# Patient Record
Sex: FEMALE | Race: WHITE | NOT HISPANIC OR LATINO | Employment: OTHER | URBAN - NONMETROPOLITAN AREA
[De-identification: names, ages, dates, MRNs, and addresses within clinical notes are randomized per-mention and may not be internally consistent; named-entity substitution may affect disease eponyms.]

---

## 2017-03-04 ENCOUNTER — APPOINTMENT (OUTPATIENT)
Dept: CT IMAGING | Facility: HOSPITAL | Age: 72
End: 2017-03-04

## 2017-03-04 ENCOUNTER — HOSPITAL ENCOUNTER (EMERGENCY)
Facility: HOSPITAL | Age: 72
Discharge: HOME | End: 2017-03-04
Attending: EMERGENCY MEDICINE | Admitting: EMERGENCY MEDICINE

## 2017-03-04 ENCOUNTER — APPOINTMENT (OUTPATIENT)
Dept: GENERAL RADIOLOGY | Facility: HOSPITAL | Age: 72
End: 2017-03-04

## 2017-03-04 VITALS
SYSTOLIC BLOOD PRESSURE: 135 MMHG | BODY MASS INDEX: 33.8 KG/M2 | DIASTOLIC BLOOD PRESSURE: 56 MMHG | OXYGEN SATURATION: 98 % | WEIGHT: 198 LBS | RESPIRATION RATE: 18 BRPM | TEMPERATURE: 97.9 F | HEIGHT: 64 IN | HEART RATE: 65 BPM
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
## 2017-03-04 DIAGNOSIS — S30.0XXA PELVIC CONTUSION, INITIAL ENCOUNTER: ICD-10-CM

## 2017-03-04 DIAGNOSIS — R73.9 HYPERGLYCEMIA: ICD-10-CM

## 2017-03-04 DIAGNOSIS — N28.9 RENAL INSUFFICIENCY: ICD-10-CM

## 2017-03-04 LAB
ALBUMIN SERPL-MCNC: 3.9 G/DL (ref 3.5–5)
ALBUMIN/GLOB SERPL: 1.3 G/DL (ref 1.1–2.5)
ALP SERPL-CCNC: 84 U/L (ref 24–120)
ALT SERPL W P-5'-P-CCNC: 45 U/L (ref 0–54)
ANION GAP SERPL CALCULATED.3IONS-SCNC: 11 MMOL/L (ref 4–13)
AST SERPL-CCNC: 55 U/L (ref 7–45)
BASOPHILS # BLD AUTO: 0.03 10*3/MM3 (ref 0–0.2)
BASOPHILS NFR BLD AUTO: 0.5 % (ref 0–2)
BILIRUB SERPL-MCNC: 0.2 MG/DL (ref 0.1–1)
BUN BLD-MCNC: 42 MG/DL (ref 5–21)
BUN/CREAT SERPL: 28.6 (ref 7–25)
CALCIUM SPEC-SCNC: 9.7 MG/DL (ref 8.4–10.4)
CHLORIDE SERPL-SCNC: 101 MMOL/L (ref 98–110)
CO2 SERPL-SCNC: 28 MMOL/L (ref 24–31)
CREAT BLD-MCNC: 1.47 MG/DL (ref 0.5–1.4)
DEPRECATED RDW RBC AUTO: 42.6 FL (ref 40–54)
EOSINOPHIL # BLD AUTO: 0.32 10*3/MM3 (ref 0–0.7)
EOSINOPHIL NFR BLD AUTO: 5.3 % (ref 0–4)
ERYTHROCYTE [DISTWIDTH] IN BLOOD BY AUTOMATED COUNT: 13.5 % (ref 12–15)
GFR SERPL CREATININE-BSD FRML MDRD: 35 ML/MIN/1.73
GLOBULIN UR ELPH-MCNC: 3 GM/DL
GLUCOSE BLD-MCNC: 232 MG/DL (ref 70–100)
HCT VFR BLD AUTO: 33.8 % (ref 37–47)
HGB BLD-MCNC: 11.5 G/DL (ref 12–16)
IMM GRANULOCYTES # BLD: 0.03 10*3/MM3 (ref 0–0.03)
IMM GRANULOCYTES NFR BLD: 0.5 % (ref 0–5)
LYMPHOCYTES # BLD AUTO: 1.74 10*3/MM3 (ref 0.72–4.86)
LYMPHOCYTES NFR BLD AUTO: 28.7 % (ref 15–45)
MCH RBC QN AUTO: 29.9 PG (ref 28–32)
MCHC RBC AUTO-ENTMCNC: 34 G/DL (ref 33–36)
MCV RBC AUTO: 87.8 FL (ref 82–98)
MONOCYTES # BLD AUTO: 0.62 10*3/MM3 (ref 0.19–1.3)
MONOCYTES NFR BLD AUTO: 10.2 % (ref 4–12)
NEUTROPHILS # BLD AUTO: 3.33 10*3/MM3 (ref 1.87–8.4)
NEUTROPHILS NFR BLD AUTO: 54.8 % (ref 39–78)
PLATELET # BLD AUTO: 200 10*3/MM3 (ref 130–400)
PMV BLD AUTO: 10.1 FL (ref 6–12)
POTASSIUM BLD-SCNC: 3.9 MMOL/L (ref 3.5–5.3)
PROT SERPL-MCNC: 6.9 G/DL (ref 6.3–8.7)
RBC # BLD AUTO: 3.85 10*6/MM3 (ref 4.2–5.4)
SODIUM BLD-SCNC: 140 MMOL/L (ref 135–145)
WBC NRBC COR # BLD: 6.07 10*3/MM3 (ref 4.8–10.8)

## 2017-03-04 PROCEDURE — 99285 EMERGENCY DEPT VISIT HI MDM: CPT

## 2017-03-04 PROCEDURE — 72170 X-RAY EXAM OF PELVIS: CPT

## 2017-03-04 PROCEDURE — 85025 COMPLETE CBC W/AUTO DIFF WBC: CPT | Performed by: EMERGENCY MEDICINE

## 2017-03-04 PROCEDURE — 72131 CT LUMBAR SPINE W/O DYE: CPT

## 2017-03-04 PROCEDURE — 72192 CT PELVIS W/O DYE: CPT

## 2017-03-04 PROCEDURE — 80053 COMPREHEN METABOLIC PANEL: CPT | Performed by: EMERGENCY MEDICINE

## 2017-03-04 RX ORDER — TRAMADOL HYDROCHLORIDE 50 MG/1
50 TABLET ORAL EVERY 6 HOURS PRN
Status: ON HOLD | COMMUNITY
End: 2018-03-05

## 2017-03-04 NOTE — ED PROVIDER NOTES
Subjective   Patient is a 71 y.o. female presenting with fall.   Fall   Mechanism of injury: fall    Injury location:  Pelvis  Pelvic injury location:  Pelvis  Incident location:  Nursing home  Arrived directly from scene: yes    Fall:     Fall occurred: from a wheelchair.    Impact surface:  Hard floor    Entrapped after fall: no    Prior to arrival data:     Bystander interventions:  None    Airway condition since incident:  Stable    Breathing condition since incident:  Stable    Circulation condition since incident:  Stable    Mental status condition since incident:  Stable    Disability condition since incident:  Stable  Associated symptoms: no abdominal pain, no back pain, no blindness, no headaches, no hearing loss, no loss of consciousness, no neck pain, no seizures and no vomiting    Risk factors: no AICD, no anticoagulation therapy, no asthma, no diabetes, no dialysis, no hemophilia, no kidney disease, no past MI, not pregnant and no steroid use        Review of Systems   Constitutional: Negative.    HENT: Negative.  Negative for hearing loss.    Eyes: Negative.  Negative for blindness.   Respiratory: Negative.    Cardiovascular: Negative.    Gastrointestinal: Negative.  Negative for abdominal pain and vomiting.   Musculoskeletal: Negative.  Negative for back pain and neck pain.   Skin: Negative.    Neurological: Negative.  Negative for seizures, loss of consciousness and headaches.   All other systems reviewed and are negative.      Past Medical History   Diagnosis Date   • COPD (chronic obstructive pulmonary disease)    • Coronary artery disease    • Diabetes mellitus    • Hypertension    • Peptic ulcer disease    • Renal insufficiency    • Venous insufficiency        Allergies   Allergen Reactions   • Aspirin Rash   • Tetracyclines & Related Rash       Past Surgical History   Procedure Laterality Date   • Cholecystectomy     • Colon surgery         History reviewed. No pertinent family  history.    Social History     Social History   • Marital status:      Spouse name: N/A   • Number of children: N/A   • Years of education: N/A     Social History Main Topics   • Smoking status: Former Smoker   • Smokeless tobacco: None   • Alcohol use No   • Drug use: No   • Sexual activity: Not Asked     Other Topics Concern   • None     Social History Narrative           Objective   Physical Exam   Constitutional: She is oriented to person, place, and time. She appears well-developed and well-nourished.   HENT:   Head: Normocephalic and atraumatic.   Eyes: Conjunctivae and EOM are normal. Pupils are equal, round, and reactive to light.   Neck: Normal range of motion. Neck supple.   Cardiovascular: Normal rate, regular rhythm, normal heart sounds and intact distal pulses.    Pulmonary/Chest: Effort normal and breath sounds normal.   Abdominal: Soft. Bowel sounds are normal.   Musculoskeletal: Normal range of motion.        Cervical back: Normal.        Thoracic back: Normal.        Lumbar back: She exhibits bony tenderness and pain. She exhibits normal range of motion, no tenderness, no swelling, no edema, no deformity and no spasm.   Neurological: She is alert and oriented to person, place, and time. She has normal reflexes.   Skin: Skin is warm and dry.   Psychiatric: She has a normal mood and affect.   Nursing note and vitals reviewed.      Procedures         ED Course  ED Course   Comment By Time   CTs are negative creatinine is slightly up the patient states that she has got some renal insufficiency all discharge her home with follow-up Andres Oropeza MD 03/04 3470                  [facility]    Final diagnoses:   Fall, initial encounter   Pelvic contusion, initial encounter   Renal insufficiency   Hyperglycemia            Andres Oropeza MD  03/04/17 [id number]

## 2017-03-04 NOTE — ED NOTES
Consulted with charge nurse regarding discharge and transport for patient.      Carissa Bacon RN  03/04/17 [id number]

## 2017-08-29 ENCOUNTER — TRANSCRIBE ORDERS (OUTPATIENT)
Dept: ADMINISTRATIVE | Facility: HOSPITAL | Age: 72
End: 2017-08-29

## 2017-08-29 DIAGNOSIS — N64.4 BREAST PAIN, RIGHT: Primary | ICD-10-CM

## 2017-09-07 ENCOUNTER — HOSPITAL ENCOUNTER (OUTPATIENT)
Dept: MAMMOGRAPHY | Facility: HOSPITAL | Age: 72
Discharge: HOME | End: 2017-09-07
Admitting: GENERAL PRACTICE

## 2017-09-07 DIAGNOSIS — N64.4 BREAST PAIN, RIGHT: ICD-10-CM

## 2017-09-07 PROCEDURE — G0279 TOMOSYNTHESIS, MAMMO: HCPCS

## 2017-09-07 PROCEDURE — G0204 DX MAMMO INCL CAD BI: HCPCS

## 2017-11-17 ENCOUNTER — HOSPITAL ENCOUNTER (INPATIENT)
Facility: HOSPITAL | Age: 72
LOS: 5 days | Discharge: NURSING, REHAB OR LONG-TERM CARE | End: 2017-11-22
Attending: EMERGENCY MEDICINE | Admitting: FAMILY MEDICINE

## 2017-11-17 ENCOUNTER — APPOINTMENT (OUTPATIENT)
Dept: GENERAL RADIOLOGY | Facility: HOSPITAL | Age: 72
End: 2017-11-17

## 2017-11-17 ENCOUNTER — APPOINTMENT (OUTPATIENT)
Dept: CT IMAGING | Facility: HOSPITAL | Age: 72
End: 2017-11-17

## 2017-11-17 DIAGNOSIS — R50.9 FEVER, UNSPECIFIED FEVER CAUSE: ICD-10-CM

## 2017-11-17 DIAGNOSIS — Z74.09 IMPAIRED MOBILITY: ICD-10-CM

## 2017-11-17 DIAGNOSIS — N18.30 ACUTE RENAL FAILURE WITH ACUTE TUBULAR NECROSIS SUPERIMPOSED ON STAGE 3 CHRONIC KIDNEY DISEASE (HCC): ICD-10-CM

## 2017-11-17 DIAGNOSIS — N39.0 URINARY TRACT INFECTION WITHOUT HEMATURIA, SITE UNSPECIFIED: Primary | ICD-10-CM

## 2017-11-17 DIAGNOSIS — N17.0 ACUTE RENAL FAILURE WITH ACUTE TUBULAR NECROSIS SUPERIMPOSED ON STAGE 3 CHRONIC KIDNEY DISEASE (HCC): ICD-10-CM

## 2017-11-17 LAB
ALBUMIN SERPL-MCNC: 3.5 G/DL (ref 3.5–5)
ALBUMIN/GLOB SERPL: 1.1 G/DL (ref 1.1–2.5)
ALP SERPL-CCNC: 80 U/L (ref 24–120)
ALT SERPL W P-5'-P-CCNC: 53 U/L (ref 0–54)
ANION GAP SERPL CALCULATED.3IONS-SCNC: 12 MMOL/L (ref 4–13)
AST SERPL-CCNC: 104 U/L (ref 7–45)
BACTERIA BLD CULT: ABNORMAL
BACTERIA UR QL AUTO: ABNORMAL /HPF
BASOPHILS # BLD AUTO: 0.01 10*3/MM3 (ref 0–0.2)
BASOPHILS NFR BLD AUTO: 0.1 % (ref 0–2)
BILIRUB SERPL-MCNC: 0.6 MG/DL (ref 0.1–1)
BILIRUB UR QL STRIP: NEGATIVE
BUN BLD-MCNC: 76 MG/DL (ref 5–21)
BUN/CREAT SERPL: 24.8 (ref 7–25)
CALCIUM SPEC-SCNC: 8.2 MG/DL (ref 8.4–10.4)
CHLORIDE SERPL-SCNC: 100 MMOL/L (ref 98–110)
CLARITY UR: ABNORMAL
CO2 SERPL-SCNC: 28 MMOL/L (ref 24–31)
COLOR UR: YELLOW
CREAT BLD-MCNC: 3.06 MG/DL (ref 0.5–1.4)
D-LACTATE SERPL-SCNC: 1.2 MMOL/L (ref 0.5–2)
DEPRECATED RDW RBC AUTO: 45.9 FL (ref 40–54)
EOSINOPHIL # BLD AUTO: 0 10*3/MM3 (ref 0–0.7)
EOSINOPHIL NFR BLD AUTO: 0 % (ref 0–4)
ERYTHROCYTE [DISTWIDTH] IN BLOOD BY AUTOMATED COUNT: 14 % (ref 12–15)
GFR SERPL CREATININE-BSD FRML MDRD: 15 ML/MIN/1.73
GLOBULIN UR ELPH-MCNC: 3.1 GM/DL
GLUCOSE BLD-MCNC: 299 MG/DL (ref 70–100)
GLUCOSE BLDC GLUCOMTR-MCNC: 263 MG/DL (ref 70–130)
GLUCOSE UR STRIP-MCNC: NEGATIVE MG/DL
HCT VFR BLD AUTO: 31.4 % (ref 37–47)
HGB BLD-MCNC: 10.5 G/DL (ref 12–16)
HGB UR QL STRIP.AUTO: ABNORMAL
HOLD SPECIMEN: NORMAL
HOLD SPECIMEN: NORMAL
IMM GRANULOCYTES # BLD: 0.05 10*3/MM3 (ref 0–0.03)
IMM GRANULOCYTES NFR BLD: 0.6 % (ref 0–5)
KETONES UR QL STRIP: ABNORMAL
LEUKOCYTE ESTERASE UR QL STRIP.AUTO: ABNORMAL
LYMPHOCYTES # BLD AUTO: 0.72 10*3/MM3 (ref 0.72–4.86)
LYMPHOCYTES NFR BLD AUTO: 8.3 % (ref 15–45)
MCH RBC QN AUTO: 30.5 PG (ref 28–32)
MCHC RBC AUTO-ENTMCNC: 33.4 G/DL (ref 33–36)
MCV RBC AUTO: 91.3 FL (ref 82–98)
MONOCYTES # BLD AUTO: 1.08 10*3/MM3 (ref 0.19–1.3)
MONOCYTES NFR BLD AUTO: 12.5 % (ref 4–12)
NEUTROPHILS # BLD AUTO: 6.78 10*3/MM3 (ref 1.87–8.4)
NEUTROPHILS NFR BLD AUTO: 78.5 % (ref 39–78)
NITRITE UR QL STRIP: NEGATIVE
PH UR STRIP.AUTO: <=5 [PH] (ref 5–8)
PLATELET # BLD AUTO: 130 10*3/MM3 (ref 130–400)
PMV BLD AUTO: 10 FL (ref 6–12)
POTASSIUM BLD-SCNC: 4.2 MMOL/L (ref 3.5–5.3)
PROT SERPL-MCNC: 6.6 G/DL (ref 6.3–8.7)
PROT UR QL STRIP: ABNORMAL
RBC # BLD AUTO: 3.44 10*6/MM3 (ref 4.2–5.4)
RBC # UR: ABNORMAL /HPF
REF LAB TEST METHOD: ABNORMAL
SODIUM BLD-SCNC: 140 MMOL/L (ref 135–145)
SP GR UR STRIP: 1.02 (ref 1–1.03)
SQUAMOUS #/AREA URNS HPF: ABNORMAL /HPF
UROBILINOGEN UR QL STRIP: ABNORMAL
WBC NRBC COR # BLD: 8.64 10*3/MM3 (ref 4.8–10.8)
WBC UR QL AUTO: ABNORMAL /HPF
WHOLE BLOOD HOLD SPECIMEN: NORMAL
WHOLE BLOOD HOLD SPECIMEN: NORMAL

## 2017-11-17 PROCEDURE — 63710000001 INSULIN ISOPHANE & REGULAR PER 5 UNITS: Performed by: INTERNAL MEDICINE

## 2017-11-17 PROCEDURE — 25010000002 CEFTRIAXONE PER 250 MG: Performed by: EMERGENCY MEDICINE

## 2017-11-17 PROCEDURE — 87088 URINE BACTERIA CULTURE: CPT | Performed by: EMERGENCY MEDICINE

## 2017-11-17 PROCEDURE — 93010 ELECTROCARDIOGRAM REPORT: CPT | Performed by: INTERNAL MEDICINE

## 2017-11-17 PROCEDURE — 87040 BLOOD CULTURE FOR BACTERIA: CPT | Performed by: EMERGENCY MEDICINE

## 2017-11-17 PROCEDURE — 87186 SC STD MICRODIL/AGAR DIL: CPT | Performed by: EMERGENCY MEDICINE

## 2017-11-17 PROCEDURE — 82962 GLUCOSE BLOOD TEST: CPT

## 2017-11-17 PROCEDURE — 71010 HC CHEST PA OR AP: CPT

## 2017-11-17 PROCEDURE — 93005 ELECTROCARDIOGRAM TRACING: CPT | Performed by: EMERGENCY MEDICINE

## 2017-11-17 PROCEDURE — 99285 EMERGENCY DEPT VISIT HI MDM: CPT

## 2017-11-17 PROCEDURE — 74176 CT ABD & PELVIS W/O CONTRAST: CPT

## 2017-11-17 PROCEDURE — 80053 COMPREHEN METABOLIC PANEL: CPT | Performed by: EMERGENCY MEDICINE

## 2017-11-17 PROCEDURE — 83605 ASSAY OF LACTIC ACID: CPT | Performed by: EMERGENCY MEDICINE

## 2017-11-17 PROCEDURE — 87150 DNA/RNA AMPLIFIED PROBE: CPT | Performed by: EMERGENCY MEDICINE

## 2017-11-17 PROCEDURE — 81001 URINALYSIS AUTO W/SCOPE: CPT | Performed by: EMERGENCY MEDICINE

## 2017-11-17 PROCEDURE — 85025 COMPLETE CBC W/AUTO DIFF WBC: CPT | Performed by: EMERGENCY MEDICINE

## 2017-11-17 PROCEDURE — 25010000002 ENOXAPARIN PER 10 MG: Performed by: INTERNAL MEDICINE

## 2017-11-17 PROCEDURE — 87086 URINE CULTURE/COLONY COUNT: CPT | Performed by: EMERGENCY MEDICINE

## 2017-11-17 PROCEDURE — P9612 CATHETERIZE FOR URINE SPEC: HCPCS

## 2017-11-17 RX ORDER — TRAMADOL HYDROCHLORIDE 50 MG/1
50 TABLET ORAL EVERY 6 HOURS PRN
Status: DISCONTINUED | OUTPATIENT
Start: 2017-11-17 | End: 2017-11-22 | Stop reason: HOSPADM

## 2017-11-17 RX ORDER — SODIUM CHLORIDE 9 MG/ML
125 INJECTION, SOLUTION INTRAVENOUS CONTINUOUS
Status: DISCONTINUED | OUTPATIENT
Start: 2017-11-17 | End: 2017-11-18

## 2017-11-17 RX ORDER — PANTOPRAZOLE SODIUM 40 MG/1
40 TABLET, DELAYED RELEASE ORAL EVERY 12 HOURS
Status: ON HOLD | COMMUNITY
End: 2018-03-07

## 2017-11-17 RX ORDER — ACETAMINOPHEN 325 MG/1
650 TABLET ORAL EVERY 4 HOURS PRN
Status: DISCONTINUED | OUTPATIENT
Start: 2017-11-17 | End: 2017-11-22 | Stop reason: HOSPADM

## 2017-11-17 RX ORDER — MULTIVIT,CALC,MINS/IRON/FOLIC 9MG-400MCG
1 TABLET ORAL DAILY
Status: DISCONTINUED | OUTPATIENT
Start: 2017-11-17 | End: 2017-11-22 | Stop reason: HOSPADM

## 2017-11-17 RX ORDER — CLOPIDOGREL BISULFATE 75 MG/1
75 TABLET ORAL DAILY
Status: DISCONTINUED | OUTPATIENT
Start: 2017-11-17 | End: 2017-11-22 | Stop reason: HOSPADM

## 2017-11-17 RX ORDER — CHOLESTYRAMINE LIGHT 4 G/5.7G
1 POWDER, FOR SUSPENSION ORAL DAILY
Status: DISCONTINUED | OUTPATIENT
Start: 2017-11-17 | End: 2017-11-22 | Stop reason: HOSPADM

## 2017-11-17 RX ORDER — SODIUM CHLORIDE 0.9 % (FLUSH) 0.9 %
1-10 SYRINGE (ML) INJECTION AS NEEDED
Status: DISCONTINUED | OUTPATIENT
Start: 2017-11-17 | End: 2017-11-22 | Stop reason: HOSPADM

## 2017-11-17 RX ORDER — ACETAMINOPHEN 500 MG
1000 TABLET ORAL ONCE
Status: COMPLETED | OUTPATIENT
Start: 2017-11-17 | End: 2017-11-17

## 2017-11-17 RX ORDER — CYCLOSPORINE 0.5 MG/ML
1 EMULSION OPHTHALMIC 2 TIMES DAILY
Status: DISCONTINUED | OUTPATIENT
Start: 2017-11-17 | End: 2017-11-22 | Stop reason: HOSPADM

## 2017-11-17 RX ORDER — ATORVASTATIN CALCIUM 10 MG/1
10 TABLET, FILM COATED ORAL NIGHTLY
Status: DISCONTINUED | OUTPATIENT
Start: 2017-11-17 | End: 2017-11-22 | Stop reason: HOSPADM

## 2017-11-17 RX ORDER — MUPIROCIN CALCIUM 20 MG/G
CREAM TOPICAL EVERY 12 HOURS SCHEDULED
Status: DISCONTINUED | OUTPATIENT
Start: 2017-11-17 | End: 2017-11-22 | Stop reason: HOSPADM

## 2017-11-17 RX ORDER — ATENOLOL 25 MG/1
25 TABLET ORAL DAILY
Status: DISCONTINUED | OUTPATIENT
Start: 2017-11-17 | End: 2017-11-19

## 2017-11-17 RX ORDER — INSULIN ASPART 100 [IU]/ML
70 INJECTION, SUSPENSION SUBCUTANEOUS 2 TIMES DAILY WITH MEALS
Status: DISCONTINUED | OUTPATIENT
Start: 2017-11-17 | End: 2017-11-17 | Stop reason: SDUPTHER

## 2017-11-17 RX ORDER — SODIUM CHLORIDE 0.9 % (FLUSH) 0.9 %
10 SYRINGE (ML) INJECTION AS NEEDED
Status: DISCONTINUED | OUTPATIENT
Start: 2017-11-17 | End: 2017-11-22 | Stop reason: HOSPADM

## 2017-11-17 RX ORDER — TOBRAMYCIN AND DEXAMETHASONE 3; 1 MG/ML; MG/ML
2 SUSPENSION/ DROPS OPHTHALMIC
Status: DISCONTINUED | OUTPATIENT
Start: 2017-11-17 | End: 2017-11-22 | Stop reason: HOSPADM

## 2017-11-17 RX ORDER — ACETAMINOPHEN 325 MG/1
650 TABLET ORAL EVERY 6 HOURS PRN
COMMUNITY
End: 2018-03-07 | Stop reason: HOSPADM

## 2017-11-17 RX ORDER — TRAMADOL HYDROCHLORIDE 100 MG/1
100 TABLET, EXTENDED RELEASE ORAL EVERY 6 HOURS PRN
COMMUNITY
End: 2017-11-22 | Stop reason: HOSPADM

## 2017-11-17 RX ORDER — SODIUM CHLORIDE 9 MG/ML
100 INJECTION, SOLUTION INTRAVENOUS CONTINUOUS
Status: DISCONTINUED | OUTPATIENT
Start: 2017-11-17 | End: 2017-11-19

## 2017-11-17 RX ORDER — GABAPENTIN 100 MG/1
100 CAPSULE ORAL 3 TIMES DAILY
Status: DISCONTINUED | OUTPATIENT
Start: 2017-11-17 | End: 2017-11-22 | Stop reason: HOSPADM

## 2017-11-17 RX ORDER — CETIRIZINE HYDROCHLORIDE 10 MG/1
10 TABLET ORAL DAILY
Status: DISCONTINUED | OUTPATIENT
Start: 2017-11-17 | End: 2017-11-22 | Stop reason: HOSPADM

## 2017-11-17 RX ORDER — ASPIRIN 81 MG/1
81 TABLET ORAL DAILY
Status: DISCONTINUED | OUTPATIENT
Start: 2017-11-17 | End: 2017-11-17

## 2017-11-17 RX ORDER — ONDANSETRON 2 MG/ML
4 INJECTION INTRAMUSCULAR; INTRAVENOUS EVERY 6 HOURS PRN
Status: DISCONTINUED | OUTPATIENT
Start: 2017-11-17 | End: 2017-11-22 | Stop reason: HOSPADM

## 2017-11-17 RX ADMIN — CLOPIDOGREL BISULFATE 75 MG: 75 TABLET, FILM COATED ORAL at 17:25

## 2017-11-17 RX ADMIN — GABAPENTIN 100 MG: 100 CAPSULE ORAL at 22:57

## 2017-11-17 RX ADMIN — INSULIN HUMAN 70 UNITS: 100 INJECTION, SUSPENSION SUBCUTANEOUS at 18:49

## 2017-11-17 RX ADMIN — MUPIROCIN: 2 CREAM TOPICAL at 22:58

## 2017-11-17 RX ADMIN — ACETAMINOPHEN 1000 MG: 500 TABLET, FILM COATED ORAL at 10:08

## 2017-11-17 RX ADMIN — TOBRAMYCIN AND DEXAMETHASONE 2 DROP: 3; 1 SUSPENSION/ DROPS OPHTHALMIC at 17:27

## 2017-11-17 RX ADMIN — SODIUM CHLORIDE 1000 ML: 9 INJECTION, SOLUTION INTRAVENOUS at 09:48

## 2017-11-17 RX ADMIN — GABAPENTIN 100 MG: 100 CAPSULE ORAL at 17:25

## 2017-11-17 RX ADMIN — ENOXAPARIN SODIUM 30 MG: 30 INJECTION SUBCUTANEOUS at 17:25

## 2017-11-17 RX ADMIN — CYCLOSPORINE 1 DROP: 0.5 EMULSION OPHTHALMIC at 17:25

## 2017-11-17 RX ADMIN — ATORVASTATIN CALCIUM 10 MG: 10 TABLET, FILM COATED ORAL at 22:57

## 2017-11-17 RX ADMIN — SODIUM CHLORIDE 100 ML/HR: 9 INJECTION, SOLUTION INTRAVENOUS at 18:39

## 2017-11-17 RX ADMIN — MUPIROCIN: 2 CREAM TOPICAL at 17:26

## 2017-11-17 RX ADMIN — CEFTRIAXONE SODIUM 1 G: 1 INJECTION, POWDER, FOR SOLUTION INTRAMUSCULAR; INTRAVENOUS at 10:08

## 2017-11-17 RX ADMIN — TOBRAMYCIN AND DEXAMETHASONE 2 DROP: 3; 1 SUSPENSION/ DROPS OPHTHALMIC at 22:57

## 2017-11-18 LAB
ANION GAP SERPL CALCULATED.3IONS-SCNC: 11 MMOL/L (ref 4–13)
BASOPHILS # BLD AUTO: 0.01 10*3/MM3 (ref 0–0.2)
BASOPHILS NFR BLD AUTO: 0.1 % (ref 0–2)
BUN BLD-MCNC: 68 MG/DL (ref 5–21)
BUN/CREAT SERPL: 28.8 (ref 7–25)
CALCIUM SPEC-SCNC: 7.7 MG/DL (ref 8.4–10.4)
CHLORIDE SERPL-SCNC: 107 MMOL/L (ref 98–110)
CO2 SERPL-SCNC: 27 MMOL/L (ref 24–31)
CREAT BLD-MCNC: 2.36 MG/DL (ref 0.5–1.4)
DEPRECATED RDW RBC AUTO: 48.3 FL (ref 40–54)
EOSINOPHIL # BLD AUTO: 0.04 10*3/MM3 (ref 0–0.7)
EOSINOPHIL NFR BLD AUTO: 0.5 % (ref 0–4)
ERYTHROCYTE [DISTWIDTH] IN BLOOD BY AUTOMATED COUNT: 14.4 % (ref 12–15)
GFR SERPL CREATININE-BSD FRML MDRD: 20 ML/MIN/1.73
GLUCOSE BLD-MCNC: 208 MG/DL (ref 70–100)
GLUCOSE BLDC GLUCOMTR-MCNC: 160 MG/DL (ref 70–130)
GLUCOSE BLDC GLUCOMTR-MCNC: 197 MG/DL (ref 70–130)
GLUCOSE BLDC GLUCOMTR-MCNC: 206 MG/DL (ref 70–130)
GLUCOSE BLDC GLUCOMTR-MCNC: 221 MG/DL (ref 70–130)
HCT VFR BLD AUTO: 31.6 % (ref 37–47)
HGB BLD-MCNC: 10.3 G/DL (ref 12–16)
IMM GRANULOCYTES # BLD: 0.07 10*3/MM3 (ref 0–0.03)
IMM GRANULOCYTES NFR BLD: 0.9 % (ref 0–5)
LYMPHOCYTES # BLD AUTO: 0.52 10*3/MM3 (ref 0.72–4.86)
LYMPHOCYTES NFR BLD AUTO: 6.9 % (ref 15–45)
MCH RBC QN AUTO: 29.9 PG (ref 28–32)
MCHC RBC AUTO-ENTMCNC: 32.6 G/DL (ref 33–36)
MCV RBC AUTO: 91.9 FL (ref 82–98)
MONOCYTES # BLD AUTO: 0.84 10*3/MM3 (ref 0.19–1.3)
MONOCYTES NFR BLD AUTO: 11.1 % (ref 4–12)
NEUTROPHILS # BLD AUTO: 6.11 10*3/MM3 (ref 1.87–8.4)
NEUTROPHILS NFR BLD AUTO: 80.5 % (ref 39–78)
PLATELET # BLD AUTO: 132 10*3/MM3 (ref 130–400)
PMV BLD AUTO: 10.6 FL (ref 6–12)
POTASSIUM BLD-SCNC: 4.2 MMOL/L (ref 3.5–5.3)
RBC # BLD AUTO: 3.44 10*6/MM3 (ref 4.2–5.4)
SODIUM BLD-SCNC: 145 MMOL/L (ref 135–145)
WBC NRBC COR # BLD: 7.59 10*3/MM3 (ref 4.8–10.8)

## 2017-11-18 PROCEDURE — 80048 BASIC METABOLIC PNL TOTAL CA: CPT | Performed by: INTERNAL MEDICINE

## 2017-11-18 PROCEDURE — 25010000002 ENOXAPARIN PER 10 MG: Performed by: INTERNAL MEDICINE

## 2017-11-18 PROCEDURE — 82962 GLUCOSE BLOOD TEST: CPT

## 2017-11-18 PROCEDURE — 25010000002 CEFTRIAXONE PER 250 MG: Performed by: INTERNAL MEDICINE

## 2017-11-18 PROCEDURE — 85025 COMPLETE CBC W/AUTO DIFF WBC: CPT | Performed by: INTERNAL MEDICINE

## 2017-11-18 PROCEDURE — 63710000001 INSULIN LISPRO (HUMAN) PER 5 UNITS: Performed by: FAMILY MEDICINE

## 2017-11-18 RX ORDER — NICOTINE POLACRILEX 4 MG
15 LOZENGE BUCCAL
Status: DISCONTINUED | OUTPATIENT
Start: 2017-11-18 | End: 2017-11-22 | Stop reason: HOSPADM

## 2017-11-18 RX ORDER — DEXTROSE MONOHYDRATE 25 G/50ML
25 INJECTION, SOLUTION INTRAVENOUS
Status: DISCONTINUED | OUTPATIENT
Start: 2017-11-18 | End: 2017-11-22 | Stop reason: HOSPADM

## 2017-11-18 RX ADMIN — Medication 1 TABLET: at 08:37

## 2017-11-18 RX ADMIN — GABAPENTIN 100 MG: 100 CAPSULE ORAL at 21:34

## 2017-11-18 RX ADMIN — INSULIN HUMAN 70 UNITS: 100 INJECTION, SUSPENSION SUBCUTANEOUS at 08:50

## 2017-11-18 RX ADMIN — GABAPENTIN 100 MG: 100 CAPSULE ORAL at 08:37

## 2017-11-18 RX ADMIN — CYCLOSPORINE 1 DROP: 0.5 EMULSION OPHTHALMIC at 17:12

## 2017-11-18 RX ADMIN — SODIUM CHLORIDE 100 ML/HR: 9 INJECTION, SOLUTION INTRAVENOUS at 14:42

## 2017-11-18 RX ADMIN — TOBRAMYCIN AND DEXAMETHASONE 2 DROP: 3; 1 SUSPENSION/ DROPS OPHTHALMIC at 14:17

## 2017-11-18 RX ADMIN — CYCLOSPORINE 1 DROP: 0.5 EMULSION OPHTHALMIC at 08:37

## 2017-11-18 RX ADMIN — CLOPIDOGREL BISULFATE 75 MG: 75 TABLET, FILM COATED ORAL at 08:36

## 2017-11-18 RX ADMIN — INSULIN LISPRO 3 UNITS: 100 INJECTION, SOLUTION INTRAVENOUS; SUBCUTANEOUS at 17:16

## 2017-11-18 RX ADMIN — TOBRAMYCIN AND DEXAMETHASONE 2 DROP: 3; 1 SUSPENSION/ DROPS OPHTHALMIC at 05:19

## 2017-11-18 RX ADMIN — INSULIN HUMAN 70 UNITS: 100 INJECTION, SUSPENSION SUBCUTANEOUS at 17:16

## 2017-11-18 RX ADMIN — TOBRAMYCIN AND DEXAMETHASONE 2 DROP: 3; 1 SUSPENSION/ DROPS OPHTHALMIC at 21:35

## 2017-11-18 RX ADMIN — CHOLESTYRAMINE 4 G: 4 POWDER, FOR SUSPENSION ORAL at 08:37

## 2017-11-18 RX ADMIN — INSULIN LISPRO 2 UNITS: 100 INJECTION, SOLUTION INTRAVENOUS; SUBCUTANEOUS at 12:14

## 2017-11-18 RX ADMIN — SODIUM CHLORIDE 100 ML/HR: 9 INJECTION, SOLUTION INTRAVENOUS at 04:33

## 2017-11-18 RX ADMIN — CEFTRIAXONE SODIUM 1 G: 1 INJECTION, POWDER, FOR SOLUTION INTRAMUSCULAR; INTRAVENOUS at 09:10

## 2017-11-18 RX ADMIN — TOBRAMYCIN AND DEXAMETHASONE 2 DROP: 3; 1 SUSPENSION/ DROPS OPHTHALMIC at 09:10

## 2017-11-18 RX ADMIN — MUPIROCIN: 2 CREAM TOPICAL at 08:37

## 2017-11-18 RX ADMIN — INSULIN LISPRO 3 UNITS: 100 INJECTION, SOLUTION INTRAVENOUS; SUBCUTANEOUS at 21:35

## 2017-11-18 RX ADMIN — ENOXAPARIN SODIUM 30 MG: 30 INJECTION SUBCUTANEOUS at 14:42

## 2017-11-18 RX ADMIN — MUPIROCIN: 2 CREAM TOPICAL at 21:35

## 2017-11-18 RX ADMIN — TOBRAMYCIN AND DEXAMETHASONE 2 DROP: 3; 1 SUSPENSION/ DROPS OPHTHALMIC at 17:12

## 2017-11-18 RX ADMIN — ATORVASTATIN CALCIUM 10 MG: 10 TABLET, FILM COATED ORAL at 21:34

## 2017-11-18 RX ADMIN — CETIRIZINE HYDROCHLORIDE 10 MG: 10 TABLET, FILM COATED ORAL at 08:37

## 2017-11-18 RX ADMIN — INSULIN LISPRO 2 UNITS: 100 INJECTION, SOLUTION INTRAVENOUS; SUBCUTANEOUS at 08:50

## 2017-11-19 ENCOUNTER — APPOINTMENT (OUTPATIENT)
Dept: GENERAL RADIOLOGY | Facility: HOSPITAL | Age: 72
End: 2017-11-19

## 2017-11-19 LAB
ANION GAP SERPL CALCULATED.3IONS-SCNC: 10 MMOL/L (ref 4–13)
ARTERIAL PATENCY WRIST A: POSITIVE
ATMOSPHERIC PRESS: 756 MMHG
BACTERIA SPEC AEROBE CULT: ABNORMAL
BASE EXCESS BLDA CALC-SCNC: -2.5 MMOL/L (ref 0–2)
BASOPHILS # BLD AUTO: 0.05 10*3/MM3 (ref 0–0.2)
BASOPHILS NFR BLD AUTO: 0.6 % (ref 0–2)
BDY SITE: ABNORMAL
BODY TEMPERATURE: 37 C
BUN BLD-MCNC: 56 MG/DL (ref 5–21)
BUN/CREAT SERPL: 30.9 (ref 7–25)
CALCIUM SPEC-SCNC: 8.1 MG/DL (ref 8.4–10.4)
CHLORIDE SERPL-SCNC: 116 MMOL/L (ref 98–110)
CO2 SERPL-SCNC: 22 MMOL/L (ref 24–31)
CREAT BLD-MCNC: 1.81 MG/DL (ref 0.5–1.4)
DEPRECATED RDW RBC AUTO: 48.5 FL (ref 40–54)
EOSINOPHIL # BLD AUTO: 0.25 10*3/MM3 (ref 0–0.7)
EOSINOPHIL NFR BLD AUTO: 2.8 % (ref 0–4)
ERYTHROCYTE [DISTWIDTH] IN BLOOD BY AUTOMATED COUNT: 14.6 % (ref 12–15)
GAS FLOW AIRWAY: 2 LPM
GFR SERPL CREATININE-BSD FRML MDRD: 27 ML/MIN/1.73
GLUCOSE BLD-MCNC: 53 MG/DL (ref 70–100)
GLUCOSE BLDC GLUCOMTR-MCNC: 106 MG/DL (ref 70–130)
GLUCOSE BLDC GLUCOMTR-MCNC: 138 MG/DL (ref 70–130)
GLUCOSE BLDC GLUCOMTR-MCNC: 247 MG/DL (ref 70–130)
GLUCOSE BLDC GLUCOMTR-MCNC: 263 MG/DL (ref 70–130)
GLUCOSE BLDC GLUCOMTR-MCNC: 59 MG/DL (ref 70–130)
GRAM STN SPEC: ABNORMAL
GRAM STN SPEC: ABNORMAL
HCO3 BLDA-SCNC: 21.4 MMOL/L (ref 20–26)
HCT VFR BLD AUTO: 39.3 % (ref 37–47)
HGB BLD-MCNC: 12.7 G/DL (ref 12–16)
IMM GRANULOCYTES # BLD: 0.1 10*3/MM3 (ref 0–0.03)
IMM GRANULOCYTES NFR BLD: 1.1 % (ref 0–5)
ISOLATED FROM: ABNORMAL
ISOLATED FROM: ABNORMAL
LYMPHOCYTES # BLD AUTO: 0.99 10*3/MM3 (ref 0.72–4.86)
LYMPHOCYTES NFR BLD AUTO: 11.1 % (ref 15–45)
Lab: ABNORMAL
MCH RBC QN AUTO: 29.3 PG (ref 28–32)
MCHC RBC AUTO-ENTMCNC: 32.3 G/DL (ref 33–36)
MCV RBC AUTO: 90.8 FL (ref 82–98)
MODALITY: ABNORMAL
MONOCYTES # BLD AUTO: 0.92 10*3/MM3 (ref 0.19–1.3)
MONOCYTES NFR BLD AUTO: 10.3 % (ref 4–12)
NEUTROPHILS # BLD AUTO: 6.58 10*3/MM3 (ref 1.87–8.4)
NEUTROPHILS NFR BLD AUTO: 74.1 % (ref 39–78)
NRBC BLD MANUAL-RTO: 0.4 /100 WBC (ref 0–0)
PCO2 BLDA: 33.3 MM HG (ref 35–45)
PH BLDA: 7.42 PH UNITS (ref 7.35–7.45)
PLATELET # BLD AUTO: 124 10*3/MM3 (ref 130–400)
PMV BLD AUTO: 11.1 FL (ref 6–12)
PO2 BLDA: 93.7 MM HG (ref 83–108)
POTASSIUM BLD-SCNC: 4 MMOL/L (ref 3.5–5.3)
RBC # BLD AUTO: 4.33 10*6/MM3 (ref 4.2–5.4)
SAO2 % BLDCOA: 98.3 % (ref 94–99)
SODIUM BLD-SCNC: 148 MMOL/L (ref 135–145)
VENTILATOR MODE: ABNORMAL
WBC NRBC COR # BLD: 8.89 10*3/MM3 (ref 4.8–10.8)

## 2017-11-19 PROCEDURE — 36600 WITHDRAWAL OF ARTERIAL BLOOD: CPT

## 2017-11-19 PROCEDURE — 71020 HC CHEST PA AND LATERAL: CPT

## 2017-11-19 PROCEDURE — 25010000002 FUROSEMIDE PER 20 MG: Performed by: FAMILY MEDICINE

## 2017-11-19 PROCEDURE — 82803 BLOOD GASES ANY COMBINATION: CPT

## 2017-11-19 PROCEDURE — 80048 BASIC METABOLIC PNL TOTAL CA: CPT | Performed by: INTERNAL MEDICINE

## 2017-11-19 PROCEDURE — 82962 GLUCOSE BLOOD TEST: CPT

## 2017-11-19 PROCEDURE — 63710000001 INSULIN ISOPHANE & REGULAR PER 5 UNITS: Performed by: FAMILY MEDICINE

## 2017-11-19 PROCEDURE — 63710000001 INSULIN LISPRO (HUMAN) PER 5 UNITS: Performed by: FAMILY MEDICINE

## 2017-11-19 PROCEDURE — 85025 COMPLETE CBC W/AUTO DIFF WBC: CPT | Performed by: INTERNAL MEDICINE

## 2017-11-19 PROCEDURE — 25010000002 CEFTRIAXONE PER 250 MG: Performed by: INTERNAL MEDICINE

## 2017-11-19 PROCEDURE — 25010000002 ENOXAPARIN PER 10 MG: Performed by: INTERNAL MEDICINE

## 2017-11-19 RX ORDER — ATENOLOL 50 MG/1
50 TABLET ORAL DAILY
Status: DISCONTINUED | OUTPATIENT
Start: 2017-11-20 | End: 2017-11-22 | Stop reason: HOSPADM

## 2017-11-19 RX ORDER — IPRATROPIUM BROMIDE AND ALBUTEROL SULFATE 2.5; .5 MG/3ML; MG/3ML
3 SOLUTION RESPIRATORY (INHALATION) EVERY 4 HOURS PRN
Status: DISCONTINUED | OUTPATIENT
Start: 2017-11-19 | End: 2017-11-22 | Stop reason: HOSPADM

## 2017-11-19 RX ORDER — FUROSEMIDE 10 MG/ML
10 INJECTION INTRAMUSCULAR; INTRAVENOUS ONCE
Status: COMPLETED | OUTPATIENT
Start: 2017-11-19 | End: 2017-11-19

## 2017-11-19 RX ADMIN — GABAPENTIN 100 MG: 100 CAPSULE ORAL at 08:57

## 2017-11-19 RX ADMIN — ATORVASTATIN CALCIUM 10 MG: 10 TABLET, FILM COATED ORAL at 21:12

## 2017-11-19 RX ADMIN — CYCLOSPORINE 1 DROP: 0.5 EMULSION OPHTHALMIC at 08:57

## 2017-11-19 RX ADMIN — FUROSEMIDE 10 MG: 10 INJECTION, SOLUTION INTRAMUSCULAR; INTRAVENOUS at 16:04

## 2017-11-19 RX ADMIN — TOBRAMYCIN AND DEXAMETHASONE 2 DROP: 3; 1 SUSPENSION/ DROPS OPHTHALMIC at 17:28

## 2017-11-19 RX ADMIN — ENOXAPARIN SODIUM 30 MG: 30 INJECTION SUBCUTANEOUS at 15:00

## 2017-11-19 RX ADMIN — MUPIROCIN: 2 CREAM TOPICAL at 21:13

## 2017-11-19 RX ADMIN — CHOLESTYRAMINE 4 G: 4 POWDER, FOR SUSPENSION ORAL at 08:58

## 2017-11-19 RX ADMIN — GABAPENTIN 100 MG: 100 CAPSULE ORAL at 21:13

## 2017-11-19 RX ADMIN — INSULIN HUMAN 30 UNITS: 100 INJECTION, SUSPENSION SUBCUTANEOUS at 08:56

## 2017-11-19 RX ADMIN — CLOPIDOGREL BISULFATE 75 MG: 75 TABLET, FILM COATED ORAL at 08:58

## 2017-11-19 RX ADMIN — INSULIN LISPRO 3 UNITS: 100 INJECTION, SOLUTION INTRAVENOUS; SUBCUTANEOUS at 17:28

## 2017-11-19 RX ADMIN — ATENOLOL 25 MG: 25 TABLET ORAL at 08:57

## 2017-11-19 RX ADMIN — TOBRAMYCIN AND DEXAMETHASONE 2 DROP: 3; 1 SUSPENSION/ DROPS OPHTHALMIC at 21:12

## 2017-11-19 RX ADMIN — CETIRIZINE HYDROCHLORIDE 10 MG: 10 TABLET, FILM COATED ORAL at 08:57

## 2017-11-19 RX ADMIN — TOBRAMYCIN AND DEXAMETHASONE 2 DROP: 3; 1 SUSPENSION/ DROPS OPHTHALMIC at 09:13

## 2017-11-19 RX ADMIN — CEFTRIAXONE SODIUM 1 G: 1 INJECTION, POWDER, FOR SOLUTION INTRAMUSCULAR; INTRAVENOUS at 09:11

## 2017-11-19 RX ADMIN — INSULIN LISPRO 3 UNITS: 100 INJECTION, SOLUTION INTRAVENOUS; SUBCUTANEOUS at 21:12

## 2017-11-19 RX ADMIN — MUPIROCIN: 2 CREAM TOPICAL at 09:12

## 2017-11-19 RX ADMIN — CYCLOSPORINE 1 DROP: 0.5 EMULSION OPHTHALMIC at 17:28

## 2017-11-19 RX ADMIN — GABAPENTIN 100 MG: 100 CAPSULE ORAL at 16:08

## 2017-11-19 RX ADMIN — Medication 1 TABLET: at 08:58

## 2017-11-19 RX ADMIN — TOBRAMYCIN AND DEXAMETHASONE 2 DROP: 3; 1 SUSPENSION/ DROPS OPHTHALMIC at 15:00

## 2017-11-19 RX ADMIN — SODIUM CHLORIDE 100 ML/HR: 9 INJECTION, SOLUTION INTRAVENOUS at 01:55

## 2017-11-19 RX ADMIN — INSULIN HUMAN 30 UNITS: 100 INJECTION, SUSPENSION SUBCUTANEOUS at 17:28

## 2017-11-20 LAB
ANION GAP SERPL CALCULATED.3IONS-SCNC: 10 MMOL/L (ref 4–13)
BASOPHILS # BLD AUTO: 0.07 10*3/MM3 (ref 0–0.2)
BASOPHILS NFR BLD AUTO: 0.7 % (ref 0–2)
BUN BLD-MCNC: 45 MG/DL (ref 5–21)
BUN/CREAT SERPL: 28.7 (ref 7–25)
CALCIUM SPEC-SCNC: 8.1 MG/DL (ref 8.4–10.4)
CHLORIDE SERPL-SCNC: 113 MMOL/L (ref 98–110)
CO2 SERPL-SCNC: 25 MMOL/L (ref 24–31)
CREAT BLD-MCNC: 1.57 MG/DL (ref 0.5–1.4)
DEPRECATED RDW RBC AUTO: 48.3 FL (ref 40–54)
EOSINOPHIL # BLD AUTO: 0.37 10*3/MM3 (ref 0–0.7)
EOSINOPHIL NFR BLD AUTO: 3.5 % (ref 0–4)
ERYTHROCYTE [DISTWIDTH] IN BLOOD BY AUTOMATED COUNT: 14.8 % (ref 12–15)
GFR SERPL CREATININE-BSD FRML MDRD: 32 ML/MIN/1.73
GLUCOSE BLD-MCNC: 98 MG/DL (ref 70–100)
GLUCOSE BLDC GLUCOMTR-MCNC: 157 MG/DL (ref 70–130)
GLUCOSE BLDC GLUCOMTR-MCNC: 178 MG/DL (ref 70–130)
GLUCOSE BLDC GLUCOMTR-MCNC: 212 MG/DL (ref 70–130)
GLUCOSE BLDC GLUCOMTR-MCNC: 75 MG/DL (ref 70–130)
HCT VFR BLD AUTO: 31.7 % (ref 37–47)
HGB BLD-MCNC: 10.4 G/DL (ref 12–16)
IMM GRANULOCYTES # BLD: 0.18 10*3/MM3 (ref 0–0.03)
IMM GRANULOCYTES NFR BLD: 1.7 % (ref 0–5)
LYMPHOCYTES # BLD AUTO: 1.65 10*3/MM3 (ref 0.72–4.86)
LYMPHOCYTES NFR BLD AUTO: 15.7 % (ref 15–45)
MCH RBC QN AUTO: 29.5 PG (ref 28–32)
MCHC RBC AUTO-ENTMCNC: 32.8 G/DL (ref 33–36)
MCV RBC AUTO: 89.8 FL (ref 82–98)
MONOCYTES # BLD AUTO: 0.89 10*3/MM3 (ref 0.19–1.3)
MONOCYTES NFR BLD AUTO: 8.5 % (ref 4–12)
NEUTROPHILS # BLD AUTO: 7.32 10*3/MM3 (ref 1.87–8.4)
NEUTROPHILS NFR BLD AUTO: 69.9 % (ref 39–78)
NRBC BLD MANUAL-RTO: 0.3 /100 WBC (ref 0–0)
PLATELET # BLD AUTO: 173 10*3/MM3 (ref 130–400)
PMV BLD AUTO: 10.5 FL (ref 6–12)
POTASSIUM BLD-SCNC: 3.6 MMOL/L (ref 3.5–5.3)
RBC # BLD AUTO: 3.53 10*6/MM3 (ref 4.2–5.4)
SODIUM BLD-SCNC: 148 MMOL/L (ref 135–145)
WBC NRBC COR # BLD: 10.48 10*3/MM3 (ref 4.8–10.8)

## 2017-11-20 PROCEDURE — 85025 COMPLETE CBC W/AUTO DIFF WBC: CPT | Performed by: INTERNAL MEDICINE

## 2017-11-20 PROCEDURE — 97530 THERAPEUTIC ACTIVITIES: CPT

## 2017-11-20 PROCEDURE — 25010000002 CEFTRIAXONE PER 250 MG: Performed by: INTERNAL MEDICINE

## 2017-11-20 PROCEDURE — 97110 THERAPEUTIC EXERCISES: CPT

## 2017-11-20 PROCEDURE — G8978 MOBILITY CURRENT STATUS: HCPCS

## 2017-11-20 PROCEDURE — G8979 MOBILITY GOAL STATUS: HCPCS

## 2017-11-20 PROCEDURE — 25010000002 ENOXAPARIN PER 10 MG: Performed by: INTERNAL MEDICINE

## 2017-11-20 PROCEDURE — 97163 PT EVAL HIGH COMPLEX 45 MIN: CPT

## 2017-11-20 PROCEDURE — 82962 GLUCOSE BLOOD TEST: CPT

## 2017-11-20 PROCEDURE — 63710000001 INSULIN LISPRO (HUMAN) PER 5 UNITS: Performed by: FAMILY MEDICINE

## 2017-11-20 PROCEDURE — 87040 BLOOD CULTURE FOR BACTERIA: CPT | Performed by: INTERNAL MEDICINE

## 2017-11-20 PROCEDURE — 80048 BASIC METABOLIC PNL TOTAL CA: CPT | Performed by: INTERNAL MEDICINE

## 2017-11-20 RX ORDER — BUMETANIDE 0.25 MG/ML
0.5 INJECTION INTRAMUSCULAR; INTRAVENOUS ONCE
Status: COMPLETED | OUTPATIENT
Start: 2017-11-20 | End: 2017-11-20

## 2017-11-20 RX ORDER — NIFEDIPINE 30 MG/1
30 TABLET, EXTENDED RELEASE ORAL
Status: DISCONTINUED | OUTPATIENT
Start: 2017-11-20 | End: 2017-11-22 | Stop reason: HOSPADM

## 2017-11-20 RX ADMIN — TOBRAMYCIN AND DEXAMETHASONE 2 DROP: 3; 1 SUSPENSION/ DROPS OPHTHALMIC at 17:36

## 2017-11-20 RX ADMIN — GABAPENTIN 100 MG: 100 CAPSULE ORAL at 21:22

## 2017-11-20 RX ADMIN — CYCLOSPORINE 1 DROP: 0.5 EMULSION OPHTHALMIC at 17:36

## 2017-11-20 RX ADMIN — NIFEDIPINE 30 MG: 30 TABLET, FILM COATED, EXTENDED RELEASE ORAL at 14:13

## 2017-11-20 RX ADMIN — TOBRAMYCIN AND DEXAMETHASONE 2 DROP: 3; 1 SUSPENSION/ DROPS OPHTHALMIC at 14:13

## 2017-11-20 RX ADMIN — TOBRAMYCIN AND DEXAMETHASONE 2 DROP: 3; 1 SUSPENSION/ DROPS OPHTHALMIC at 10:53

## 2017-11-20 RX ADMIN — CLOPIDOGREL BISULFATE 75 MG: 75 TABLET, FILM COATED ORAL at 09:38

## 2017-11-20 RX ADMIN — GABAPENTIN 100 MG: 100 CAPSULE ORAL at 16:12

## 2017-11-20 RX ADMIN — CHOLESTYRAMINE 4 G: 4 POWDER, FOR SUSPENSION ORAL at 09:38

## 2017-11-20 RX ADMIN — ENOXAPARIN SODIUM 30 MG: 30 INJECTION SUBCUTANEOUS at 14:18

## 2017-11-20 RX ADMIN — INSULIN LISPRO 3 UNITS: 100 INJECTION, SOLUTION INTRAVENOUS; SUBCUTANEOUS at 21:22

## 2017-11-20 RX ADMIN — TOBRAMYCIN AND DEXAMETHASONE 2 DROP: 3; 1 SUSPENSION/ DROPS OPHTHALMIC at 21:23

## 2017-11-20 RX ADMIN — ATENOLOL 50 MG: 50 TABLET ORAL at 09:38

## 2017-11-20 RX ADMIN — ATORVASTATIN CALCIUM 10 MG: 10 TABLET, FILM COATED ORAL at 21:22

## 2017-11-20 RX ADMIN — GABAPENTIN 100 MG: 100 CAPSULE ORAL at 09:38

## 2017-11-20 RX ADMIN — INSULIN LISPRO 2 UNITS: 100 INJECTION, SOLUTION INTRAVENOUS; SUBCUTANEOUS at 17:36

## 2017-11-20 RX ADMIN — INSULIN HUMAN 30 UNITS: 100 INJECTION, SUSPENSION SUBCUTANEOUS at 09:39

## 2017-11-20 RX ADMIN — CYCLOSPORINE 1 DROP: 0.5 EMULSION OPHTHALMIC at 09:38

## 2017-11-20 RX ADMIN — Medication 1 TABLET: at 09:38

## 2017-11-20 RX ADMIN — MUPIROCIN: 2 CREAM TOPICAL at 09:46

## 2017-11-20 RX ADMIN — INSULIN HUMAN 30 UNITS: 100 INJECTION, SUSPENSION SUBCUTANEOUS at 17:36

## 2017-11-20 RX ADMIN — BUMETANIDE 0.5 MG: 0.25 INJECTION INTRAMUSCULAR; INTRAVENOUS at 14:13

## 2017-11-20 RX ADMIN — CETIRIZINE HYDROCHLORIDE 10 MG: 10 TABLET, FILM COATED ORAL at 09:38

## 2017-11-20 RX ADMIN — MUPIROCIN: 2 CREAM TOPICAL at 21:24

## 2017-11-20 RX ADMIN — INSULIN LISPRO 2 UNITS: 100 INJECTION, SOLUTION INTRAVENOUS; SUBCUTANEOUS at 11:53

## 2017-11-20 RX ADMIN — TOBRAMYCIN AND DEXAMETHASONE 2 DROP: 3; 1 SUSPENSION/ DROPS OPHTHALMIC at 05:44

## 2017-11-20 RX ADMIN — CEFTRIAXONE SODIUM 1 G: 1 INJECTION, POWDER, FOR SOLUTION INTRAMUSCULAR; INTRAVENOUS at 09:40

## 2017-11-21 ENCOUNTER — APPOINTMENT (OUTPATIENT)
Dept: CT IMAGING | Facility: HOSPITAL | Age: 72
End: 2017-11-21

## 2017-11-21 LAB
ANION GAP SERPL CALCULATED.3IONS-SCNC: 12 MMOL/L (ref 4–13)
BASOPHILS # BLD MANUAL: 0.12 10*3/MM3 (ref 0–0.2)
BASOPHILS NFR BLD AUTO: 1 % (ref 0–2)
BUN BLD-MCNC: 40 MG/DL (ref 5–21)
BUN/CREAT SERPL: 29.4 (ref 7–25)
CALCIUM SPEC-SCNC: 8.3 MG/DL (ref 8.4–10.4)
CHLORIDE SERPL-SCNC: 113 MMOL/L (ref 98–110)
CO2 SERPL-SCNC: 21 MMOL/L (ref 24–31)
CREAT BLD-MCNC: 1.36 MG/DL (ref 0.5–1.4)
DEPRECATED RDW RBC AUTO: 49.4 FL (ref 40–54)
EOSINOPHIL # BLD MANUAL: 0.36 10*3/MM3 (ref 0–0.7)
EOSINOPHIL NFR BLD MANUAL: 3 % (ref 0–4)
ERYTHROCYTE [DISTWIDTH] IN BLOOD BY AUTOMATED COUNT: 15.1 % (ref 12–15)
GFR SERPL CREATININE-BSD FRML MDRD: 38 ML/MIN/1.73
GLUCOSE BLD-MCNC: 242 MG/DL (ref 70–100)
GLUCOSE BLDC GLUCOMTR-MCNC: 244 MG/DL (ref 70–130)
GLUCOSE BLDC GLUCOMTR-MCNC: 263 MG/DL (ref 70–130)
GLUCOSE BLDC GLUCOMTR-MCNC: 263 MG/DL (ref 70–130)
GLUCOSE BLDC GLUCOMTR-MCNC: 275 MG/DL (ref 70–130)
HCT VFR BLD AUTO: 31.7 % (ref 37–47)
HGB BLD-MCNC: 10.4 G/DL (ref 12–16)
LYMPHOCYTES # BLD MANUAL: 0.85 10*3/MM3 (ref 0.72–4.86)
LYMPHOCYTES NFR BLD MANUAL: 6 % (ref 4–12)
LYMPHOCYTES NFR BLD MANUAL: 7 % (ref 15–45)
MCH RBC QN AUTO: 29.6 PG (ref 28–32)
MCHC RBC AUTO-ENTMCNC: 32.8 G/DL (ref 33–36)
MCV RBC AUTO: 90.3 FL (ref 82–98)
MONOCYTES # BLD AUTO: 0.73 10*3/MM3 (ref 0.19–1.3)
MYELOCYTES NFR BLD MANUAL: 1 % (ref 0–0)
NEUTROPHILS # BLD AUTO: 9.48 10*3/MM3 (ref 1.87–8.4)
NEUTROPHILS NFR BLD MANUAL: 70 % (ref 39–78)
NEUTS BAND NFR BLD MANUAL: 8 % (ref 0–10)
PLATELET # BLD AUTO: 227 10*3/MM3 (ref 130–400)
PMV BLD AUTO: 10.8 FL (ref 6–12)
POTASSIUM BLD-SCNC: 4 MMOL/L (ref 3.5–5.3)
RBC # BLD AUTO: 3.51 10*6/MM3 (ref 4.2–5.4)
RBC MORPH BLD: NORMAL
SCAN SLIDE: NORMAL
SMALL PLATELETS BLD QL SMEAR: ADEQUATE
SODIUM BLD-SCNC: 146 MMOL/L (ref 135–145)
VARIANT LYMPHS NFR BLD MANUAL: 4 % (ref 0–5)
WBC MORPH BLD: NORMAL
WBC NRBC COR # BLD: 12.16 10*3/MM3 (ref 4.8–10.8)

## 2017-11-21 PROCEDURE — 97110 THERAPEUTIC EXERCISES: CPT

## 2017-11-21 PROCEDURE — 97116 GAIT TRAINING THERAPY: CPT

## 2017-11-21 PROCEDURE — 85025 COMPLETE CBC W/AUTO DIFF WBC: CPT | Performed by: INTERNAL MEDICINE

## 2017-11-21 PROCEDURE — 25010000002 CEFTRIAXONE PER 250 MG: Performed by: INTERNAL MEDICINE

## 2017-11-21 PROCEDURE — 63710000001 INSULIN LISPRO (HUMAN) PER 5 UNITS: Performed by: FAMILY MEDICINE

## 2017-11-21 PROCEDURE — 80048 BASIC METABOLIC PNL TOTAL CA: CPT | Performed by: INTERNAL MEDICINE

## 2017-11-21 PROCEDURE — 25010000002 ENOXAPARIN PER 10 MG: Performed by: INTERNAL MEDICINE

## 2017-11-21 PROCEDURE — 63710000001 INSULIN ISOPHANE & REGULAR PER 5 UNITS: Performed by: FAMILY MEDICINE

## 2017-11-21 PROCEDURE — 97530 THERAPEUTIC ACTIVITIES: CPT

## 2017-11-21 PROCEDURE — 85007 BL SMEAR W/DIFF WBC COUNT: CPT | Performed by: INTERNAL MEDICINE

## 2017-11-21 PROCEDURE — 74176 CT ABD & PELVIS W/O CONTRAST: CPT

## 2017-11-21 PROCEDURE — 25010000002 HYDRALAZINE PER 20 MG: Performed by: FAMILY MEDICINE

## 2017-11-21 PROCEDURE — 82962 GLUCOSE BLOOD TEST: CPT

## 2017-11-21 RX ORDER — HYDRALAZINE HYDROCHLORIDE 20 MG/ML
10 INJECTION INTRAMUSCULAR; INTRAVENOUS ONCE
Status: COMPLETED | OUTPATIENT
Start: 2017-11-21 | End: 2017-11-21

## 2017-11-21 RX ADMIN — ATENOLOL 50 MG: 50 TABLET ORAL at 09:25

## 2017-11-21 RX ADMIN — CHOLESTYRAMINE 4 G: 4 POWDER, FOR SUSPENSION ORAL at 09:25

## 2017-11-21 RX ADMIN — TOBRAMYCIN AND DEXAMETHASONE 2 DROP: 3; 1 SUSPENSION/ DROPS OPHTHALMIC at 14:51

## 2017-11-21 RX ADMIN — GABAPENTIN 100 MG: 100 CAPSULE ORAL at 16:20

## 2017-11-21 RX ADMIN — GABAPENTIN 100 MG: 100 CAPSULE ORAL at 20:37

## 2017-11-21 RX ADMIN — MUPIROCIN: 2 CREAM TOPICAL at 20:37

## 2017-11-21 RX ADMIN — CYCLOSPORINE 1 DROP: 0.5 EMULSION OPHTHALMIC at 17:10

## 2017-11-21 RX ADMIN — GABAPENTIN 100 MG: 100 CAPSULE ORAL at 09:25

## 2017-11-21 RX ADMIN — TOBRAMYCIN AND DEXAMETHASONE 2 DROP: 3; 1 SUSPENSION/ DROPS OPHTHALMIC at 21:03

## 2017-11-21 RX ADMIN — CEFTRIAXONE SODIUM 1 G: 1 INJECTION, POWDER, FOR SOLUTION INTRAMUSCULAR; INTRAVENOUS at 09:25

## 2017-11-21 RX ADMIN — CETIRIZINE HYDROCHLORIDE 10 MG: 10 TABLET, FILM COATED ORAL at 09:25

## 2017-11-21 RX ADMIN — INSULIN HUMAN 30 UNITS: 100 INJECTION, SUSPENSION SUBCUTANEOUS at 17:09

## 2017-11-21 RX ADMIN — MUPIROCIN: 2 CREAM TOPICAL at 09:26

## 2017-11-21 RX ADMIN — NIFEDIPINE 30 MG: 30 TABLET, FILM COATED, EXTENDED RELEASE ORAL at 09:25

## 2017-11-21 RX ADMIN — CLOPIDOGREL BISULFATE 75 MG: 75 TABLET, FILM COATED ORAL at 08:45

## 2017-11-21 RX ADMIN — INSULIN LISPRO 4 UNITS: 100 INJECTION, SOLUTION INTRAVENOUS; SUBCUTANEOUS at 12:14

## 2017-11-21 RX ADMIN — ACETAMINOPHEN 650 MG: 325 TABLET ORAL at 21:01

## 2017-11-21 RX ADMIN — ATORVASTATIN CALCIUM 10 MG: 10 TABLET, FILM COATED ORAL at 20:37

## 2017-11-21 RX ADMIN — CYCLOSPORINE 1 DROP: 0.5 EMULSION OPHTHALMIC at 08:45

## 2017-11-21 RX ADMIN — ENOXAPARIN SODIUM 30 MG: 30 INJECTION SUBCUTANEOUS at 14:51

## 2017-11-21 RX ADMIN — INSULIN LISPRO 4 UNITS: 100 INJECTION, SOLUTION INTRAVENOUS; SUBCUTANEOUS at 21:01

## 2017-11-21 RX ADMIN — HYDRALAZINE HYDROCHLORIDE 10 MG: 20 INJECTION, SOLUTION INTRAMUSCULAR; INTRAVENOUS at 03:11

## 2017-11-21 RX ADMIN — TOBRAMYCIN AND DEXAMETHASONE 2 DROP: 3; 1 SUSPENSION/ DROPS OPHTHALMIC at 09:25

## 2017-11-21 RX ADMIN — Medication 1 TABLET: at 08:45

## 2017-11-21 RX ADMIN — TOBRAMYCIN AND DEXAMETHASONE 2 DROP: 3; 1 SUSPENSION/ DROPS OPHTHALMIC at 17:10

## 2017-11-21 RX ADMIN — HYDRALAZINE HYDROCHLORIDE 10 MG: 20 INJECTION, SOLUTION INTRAMUSCULAR; INTRAVENOUS at 00:58

## 2017-11-21 RX ADMIN — INSULIN HUMAN 30 UNITS: 100 INJECTION, SUSPENSION SUBCUTANEOUS at 08:47

## 2017-11-21 RX ADMIN — TOBRAMYCIN AND DEXAMETHASONE 2 DROP: 3; 1 SUSPENSION/ DROPS OPHTHALMIC at 05:58

## 2017-11-22 VITALS
SYSTOLIC BLOOD PRESSURE: 151 MMHG | DIASTOLIC BLOOD PRESSURE: 49 MMHG | HEART RATE: 66 BPM | RESPIRATION RATE: 16 BRPM | WEIGHT: 202.8 LBS | HEIGHT: 67 IN | OXYGEN SATURATION: 98 % | BODY MASS INDEX: 31.83 KG/M2 | TEMPERATURE: 98 F

## 2017-11-22 LAB
ANION GAP SERPL CALCULATED.3IONS-SCNC: 11 MMOL/L (ref 4–13)
BUN BLD-MCNC: 42 MG/DL (ref 5–21)
BUN/CREAT SERPL: 29.8 (ref 7–25)
CALCIUM SPEC-SCNC: 8.6 MG/DL (ref 8.4–10.4)
CHLORIDE SERPL-SCNC: 114 MMOL/L (ref 98–110)
CO2 SERPL-SCNC: 24 MMOL/L (ref 24–31)
CREAT BLD-MCNC: 1.41 MG/DL (ref 0.5–1.4)
DEPRECATED RDW RBC AUTO: 50.6 FL (ref 40–54)
ERYTHROCYTE [DISTWIDTH] IN BLOOD BY AUTOMATED COUNT: 15.1 % (ref 12–15)
GFR SERPL CREATININE-BSD FRML MDRD: 36 ML/MIN/1.73
GLUCOSE BLD-MCNC: 127 MG/DL (ref 70–100)
GLUCOSE BLDC GLUCOMTR-MCNC: 148 MG/DL (ref 70–130)
GLUCOSE BLDC GLUCOMTR-MCNC: 205 MG/DL (ref 70–130)
HCT VFR BLD AUTO: 31 % (ref 37–47)
HGB BLD-MCNC: 10.1 G/DL (ref 12–16)
HYPOCHROMIA BLD QL: ABNORMAL
LYMPHOCYTES # BLD MANUAL: 2.14 10*3/MM3 (ref 0.72–4.86)
LYMPHOCYTES NFR BLD MANUAL: 1 % (ref 4–12)
LYMPHOCYTES NFR BLD MANUAL: 18 % (ref 15–45)
MCH RBC QN AUTO: 29.7 PG (ref 28–32)
MCHC RBC AUTO-ENTMCNC: 32.6 G/DL (ref 33–36)
MCV RBC AUTO: 91.2 FL (ref 82–98)
METAMYELOCYTES NFR BLD MANUAL: 1 % (ref 0–0)
MONOCYTES # BLD AUTO: 0.12 10*3/MM3 (ref 0.19–1.3)
NEUTROPHILS # BLD AUTO: 9.27 10*3/MM3 (ref 1.87–8.4)
NEUTROPHILS NFR BLD MANUAL: 78 % (ref 39–78)
PLAT MORPH BLD: NORMAL
PLATELET # BLD AUTO: 270 10*3/MM3 (ref 130–400)
PMV BLD AUTO: 10.2 FL (ref 6–12)
POTASSIUM BLD-SCNC: 3.6 MMOL/L (ref 3.5–5.3)
RBC # BLD AUTO: 3.4 10*6/MM3 (ref 4.2–5.4)
SCAN SLIDE: NORMAL
SODIUM BLD-SCNC: 149 MMOL/L (ref 135–145)
VARIANT LYMPHS NFR BLD MANUAL: 2 % (ref 0–5)
WBC MORPH BLD: NORMAL
WBC NRBC COR # BLD: 11.89 10*3/MM3 (ref 4.8–10.8)

## 2017-11-22 PROCEDURE — 85007 BL SMEAR W/DIFF WBC COUNT: CPT | Performed by: INTERNAL MEDICINE

## 2017-11-22 PROCEDURE — 80048 BASIC METABOLIC PNL TOTAL CA: CPT | Performed by: INTERNAL MEDICINE

## 2017-11-22 PROCEDURE — 97110 THERAPEUTIC EXERCISES: CPT

## 2017-11-22 PROCEDURE — 85025 COMPLETE CBC W/AUTO DIFF WBC: CPT | Performed by: INTERNAL MEDICINE

## 2017-11-22 PROCEDURE — 25010000002 CEFTRIAXONE PER 250 MG: Performed by: INTERNAL MEDICINE

## 2017-11-22 PROCEDURE — 63710000001 INSULIN LISPRO (HUMAN) PER 5 UNITS: Performed by: FAMILY MEDICINE

## 2017-11-22 PROCEDURE — 82962 GLUCOSE BLOOD TEST: CPT

## 2017-11-22 RX ORDER — NIFEDIPINE 30 MG/1
30 TABLET, FILM COATED, EXTENDED RELEASE ORAL
Start: 2017-11-23 | End: 2019-12-22 | Stop reason: HOSPADM

## 2017-11-22 RX ORDER — AMPICILLIN 500 MG/1
500 CAPSULE ORAL 4 TIMES DAILY
Qty: 36 CAPSULE | Refills: 0
Start: 2017-11-22 | End: 2017-12-01

## 2017-11-22 RX ORDER — FUROSEMIDE 40 MG/1
40 TABLET ORAL DAILY PRN
Status: ON HOLD
Start: 2017-11-22 | End: 2018-03-05

## 2017-11-22 RX ORDER — ATENOLOL 50 MG/1
50 TABLET ORAL DAILY
Start: 2017-11-23 | End: 2022-11-11 | Stop reason: HOSPADM

## 2017-11-22 RX ORDER — GABAPENTIN 100 MG/1
100 CAPSULE ORAL 3 TIMES DAILY
Status: ON HOLD
Start: 2017-11-22 | End: 2018-03-05

## 2017-11-22 RX ORDER — IPRATROPIUM BROMIDE AND ALBUTEROL SULFATE 2.5; .5 MG/3ML; MG/3ML
3 SOLUTION RESPIRATORY (INHALATION) EVERY 4 HOURS PRN
Qty: 360 ML | Status: ON HOLD
Start: 2017-11-22 | End: 2019-12-16

## 2017-11-22 RX ADMIN — INSULIN HUMAN 30 UNITS: 100 INJECTION, SUSPENSION SUBCUTANEOUS at 09:09

## 2017-11-22 RX ADMIN — INSULIN LISPRO 3 UNITS: 100 INJECTION, SOLUTION INTRAVENOUS; SUBCUTANEOUS at 12:12

## 2017-11-22 RX ADMIN — NIFEDIPINE 30 MG: 30 TABLET, FILM COATED, EXTENDED RELEASE ORAL at 09:06

## 2017-11-22 RX ADMIN — MUPIROCIN: 2 CREAM TOPICAL at 09:16

## 2017-11-22 RX ADMIN — GABAPENTIN 100 MG: 100 CAPSULE ORAL at 09:16

## 2017-11-22 RX ADMIN — CLOPIDOGREL BISULFATE 75 MG: 75 TABLET, FILM COATED ORAL at 09:06

## 2017-11-22 RX ADMIN — CYCLOSPORINE 1 DROP: 0.5 EMULSION OPHTHALMIC at 09:06

## 2017-11-22 RX ADMIN — TOBRAMYCIN AND DEXAMETHASONE 2 DROP: 3; 1 SUSPENSION/ DROPS OPHTHALMIC at 05:15

## 2017-11-22 RX ADMIN — CETIRIZINE HYDROCHLORIDE 10 MG: 10 TABLET, FILM COATED ORAL at 09:06

## 2017-11-22 RX ADMIN — ATENOLOL 50 MG: 50 TABLET ORAL at 09:06

## 2017-11-22 RX ADMIN — Medication 1 TABLET: at 09:06

## 2017-11-22 RX ADMIN — TOBRAMYCIN AND DEXAMETHASONE 2 DROP: 3; 1 SUSPENSION/ DROPS OPHTHALMIC at 09:07

## 2017-11-22 RX ADMIN — CHOLESTYRAMINE 4 G: 4 POWDER, FOR SUSPENSION ORAL at 09:06

## 2017-11-22 RX ADMIN — ACETAMINOPHEN 650 MG: 325 TABLET ORAL at 05:25

## 2017-11-22 RX ADMIN — CEFTRIAXONE SODIUM 1 G: 1 INJECTION, POWDER, FOR SOLUTION INTRAMUSCULAR; INTRAVENOUS at 09:16

## 2017-11-25 LAB
BACTERIA SPEC AEROBE CULT: NORMAL
BACTERIA SPEC AEROBE CULT: NORMAL

## 2018-03-04 ENCOUNTER — HOSPITAL ENCOUNTER (INPATIENT)
Facility: HOSPITAL | Age: 73
LOS: 3 days | Discharge: NURSING, REHAB OR LONG-TERM CARE | End: 2018-03-07
Attending: EMERGENCY MEDICINE | Admitting: FAMILY MEDICINE

## 2018-03-04 ENCOUNTER — APPOINTMENT (OUTPATIENT)
Dept: GENERAL RADIOLOGY | Facility: HOSPITAL | Age: 73
End: 2018-03-04

## 2018-03-04 ENCOUNTER — APPOINTMENT (OUTPATIENT)
Dept: CT IMAGING | Facility: HOSPITAL | Age: 73
End: 2018-03-04

## 2018-03-04 DIAGNOSIS — A41.9 SEPSIS, DUE TO UNSPECIFIED ORGANISM: Primary | ICD-10-CM

## 2018-03-04 DIAGNOSIS — Z74.09 IMPAIRED MOBILITY AND ADLS: ICD-10-CM

## 2018-03-04 DIAGNOSIS — R41.82 ALTERED MENTAL STATUS, UNSPECIFIED ALTERED MENTAL STATUS TYPE: ICD-10-CM

## 2018-03-04 DIAGNOSIS — Z78.9 IMPAIRED MOBILITY AND ADLS: ICD-10-CM

## 2018-03-04 DIAGNOSIS — E16.2 HYPOGLYCEMIA: ICD-10-CM

## 2018-03-04 DIAGNOSIS — T68.XXXA HYPOTHERMIA, INITIAL ENCOUNTER: ICD-10-CM

## 2018-03-04 DIAGNOSIS — R13.12 OROPHARYNGEAL DYSPHAGIA: ICD-10-CM

## 2018-03-04 DIAGNOSIS — Z74.09 IMPAIRED FUNCTIONAL MOBILITY, BALANCE, GAIT, AND ENDURANCE: ICD-10-CM

## 2018-03-04 LAB
ALBUMIN SERPL-MCNC: 4 G/DL (ref 3.5–5)
ALBUMIN/GLOB SERPL: 1.1 G/DL (ref 1.1–2.5)
ALP SERPL-CCNC: 57 U/L (ref 24–120)
ALT SERPL W P-5'-P-CCNC: 30 U/L (ref 0–54)
ANION GAP SERPL CALCULATED.3IONS-SCNC: 12 MMOL/L (ref 4–13)
APTT PPP: 36.5 SECONDS (ref 24.1–34.8)
ARTERIAL PATENCY WRIST A: POSITIVE
AST SERPL-CCNC: 40 U/L (ref 7–45)
ATMOSPHERIC PRESS: 755 MMHG
BACTERIA UR QL AUTO: ABNORMAL /HPF
BASE EXCESS BLDA CALC-SCNC: 1.2 MMOL/L (ref 0–2)
BASOPHILS # BLD AUTO: 0.04 10*3/MM3 (ref 0–0.2)
BASOPHILS NFR BLD AUTO: 0.4 % (ref 0–2)
BDY SITE: ABNORMAL
BILIRUB SERPL-MCNC: 0.2 MG/DL (ref 0.1–1)
BILIRUB UR QL STRIP: NEGATIVE
BODY TEMPERATURE: 37 C
BUN BLD-MCNC: 44 MG/DL (ref 5–21)
BUN/CREAT SERPL: 26.8 (ref 7–25)
CALCIUM SPEC-SCNC: 9.4 MG/DL (ref 8.4–10.4)
CHLORIDE SERPL-SCNC: 103 MMOL/L (ref 98–110)
CK SERPL-CCNC: 140 U/L (ref 0–203)
CLARITY UR: CLEAR
CO2 SERPL-SCNC: 30 MMOL/L (ref 24–31)
COLOR UR: YELLOW
CREAT BLD-MCNC: 1.64 MG/DL (ref 0.5–1.4)
D-LACTATE SERPL-SCNC: 1.2 MMOL/L (ref 0.5–2)
DEPRECATED RDW RBC AUTO: 43.6 FL (ref 40–54)
EOSINOPHIL # BLD AUTO: 0.1 10*3/MM3 (ref 0–0.7)
EOSINOPHIL NFR BLD AUTO: 1 % (ref 0–4)
ERYTHROCYTE [DISTWIDTH] IN BLOOD BY AUTOMATED COUNT: 14 % (ref 12–15)
FLUAV AG NPH QL: NEGATIVE
FLUBV AG NPH QL IA: NEGATIVE
GFR SERPL CREATININE-BSD FRML MDRD: 31 ML/MIN/1.73
GLOBULIN UR ELPH-MCNC: 3.5 GM/DL
GLUCOSE BLD-MCNC: 145 MG/DL (ref 70–100)
GLUCOSE BLDC GLUCOMTR-MCNC: 119 MG/DL (ref 70–130)
GLUCOSE BLDC GLUCOMTR-MCNC: 163 MG/DL (ref 70–130)
GLUCOSE UR STRIP-MCNC: NEGATIVE MG/DL
HCO3 BLDA-SCNC: 26.8 MMOL/L (ref 20–26)
HCT VFR BLD AUTO: 31.6 % (ref 37–47)
HGB BLD-MCNC: 10.4 G/DL (ref 12–16)
HGB UR QL STRIP.AUTO: ABNORMAL
HOLD SPECIMEN: NORMAL
HOLD SPECIMEN: NORMAL
HYALINE CASTS UR QL AUTO: ABNORMAL /LPF
IMM GRANULOCYTES # BLD: 0.06 10*3/MM3 (ref 0–0.03)
IMM GRANULOCYTES NFR BLD: 0.6 % (ref 0–5)
INR PPP: 1.04 (ref 0.91–1.09)
KETONES UR QL STRIP: NEGATIVE
LEUKOCYTE ESTERASE UR QL STRIP.AUTO: NEGATIVE
LIPASE SERPL-CCNC: 34 U/L (ref 23–203)
LYMPHOCYTES # BLD AUTO: 0.74 10*3/MM3 (ref 0.72–4.86)
LYMPHOCYTES NFR BLD AUTO: 7.6 % (ref 15–45)
Lab: ABNORMAL
MCH RBC QN AUTO: 28.6 PG (ref 28–32)
MCHC RBC AUTO-ENTMCNC: 32.9 G/DL (ref 33–36)
MCV RBC AUTO: 86.8 FL (ref 82–98)
MODALITY: ABNORMAL
MONOCYTES # BLD AUTO: 0.85 10*3/MM3 (ref 0.19–1.3)
MONOCYTES NFR BLD AUTO: 8.7 % (ref 4–12)
NEUTROPHILS # BLD AUTO: 8.01 10*3/MM3 (ref 1.87–8.4)
NEUTROPHILS NFR BLD AUTO: 81.7 % (ref 39–78)
NITRITE UR QL STRIP: NEGATIVE
NRBC BLD MANUAL-RTO: 0 /100 WBC (ref 0–0)
NT-PROBNP SERPL-MCNC: 1910 PG/ML (ref 0–900)
PCO2 BLDA: 45.5 MM HG (ref 35–45)
PH BLDA: 7.38 PH UNITS (ref 7.35–7.45)
PH UR STRIP.AUTO: <=5 [PH] (ref 5–8)
PLATELET # BLD AUTO: 237 10*3/MM3 (ref 130–400)
PMV BLD AUTO: 9.9 FL (ref 6–12)
PO2 BLDA: 79.1 MM HG (ref 83–108)
POTASSIUM BLD-SCNC: 4.2 MMOL/L (ref 3.5–5.3)
PROCALCITONIN SERPL-MCNC: <0.25 NG/ML
PROT SERPL-MCNC: 7.5 G/DL (ref 6.3–8.7)
PROT UR QL STRIP: ABNORMAL
PROTHROMBIN TIME: 13.9 SECONDS (ref 11.9–14.6)
RBC # BLD AUTO: 3.64 10*6/MM3 (ref 4.2–5.4)
RBC # UR: ABNORMAL /HPF
REF LAB TEST METHOD: ABNORMAL
SAO2 % BLDCOA: 95.9 % (ref 94–99)
SODIUM BLD-SCNC: 145 MMOL/L (ref 135–145)
SP GR UR STRIP: 1.01 (ref 1–1.03)
SQUAMOUS #/AREA URNS HPF: ABNORMAL /HPF
TROPONIN I SERPL-MCNC: <0.012 NG/ML (ref 0–0.03)
UROBILINOGEN UR QL STRIP: ABNORMAL
VENTILATOR MODE: ABNORMAL
WBC NRBC COR # BLD: 9.8 10*3/MM3 (ref 4.8–10.8)
WBC UR QL AUTO: ABNORMAL /HPF
WHOLE BLOOD HOLD SPECIMEN: NORMAL
WHOLE BLOOD HOLD SPECIMEN: NORMAL

## 2018-03-04 PROCEDURE — 84484 ASSAY OF TROPONIN QUANT: CPT | Performed by: EMERGENCY MEDICINE

## 2018-03-04 PROCEDURE — 87077 CULTURE AEROBIC IDENTIFY: CPT | Performed by: EMERGENCY MEDICINE

## 2018-03-04 PROCEDURE — 82803 BLOOD GASES ANY COMBINATION: CPT

## 2018-03-04 PROCEDURE — 82550 ASSAY OF CK (CPK): CPT | Performed by: EMERGENCY MEDICINE

## 2018-03-04 PROCEDURE — 80053 COMPREHEN METABOLIC PANEL: CPT | Performed by: EMERGENCY MEDICINE

## 2018-03-04 PROCEDURE — 85730 THROMBOPLASTIN TIME PARTIAL: CPT | Performed by: EMERGENCY MEDICINE

## 2018-03-04 PROCEDURE — 87804 INFLUENZA ASSAY W/OPTIC: CPT | Performed by: EMERGENCY MEDICINE

## 2018-03-04 PROCEDURE — 82962 GLUCOSE BLOOD TEST: CPT

## 2018-03-04 PROCEDURE — 85025 COMPLETE CBC W/AUTO DIFF WBC: CPT | Performed by: EMERGENCY MEDICINE

## 2018-03-04 PROCEDURE — 25010000002 PIPERACILLIN SOD-TAZOBACTAM PER 1 G: Performed by: EMERGENCY MEDICINE

## 2018-03-04 PROCEDURE — 81001 URINALYSIS AUTO W/SCOPE: CPT | Performed by: EMERGENCY MEDICINE

## 2018-03-04 PROCEDURE — 99285 EMERGENCY DEPT VISIT HI MDM: CPT

## 2018-03-04 PROCEDURE — 25010000002 VANCOMYCIN PER 500 MG: Performed by: EMERGENCY MEDICINE

## 2018-03-04 PROCEDURE — 71045 X-RAY EXAM CHEST 1 VIEW: CPT

## 2018-03-04 PROCEDURE — 85610 PROTHROMBIN TIME: CPT | Performed by: EMERGENCY MEDICINE

## 2018-03-04 PROCEDURE — 84145 PROCALCITONIN (PCT): CPT | Performed by: EMERGENCY MEDICINE

## 2018-03-04 PROCEDURE — 94640 AIRWAY INHALATION TREATMENT: CPT

## 2018-03-04 PROCEDURE — 83605 ASSAY OF LACTIC ACID: CPT | Performed by: EMERGENCY MEDICINE

## 2018-03-04 PROCEDURE — 71250 CT THORAX DX C-: CPT

## 2018-03-04 PROCEDURE — 36600 WITHDRAWAL OF ARTERIAL BLOOD: CPT

## 2018-03-04 PROCEDURE — 87086 URINE CULTURE/COLONY COUNT: CPT | Performed by: EMERGENCY MEDICINE

## 2018-03-04 PROCEDURE — 83690 ASSAY OF LIPASE: CPT | Performed by: EMERGENCY MEDICINE

## 2018-03-04 PROCEDURE — 70450 CT HEAD/BRAIN W/O DYE: CPT

## 2018-03-04 PROCEDURE — 87186 SC STD MICRODIL/AGAR DIL: CPT | Performed by: EMERGENCY MEDICINE

## 2018-03-04 PROCEDURE — 74176 CT ABD & PELVIS W/O CONTRAST: CPT

## 2018-03-04 PROCEDURE — 83880 ASSAY OF NATRIURETIC PEPTIDE: CPT | Performed by: EMERGENCY MEDICINE

## 2018-03-04 PROCEDURE — 93005 ELECTROCARDIOGRAM TRACING: CPT | Performed by: EMERGENCY MEDICINE

## 2018-03-04 PROCEDURE — 87040 BLOOD CULTURE FOR BACTERIA: CPT | Performed by: EMERGENCY MEDICINE

## 2018-03-04 PROCEDURE — P9612 CATHETERIZE FOR URINE SPEC: HCPCS

## 2018-03-04 PROCEDURE — 93010 ELECTROCARDIOGRAM REPORT: CPT | Performed by: INTERNAL MEDICINE

## 2018-03-04 RX ORDER — IPRATROPIUM BROMIDE AND ALBUTEROL SULFATE 2.5; .5 MG/3ML; MG/3ML
3 SOLUTION RESPIRATORY (INHALATION) EVERY 4 HOURS PRN
Status: DISCONTINUED | OUTPATIENT
Start: 2018-03-04 | End: 2018-03-05

## 2018-03-04 RX ORDER — ONDANSETRON 2 MG/ML
4 INJECTION INTRAMUSCULAR; INTRAVENOUS EVERY 6 HOURS PRN
Status: DISCONTINUED | OUTPATIENT
Start: 2018-03-04 | End: 2018-03-07 | Stop reason: HOSPADM

## 2018-03-04 RX ORDER — HEPARIN SODIUM 5000 [USP'U]/ML
5000 INJECTION, SOLUTION INTRAVENOUS; SUBCUTANEOUS EVERY 12 HOURS SCHEDULED
Status: DISCONTINUED | OUTPATIENT
Start: 2018-03-05 | End: 2018-03-05

## 2018-03-04 RX ORDER — ATORVASTATIN CALCIUM 10 MG/1
10 TABLET, FILM COATED ORAL DAILY
Status: DISCONTINUED | OUTPATIENT
Start: 2018-03-05 | End: 2018-03-07 | Stop reason: HOSPADM

## 2018-03-04 RX ORDER — FUROSEMIDE 40 MG/1
40 TABLET ORAL DAILY PRN
Status: DISCONTINUED | OUTPATIENT
Start: 2018-03-04 | End: 2018-03-05

## 2018-03-04 RX ORDER — CLOPIDOGREL BISULFATE 75 MG/1
75 TABLET ORAL DAILY
Status: DISCONTINUED | OUTPATIENT
Start: 2018-03-05 | End: 2018-03-07 | Stop reason: HOSPADM

## 2018-03-04 RX ORDER — SODIUM CHLORIDE 0.9 % (FLUSH) 0.9 %
10 SYRINGE (ML) INJECTION AS NEEDED
Status: DISCONTINUED | OUTPATIENT
Start: 2018-03-04 | End: 2018-03-07 | Stop reason: HOSPADM

## 2018-03-04 RX ORDER — NIFEDIPINE 30 MG/1
30 TABLET, EXTENDED RELEASE ORAL
Status: DISCONTINUED | OUTPATIENT
Start: 2018-03-05 | End: 2018-03-07 | Stop reason: HOSPADM

## 2018-03-04 RX ORDER — PANTOPRAZOLE SODIUM 40 MG/1
40 TABLET, DELAYED RELEASE ORAL
Status: DISCONTINUED | OUTPATIENT
Start: 2018-03-05 | End: 2018-03-05

## 2018-03-04 RX ORDER — IPRATROPIUM BROMIDE AND ALBUTEROL SULFATE 2.5; .5 MG/3ML; MG/3ML
3 SOLUTION RESPIRATORY (INHALATION) ONCE
Status: COMPLETED | OUTPATIENT
Start: 2018-03-04 | End: 2018-03-04

## 2018-03-04 RX ORDER — ATENOLOL 50 MG/1
50 TABLET ORAL DAILY
Status: DISCONTINUED | OUTPATIENT
Start: 2018-03-05 | End: 2018-03-07 | Stop reason: HOSPADM

## 2018-03-04 RX ORDER — FAMOTIDINE 20 MG/1
40 TABLET, FILM COATED ORAL DAILY
Status: DISCONTINUED | OUTPATIENT
Start: 2018-03-05 | End: 2018-03-05 | Stop reason: DRUGHIGH

## 2018-03-04 RX ORDER — GABAPENTIN 100 MG/1
100 CAPSULE ORAL EVERY 8 HOURS SCHEDULED
Status: DISCONTINUED | OUTPATIENT
Start: 2018-03-05 | End: 2018-03-07 | Stop reason: HOSPADM

## 2018-03-04 RX ORDER — INSULIN ASPART 100 [IU]/ML
30 INJECTION, SUSPENSION SUBCUTANEOUS 2 TIMES DAILY WITH MEALS
Status: DISCONTINUED | OUTPATIENT
Start: 2018-03-05 | End: 2018-03-05 | Stop reason: CLARIF

## 2018-03-04 RX ORDER — SODIUM CHLORIDE 9 MG/ML
100 INJECTION, SOLUTION INTRAVENOUS CONTINUOUS
Status: DISCONTINUED | OUTPATIENT
Start: 2018-03-05 | End: 2018-03-05

## 2018-03-04 RX ORDER — SODIUM CHLORIDE 0.9 % (FLUSH) 0.9 %
1-10 SYRINGE (ML) INJECTION AS NEEDED
Status: DISCONTINUED | OUTPATIENT
Start: 2018-03-04 | End: 2018-03-07 | Stop reason: HOSPADM

## 2018-03-04 RX ADMIN — SODIUM CHLORIDE 1000 ML: 9 INJECTION, SOLUTION INTRAVENOUS at 20:39

## 2018-03-04 RX ADMIN — TAZOBACTAM SODIUM AND PIPERACILLIN SODIUM 4.5 G: 500; 4 INJECTION, SOLUTION INTRAVENOUS at 20:43

## 2018-03-04 RX ADMIN — IPRATROPIUM BROMIDE AND ALBUTEROL SULFATE 3 ML: 2.5; .5 SOLUTION RESPIRATORY (INHALATION) at 23:22

## 2018-03-04 RX ADMIN — VANCOMYCIN HYDROCHLORIDE 1750 MG: 1 INJECTION, POWDER, LYOPHILIZED, FOR SOLUTION INTRAVENOUS at 21:51

## 2018-03-05 ENCOUNTER — APPOINTMENT (OUTPATIENT)
Dept: GENERAL RADIOLOGY | Facility: HOSPITAL | Age: 73
End: 2018-03-05

## 2018-03-05 ENCOUNTER — APPOINTMENT (OUTPATIENT)
Dept: CARDIOLOGY | Facility: HOSPITAL | Age: 73
End: 2018-03-05
Attending: FAMILY MEDICINE

## 2018-03-05 LAB
ALBUMIN SERPL-MCNC: 3.3 G/DL (ref 3.5–5)
ALBUMIN/GLOB SERPL: 1.1 G/DL (ref 1.1–2.5)
ALP SERPL-CCNC: 62 U/L (ref 24–120)
ALT SERPL W P-5'-P-CCNC: 36 U/L (ref 0–54)
ANION GAP SERPL CALCULATED.3IONS-SCNC: 11 MMOL/L (ref 4–13)
AST SERPL-CCNC: 40 U/L (ref 7–45)
BASOPHILS # BLD AUTO: 0.02 10*3/MM3 (ref 0–0.2)
BASOPHILS NFR BLD AUTO: 0.2 % (ref 0–2)
BH CV ECHO MEAS - AO MAX PG (FULL): 10.7 MMHG
BH CV ECHO MEAS - AO MAX PG: 14 MMHG
BH CV ECHO MEAS - AO MEAN PG (FULL): 6 MMHG
BH CV ECHO MEAS - AO MEAN PG: 8 MMHG
BH CV ECHO MEAS - AO ROOT AREA (BSA CORRECTED): 1.2
BH CV ECHO MEAS - AO ROOT AREA: 4.5 CM^2
BH CV ECHO MEAS - AO ROOT DIAM: 2.4 CM
BH CV ECHO MEAS - AO V2 MAX: 187 CM/SEC
BH CV ECHO MEAS - AO V2 MEAN: 138 CM/SEC
BH CV ECHO MEAS - AO V2 VTI: 47.1 CM
BH CV ECHO MEAS - AVA(I,A): 1.4 CM^2
BH CV ECHO MEAS - AVA(I,D): 1.4 CM^2
BH CV ECHO MEAS - AVA(V,A): 1.5 CM^2
BH CV ECHO MEAS - AVA(V,D): 1.5 CM^2
BH CV ECHO MEAS - BSA(HAYCOCK): 2.1 M^2
BH CV ECHO MEAS - BSA: 2 M^2
BH CV ECHO MEAS - BZI_BMI: 31.6 KILOGRAMS/M^2
BH CV ECHO MEAS - BZI_METRIC_HEIGHT: 170.2 CM
BH CV ECHO MEAS - BZI_METRIC_WEIGHT: 91.6 KG
BH CV ECHO MEAS - EDV(CUBED): 110.6 ML
BH CV ECHO MEAS - EDV(TEICH): 107.5 ML
BH CV ECHO MEAS - EF(CUBED): 84.1 %
BH CV ECHO MEAS - EF(TEICH): 77.1 %
BH CV ECHO MEAS - ESV(CUBED): 17.6 ML
BH CV ECHO MEAS - ESV(TEICH): 24.6 ML
BH CV ECHO MEAS - FS: 45.8 %
BH CV ECHO MEAS - IVS/LVPW: 1.3
BH CV ECHO MEAS - IVSD: 1.2 CM
BH CV ECHO MEAS - LA DIMENSION: 3.5 CM
BH CV ECHO MEAS - LA/AO: 1.5
BH CV ECHO MEAS - LAT PEAK E' VEL: 10.1 CM/SEC
BH CV ECHO MEAS - LV MASS(C)D: 181.9 GRAMS
BH CV ECHO MEAS - LV MASS(C)DI: 89.6 GRAMS/M^2
BH CV ECHO MEAS - LV MAX PG: 3.3 MMHG
BH CV ECHO MEAS - LV MEAN PG: 2 MMHG
BH CV ECHO MEAS - LV V1 MAX: 90.7 CM/SEC
BH CV ECHO MEAS - LV V1 MEAN: 63.3 CM/SEC
BH CV ECHO MEAS - LV V1 VTI: 21.2 CM
BH CV ECHO MEAS - LVIDD: 4.8 CM
BH CV ECHO MEAS - LVIDS: 2.6 CM
BH CV ECHO MEAS - LVOT AREA (M): 3.1 CM^2
BH CV ECHO MEAS - LVOT AREA: 3.1 CM^2
BH CV ECHO MEAS - LVOT DIAM: 2 CM
BH CV ECHO MEAS - LVPWD: 0.9 CM
BH CV ECHO MEAS - MED PEAK E' VEL: 6.09 CM/SEC
BH CV ECHO MEAS - MV A MAX VEL: 75.8 CM/SEC
BH CV ECHO MEAS - MV DEC TIME: 0.18 SEC
BH CV ECHO MEAS - MV E MAX VEL: 135 CM/SEC
BH CV ECHO MEAS - MV E/A: 1.8
BH CV ECHO MEAS - RAP SYSTOLE: 5 MMHG
BH CV ECHO MEAS - RVSP: 34.4 MMHG
BH CV ECHO MEAS - SI(AO): 104.9 ML/M^2
BH CV ECHO MEAS - SI(CUBED): 45.8 ML/M^2
BH CV ECHO MEAS - SI(LVOT): 32.8 ML/M^2
BH CV ECHO MEAS - SI(TEICH): 40.8 ML/M^2
BH CV ECHO MEAS - SV(AO): 213.1 ML
BH CV ECHO MEAS - SV(CUBED): 93 ML
BH CV ECHO MEAS - SV(LVOT): 66.6 ML
BH CV ECHO MEAS - SV(TEICH): 82.9 ML
BH CV ECHO MEAS - TR MAX VEL: 271 CM/SEC
BILIRUB SERPL-MCNC: 0.1 MG/DL (ref 0.1–1)
BUN BLD-MCNC: 39 MG/DL (ref 5–21)
BUN/CREAT SERPL: 25.7 (ref 7–25)
CALCIUM SPEC-SCNC: 8.7 MG/DL (ref 8.4–10.4)
CHLORIDE SERPL-SCNC: 108 MMOL/L (ref 98–110)
CO2 SERPL-SCNC: 28 MMOL/L (ref 24–31)
CREAT BLD-MCNC: 1.52 MG/DL (ref 0.5–1.4)
D-LACTATE SERPL-SCNC: 0.8 MMOL/L (ref 0.5–2)
DEPRECATED RDW RBC AUTO: 43 FL (ref 40–54)
E/E' RATIO: 22.2
EOSINOPHIL # BLD AUTO: 0.13 10*3/MM3 (ref 0–0.7)
EOSINOPHIL NFR BLD AUTO: 1.5 % (ref 0–4)
ERYTHROCYTE [DISTWIDTH] IN BLOOD BY AUTOMATED COUNT: 14 % (ref 12–15)
GFR SERPL CREATININE-BSD FRML MDRD: 33 ML/MIN/1.73
GLOBULIN UR ELPH-MCNC: 3 GM/DL
GLUCOSE BLD-MCNC: 164 MG/DL (ref 70–100)
GLUCOSE BLDC GLUCOMTR-MCNC: 166 MG/DL (ref 70–130)
GLUCOSE BLDC GLUCOMTR-MCNC: 174 MG/DL (ref 70–130)
GLUCOSE BLDC GLUCOMTR-MCNC: 361 MG/DL (ref 70–130)
GLUCOSE BLDC GLUCOMTR-MCNC: 395 MG/DL (ref 70–130)
GLUCOSE BLDC GLUCOMTR-MCNC: 404 MG/DL (ref 70–130)
HCT VFR BLD AUTO: 28.2 % (ref 37–47)
HGB BLD-MCNC: 9.4 G/DL (ref 12–16)
IMM GRANULOCYTES # BLD: 0.03 10*3/MM3 (ref 0–0.03)
IMM GRANULOCYTES NFR BLD: 0.4 % (ref 0–5)
LEFT ATRIUM VOLUME INDEX: 29 ML/M2
LEFT ATRIUM VOLUME: 58.9 CM3
LV EF 2D ECHO EST: 55 %
LYMPHOCYTES # BLD AUTO: 1.23 10*3/MM3 (ref 0.72–4.86)
LYMPHOCYTES NFR BLD AUTO: 14.4 % (ref 15–45)
MAGNESIUM SERPL-MCNC: 1.7 MG/DL (ref 1.4–2.2)
MAXIMAL PREDICTED HEART RATE: 146 BPM
MCH RBC QN AUTO: 28.8 PG (ref 28–32)
MCHC RBC AUTO-ENTMCNC: 33.3 G/DL (ref 33–36)
MCV RBC AUTO: 86.5 FL (ref 82–98)
MONOCYTES # BLD AUTO: 0.86 10*3/MM3 (ref 0.19–1.3)
MONOCYTES NFR BLD AUTO: 10 % (ref 4–12)
NEUTROPHILS # BLD AUTO: 6.3 10*3/MM3 (ref 1.87–8.4)
NEUTROPHILS NFR BLD AUTO: 73.5 % (ref 39–78)
NRBC BLD MANUAL-RTO: 0 /100 WBC (ref 0–0)
PHOSPHATE SERPL-MCNC: 4.4 MG/DL (ref 2.5–4.5)
PLATELET # BLD AUTO: 210 10*3/MM3 (ref 130–400)
PMV BLD AUTO: 10 FL (ref 6–12)
POTASSIUM BLD-SCNC: 4 MMOL/L (ref 3.5–5.3)
PROT SERPL-MCNC: 6.3 G/DL (ref 6.3–8.7)
RBC # BLD AUTO: 3.26 10*6/MM3 (ref 4.2–5.4)
SODIUM BLD-SCNC: 147 MMOL/L (ref 135–145)
STRESS TARGET HR: 124 BPM
TROPONIN I SERPL-MCNC: <0.012 NG/ML (ref 0–0.03)
WBC NRBC COR # BLD: 8.57 10*3/MM3 (ref 4.8–10.8)

## 2018-03-05 PROCEDURE — 97166 OT EVAL MOD COMPLEX 45 MIN: CPT | Performed by: OCCUPATIONAL THERAPIST

## 2018-03-05 PROCEDURE — G8997 SWALLOW GOAL STATUS: HCPCS

## 2018-03-05 PROCEDURE — 25010000002 HEPARIN (PORCINE) PER 1000 UNITS: Performed by: FAMILY MEDICINE

## 2018-03-05 PROCEDURE — 83605 ASSAY OF LACTIC ACID: CPT | Performed by: FAMILY MEDICINE

## 2018-03-05 PROCEDURE — G8979 MOBILITY GOAL STATUS: HCPCS

## 2018-03-05 PROCEDURE — 25010000002 ENOXAPARIN PER 10 MG: Performed by: FAMILY MEDICINE

## 2018-03-05 PROCEDURE — 82962 GLUCOSE BLOOD TEST: CPT

## 2018-03-05 PROCEDURE — 25010000002 CEFTRIAXONE PER 250 MG: Performed by: FAMILY MEDICINE

## 2018-03-05 PROCEDURE — 83735 ASSAY OF MAGNESIUM: CPT | Performed by: FAMILY MEDICINE

## 2018-03-05 PROCEDURE — 93306 TTE W/DOPPLER COMPLETE: CPT | Performed by: INTERNAL MEDICINE

## 2018-03-05 PROCEDURE — G8988 SELF CARE GOAL STATUS: HCPCS | Performed by: OCCUPATIONAL THERAPIST

## 2018-03-05 PROCEDURE — 97162 PT EVAL MOD COMPLEX 30 MIN: CPT

## 2018-03-05 PROCEDURE — 93010 ELECTROCARDIOGRAM REPORT: CPT | Performed by: INTERNAL MEDICINE

## 2018-03-05 PROCEDURE — 93306 TTE W/DOPPLER COMPLETE: CPT

## 2018-03-05 PROCEDURE — 63710000001 INSULIN ISOPHANE & REGULAR PER 5 UNITS: Performed by: FAMILY MEDICINE

## 2018-03-05 PROCEDURE — 92610 EVALUATE SWALLOWING FUNCTION: CPT

## 2018-03-05 PROCEDURE — 93005 ELECTROCARDIOGRAM TRACING: CPT | Performed by: FAMILY MEDICINE

## 2018-03-05 PROCEDURE — 80053 COMPREHEN METABOLIC PANEL: CPT | Performed by: FAMILY MEDICINE

## 2018-03-05 PROCEDURE — 84100 ASSAY OF PHOSPHORUS: CPT | Performed by: FAMILY MEDICINE

## 2018-03-05 PROCEDURE — 71045 X-RAY EXAM CHEST 1 VIEW: CPT

## 2018-03-05 PROCEDURE — 63710000001 INSULIN ASPART PER 5 UNITS: Performed by: INTERNAL MEDICINE

## 2018-03-05 PROCEDURE — 84484 ASSAY OF TROPONIN QUANT: CPT | Performed by: FAMILY MEDICINE

## 2018-03-05 PROCEDURE — 94799 UNLISTED PULMONARY SVC/PX: CPT

## 2018-03-05 PROCEDURE — G8978 MOBILITY CURRENT STATUS: HCPCS

## 2018-03-05 PROCEDURE — G8987 SELF CARE CURRENT STATUS: HCPCS | Performed by: OCCUPATIONAL THERAPIST

## 2018-03-05 PROCEDURE — G8996 SWALLOW CURRENT STATUS: HCPCS

## 2018-03-05 PROCEDURE — 25010000002 PIPERACILLIN SOD-TAZOBACTAM PER 1 G: Performed by: FAMILY MEDICINE

## 2018-03-05 PROCEDURE — 85025 COMPLETE CBC W/AUTO DIFF WBC: CPT | Performed by: FAMILY MEDICINE

## 2018-03-05 RX ORDER — ASPIRIN 81 MG/1
81 TABLET ORAL DAILY
Status: DISCONTINUED | OUTPATIENT
Start: 2018-03-05 | End: 2018-03-05

## 2018-03-05 RX ORDER — INSULIN ASPART 100 [IU]/ML
40 INJECTION, SUSPENSION SUBCUTANEOUS NIGHTLY
Status: DISCONTINUED | OUTPATIENT
Start: 2018-03-05 | End: 2018-03-07 | Stop reason: HOSPADM

## 2018-03-05 RX ORDER — TRAMADOL HYDROCHLORIDE 50 MG/1
100 TABLET ORAL EVERY 6 HOURS PRN
COMMUNITY
End: 2018-03-07 | Stop reason: HOSPADM

## 2018-03-05 RX ORDER — FAMOTIDINE 20 MG/1
20 TABLET, FILM COATED ORAL DAILY
Status: DISCONTINUED | OUTPATIENT
Start: 2018-03-05 | End: 2018-03-05

## 2018-03-05 RX ORDER — FAMOTIDINE 20 MG/1
20 TABLET, FILM COATED ORAL 2 TIMES DAILY
Status: DISCONTINUED | OUTPATIENT
Start: 2018-03-05 | End: 2018-03-06

## 2018-03-05 RX ORDER — INSULIN ASPART 100 [IU]/ML
50 INJECTION, SUSPENSION SUBCUTANEOUS EVERY MORNING
Status: DISCONTINUED | OUTPATIENT
Start: 2018-03-06 | End: 2018-03-06

## 2018-03-05 RX ORDER — IPRATROPIUM BROMIDE AND ALBUTEROL SULFATE 2.5; .5 MG/3ML; MG/3ML
3 SOLUTION RESPIRATORY (INHALATION)
Status: DISCONTINUED | OUTPATIENT
Start: 2018-03-05 | End: 2018-03-07 | Stop reason: HOSPADM

## 2018-03-05 RX ORDER — SODIUM CHLORIDE 450 MG/100ML
75 INJECTION, SOLUTION INTRAVENOUS CONTINUOUS
Status: DISCONTINUED | OUTPATIENT
Start: 2018-03-05 | End: 2018-03-06

## 2018-03-05 RX ORDER — ACETAMINOPHEN 325 MG/1
650 TABLET ORAL EVERY 6 HOURS PRN
Status: DISCONTINUED | OUTPATIENT
Start: 2018-03-05 | End: 2018-03-07 | Stop reason: HOSPADM

## 2018-03-05 RX ORDER — GABAPENTIN 600 MG/1
600 TABLET ORAL 3 TIMES DAILY
COMMUNITY
End: 2018-03-07 | Stop reason: HOSPADM

## 2018-03-05 RX ORDER — VANCOMYCIN HYDROCHLORIDE 1 G/200ML
1000 INJECTION, SOLUTION INTRAVENOUS EVERY 24 HOURS
Status: DISCONTINUED | OUTPATIENT
Start: 2018-03-05 | End: 2018-03-05

## 2018-03-05 RX ORDER — FUROSEMIDE 40 MG/1
40 TABLET ORAL DAILY
COMMUNITY
End: 2018-03-07 | Stop reason: HOSPADM

## 2018-03-05 RX ADMIN — ATENOLOL 50 MG: 50 TABLET ORAL at 09:55

## 2018-03-05 RX ADMIN — NIFEDIPINE 30 MG: 30 TABLET, FILM COATED, EXTENDED RELEASE ORAL at 05:24

## 2018-03-05 RX ADMIN — INSULIN ASPART 40 UNITS: 100 INJECTION, SUSPENSION SUBCUTANEOUS at 22:12

## 2018-03-05 RX ADMIN — SODIUM CHLORIDE 100 ML/HR: 9 INJECTION, SOLUTION INTRAVENOUS at 00:48

## 2018-03-05 RX ADMIN — ENOXAPARIN SODIUM 30 MG: 30 INJECTION SUBCUTANEOUS at 17:13

## 2018-03-05 RX ADMIN — CLOPIDOGREL 75 MG: 75 TABLET, FILM COATED ORAL at 09:56

## 2018-03-05 RX ADMIN — INSULIN HUMAN 30 UNITS: 100 INJECTION, SUSPENSION SUBCUTANEOUS at 17:13

## 2018-03-05 RX ADMIN — SODIUM CHLORIDE 75 ML/HR: 4.5 INJECTION, SOLUTION INTRAVENOUS at 10:08

## 2018-03-05 RX ADMIN — INSULIN HUMAN 30 UNITS: 100 INJECTION, SUSPENSION SUBCUTANEOUS at 10:08

## 2018-03-05 RX ADMIN — FAMOTIDINE 20 MG: 20 TABLET, FILM COATED ORAL at 22:11

## 2018-03-05 RX ADMIN — GABAPENTIN 100 MG: 100 CAPSULE ORAL at 14:43

## 2018-03-05 RX ADMIN — GABAPENTIN 100 MG: 100 CAPSULE ORAL at 00:47

## 2018-03-05 RX ADMIN — PANTOPRAZOLE SODIUM 40 MG: 40 TABLET, DELAYED RELEASE ORAL at 05:23

## 2018-03-05 RX ADMIN — FAMOTIDINE 20 MG: 20 TABLET, FILM COATED ORAL at 09:56

## 2018-03-05 RX ADMIN — ATORVASTATIN CALCIUM 10 MG: 10 TABLET, FILM COATED ORAL at 09:56

## 2018-03-05 RX ADMIN — HEPARIN SODIUM 5000 UNITS: 5000 INJECTION INTRAVENOUS; SUBCUTANEOUS at 00:47

## 2018-03-05 RX ADMIN — GABAPENTIN 100 MG: 100 CAPSULE ORAL at 22:11

## 2018-03-05 RX ADMIN — IPRATROPIUM BROMIDE AND ALBUTEROL SULFATE 3 ML: 2.5; .5 SOLUTION RESPIRATORY (INHALATION) at 21:24

## 2018-03-05 RX ADMIN — HEPARIN SODIUM 5000 UNITS: 5000 INJECTION INTRAVENOUS; SUBCUTANEOUS at 09:56

## 2018-03-05 RX ADMIN — GABAPENTIN 100 MG: 100 CAPSULE ORAL at 05:23

## 2018-03-05 RX ADMIN — IPRATROPIUM BROMIDE AND ALBUTEROL SULFATE 3 ML: 2.5; .5 SOLUTION RESPIRATORY (INHALATION) at 11:22

## 2018-03-05 RX ADMIN — IPRATROPIUM BROMIDE AND ALBUTEROL SULFATE 3 ML: 2.5; .5 SOLUTION RESPIRATORY (INHALATION) at 16:03

## 2018-03-05 RX ADMIN — CEFTRIAXONE SODIUM 1 G: 1 INJECTION, POWDER, FOR SOLUTION INTRAMUSCULAR; INTRAVENOUS at 10:47

## 2018-03-05 RX ADMIN — TAZOBACTAM SODIUM AND PIPERACILLIN SODIUM 4.5 G: 500; 4 INJECTION, SOLUTION INTRAVENOUS at 04:07

## 2018-03-05 NOTE — CONSULTS
"Pharmacy Dosing Service  Pharmacokinetics  Vancomycin Initial Evaluation    Assessment/Action/Plan:  Initiated Vancomycin 1,000 mg IVPB every 24 hours, following a 1,750 mg (20 mg/kg) loading dose given in ER. Vancomycin trough ordered when pharmacokinetically appropriate. Pharmacy will monitor renal function and adjust dose accordingly.     Subjective:  Gregoria Bennett is a 72 y.o. female with a Vancomycin \"Pharmacy to Dose\" consult for the treatment of Pneumonia (x 7 days) .    Complicating factors:  Diabetes mellitus  COPD  Chronic renal insufficiency  Obesity  Loop diuretic (PRN therapy)  Concomitant Zosyn therapy    Objective:  Ht: 170.2 cm (67\"); Wt: 92 kg (202 lb 14.4 oz)  Estimated Creatinine Clearance: 35.1 mL/min (by C-G formula based on Cr of 1.64).   Lab Results   Component Value Date    CREATININE 1.64 (H) 03/04/2018      Lab Results   Component Value Date    WBC 9.80 03/04/2018      Baseline culture results:    Microbiology Results        Procedure Component Value Units Date/Time       Influenza Antigen, Rapid - Swab, Nasopharynx [[id number]] (Normal) Collected: 03/04/18 2012       Specimen: Swab from Nasopharynx Updated: 03/04/18 2039        Influenza A Ag, EIA Negative        Influenza B Ag, EIA Negative       Narrative:           Recommend confirmation of negative results by viral culture or molecular assay.       Urine Culture - Urine, Urine, Clean Catch [[id number]] Collected: 03/04/18 2007       Specimen: Urine from Urine, Catheter Updated: 03/04/18 2020       Blood Culture - Blood, Blood, Venous Line [[id number]] Collected: 03/04/18 1945       Specimen: Blood from Blood, Venous Line Updated: 03/04/18 2019       Blood Culture - Blood, Blood, Venous Line [[id number]] Collected: 03/04/18 1945       Specimen: Blood from Blood, Venous Line Updated: 03/04/18 2019        Raza Martinez [facility]  03/05/18 12:45 AM    "

## 2018-03-05 NOTE — H&P
[facility] Medicine Services  HISTORY AND PHYSICAL    Date of Admission: 3/4/2018  Primary Care Physician: Chu Isaac MD    Subjective     Chief Complaint: Hypoglycemia    History of Present Illness    72-year-old female with past medical history of COPD, coronary artery disease, diabetes mellitus type II, hypertension, GERD, venous insufficiency and renal insufficiency was brought into the ER from nursing home for hypoglycemia.  Apparently patient was found to be somewhat more altered than her baseline mentally.  Patient's blood sugar was noted to be in the 40s.  In the ER during initial evaluation patient was found to be hypothermic with low blood sugar.  EMS administered oral glucose so repeat blood glucose was noted to be in the 180s in the ER.  Patient was started on warm blanket for hypothermia.  During initial evaluation patient was found to be sepsis positive.  Patient will be admitted for further evaluation and treatment.  Imaging study was noted for possible underlying pneumonia.        Review of Systems     Otherwise complete ROS reviewed and negative except as mentioned in the HPI.    Past Medical History:   Past Medical History:   Diagnosis Date   • COPD (chronic obstructive pulmonary disease)    • Coronary artery disease    • Diabetes mellitus    • Hypertension    • Peptic ulcer disease    • Renal insufficiency    • Venous insufficiency      Past Surgical History:  Past Surgical History:   Procedure Laterality Date   • CHOLECYSTECTOMY     • COLON SURGERY       Social History:  reports that she has quit smoking. She does not have any smokeless tobacco history on file. She reports that she does not drink alcohol or use illicit drugs.    Family History: family history is negative for Breast cancer.     Allergies:  Allergies   Allergen Reactions   • Aspirin Rash   • Tetracyclines & Related Rash     Medications:  Prior to Admission medications    Medication Sig  Start Date End Date Taking? Authorizing Provider   acetaminophen (TYLENOL) 325 MG tablet Take 650 mg by mouth Every 6 (Six) Hours As Needed for Mild Pain  or Fever.    Historical Provider, MD   aspirin 81 MG EC tablet Take 81 mg by mouth daily.    Historical Provider, MD   atenolol (TENORMIN) 50 MG tablet Take 1 tablet by mouth Daily. 11/23/17   Susanna Gordillo MD   atorvastatin (LIPITOR) 10 MG tablet Take 10 mg by mouth daily.    Historical Provider, MD   clopidogrel (PLAVIX) 75 MG tablet Take 75 mg by mouth daily.    Historical Provider, MD   colesevelam (WELCHOL) 625 MG tablet Take 625 mg by mouth daily.    Historical Provider, MD   cycloSPORINE (RESTASIS) 0.05 % ophthalmic emulsion Administer 1 drop to both eyes 2 (Two) Times a Day.    Historical Provider, MD   furosemide (LASIX) 40 MG tablet Take 1 tablet by mouth Daily As Needed (for shortness of breath, weight gain or lower extremity edema). 11/22/17   Susanna Gordillo MD   gabapentin (NEURONTIN) 100 MG capsule Take 1 capsule by mouth 3 (Three) Times a Day. 11/22/17   Susanna Gordillo MD   insulin NPH-insulin regular (humuLIN 70/30,novoLIN 70/30) (70-30) 100 UNIT/ML injection Inject 30 Units under the skin 2 (Two) Times a Day With Meals. 11/22/17   Susanna Gordillo MD   ipratropium-albuterol (DUO-NEB) 0.5-2.5 mg/mL nebulizer Take 3 mL by nebulization Every 4 (Four) Hours As Needed for Shortness of Air. 11/22/17   Susanna Gordillo MD   loratadine (CLARITIN) 10 MG tablet Take 10 mg by mouth daily.    Historical Provider, MD   Multiple Vitamins-Minerals (CENTRUM SILVER PO) Take 1 tablet by mouth daily.    Historical Provider, MD   NIFEdipine XL (ADALAT CC) 30 MG 24 hr tablet Take 1 tablet by mouth Daily. 11/23/17   Susanna Gordillo MD   pantoprazole (PROTONIX) 40 MG EC tablet Take 40 mg by mouth 2 (Two) Times a Day.    Historical Provider, MD   POLYETHYLENE GLYCOL 3350 PO Take 1 dose by mouth Take As Directed. 1 tablespoon with full glass of water  "every other day    Historical Provider, MD   traMADol (ULTRAM) 50 MG tablet Take 50 mg by mouth Every 6 (Six) Hours As Needed for moderate pain (4-6).    Historical Provider, MD     Objective     Vital Signs: /42  Pulse 77  Temp 95.2 °F (35.1 °C) (Rectal)   Resp 15  Ht 170.2 cm (67\")  Wt 91.6 kg (202 lb)  SpO2 96%  BMI 31.64 kg/m2  Physical Exam   Constitutional:   Altered and lethargic   HENT:   Head: Normocephalic and atraumatic.   Eyes: EOM are normal. Pupils are equal, round, and reactive to light.   Neck: Normal range of motion. Neck supple.   Cardiovascular: Normal rate, regular rhythm and normal heart sounds.    Pulmonary/Chest: Effort normal and breath sounds normal.   Abdominal: Soft. Bowel sounds are normal.   Musculoskeletal: Normal range of motion.   Neurological:   Arousable but altered mental status   Skin:   Bilateral lower extremity erythematous changes below knee warm to touch and tenderness on palpation   Psychiatric:   Altered and lethargic             Results Reviewed:  Lab Results (last 24 hours)     Procedure Component Value Units Date/Time    POC Glucose Once [[id number]]  (Normal) Collected:  03/04/18 1822    Specimen:  Blood Updated:  03/04/18 1847     Glucose 119 mg/dL       : [id number] Lui AlexandraMeter ID: [id number]       CBC & Differential [[id number]] Collected:  03/04/18 1855    Specimen:  Blood Updated:  03/04/18 1903    Narrative:       The following orders were created for panel order CBC & Differential.  Procedure                               Abnormality         Status                     ---------                               -----------         ------                     CBC Auto Differential[[id number]]        Abnormal            Final result                 Please view results for these tests on the individual orders.    CBC Auto Differential [[id number]]  (Abnormal) Collected:  03/04/18 1855    Specimen:  Blood from Arm, Left Updated:  03/04/18 1903     WBC " 9.80 10*3/mm3      RBC 3.64 (L) 10*6/mm3      Hemoglobin 10.4 (L) g/dL      Hematocrit 31.6 (L) %      MCV 86.8 fL      MCH 28.6 pg      MCHC 32.9 (L) g/dL      RDW 14.0 %      RDW-SD 43.6 fl      MPV 9.9 fL      Platelets 237 10*3/mm3      Neutrophil % 81.7 (H) %      Lymphocyte % 7.6 (L) %      Monocyte % 8.7 %      Eosinophil % 1.0 %      Basophil % 0.4 %      Immature Grans % 0.6 %      Neutrophils, Absolute 8.01 10*3/mm3      Lymphocytes, Absolute 0.74 10*3/mm3      Monocytes, Absolute 0.85 10*3/mm3      Eosinophils, Absolute 0.10 10*3/mm3      Basophils, Absolute 0.04 10*3/mm3      Immature Grans, Absolute 0.06 (H) 10*3/mm3      nRBC 0.0 /100 WBC     POC Glucose Once [[id number]]  (Abnormal) Collected:  03/04/18 1853    Specimen:  Blood Updated:  03/04/18 1907     Glucose 163 (H) mg/dL       : [id number] Lui Lambert ID: [id number]       Lipase [[id number]]  (Normal) Collected:  03/04/18 1855    Specimen:  Blood from Arm, Left Updated:  03/04/18 1920     Lipase 34 U/L     CK [[id number]]  (Normal) Collected:  03/04/18 1855    Specimen:  Blood from Arm, Left Updated:  03/04/18 1921     Creatine Kinase 140 U/L     Comprehensive Metabolic Panel [[id number]]  (Abnormal) Collected:  03/04/18 1855    Specimen:  Blood from Arm, Left Updated:  03/04/18 1921     Glucose 145 (H) mg/dL      BUN 44 (H) mg/dL       Specimen hemolyzed. Results may be affected.        Creatinine 1.64 (H) mg/dL      Sodium 145 mmol/L      Potassium 4.2 mmol/L       Specimen hemolyzed.  Results may be affected.        Chloride 103 mmol/L      CO2 30.0 mmol/L      Calcium 9.4 mg/dL      Total Protein 7.5 g/dL      Albumin 4.00 g/dL      ALT (SGPT) 30 U/L       Specimen hemolyzed.  Results may be affected.        AST (SGOT) 40 U/L       Specimen hemolyzed.  Results may be affected.        Alkaline Phosphatase 57 U/L       Specimen hemolyzed. Results may be affected.        Total Bilirubin 0.2 mg/dL      eGFR Non  Amer 31 (L)  mL/min/1.73      Globulin 3.5 gm/dL      A/G Ratio 1.1 g/dL      BUN/Creatinine Ratio 26.8 (H)     Anion Gap 12.0 mmol/L     Narrative:       The MDRD GFR formula is only valid for adults with stable renal function between ages 18 and 70.    Protime-INR [[id number]]  (Normal) Collected:  03/04/18 1855    Specimen:  Blood from Arm, Left Updated:  03/04/18 1931     Protime 13.9 Seconds      INR 1.04    aPTT [[id number]]  (Abnormal) Collected:  03/04/18 1855    Specimen:  Blood from Arm, Left Updated:  03/04/18 1931     PTT 36.5 (H) seconds     BNP [[id number]]  (Abnormal) Collected:  03/04/18 1855    Specimen:  Blood from Arm, Left Updated:  03/04/18 1931     proBNP 1910.0 (H) pg/mL     Troponin [[id number]]  (Normal) Collected:  03/04/18 1855    Specimen:  Blood from Arm, Left Updated:  03/04/18 1932     Troponin I <0.012 ng/mL     Lactic Acid, Plasma [[id number]]  (Normal) Collected:  03/04/18 1855    Specimen:  Blood Updated:  03/04/18 1942     Lactate 1.2 mmol/L     Blood Gas, Arterial [[id number]]  (Abnormal) Collected:  03/04/18 1940    Specimen:  Arterial Blood Updated:  03/04/18 1946     Site Left Radial     Andrea's Test Positive     pH, Arterial 7.378 pH units      pCO2, Arterial 45.5 (H) mm Hg      pO2, Arterial 79.1 (L) mm Hg      HCO3, Arterial 26.8 (H) mmol/L      Base Excess, Arterial 1.2 mmol/L      O2 Saturation, Arterial 95.9 %      Temperature 37.0 C      Barometric Pressure for Blood Gas 755 mmHg      Modality Room Air     Ventilator Mode NA     Collected by [id number]    Procalcitonin [[id number]]  (Normal) Collected:  03/04/18 1855    Specimen:  Blood from Arm, Left Updated:  03/04/18 1947     Procalcitonin <0.25 ng/mL     Narrative:       SIRS, sepsis, severe sepsis, and septic shock are categorized according to the criteria of the consensus conference of the American College of Chest Physicians/Society of Critical Care Medicine.    PCT < 0.5 ng/mL     Systemic infection (sepsis) is not likely.    PCT >0.5  and < 2.0 ng/mL Systemic infection (sepsis) is possible, but other conditions are known to elevate PCT as well.    PCT > 2.0 ng/mL     Systemic infection (sepsis) is likely, unless other causes are known.      PCT > 10.0 ng/mL    Important systemic inflammatory response, almost exclusively due to severe bacterial sepsis or septic shock.    PCT values of < 0.5 ng/mL do not exclude an infection, because localized infections (without systemic signs) may be associated with such low concentrations, or a systemic infection in its initial stages (<6 hours).  Increased PCT can occur without infection.  PCT concentrations between 0.5 and 2.0 ng/mL should be interpreted taking into account the patients history.  It is recommended to retest PCT within 6-24 hours if any concentrations < 2.0 ng/mL are obtained.    [place] Draw [[id number]] Collected:  03/04/18 1855    Specimen:  Blood Updated:  03/04/18 2001    Narrative:       The following orders were created for panel order [place] Draw.  Procedure                               Abnormality         Status                     ---------                               -----------         ------                     Light Blue Top[[id number]]                                   Final result               Green Top (Gel)[[id number]]                                  Final result               Lavender Top[[id number]]                                     Final result               Red Top[[id number]]                                          Final result                 Please view results for these tests on the individual orders.    Light Blue Top [[id number]] Collected:  03/04/18 1855    Specimen:  Blood from Arm, Left Updated:  03/04/18 2001     Extra Tube hold for add-on      Auto resulted       Green Top (Gel) [[id number]] Collected:  03/04/18 1855    Specimen:  Blood from Arm, Left Updated:  03/04/18 2001     Extra Tube Hold for add-ons.      Auto resulted.       Lavender Top [[id number]]  Collected:  03/04/18 1855    Specimen:  Blood from Arm, Left Updated:  03/04/18 2001     Extra Tube hold for add-on      Auto resulted       Red Top [[id number]] Collected:  03/04/18 1855    Specimen:  Blood from Arm, Left Updated:  03/04/18 2001     Extra Tube Hold for add-ons.      Auto resulted.       Blood Culture - Blood, Blood, Venous Line [[id number]] Collected:  03/04/18 1945    Specimen:  Blood from Blood, Venous Line Updated:  03/04/18 2019    Blood Culture - Blood, Blood, Venous Line [[id number]] Collected:  03/04/18 1945    Specimen:  Blood from Blood, Venous Line Updated:  03/04/18 2019    Urine Culture - Urine, Urine, Clean Catch [[id number]] Collected:  03/04/18 2007    Specimen:  Urine from Urine, Catheter Updated:  03/04/18 2020    Urinalysis With / Culture If Indicated - Urine, Catheter [[id number]]  (Abnormal) Collected:  03/04/18 2007    Specimen:  Urine from Urine, Catheter Updated:  03/04/18 2026     Color, UA Yellow     Appearance, UA Clear     pH, UA <=5.0     Specific Gravity, UA 1.010     Glucose, UA Negative     Ketones, UA Negative     Bilirubin, UA Negative     Blood, UA Trace (A)     Protein, UA 30 mg/dL (1+) (A)     Leuk Esterase, UA Negative     Nitrite, UA Negative     Urobilinogen, UA 0.2 E.U./dL    Urinalysis, Microscopic Only - Urine, Clean Catch [[id number]]  (Abnormal) Collected:  03/04/18 2007    Specimen:  Urine from Urine, Catheter Updated:  03/04/18 2026     RBC, UA 3-5 (A) /HPF      WBC, UA 0-2 (A) /HPF      Bacteria, UA 4+ (A) /HPF      Squamous Epithelial Cells, UA 0-2 /HPF      Hyaline Casts, UA 0-2 /LPF      Methodology Automated Microscopy    Influenza Antigen, Rapid - Swab, Nasopharynx [[id number]]  (Normal) Collected:  03/04/18 2012    Specimen:  Swab from Nasopharynx Updated:  03/04/18 2039     Influenza A Ag, EIA Negative     Influenza B Ag, EIA Negative    Narrative:         Recommend confirmation of negative results by viral culture or molecular assay.        Imaging  Results (last 24 hours)     Procedure Component Value Units Date/Time    XR Chest 1 View [[id number]] Collected:  03/04/18 1925     Updated:  03/04/18 1929    Narrative:       EXAMINATION: XR CHEST 1 VW-     3/4/2018 7:21 PM CST     HISTORY: syncope.     1 view chest x-ray compared with 11/19/2017.     Magnified heart size.     Patchy atelectasis.     No focal infiltrate or pneumothorax.     Summary:  1. Patchy atelectasis.  This report was finalized on 03/04/2018 19:26 by Dr. Alphonso Plunkett MD.    CT Chest Without Contrast [[id number]] Collected:  03/04/18 1949     Updated:  03/04/18 1954    Narrative:       EXAMINATION: CT CHEST WO CONTRAST-      3/4/2018 7:20 PM CST     HISTORY: syncope     In order to have a CT radiation dose as low as reasonably achievable  Automated Exposure Control was utilized for adjustment of the mA and/or  KV according to patient size.     DLP in mGycm= 588.     Axial, sagittal, and coronal noncontrast chest CT imaging.     Heart size is at the upper limits of normal.  There is prominent diffuse coronary artery calcification.  No mediastinal mass.     Chronic interstitial lung disease with patchy groundglass opacity which  can be seen with mild edema or patchy atelectasis.     No pleural effusion or pneumothorax.     Mild scoliosis with prominent kyphosis. Moderate thoracic spurring.     Summary:  1. Patchy groundglass opacity within both lungs. Patchy edema favored  over atelectasis.  2. No focal consolidation or pneumothorax.                                         This report was finalized on 03/04/2018 19:51 by Dr. Alphonso Plunkett MD.    CT Head Without Contrast [[id number]] Collected:  03/04/18 2115     Updated:  03/04/18 2119    Narrative:       EXAMINATION: CT HEAD WO CONTRAST-      3/4/2018 8:52 PM CST     HISTORY: hypothermia, ams, , looking for infectious source     In order to have a CT radiation dose as low as reasonably achievable  Automated Exposure Control was utilized for  adjustment of the mA and/or  KV according to patient size.     DLP in mGycm= 766.     Noncontrast head CT compared with 10/06/2015.     Axial, sagittal, and coronal noncontrast CT imaging of the head.     The visualized paranasal sinuses are clear.     The brain and ventricles have an age appropriate appearance.   There is no hemorrhage or mass-effect.   No acute infarction is seen.     No calvarial abnormality.       Impression:       1. No acute intracranial abnormality is seen.                                         This report was finalized on 03/04/2018 21:16 by Dr. Alphonso Plunkett MD.    CT Abdomen Pelvis Without Contrast [[id number]] Collected:  03/04/18 2116     Updated:  03/04/18 2121    Narrative:       EXAMINATION: CT ABDOMEN PELVIS WO CONTRAST-      3/4/2018 8:52 PM CST     HISTORY: hypothermia, ams, looking for infectious source     In order to have a CT radiation dose as low as reasonably achievable  Automated Exposure Control was utilized for adjustment of the mA and/or  KV according to patient size.     DLP in mGycm= 741.     Axial, sagittal, and coronal noncontrast CT imaging of the  abdomen/pelvis.     Normal heart size.  Chronic interstitial disease at the lung bases.  No sign of pneumonia.     Cholecystectomy clips are present.  Multiple surgical clips are seen in the region of the pancreas tail.     Normal noncontrast appearance of the liver and spleen.  Atrophic pancreas.  Normal appearance of the adrenal glands and kidneys.  No perinephric fluid or edema.     No bowel dilation.  No appendicitis or diverticulitis.  No sign of colitis.     No free air or free fluid.     Summary:  1. No acute abnormality is seen within the abdomen or pelvis.                                   This report was finalized on 03/04/2018 21:18 by Dr. Alphonso Plunkett MD.        I have personally reviewed and interpreted the radiology studies and ECG obtained at time of admission.     Assessment / Plan     Assessment:    Hospital Problem List     Sepsis        1.  Sepsis  2.  Possible pneumonia  3.  Possible underlying lower extremity cellulitis  4.  Altered mental status  5.  Coronary artery disease  6.  Hypertension  7.  Hyperlipidemia  8.  COPD    Plan:      -Admit for further evaluation  -Continue cardiac monitoring  -Continue pulse ox  -Initial EKG noted   -Follow-up a.m. EKG  -Status post IV fluid bolus and IV antibiotic in the ER  -Continue IV fluid  -Continue IV antibiotics  -Lactate noted to be negative  -Monitor WBC  -Follow-up a.m. chest x-ray  -Consider aggressive fluid resuscitation if indicated  -CT head noted to be negative  -If indicated consider further head imaging  -Fall precaution  -Continue neuro checks  -Continue home cardiac medications  -Keep nothing by mouth for now  -Follow-up speech and swallow  -GI prophylaxis  -DVT prophylaxis        Code Status: Full code     I discussed the patients findings and my recommendations with the patient's RN    Estimated length of stay 2-3 days    Quinton Pinedo MD   03/04/18   10:37 PM

## 2018-03-05 NOTE — THERAPY EVALUATION
Acute Care - Speech Language Pathology   Swallow Initial Evaluation [facility]     Patient Name: Gregoria Bennett  : 1945  MRN: [id number]  Today's Date: 3/5/2018        Referring Physician: Dr. Lozoya      Admit Date: 3/4/2018     SPEECH-LANGUAGE PATHOLOGY EVALUATION - SWALLOW  Subjective: The patient was seen on this date for a Clinical Swallow evaluation.  Patient was alert and cooperative.  Significant history: States she has been on mechanical soft solids at [facility].  Objective: Textures given included thin liquid, nectar thick liquid, honey thick liquid, puree consistency and regular consistency.  Assessment: Difficulties were noted with thin liquid, honey thick liquid, puree consistency and regular consistency.  Observations:  reduced oral control, inefficient mastication and wet vocal quality, audible swallows.  SLP Findings:  Patient presents with moderate oropharyngeal dysphagia, without esophageal component.   Recommendations: Diet Textures: nectar thick liquid, mechanical soft consistency food.  Medications should be taken crushed with puree. May have water between meals after oral care, under staff or family supervision and with the recommended strategies for safe swallowing.   Recommended Strategies: Upright for PO, small bites and sips and alternate liquids and solids. Oral care before breakfast, after all meals and PRN.  Dysphagia therapy is recommended.     ST saw pt for CBSE on 3/5/18; pt was given a full range of consistencies. Before completing CBSE, ST asked pt if she had ever had any diet modifications at [facility] and she stated that she had previously been on mechanical soft solids. During oral [facility] examination, pt presented with teeth that were present and adequate as well as labial/lingual function that was WNL. Pt presented with a mild-moderate delay in initiation of swallow response during volitional swallow; volitional cough was functional. Pt presented with very audible swallow x3 during  each trial of puree as well as slight vocal quality change. Pt presented with the same symptoms during honey thick liquid. Pt tolerated nectar thick liquids with no s/s aspiration noted. Pt presented with delayed initiation of swallow response and slight vocal quality change with thin liquids. Pt presented with increased mastication of cohesive solid consistencies. At this time, ST recommends mechanical soft and nectar thick consistencies. Medications to be crushed and given in applesauce; may have water BETWEEN meals. ST to follow up with diet toleration and treatment.  Priyanka Mendez MS, CFY-SLP 3/5/2018 9:20 AM  Visit Dx:     ICD-10-CM ICD-9-CM   1. Sepsis, due to unspecified organism A41.9 038.9     995.91   2. Hypothermia, initial encounter T68.XXXA 991.6   3. Altered mental status, unspecified altered mental status type R41.82 780.97   4. Hypoglycemia E16.2 251.2   5. Oropharyngeal dysphagia R13.12 787.22     Patient Active Problem List   Diagnosis   • Sprain   • Urinary tract infection without hematuria   • Sepsis     Past Medical History:   Diagnosis Date   • COPD (chronic obstructive pulmonary disease)    • Coronary artery disease    • Diabetes mellitus    • Hypertension    • Peptic ulcer disease    • Renal insufficiency    • Venous insufficiency      Past Surgical History:   Procedure Laterality Date   • CHOLECYSTECTOMY     • COLON SURGERY            SWALLOW EVALUATION (last 72 hours)      Swallow Evaluation       03/05/18 0820                Rehab Evaluation    Document Type evaluation  -EA        Subjective Information agree to therapy  -EA        Patient Effort, Rehab Treatment good  -EA        General Information    Patient Profile Review yes  -EA        Onset of Illness/Injury 03/04/18  -EA        Referring Physician Dr. Lozoya  -EA        Subjective Patient Observations Pt alert and cooperative; in bed when ST came in to evaluate.  -EA        Pertinent History Of Current Problem Sepsis; possible  underlyinh pneumonia stated in H&P.  -EA        Current Diet Limitations NPO  -EA        Precautions/Limitations, Vision WNL  -EA        Precautions/Limitations, Hearing WNL  -EA        Prior Level of Function- Communication functional in all spheres  -EA        Prior Level of Function- Swallowing diet modifications- foods   states on mech soft at SNF  -EA        Plans/Goals Discussed With patient  -EA        Clinical Impression    Patient's Goals For Discharge patient did not state  -EA        SLP Swallowing Diagnosis moderate dysphagia;oral dysfunction;pharyngeal dysfunction  -EA        Rehab Potential/Prognosis, Swallowing good, to achieve stated therapy goals  -EA        Criteria for Skilled Therapeutic Interventions Met demonstrates skilled criteria for intervention  -EA        FCM, Swallowing 5-->Level 5  -EA        Therapy Frequency 3-5 times/wk  -EA        Predicted Duration Therapy Interv (days) until discharge  -EA        Expected Duration Therapy Session (min) 15-30 minutes  -EA        SLP Diet Recommendation IV - mechanical soft, no mixed consistencies;chopped;nectar/syrup-thick liquids;other (see comments)   water between meals  -EA        Recommended Diagnostics other (see comments)   possible VFSS pending diet toleration  -EA        Recommended Feeding/Eating Techniques alternate between small bites and sips of food/liquid;maintain upright posture during/after eating for 30 mins;small sips/bites  -EA        SLP Rec. for Method of Medication Administration meds crushed in pudding/applesauce  -EA        Monitor For Signs Of Aspiration cough;gurgly voice;throat clearing;right lower lobe infiltrates  -EA        Anticipated Discharge Disposition skilled nursing facility  -EA        Cognitive Assessment/Intervention    Current Cognitive/Communication Assessment functional  -EA        Orientation Status oriented x 4  -EA        Follows Commands/Answers Questions able to follow single-step instructions;100%  of the time  -EA        Personal Safety WNL/WFL  -EA        Oral Motor Structure and Function    Oral Motor Anatomy and Physiology patient demonstrates anatomy and physiology that is WNL  -EA        Dentition Assessment present and adequate;other (see comments)  -EA        Secretion Management WNL/WFL  -EA        Mucosal Quality moist, healthy  -EA        Volitional Swallow mild to moderate difficulties initiating volitional swallow  -EA        Volitional Cough no difficulties initiating volitional cough  -EA        Oral Musculature General Assessment mandibular impairment  -EA        Oral Motor Assessment Comment Pt presents with reduced ROM and strength of mandibular fx.   -EA        Mandibular Strength and Mobility reduced ROM;reduced strength bilaterally  -EA        General Feeding/Swallowing Observations    Current Feeding Method NPO  -EA        Observations of Posture During Feeding ST sat pt upright upright in bed during CBSE.  -EA        Clinical Swallow Exam    Mode of Presentation fed by clinician;spoon;straw  -EA        Oral Preparation Concerns honey:;thin:;cohesive solid:;increased prep time;incomplete bolus preparation   as well as puree  -EA        Oral Transit Concerns thin:;honey:;increased oral transit time   as well as puree  -EA        Oral Phase Results impaired oral phase, signs of dysfunction present   madibular fx; reduced strength and ROM  -EA        Pharyngeal Phase Results sign/symptoms of pharyngeal impairment;susupected impaired pharyngeal phase of swallowing   audible swallows, delayed swallow response  -EA        Summary of Clinical Exam ST saw pt for CBSE on 3/5/18; pt was given a full range of consistencies. Before completing CBSE, ST asked pt if she had ever had any diet modifications at [facility] and she stated that she had previously been on mechanical soft solids. During oral [facility] examination, pt presented with teeth that were present and adequate as well as labial/lingual function  that was WNL. Pt presented with a mild-moderate delay in initiation of swallow response during volitional swallow; volitional cough was functional. Pt presented with very audible swallow x3 during each trial of puree as well as slight vocal quality change. Pt presented with the same symptoms during honey thick liquid. Pt tolerated nectar thick liquids with no s/s aspiration noted. Pt presented with delayed initiation of swallow response and slight vocal quality change with thin liquids. Pt presented with increased mastication of cohesive solid consistencies. At this time, ST recommends mechanical soft and nectar thick consistencies. Medications to be crushed and given in applesauce; may have water BETWEEN meals. ST to follow up with diet toleration and treatment.  -EA        Swallow Recommendations    Oral Care oral care with toothbrush and dentifrice BID and PRN  -EA        Modified Eating Strategies alternate food and liquid swallows;upright positioning 90 degrees  -EA        Recommended Diet IV - mechanical soft, no mixed consistencies;chopped;nectar/syrup-thick liquids   may have water between meals.  -EA        Oral Therapeutic Exercise Program jaw exercise,  -EA        Pharyngeal Therapeutic Exercise Program Improve timing:;Mendelsohn Maneuver;Improve tongue base/pharyngeal wall:;effortful swallow  -EA        Dysphagia Treatment Objectives and Progress    Dysphagia Treatment Objectives Improve oral skills;Improve timing of pharyngeal response;Improve tongue base & pharyngeal wall squeeze  -EA        Improve oral skills    To Improve Oral Skills, patient will: 90%;without cues   improve madibular fx  -EA        Status: Improve Oral Skills New  -EA        Oral Skills Progress continue to adress  -EA        Improve timing of pharyngeal response    To improve timing of pharyngeal response, patient will: Swallow in timely way after sensory input;Swallow in timely way using Mendelsohn maneuver;90%;without cues  -EA         Status: Improve timing of pharyngeal response New  -EA        Timing of Pharyngeal Response Progress continue to adress  -EA        Improve tongue base & pharyngeal wall squeeze    To improve tongue base & pharyngeal wall squeeze, patient will: Complete effortful swallow;90%;without cues  -EA        Status: Improve tongue base & pharyngeal wall squeeze New  -EA        Tongue Base/Pharyngeal Wall Squeeze Progress continue to adress  -EA          User Key  (r) = Recorded By, (t) = Taken By, (c) = Cosigned By    Initials Name Effective Dates    EA Priyanka Mendez MS, CFY-SLP 02/21/17 -         EDUCATION  The patient has been educated in the following areas:   Dysphagia (Swallowing Impairment).    SLP Recommendation and Plan  SLP Swallowing Diagnosis: moderate dysphagia, oral dysfunction, pharyngeal dysfunction  SLP Diet Recommendation: IV - mechanical soft, no mixed consistencies, chopped, nectar/syrup-thick liquids, other (see comments) (water between meals)  Recommended Feeding/Eating Techniques: alternate between small bites and sips of food/liquid, maintain upright posture during/after eating for 30 mins, small sips/bites  SLP Rec. for Method of Medication Administration: meds crushed in pudding/applesauce  Monitor For Signs Of Aspiration: cough, gurgly voice, throat clearing, right lower lobe infiltrates  Recommended Diagnostics: other (see comments) (possible VFSS pending diet toleration)  Criteria for Skilled Therapeutic Interventions Met: demonstrates skilled criteria for intervention  Anticipated Discharge Disposition: skilled nursing facility  Rehab Potential/Prognosis, Swallowing: good, to achieve stated therapy goals  Therapy Frequency: 3-5 times/wk             Plan of Care Review  Plan Of Care Reviewed With: patient  Progress: progress towards functional goals is fair  Outcome Summary/Follow up Plan:  saw pt for CBSE on 3/5/18; pt was given a full range of consistencies. Before completing CBSE,   asked pt if she had ever had any diet modifications at [facility] and she stated that she had previously been on mechanical soft solids. During oral [facility] examination, pt presented with teeth that were present and adequate as well as labial/lingual function that was WNL. Pt presented with a mild-moderate delay in initiation of swallow response during volitional swallow; volitional cough was functional. Pt presented with very audible swallow x3 during each trial of puree as well as slight vocal quality change. Pt presented with the same symptoms during honey thick liquid. Pt tolerated nectar thick liquids with no s/s aspiration noted. Pt presented with delayed initiation of swallow response and slight vocal quality change with thin liquids. Pt presented with increased mastication of cohesive solid consistencies. At this time, ST recommends mechanical soft and nectar thick consistencies. Medications to be crushed and given in applesauce; may have water BETWEEN meals. ST to follow up with diet toleration and treatment.          IP SLP Goals       03/05/18 0916          Safely Consume Diet    Safely Consume Diet- SLP, Date Established 03/05/18  -EA      Safely Consume Diet- SLP, Time to Achieve by discharge  -EA      Safely Consume Diet- SLP, Activity Level Patient will improve oral skills for more efficient eating;Patient will improve timing of pharyngeal response for safer, more efficient swallow;Patient will improve tongue base/pharyngeal wall squeeze for safer, more efficient swallow  -EA      Safely Consume Diet- SLP, Date Goal Reviewed 03/05/18  -EA      Safely Consume Diet- SLP, Outcome goal ongoing  -EA        User Key  (r) = Recorded By, (t) = Taken By, (c) = Cosigned By    Initials Name Provider Type    EA Priyanka Mendez, MS, CFY-SLP Speech and Language Pathologist             SLP Outcome Measures (last 72 hours)      SLP Outcome Measures       03/05/18 0900          SLP Outcome Measures    Outcome Measure Used? Adult  NOMS  -      FCM Scores    Swallowing FCM Score 5  -        User Key  (r) = Recorded By, (t) = Taken By, (c) = Cosigned By    Initials Name Effective Dates    TIAN Mendez MS, ROBERT-SLP 02/21/17 -            Time Calculation:         Time Calculation- SLP       03/05/18 0920          Time Calculation- SLP    SLP Start Time 0820  -EA      SLP Stop Time 0920  -EA      SLP Time Calculation (min) 60 min  -EA      SLP Received On 03/05/18  -EA      SLP Goal Re-Cert Due Date 03/15/18  -        User Key  (r) = Recorded By, (t) = Taken By, (c) = Cosigned By    Initials Name Provider Type    TIAN Mendez MS, CFY-SLP Speech and Language Pathologist          Therapy Charges for Today     Code Description Service Date Service Provider Modifiers Qty    93416850283 HC ST SWALLOWING CURRENT STATUS 3/5/2018 Priyanka Mendez MS, CFNOLAN-SLP GN, CJ 1    81034515833 HC ST SWALLOWING PROJECTED 3/5/2018 Priyanka Mendez MS, CFY-SLP GN, CI 1    10854613572 HC ST EVAL ORAL PHARYNG SWALLOW 4 3/5/2018 Priyanka Mendez MS, CFY-SLP GN 1          SLP G-Codes  SLP NOMS Used?: Yes  Functional Limitations: Swallowing  Swallow Current Status (): At least 20 percent but less than 40 percent impaired, limited or restricted  Swallow Goal Status (): At least 1 percent but less than 20 percent impaired, limited or restricted    Priyanka Mendez MS, CFY-SLP  3/5/2018

## 2018-03-05 NOTE — PLAN OF CARE
Problem: Patient Care Overview (Adult)  Goal: Plan of Care Review  Outcome: Ongoing (interventions implemented as appropriate)   03/05/18 5942   Coping/Psychosocial Response Interventions   Plan Of Care Reviewed With patient   Patient Care Overview   Progress progress towards functional goals is fair   Outcome Evaluation   Outcome Summary/Follow up Plan Iv antibiotics and IVF in progress, patient temp is >97, congested loose cough with mild wheezing noted. No distress

## 2018-03-05 NOTE — THERAPY EVALUATION
Acute Care - Physical Therapy Initial Evaluation  [facility]     Patient Name: Gregoria Bennett  : 1945  MRN: [id number]  Today's Date: 3/5/2018   Onset of Illness/Injury or Date of Surgery Date: 18  Date of Referral to PT: 18  Referring Physician: Dr. Lozoya      Admit Date: 3/4/2018     Visit Dx:    ICD-10-CM ICD-9-CM   1. Sepsis, due to unspecified organism A41.9 038.9     995.91   2. Hypothermia, initial encounter T68.XXXA 991.6   3. Altered mental status, unspecified altered mental status type R41.82 780.97   4. Hypoglycemia E16.2 251.2   5. Oropharyngeal dysphagia R13.12 787.22   6. Impaired functional mobility, balance, gait, and endurance Z74.09 V49.89     Patient Active Problem List   Diagnosis   • Sprain   • Urinary tract infection without hematuria   • Sepsis     Past Medical History:   Diagnosis Date   • COPD (chronic obstructive pulmonary disease)    • Coronary artery disease    • Diabetes mellitus    • Hypertension    • Peptic ulcer disease    • Renal insufficiency    • Venous insufficiency      Past Surgical History:   Procedure Laterality Date   • CHOLECYSTECTOMY     • COLON SURGERY            PT ASSESSMENT (last 72 hours)      PT Evaluation       18 1113 18 0820    Rehab Evaluation    Document Type evaluation   See MAR  -PB (r) LN (t) PB (c) evaluation  -EA    Subjective Information agree to therapy;complains of;weakness  -PB (r) LN (t) PB (c) agree to therapy  -EA    Patient Effort, Rehab Treatment good  -PB (r) LN (t) PB (c) good  -EA    General Information    Patient Profile Review yes  -PB (r) LN (t) PB (c)     Onset of Illness/Injury or Date of Surgery Date 18  -PB (r) LN (t) PB (c)     Referring Physician Dr. Lozoya  -PB (r) LN (t) PB (c)     General Observations Pt supine supine, HoB elevated, swelling noted in BLE, IV in LUE  -PB (r) LN (t) PB (c)     Pertinent History Of Current Problem Pt was brought to ER from nursing home for hypoglycemia and altered  mental status. Pt was found to have sepsis related to UTI, along with general deconditioning and weakness.    -PB (r) LN (t) PB (c)     Precautions/Limitations fall precautions  -PB (r) LN (t) PB (c)     Prior Level of Function min assist:;gait;transfer;bed mobility;w/c or scooter   Pt states she used WC at nursing home after falling  -PB (r) LN (t) PB (c)     Equipment Currently Used at Home rollator;wheelchair  -PB (r) LN (t) PB (c)     Plans/Goals Discussed With patient;agreed upon  -PB (r) LN (t) PB (c)     Risks Reviewed patient:;LOB;dizziness;increased discomfort;change in vital signs  -PB (r) LN (t) PB (c)     Benefits Reviewed patient:;improve function;increase independence;increase strength;increase balance  -PB (r) LN (t) PB (c)     Barriers to Rehab previous functional deficit  -PB (r) LN (t) PB (c)     Living Environment    Lives With facility resident  -PB (r) LN (t) PB (c)     Living Arrangements extended care facility  -PB (r) LN (t) PB (c)     Home Accessibility no concerns  -PB (r) LN (t) PB (c)     Stair Railings at Home none  -PB (r) LN (t) PB (c)     Type of Financial/Environmental Concern none  -PB (r) LN (t) PB (c)     Transportation Available ambulance  -PB (r) LN (t) PB (c)     Clinical Impression    Date of Referral to PT 03/05/18  -PB (r) LN (t) PB (c)     PT Diagnosis Impaired functional mobility  -PB (r) LN (t) PB (c)     Patient/Family Goals Statement Return to extended care facility  -PB (r) LN (t) PB (c)     Criteria for Skilled Therapeutic Interventions Met yes;treatment indicated  -PB (r) LN (t) PB (c)     Pathology/Pathophysiology Noted (Describe Specifically for Each System) musculoskeletal  -PB (r) LN (t) PB (c)     Rehab Potential good, to achieve stated therapy goals  -PB (r) LN (t) PB (c)     Predicted Duration of Therapy Intervention (days/wks) Until DC  -PB (r) LN (t) PB (c)     Vital Signs    Pretreatment Heart Rate (beats/min) 69  -PB (r) LN (t) PB (c)     Pre SpO2 (%) 98   -PB (r) LN (t) PB (c)     O2 Delivery Pre Treatment room air  -PB (r) LN (t) PB (c)     O2 Delivery Intra Treatment room air  -PB (r) LN (t) PB (c)     Post SpO2 (%) 96  -PB (r) LN (t) PB (c)     O2 Delivery Post Treatment room air  -PB (r) LN (t) PB (c)     Pain Assessment    Pain Assessment 0-10  -PB (r) LN (t) PB (c)     Pain Score 0  -PB (r) LN (t) PB (c)     Post Pain Score 0  -PB (r) LN (t) PB (c)     Vision Assessment/Intervention    Visual Impairment WFL with corrective lenses  -PB (r) LN (t) PB (c)     Cognitive Assessment/Intervention    Current Cognitive/Communication Assessment functional  -PB (r) LN (t) PB (c) functional  -EA    Orientation Status oriented x 4  -PB (r) LN (t) PB (c) oriented x 4  -EA    Follows Commands/Answers Questions able to follow single-step instructions;100% of the time  -PB (r) LN (t) PB (c) able to follow single-step instructions;100% of the time  -EA    Personal Safety WNL/WFL  -PB (r) LN (t) PB (c) WNL/WFL  -EA    Personal Safety Interventions fall prevention program maintained;gait belt;nonskid shoes/slippers when out of bed;supervised activity  -PB (r) LN (t) PB (c)     ROM (Range of Motion)    General ROM Detail BLE AROM WFL  -PB (r) LN (t) PB (c)     MMT (Manual Muscle Testing)    General MMT Assessment Detail BLE functionally 4/5  -PB (r) LN (t) PB (c)     Bed Mobility, Assessment/Treatment    Bed Mobility, Roll Left, [place] contact guard assist;supervision required;verbal cues required  -PB (r) LN (t) PB (c)     Bed Mobility, Roll Right, [place] contact guard assist;supervision required;verbal cues required  -PB (r) LN (t) PB (c)     Bed Mobility, Scoot/Bridge, [place] moderate assist (50% patient effort);2 person assist required   with draw sheet  -PB (r) LN (t) PB (c)     Bed Mob, Sidelying to Sit, [place] minimum assist (75% patient effort)  -PB (r) LN (t) PB (c)     Bed Mob, Sit to Sidelying, [place] moderate assist (50% patient effort)    with BLE  -PB (r) LN (t) PB (c)     Bed Mobility, Safety Issues decreased use of legs for bridging/pushing  -PB (r) LN (t) PB (c)     Bed Mobility, Impairments strength decreased  -PB (r) LN (t) PB (c)     Transfer Assessment/Treatment    Transfers, Sit-Stand [place] contact guard assist;2 person assist required  -PB (r) LN (t) PB (c)     Transfers, Stand-Sit [place] contact guard assist;2 person assist required  -PB (r) LN (t) PB (c)     Transfers, Sit-Stand-Sit, Assist Device rolling walker  -PB (r) LN (t) PB (c)     Transfer, Safety Issues balance decreased during turns;step length decreased;weight-shifting ability decreased  -PB (r) LN (t) PB (c)     Transfer, Impairments impaired balance;strength decreased  -PB (r) LN (t) PB (c)     Gait Assessment/Treatment    Gait, [place] Level contact guard assist;verbal cues required;2 person assist required  -PB (r) LN (t) PB (c)     Gait, Assistive Device rolling walker  -PB (r) LN (t) PB (c)     Gait, Distance (Feet) 40   40 ft into hallway and back to [facility]; sit; 10 ft to bed  -PB (r) LN (t) PB (c)     Gait, Gait Deviations daniel decreased;double stance time increased;forward flexed posture;step length decreased;stride length decreased  -PB (r) LN (t) PB (c)     Gait, Safety Issues balance decreased during turns;step length decreased  -PB (r) LN (t) PB (c)     Gait, Impairments impaired balance;strength decreased  -PB (r) LN (t) PB (c)     Gait, Comment Occasional VC for obstacle avoidance with RW  -PB (r) LN (t) PB (c)     Motor Skills/Interventions    Additional Documentation Balance Skills Training (Group)  -PB (r) LN (t) PB (c)     Balance Skills Training    Sitting-Level of Assistance Contact guard;Close supervision  -PB (r) LN (t) PB (c)     Sitting-Balance Support Left upper extremity supported;Right upper extremity supported;Feet supported  -PB (r) LN (t) PB (c)     Standing-Level of Assistance Contact guard;x2  -PB (r) LN (t) PB (c)      Static Standing Balance Support assistive device  -PB (r) LN (t) PB (c)     Gait Balance-Level of Assistance Contact guard;x2  -PB (r) LN (t) PB (c)     Gait Balance Support assistive device  -PB (r) LN (t) PB (c)     Sensory Assessment/Intervention    Sensory Impairment hypersensitivity   dorsum of feet  -PB (r) LN (t) PB (c)     Light Touch LLE;RLE  -PB (r) LN (t) PB (c)     LLE Light Touch WNL  -PB (r) LN (t) PB (c)     RLE Light Touch WNL  -PB (r) LN (t) PB (c)     Positioning and Restraints    Pre-Treatment Position in bed  -PB (r) LN (t) PB (c)     Post Treatment Position bed  -PB (r) LN (t) PB (c)     In Bed fowlers;call light within reach;encouraged to call for assist;side rails up x2;notified nsg  -PB (r) LN (t) PB (c)       03/05/18 0208 03/05/18 0021    Living Environment    Lives With facility resident  -     Living Arrangements extended care facility  -     Home Accessibility no concerns  -     Stair Railings at Home none  -HW     Type of Financial/Environmental Concern none  -HW     Transportation Available ambulance  -     Living Environment Comment none  -HW     Muscle Tone Assessment    Muscle Tone Assessment  Bilateral Lower Extremities  -    Bilateral Lower Extremities Muscle Tone Assessment  moderately decreased tone  -      03/05/18 0002       General Information    Equipment Currently Used at Home wheelchair  -HW       User Key  (r) = Recorded By, (t) = Taken By, (c) = Cosigned By    Initials Name Provider Type     Yee Robles RN Registered Nurse    GRAEME Meza, PT DPT Physical Therapist    TIAN Mendez, MS, CFY-SLP Speech and Language Pathologist    LN Rafa Belcher, PT Student PT Student          Physical Therapy Education     Title: PT OT SLP Therapies (Active)     Topic: Physical Therapy (Active)     Point: Mobility training (Done)    Learning Progress Summary    Learner Readiness Method Response Comment Documented by Status   Patient Acceptance E VU Educated  on hand placement for bed mobility with rails; technique with sit <-> stand with RW; verbal cues for safety gait using RW; obstacle avoidance LN 03/05/18 1300 Done               Point: Precautions (Done)    Learning Progress Summary    Learner Readiness Method Response Comment Documented by Status   Patient Acceptance E JOSLYN Educated on hand placement for bed mobility with rails; technique with sit <-> stand with RW; verbal cues for safety gait using RW; obstacle avoidance LN 03/05/18 1300 Done                      User Key     Initials Effective Dates Name Provider Type Discipline    LN 01/08/18 -  Rafa Belcher, PT Student PT Student PT                PT Recommendation and Plan  Anticipated Discharge Disposition: skilled nursing facility, extended care facility  Planned Therapy Interventions: balance training, bed mobility training, strengthening, transfer training, gait training, home exercise program, patient/family education  PT Frequency: per priority policy, daily, 2 times/day  Plan of Care Review  Plan Of Care Reviewed With: patient  Outcome Summary/Follow up Plan: PT eval completed. Pt presents with decreased strength, balance, endurance, and functional mobility. Pt performed supine -> sit with min A and HoB elevated, Mod A with BLE for sit -> supine. Pt was able to perform sit <-> stand with CGA x2  and RW, and ambulated 40 feet with CGAx2 and RW. Pt had no LoB but needed several cues for maneuvering walker around corners and in smaller spaces. Pt will benefit from skilled therapy to improve her safety with funcitonal mobility. Recommend DC to SNF vs extended care facility.            IP PT Goals       03/05/18 1302          Bed Mobility PT LTG    Bed Mobility PT LTG, Date Established 03/05/18  -PB (r) LN (t) PB (c)      Bed Mobility PT LTG, Time to Achieve by discharge  -PB (r) LN (t) PB (c)      Bed Mobility PT LTG, Activity Type all bed mobility  -PB (r) LN (t) PB (c)      Bed Mobility PT LTG, [place]  Level supervision required  -PB (r) LN (t) PB (c)      Bed Mobility PT Goal  LTG, Assist Device bed rails  -PB (r) LN (t) PB (c)      Transfer Training PT LTG    Transfer Training PT LTG, Date Established 03/05/18  -PB (r) LN (t) PB (c)      Transfer Training PT LTG, Time to Achieve by discharge  -PB (r) LN (t) PB (c)      Transfer Training PT LTG, Activity Type bed to chair /chair to bed;sit to stand/stand to sit  -PB (r) LN (t) PB (c)      Transfer Training PT LTG, [place] Level supervision required  -PB (r) LN (t) PB (c)      Transfer Training PT LTG, Assist Device walker, rolling  -PB (r) LN (t) PB (c)      Gait Training PT LTG    Gait Training Goal PT LTG, Date Established 03/05/18  -PB (r) LN (t) PB (c)      Gait Training Goal PT LTG, Time to Achieve by discharge  -PB (r) LN (t) PB (c)      Gait Training Goal PT LTG, [place] Level supervision required  -PB (r) LN (t) PB (c)      Gait Training Goal PT LTG, Assist Device walker, rolling  -PB (r) LN (t) PB (c)      Gait Training Goal PT LTG, Distance to Achieve 100ft x2 with rest break  -PB (r) LN (t) PB (c)      Gait Training Goal PT LTG, Additional Goal With no cues needed for obstacle avoidance  -PB (r) LN (t) PB (c)      Patient Education PT LTG    Patient Education PT LTG, Date Established 03/05/18  -PB (r) LN (t) PB (c)      Patient Education PT LTG, Time to Achieve by discharge  -PB (r) LN (t) PB (c)      Patient Education PT LTG, Education Type HEP;gait;transfers;bed mobility  -PB (r) LN (t) PB (c)      Patient Education PT LTG, Education Understanding demonstrate adequately;verbalize understanding  -PB (r) LN (t) PB (c)        User Key  (r) = Recorded By, (t) = Taken By, (c) = Cosigned By    Initials Name Provider Type    GRAEME Meza, PT DPT Physical Therapist    JEN Belcher, PT Student PT Student                Outcome Measures       03/05/18 1300          How much help from another person do you currently need...    Turning from  your back to your side while in flat bed without using bedrails? 3  -PB (r) LN (t) PB (c)      Moving from lying on back to sitting on the side of a flat bed without bedrails? 3  -PB (r) LN (t) PB (c)      Moving to and from a bed to a chair (including a wheelchair)? 3  -PB (r) LN (t) PB (c)      Standing up from a chair using your arms (e.g., wheelchair, bedside chair)? 3  -PB (r) LN (t) PB (c)      Climbing 3-5 steps with a railing? 2  -PB (r) LN (t) PB (c)      To walk in hospital room? 3  -PB (r) LN (t) PB (c)      AM-PAC 6 Clicks Score 17  -PB (r) LN (t)      Functional Assessment    Outcome Measure Options AM-PAC 6 Clicks Basic Mobility (PT)  -PB (r) LN (t) PB (c)        User Key  (r) = Recorded By, (t) = Taken By, (c) = Cosigned By    Initials Name Provider Type    GRAEME Meza, PT DPT Physical Therapist    JEN Belcher, PT Student PT Student           Time Calculation:         PT Charges       03/05/18 1248          Time Calculation    Start Time 1126  -PB (r) LN (t) PB (c)      Stop Time 1210  -PB (r) LN (t) PB (c)      Time Calculation (min) 44 min  -PB (r) LN (t)      PT Received On 03/05/18  -PB (r) LN (t) PB (c)      PT Goal Re-Cert Due Date 03/15/18  -PB (r) LN (t) PB (c)        User Key  (r) = Recorded By, (t) = Taken By, (c) = Cosigned By    Initials Name Provider Type    GRAEME Meza, PT DPT Physical Therapist    JEN Belcher, PT Student PT Student          Therapy Charges for Today     Code Description Service Date Service Provider Modifiers Qty    [id number] HC PT EVAL MOD COMPLEXITY 3 3/5/2018 Rafa Belcher, PT Student GP 1          PT G-Codes  Outcome Measure Options: AM-PAC 6 Clicks Basic Mobility (PT)  Score: 17  Functional Limitation: Mobility: Walking and moving around  Mobility: Walking and Moving Around Current Status (): At least 40 percent but less than 60 percent impaired, limited or restricted  Mobility: Walking and Moving Around Goal Status (): At least 20  percent but less than 40 percent impaired, limited or restricted      Rafa Belhcer, PT Student  3/5/2018

## 2018-03-05 NOTE — PROGRESS NOTES
"Pharmacy Dosing Service  Automatic Renal Adjustment  Pepcid    Assessment/Action/Plan:  Based on prescribing information provided by the drug , Pepcid 40 mg PO every 24 hours, has been changed to Pepcid 20 mg PO every 24 hours. Pharmacy will continue to monitor daily and make further adjustment(s) accordingly.     Subjective:  Gregoria Bennett is a 72 y.o. female     Additional Factors Considered:  • Patient disposition per documentation  • Disease state or condition being treated    Objective:  Ht: 170.2 cm (67\"); Wt: 92 kg (202 lb 14.4 oz)  Estimated Creatinine Clearance: 35.1 mL/min (by C-G formula based on Cr of 1.64).   Lab Results   Component Value Date    CREATININE 1.64 (H) 03/04/2018       Raza Martinez [facility]  03/05/18 12:43 AM    "

## 2018-03-05 NOTE — PROGRESS NOTES
[facility] Medicine Services  INPATIENT PROGRESS NOTE    Length of Stay: 1  Date of Admission: 3/4/2018  Primary Care Physician: Chu Isaac MD    Subjective   Chief Complaint: Epigastric discomfort, altered mental status    HPI   Discussed patient with CT scans result.  Patient has a urinary tract infection.  Patient tolerated diet without any problem.  Complain epigastric discomfort and right upper quadrant pain.  Sugars been in 180.  Kidney functions also improving.    Review of Systems   Constitutional: Positive for activity change, appetite change and fatigue. Negative for chills and fever.   HENT: Negative for hearing loss, nosebleeds, tinnitus and trouble swallowing.    Eyes: Negative for visual disturbance.   Respiratory: Negative for cough, chest tightness, shortness of breath and wheezing.    Cardiovascular: Negative for chest pain, palpitations and leg swelling.   Gastrointestinal: Positive for abdominal pain (Right upper quadrant and epigastric) and nausea. Negative for abdominal distention, blood in stool, constipation, diarrhea and vomiting.   Endocrine: Negative for cold intolerance, heat intolerance, polydipsia, polyphagia and polyuria.   Genitourinary: Positive for dysuria and frequency. Negative for decreased urine volume, difficulty urinating, flank pain and hematuria.   Musculoskeletal: Positive for arthralgias, gait problem and myalgias. Negative for joint swelling.   Skin: Negative for rash.   Allergic/Immunologic: Negative for immunocompromised state.   Neurological: Positive for weakness. Negative for dizziness, syncope, light-headedness and headaches.   Hematological: Negative for adenopathy. Does not bruise/bleed easily.   Psychiatric/Behavioral: Negative for confusion and sleep disturbance. The patient is not nervous/anxious.           All pertinent negatives and positives are as above. All other systems have been reviewed and are negative  unless otherwise stated.     Objective    Temp:  [92.8 °F (33.8 °C)-98.4 °F (36.9 °C)] 96.9 °F (36.1 °C)  Heart Rate:  [52-80] 73  Resp:  [13-20] 16  BP: (118-188)/(42-80) 154/72    Intake/Output Summary (Last 24 hours) at 03/05/18 0937  Last data filed at 03/05/18 0048   Gross per 24 hour   Intake             1500 ml   Output                0 ml   Net             1500 ml     Physical Exam   Constitutional: She appears well-developed.   HENT:   Head: Normocephalic and atraumatic.   Eyes: Conjunctivae and EOM are normal. Pupils are equal, round, and reactive to light.   Neck: Neck supple. No JVD present. No thyromegaly present.   Cardiovascular: Normal rate, regular rhythm, normal heart sounds and intact distal pulses.  Exam reveals no gallop and no friction rub.    No murmur heard.  Pulmonary/Chest: Effort normal. No respiratory distress. She has no wheezes. She has no rales. She exhibits no tenderness.   Diminished breath sound   Abdominal: Soft. Bowel sounds are normal. She exhibits no distension. There is tenderness (right upper quadrant/epigastric). There is guarding. There is no rebound.   Obesity   Musculoskeletal: She exhibits no edema, tenderness or deformity.   Lymphadenopathy:     She has no cervical adenopathy.   Neurological: She is alert. She displays normal reflexes. No cranial nerve deficit. She exhibits abnormal muscle tone. Coordination abnormal.   Skin: Skin is warm and dry. No rash noted.   Psychiatric: She has a normal mood and affect. Her behavior is normal. Thought content normal.       Results Review:  Lab Results (last 24 hours)     Procedure Component Value Units Date/Time    POC Glucose Once [[id number]]  (Normal) Collected:  03/04/18 1822    Specimen:  Blood Updated:  03/04/18 1847     Glucose 119 mg/dL       : [id number] Lui Lambert ID: [id number]       CBC & Differential [[id number]] Collected:  03/04/18 1855    Specimen:  Blood Updated:  03/04/18 1903    Narrative:       The  following orders were created for panel order CBC & Differential.  Procedure                               Abnormality         Status                     ---------                               -----------         ------                     CBC Auto Differential[[id number]]        Abnormal            Final result                 Please view results for these tests on the individual orders.    CBC Auto Differential [[id number]]  (Abnormal) Collected:  03/04/18 1855    Specimen:  Blood from Arm, Left Updated:  03/04/18 1903     WBC 9.80 10*3/mm3      RBC 3.64 (L) 10*6/mm3      Hemoglobin 10.4 (L) g/dL      Hematocrit 31.6 (L) %      MCV 86.8 fL      MCH 28.6 pg      MCHC 32.9 (L) g/dL      RDW 14.0 %      RDW-SD 43.6 fl      MPV 9.9 fL      Platelets 237 10*3/mm3      Neutrophil % 81.7 (H) %      Lymphocyte % 7.6 (L) %      Monocyte % 8.7 %      Eosinophil % 1.0 %      Basophil % 0.4 %      Immature Grans % 0.6 %      Neutrophils, Absolute 8.01 10*3/mm3      Lymphocytes, Absolute 0.74 10*3/mm3      Monocytes, Absolute 0.85 10*3/mm3      Eosinophils, Absolute 0.10 10*3/mm3      Basophils, Absolute 0.04 10*3/mm3      Immature Grans, Absolute 0.06 (H) 10*3/mm3      nRBC 0.0 /100 WBC     POC Glucose Once [[id number]]  (Abnormal) Collected:  03/04/18 1853    Specimen:  Blood Updated:  03/04/18 1907     Glucose 163 (H) mg/dL       : [id number] Poe NasrinMeter ID: [id number]       Lipase [[id number]]  (Normal) Collected:  03/04/18 1855    Specimen:  Blood from Arm, Left Updated:  03/04/18 1920     Lipase 34 U/L     CK [[id number]]  (Normal) Collected:  03/04/18 1855    Specimen:  Blood from Arm, Left Updated:  03/04/18 1921     Creatine Kinase 140 U/L     Comprehensive Metabolic Panel [[id number]]  (Abnormal) Collected:  03/04/18 1855    Specimen:  Blood from Arm, Left Updated:  03/04/18 1921     Glucose 145 (H) mg/dL      BUN 44 (H) mg/dL       Specimen hemolyzed. Results may be affected.        Creatinine 1.64 (H) mg/dL       Sodium 145 mmol/L      Potassium 4.2 mmol/L       Specimen hemolyzed.  Results may be affected.        Chloride 103 mmol/L      CO2 30.0 mmol/L      Calcium 9.4 mg/dL      Total Protein 7.5 g/dL      Albumin 4.00 g/dL      ALT (SGPT) 30 U/L       Specimen hemolyzed.  Results may be affected.        AST (SGOT) 40 U/L       Specimen hemolyzed.  Results may be affected.        Alkaline Phosphatase 57 U/L       Specimen hemolyzed. Results may be affected.        Total Bilirubin 0.2 mg/dL      eGFR Non African Amer 31 (L) mL/min/1.73      Globulin 3.5 gm/dL      A/G Ratio 1.1 g/dL      BUN/Creatinine Ratio 26.8 (H)     Anion Gap 12.0 mmol/L     Narrative:       The MDRD GFR formula is only valid for adults with stable renal function between ages 18 and 70.    Protime-INR [[id number]]  (Normal) Collected:  03/04/18 1855    Specimen:  Blood from Arm, Left Updated:  03/04/18 1931     Protime 13.9 Seconds      INR 1.04    aPTT [[id number]]  (Abnormal) Collected:  03/04/18 1855    Specimen:  Blood from Arm, Left Updated:  03/04/18 1931     PTT 36.5 (H) seconds     BNP [[id number]]  (Abnormal) Collected:  03/04/18 1855    Specimen:  Blood from Arm, Left Updated:  03/04/18 1931     proBNP 1910.0 (H) pg/mL     Troponin [[id number]]  (Normal) Collected:  03/04/18 1855    Specimen:  Blood from Arm, Left Updated:  03/04/18 1932     Troponin I <0.012 ng/mL     Lactic Acid, Plasma [[id number]]  (Normal) Collected:  03/04/18 1855    Specimen:  Blood Updated:  03/04/18 1942     Lactate 1.2 mmol/L     Blood Gas, Arterial [[id number]]  (Abnormal) Collected:  03/04/18 1940    Specimen:  Arterial Blood Updated:  03/04/18 1946     Site Left Radial     Andrea's Test Positive     pH, Arterial 7.378 pH units      pCO2, Arterial 45.5 (H) mm Hg      pO2, Arterial 79.1 (L) mm Hg      HCO3, Arterial 26.8 (H) mmol/L      Base Excess, Arterial 1.2 mmol/L      O2 Saturation, Arterial 95.9 %      Temperature 37.0 C      Barometric Pressure for Blood Gas  755 mmHg      Modality Room Air     Ventilator Mode NA     Collected by [id number]    Procalcitonin [[id number]]  (Normal) Collected:  03/04/18 1855    Specimen:  Blood from Arm, Left Updated:  03/04/18 1947     Procalcitonin <0.25 ng/mL     Narrative:       SIRS, sepsis, severe sepsis, and septic shock are categorized according to the criteria of the consensus conference of the American College of Chest Physicians/Society of Critical Care Medicine.    PCT < 0.5 ng/mL     Systemic infection (sepsis) is not likely.    PCT >0.5 and < 2.0 ng/mL Systemic infection (sepsis) is possible, but other conditions are known to elevate PCT as well.    PCT > 2.0 ng/mL     Systemic infection (sepsis) is likely, unless other causes are known.      PCT > 10.0 ng/mL    Important systemic inflammatory response, almost exclusively due to severe bacterial sepsis or septic shock.    PCT values of < 0.5 ng/mL do not exclude an infection, because localized infections (without systemic signs) may be associated with such low concentrations, or a systemic infection in its initial stages (<6 hours).  Increased PCT can occur without infection.  PCT concentrations between 0.5 and 2.0 ng/mL should be interpreted taking into account the patients history.  It is recommended to retest PCT within 6-24 hours if any concentrations < 2.0 ng/mL are obtained.    Aubrey Draw [[id number]] Collected:  03/04/18 1855    Specimen:  Blood Updated:  03/04/18 2001    Narrative:       The following orders were created for panel order Aubrey Draw.  Procedure                               Abnormality         Status                     ---------                               -----------         ------                     Light Blue Top[[id number]]                                   Final result               Green Top (Gel)[[id number]]                                  Final result               Lavender Top[[id number]]                                     Final result                Red Top[[id number]]                                          Final result                 Please view results for these tests on the individual orders.    Light Blue Top [[id number]] Collected:  03/04/18 1855    Specimen:  Blood from Arm, Left Updated:  03/04/18 2001     Extra Tube hold for add-on      Auto resulted       Green Top (Gel) [[id number]] Collected:  03/04/18 1855    Specimen:  Blood from Arm, Left Updated:  03/04/18 2001     Extra Tube Hold for add-ons.      Auto resulted.       Lavender Top [[id number]] Collected:  03/04/18 1855    Specimen:  Blood from Arm, Left Updated:  03/04/18 2001     Extra Tube hold for add-on      Auto resulted       Red Top [[id number]] Collected:  03/04/18 1855    Specimen:  Blood from Arm, Left Updated:  03/04/18 2001     Extra Tube Hold for add-ons.      Auto resulted.       Urinalysis With / Culture If Indicated - Urine, Catheter [[id number]]  (Abnormal) Collected:  03/04/18 2007    Specimen:  Urine from Urine, Catheter Updated:  03/04/18 2026     Color, UA Yellow     Appearance, UA Clear     pH, UA <=5.0     Specific Gravity, UA 1.010     Glucose, UA Negative     Ketones, UA Negative     Bilirubin, UA Negative     Blood, UA Trace (A)     Protein, UA 30 mg/dL (1+) (A)     Leuk Esterase, UA Negative     Nitrite, UA Negative     Urobilinogen, UA 0.2 E.U./dL    Urinalysis, Microscopic Only - Urine, Clean Catch [[id number]]  (Abnormal) Collected:  03/04/18 2007    Specimen:  Urine from Urine, Catheter Updated:  03/04/18 2026     RBC, UA 3-5 (A) /HPF      WBC, UA 0-2 (A) /HPF      Bacteria, UA 4+ (A) /HPF      Squamous Epithelial Cells, UA 0-2 /HPF      Hyaline Casts, UA 0-2 /LPF      Methodology Automated Microscopy    Influenza Antigen, Rapid - Swab, Nasopharynx [[id number]]  (Normal) Collected:  03/04/18 2012    Specimen:  Swab from Nasopharynx Updated:  03/04/18 2039     Influenza A Ag, EIA Negative     Influenza B Ag, EIA Negative    Narrative:         Recommend confirmation  of negative results by viral culture or molecular assay.    POC Glucose Once [[id number]]  (Abnormal) Collected:  03/05/18 0523    Specimen:  Blood Updated:  03/05/18 0534     Glucose 166 (H) mg/dL       : [id number] Robles HeatherMeter ID: [id number]       CBC Auto Differential [[id number]]  (Abnormal) Collected:  03/05/18 0603    Specimen:  Blood Updated:  03/05/18 0622     WBC 8.57 10*3/mm3      RBC 3.26 (L) 10*6/mm3      Hemoglobin 9.4 (L) g/dL      Hematocrit 28.2 (L) %      MCV 86.5 fL      MCH 28.8 pg      MCHC 33.3 g/dL      RDW 14.0 %      RDW-SD 43.0 fl      MPV 10.0 fL      Platelets 210 10*3/mm3      Neutrophil % 73.5 %      Lymphocyte % 14.4 (L) %      Monocyte % 10.0 %      Eosinophil % 1.5 %      Basophil % 0.2 %      Immature Grans % 0.4 %      Neutrophils, Absolute 6.30 10*3/mm3      Lymphocytes, Absolute 1.23 10*3/mm3      Monocytes, Absolute 0.86 10*3/mm3      Eosinophils, Absolute 0.13 10*3/mm3      Basophils, Absolute 0.02 10*3/mm3      Immature Grans, Absolute 0.03 10*3/mm3      nRBC 0.0 /100 WBC     Lactic Acid, Plasma [[id number]]  (Normal) Collected:  03/05/18 0603    Specimen:  Blood Updated:  03/05/18 0640     Lactate 0.8 mmol/L     Comprehensive Metabolic Panel [[id number]]  (Abnormal) Collected:  03/05/18 0603    Specimen:  Blood Updated:  03/05/18 0646     Glucose 164 (H) mg/dL      BUN 39 (H) mg/dL      Creatinine 1.52 (H) mg/dL      Sodium 147 (H) mmol/L      Potassium 4.0 mmol/L      Chloride 108 mmol/L      CO2 28.0 mmol/L      Calcium 8.7 mg/dL      Total Protein 6.3 g/dL      Albumin 3.30 (L) g/dL      ALT (SGPT) 36 U/L      AST (SGOT) 40 U/L      Alkaline Phosphatase 62 U/L      Total Bilirubin 0.1 mg/dL      eGFR Non African Amer 33 (L) mL/min/1.73      Globulin 3.0 gm/dL      A/G Ratio 1.1 g/dL      BUN/Creatinine Ratio 25.7 (H)     Anion Gap 11.0 mmol/L     Narrative:       The MDRD GFR formula is only valid for adults with stable renal function between ages 18 and 70.     Magnesium [[id number]]  (Normal) Collected:  03/05/18 0603    Specimen:  Blood Updated:  03/05/18 0646     Magnesium 1.7 mg/dL     Phosphorus [[id number]]  (Normal) Collected:  03/05/18 0603    Specimen:  Blood Updated:  03/05/18 0646     Phosphorus 4.4 mg/dL     Urine Culture - Urine, Urine, Clean Catch [[id number]]  (Abnormal) Collected:  03/04/18 2007    Specimen:  Urine from Urine, Catheter Updated:  03/05/18 0647     Urine Culture --      >100,000 CFU/mL Gram Negative Bacilli (A)    Troponin [[id number]]  (Normal) Collected:  03/05/18 0603    Specimen:  Blood Updated:  03/05/18 0651     Troponin I <0.012 ng/mL     Blood Culture - Blood, Blood, Venous Line [[id number]]  (Normal) Collected:  03/04/18 1945    Specimen:  Blood from Blood, Venous Line Updated:  03/05/18 0831     Blood Culture No growth at less than 24 hours    Blood Culture - Blood, Blood, Venous Line [[id number]]  (Normal) Collected:  03/04/18 1945    Specimen:  Blood from Blood, Venous Line Updated:  03/05/18 0831     Blood Culture No growth at less than 24 hours           Cultures:  Blood Culture   Date Value Ref Range Status   03/04/2018 No growth at less than 24 hours  Preliminary   03/04/2018 No growth at less than 24 hours  Preliminary     Urine Culture   Date Value Ref Range Status   03/04/2018 >100,000 CFU/mL Gram Negative Bacilli (A)  Preliminary       Radiology Data:    Imaging Results (last 24 hours)     Procedure Component Value Units Date/Time    XR Chest 1 View [[id number]] Collected:  03/04/18 1925     Updated:  03/04/18 1929    Narrative:       EXAMINATION: XR CHEST 1 VW-     3/4/2018 7:21 PM CST     HISTORY: syncope.     1 view chest x-ray compared with 11/19/2017.     Magnified heart size.     Patchy atelectasis.     No focal infiltrate or pneumothorax.     Summary:  1. Patchy atelectasis.  This report was finalized on 03/04/2018 19:26 by Dr. Alphonso Plunkett MD.    CT Chest Without Contrast [[id number]] Collected:  03/04/18 1949      Updated:  03/04/18 1954    Narrative:       EXAMINATION: CT CHEST WO CONTRAST-      3/4/2018 7:20 PM CST     HISTORY: syncope     In order to have a CT radiation dose as low as reasonably achievable  Automated Exposure Control was utilized for adjustment of the mA and/or  KV according to patient size.     DLP in mGycm= 588.     Axial, sagittal, and coronal noncontrast chest CT imaging.     Heart size is at the upper limits of normal.  There is prominent diffuse coronary artery calcification.  No mediastinal mass.     Chronic interstitial lung disease with patchy groundglass opacity which  can be seen with mild edema or patchy atelectasis.     No pleural effusion or pneumothorax.     Mild scoliosis with prominent kyphosis. Moderate thoracic spurring.     Summary:  1. Patchy groundglass opacity within both lungs. Patchy edema favored  over atelectasis.  2. No focal consolidation or pneumothorax.                                         This report was finalized on 03/04/2018 19:51 by Dr. Alphonso Plunkett MD.    CT Head Without Contrast [[id number]] Collected:  03/04/18 2115     Updated:  03/04/18 2119    Narrative:       EXAMINATION: CT HEAD WO CONTRAST-      3/4/2018 8:52 PM CST     HISTORY: hypothermia, ams, , looking for infectious source     In order to have a CT radiation dose as low as reasonably achievable  Automated Exposure Control was utilized for adjustment of the mA and/or  KV according to patient size.     DLP in mGycm= 766.     Noncontrast head CT compared with 10/06/2015.     Axial, sagittal, and coronal noncontrast CT imaging of the head.     The visualized paranasal sinuses are clear.     The brain and ventricles have an age appropriate appearance.   There is no hemorrhage or mass-effect.   No acute infarction is seen.     No calvarial abnormality.       Impression:       1. No acute intracranial abnormality is seen.                                         This report was finalized on 03/04/2018 21:16 by  Dr. Alphonso Plunkett MD.    CT Abdomen Pelvis Without Contrast [[id number]] Collected:  03/04/18 2116     Updated:  03/04/18 2121    Narrative:       EXAMINATION: CT ABDOMEN PELVIS WO CONTRAST-      3/4/2018 8:52 PM CST     HISTORY: hypothermia, ams, looking for infectious source     In order to have a CT radiation dose as low as reasonably achievable  Automated Exposure Control was utilized for adjustment of the mA and/or  KV according to patient size.     DLP in mGycm= 741.     Axial, sagittal, and coronal noncontrast CT imaging of the  abdomen/pelvis.     Normal heart size.  Chronic interstitial disease at the lung bases.  No sign of pneumonia.     Cholecystectomy clips are present.  Multiple surgical clips are seen in the region of the pancreas tail.     Normal noncontrast appearance of the liver and spleen.  Atrophic pancreas.  Normal appearance of the adrenal glands and kidneys.  No perinephric fluid or edema.     No bowel dilation.  No appendicitis or diverticulitis.  No sign of colitis.     No free air or free fluid.     Summary:  1. No acute abnormality is seen within the abdomen or pelvis.                                   This report was finalized on 03/04/2018 21:18 by Dr. Alphonso Plunkett MD.          Allergies   Allergen Reactions   • Aspirin Rash   • Tetracyclines & Related Rash       Scheduled meds:     atenolol 50 mg Oral Daily   atorvastatin 10 mg Oral Daily   clopidogrel 75 mg Oral Daily   famotidine 20 mg Oral Daily   gabapentin 100 mg Oral Q8H   heparin (porcine) 5,000 Units Subcutaneous Q12H   insulin NPH-insulin regular 30 Units Subcutaneous BID With Meals   NIFEdipine CC 30 mg Oral Q24H   pantoprazole 40 mg Oral Q AM   piperacillin-tazobactam 4.5 g Intravenous Q8H   vancomycin 1,000 mg Intravenous Q24H       PRN meds:  furosemide  •  ipratropium-albuterol  •  ondansetron  •  sodium chloride  •  Insert peripheral IV **AND** sodium chloride    Assessment/Plan     Active Problems:     Sepsis      Plan:    Sepsis/questionable pneumonia-resolved.  Lactic acid 0.8 today.  DC vancomycin and Zosyn.  CTA shows patchy groundglass pasty both lungs, patchy edema favored over atelectasis.    Alter mental status-back to baseline.  CT scan head-no acute change.      CHF-BMP 1.9k.  Echocardiogram 8/15/15, ejection fraction 60%.  Echocardiogram.     Hypernatremia- change fluid to half-normal saline.    Epigastric pain.  Lipase and amylase.  CT scan abdomen pelvic-no acute changes.  Ultrasound the gallbladder.  Increase Pepcid.    Renal insufficiency/Dehydration - improving with IV fluids.    Diabetes- continue NPH 70/30.  Fingerstick 4 times a day    Hypertension/hyperlipidemia/CAD-continue atenolol, Lipitor, Plavix, Procardia.     UTI-start Rocephin.    Anemia- decrease. Will follow. Anemia panel.    Lovenox prophylaxis    Flu screen negative.  Blood culture no growth less than 24 hours.  Urine culture gram-negative bacilli.    Deconditioning.  PT and OT consult    Discharge Plannin-2 days.     Sen Lozoya MD   18   9:37 AM

## 2018-03-05 NOTE — ED NOTES
Pt appropriately gowned and Bear hugger blanket warmer applied to pt at this time with warm blankets placed on top     Jose Poe RN  03/04/18 1900

## 2018-03-05 NOTE — PROGRESS NOTES
Discharge Planning Assessment  [facility]     Patient Name: Gregoria Bennett  MRN: [id number]  Today's Date: 3/5/2018    Admit Date: 3/4/2018          Discharge Needs Assessment       03/05/18 1340    Living Environment    Lives With (P)  facility resident    Living Environment    Able to Return to Prior Living Arrangements (P)  yes    Discharge Needs Assessment    Concerns To Be Addressed (P)  no discharge needs identified    Anticipated Changes Related to Illness (P)  none    Discharge Planning Comments (P)  Pt admitted from [facility]. SW called Sandra [phone] pt is ICF level and has courtesy bed hold and should be able to return at [place]. Pt made mention of not liking her DR there; SW will mention it to facility. Will follow.            Discharge Plan     None        Discharge Placement     No information found                Demographic Summary     None            Functional Status     None            Psychosocial     None            Abuse/Neglect     None            Legal     None            Substance Abuse     None            Patient Forms     None          Unique Martin

## 2018-03-05 NOTE — PLAN OF CARE
Problem: Patient Care Overview (Adult)  Goal: Plan of Care Review  Outcome: Ongoing (interventions implemented as appropriate)   03/05/18 1508   Coping/Psychosocial Response Interventions   Plan Of Care Reviewed With patient   Patient Care Overview   Progress progress towards functional goals is fair   Outcome Evaluation   Outcome Summary/Follow up Plan vss, blood glucose monitoring in progress, vss, will continue to monitor       Problem: Diabetes, Type 2 (Adult)  Goal: Signs and Symptoms of Listed Potential Problems Will be Absent or Manageable (Diabetes, Type 2)  Outcome: Ongoing (interventions implemented as appropriate)      Problem: Infection, Risk/Actual (Adult)  Goal: Identify Related Risk Factors and Signs and Symptoms  Outcome: Ongoing (interventions implemented as appropriate)    Goal: Infection Prevention/Resolution  Outcome: Ongoing (interventions implemented as appropriate)      Problem: Sepsis (Adult)  Goal: Signs and Symptoms of Listed Potential Problems Will be Absent or Manageable (Sepsis)  Outcome: Ongoing (interventions implemented as appropriate)

## 2018-03-05 NOTE — PLAN OF CARE
Problem: Patient Care Overview (Adult)  Goal: Plan of Care Review  Outcome: Ongoing (interventions implemented as appropriate)   03/05/18 1630   Coping/Psychosocial Response Interventions   Plan Of Care Reviewed With patient   Patient Care Overview   Progress progress toward functional goals as expected   Outcome Evaluation   Outcome Summary/Follow up Plan OT eval completed. Pt was mod x2 with draw sheet for scooting and sit to supine. Pt was CGA for Rolling R<>L. pt was min A x2 for supine to sit. Pt was CGA x2 for sit to stand from bed, min x1 for sit to stand from commode. Pt was max to sabrina socks. Pt was mod for posterior hygiene. Skilled OT recommended to address adls, functional mobility, strength and activity tolerance. Recommended d/c SNF.       Problem: Inpatient Occupational Therapy  Goal: Strength Goal LTG- OT  Outcome: Ongoing (interventions implemented as appropriate)   03/05/18 1630   Strength OT LTG   Strength Goal OT LTG, Date Established 03/05/18   Strength Goal OT LTG, Time to Achieve by discharge   Strength Goal OT LTG, Measure to Achieve Pt will independently maintain BUE AROM HEP to increase strength to 4+/5.     Goal: Bathing Goal LTG- OT  Outcome: Ongoing (interventions implemented as appropriate)   03/05/18 1630   Bathing OT LTG   Bathing Goal OT LTG, Date Established 03/05/18   Bathing Goal OT LTG, Time to Achieve by discharge   Bathing Goal OT LTG, Activity Type upper body bathing;lower body bathing   Bathing Goal OT LTG, [place] Level conditional independence;set up required   Bathing Goal OT LTG, Assist Device grab bars;shower head, detachable;shower chair with back;sponge, long handled     Goal: Toileting Goal LTG- OT  Outcome: Ongoing (interventions implemented as appropriate)   03/05/18 1630   Toileting OT LTG   Toileting Goal OT LTG, Date Established 03/05/18   Toileting Goal OT LTG, Time to Achieve by discharge   Toileting Goal OT LTG, [place] Level conditional  independence;set up required   Toileting Goal OT LTG, Assist Device toilet paper aid     Goal: LB Dressing Goal LTG- OT  Outcome: Ongoing (interventions implemented as appropriate)   03/05/18 1630   LB Dressing OT LTG   LB Dressing Goal OT LTG, Date Established 03/05/18   LB Dressing Goal OT LTG, Time to Achieve by discharge   LB Dressing Goal OT LTG, [place] Level conditional independence;set up required   LB Dressing Goal OT LTG, Adaptive Equipment laces, elastic;reacher;shoe horn, long handled;sock-aid   LB Dressing Goal OT LTG, Additional Goal AE PRN

## 2018-03-05 NOTE — PLAN OF CARE
Problem: Patient Care Overview (Adult)  Goal: Plan of Care Review  Outcome: Ongoing (interventions implemented as appropriate)   03/05/18 0916   Coping/Psychosocial Response Interventions   Plan Of Care Reviewed With patient   Patient Care Overview   Progress progress towards functional goals is fair   Outcome Evaluation   Outcome Summary/Follow up Plan ST saw pt for CBSE on 3/5/18; pt was given a full range of consistencies. Before completing CBSE, ST asked pt if she had ever had any diet modifications at SNF and she stated that she had previously been on mechanical soft solids. During oral [facility] examination, pt presented with teeth that were present and adequate as well as labial/lingual function that was WNL. Pt presented with a mild-moderate delay in initiation of swallow response during volitional swallow; volitional cough was functional. Pt presented with very audible swallow x3 during each trial of puree as well as slight vocal quality change. Pt presented with the same symptoms during honey thick liquid. Pt tolerated nectar thick liquids with no s/s aspiration noted. Pt presented with delayed initiation of swallow response and slight vocal quality change with thin liquids. Pt presented with increased mastication of cohesive solid consistencies. At this time, ST recommends mechanical soft and nectar thick consistencies. Medications to be crushed and given in applesauce; may have water BETWEEN meals. ST to follow up with diet toleration and treatment.       Problem: Inpatient SLP  Goal: Dysphagia- Patient will safely consume diet as per recommendation with no signs/symptoms of aspiration  Outcome: Ongoing (interventions implemented as appropriate)   03/05/18 0916   Safely Consume Diet   Safely Consume Diet- SLP, Date Established 03/05/18   Safely Consume Diet- SLP, Time to Achieve by discharge   Safely Consume Diet- SLP, Activity Level Patient will improve oral skills for more efficient eating;Patient will  improve timing of pharyngeal response for safer, more efficient swallow;Patient will improve tongue base/pharyngeal wall squeeze for safer, more efficient swallow   Safely Consume Diet- SLP, Date Goal Reviewed 03/05/18   Safely Consume Diet- SLP, Outcome goal ongoing

## 2018-03-05 NOTE — ED PROVIDER NOTES
Subjective   HPI Comments: 71 y/o female presents from NH for evaluation of hypoglycemia. Patient was found to have bs of 40 at NH, given oral glucose and EMS re-check at 180. Patient arrives alert but not oriented for me. Per her prior H and P and discharge summaries she appears to be confused at baseline providing little to no history. Given this all history from NH notes and EMS notes. Patient was found to be hypothermic at 92 degrees.     Patient is a 72 y.o. female presenting with hypoglycemia.   History provided by:  EMS personnel  Hypoglycemia   Initial blood sugar:  60  Blood sugar after intervention:  180  Chronicity:  New  Diabetic status:  Controlled with insulin  Associated symptoms: altered mental status        Review of Systems   Unable to perform ROS: Mental status change       Past Medical History:   Diagnosis Date   • COPD (chronic obstructive pulmonary disease)    • Coronary artery disease    • Diabetes mellitus    • Hypertension    • Peptic ulcer disease    • Renal insufficiency    • Venous insufficiency        Allergies   Allergen Reactions   • Aspirin Rash   • Tetracyclines & Related Rash       Past Surgical History:   Procedure Laterality Date   • CHOLECYSTECTOMY     • COLON SURGERY         Family History   Problem Relation Age of Onset   • Breast cancer Neg Hx        Social History     Social History   • Marital status:      Social History Main Topics   • Smoking status: Former Smoker   • Alcohol use No   • Drug use: No           Objective   Physical Exam   Constitutional: She appears well-developed.   HENT:   Head: Normocephalic.   Nose: Nose normal.   Eyes: Conjunctivae and EOM are normal. Pupils are equal, round, and reactive to light.   Neck: Normal range of motion. Neck supple.   Cardiovascular: Normal rate, regular rhythm and normal heart sounds.    Pulmonary/Chest: Effort normal and breath sounds normal. No respiratory distress. She has no wheezes. She has no rales.   Abdominal:  "Soft. Bowel sounds are normal.   Musculoskeletal: Normal range of motion. She exhibits no edema, tenderness or deformity.   Neurological: She is alert. She exhibits normal muscle tone.   Will follow commands but jsut stats \"yeah\" to all questions.    Skin:   cold   Vitals reviewed.      Procedures         ED Course  ED Course      CT Abdomen Pelvis Without Contrast   Final Result      CT Head Without Contrast   Final Result   1. No acute intracranial abnormality is seen.                                                       This report was finalized on 03/04/2018 21:16 by Dr. Alphonso Plunkett MD.      CT Chest Without Contrast   Final Result      XR Chest 1 View   Final Result        Labs Reviewed   COMPREHENSIVE METABOLIC PANEL - Abnormal; Notable for the following:        Result Value    Glucose 145 (*)     BUN 44 (*)     Creatinine 1.64 (*)     eGFR Non African Amer 31 (*)     BUN/Creatinine Ratio 26.8 (*)     All other components within normal limits    Narrative:     The MDRD GFR formula is only valid for adults with stable renal function between ages 18 and 70.   CBC WITH AUTO DIFFERENTIAL - Abnormal; Notable for the following:     RBC 3.64 (*)     Hemoglobin 10.4 (*)     Hematocrit 31.6 (*)     MCHC 32.9 (*)     Neutrophil % 81.7 (*)     Lymphocyte % 7.6 (*)     Immature Grans, Absolute 0.06 (*)     All other components within normal limits   APTT - Abnormal; Notable for the following:     PTT 36.5 (*)     All other components within normal limits   BNP (IN-HOUSE) - Abnormal; Notable for the following:     proBNP 1910.0 (*)     All other components within normal limits   URINALYSIS W/ CULTURE IF INDICATED - Abnormal; Notable for the following:     Blood, UA Trace (*)     Protein, UA 30 mg/dL (1+) (*)     All other components within normal limits   BLOOD GAS, ARTERIAL - Abnormal; Notable for the following:     pCO2, Arterial 45.5 (*)     pO2, Arterial 79.1 (*)     HCO3, Arterial 26.8 (*)     All other components " within normal limits   URINALYSIS, MICROSCOPIC ONLY - Abnormal; Notable for the following:     RBC, UA 3-5 (*)     WBC, UA 0-2 (*)     Bacteria, UA 4+ (*)     All other components within normal limits   POCT GLUCOSE FINGERSTICK - Abnormal; Notable for the following:     Glucose 163 (*)     All other components within normal limits   INFLUENZA ANTIGEN, RAPID - Normal    Narrative:     Recommend confirmation of negative results by viral culture or molecular assay.   PROTIME-INR - Normal   LIPASE - Normal   CK - Normal   TROPONIN (IN-HOUSE) - Normal   PROCALCITONIN - Normal    Narrative:     SIRS, sepsis, severe sepsis, and septic shock are categorized according to the criteria of the consensus conference of the American College of Chest Physicians/Society of Critical Care Medicine.    PCT < 0.5 ng/mL     Systemic infection (sepsis) is not likely.    PCT >0.5 and < 2.0 ng/mL Systemic infection (sepsis) is possible, but other conditions are known to elevate PCT as well.    PCT > 2.0 ng/mL     Systemic infection (sepsis) is likely, unless other causes are known.      PCT > 10.0 ng/mL    Important systemic inflammatory response, almost exclusively due to severe bacterial sepsis or septic shock.    PCT values of < 0.5 ng/mL do not exclude an infection, because localized infections (without systemic signs) may be associated with such low concentrations, or a systemic infection in its initial stages (<6 hours).  Increased PCT can occur without infection.  PCT concentrations between 0.5 and 2.0 ng/mL should be interpreted taking into account the patients history.  It is recommended to retest PCT within 6-24 hours if any concentrations < 2.0 ng/mL are obtained.   LACTIC ACID, PLASMA - Normal   POCT GLUCOSE FINGERSTICK - Normal   BLOOD CULTURE   BLOOD CULTURE   URINE CULTURE   RAINBOW DRAW    Narrative:     The following orders were created for panel order [place] Draw.  Procedure                               Abnormality          Status                     ---------                               -----------         ------                     Light Blue Top[[id number]]                                   Final result               Green Top (Gel)[[id number]]                                  Final result               Lavender Top[[id number]]                                     Final result               Red Top[[id number]]                                          Final result                 Please view results for these tests on the individual orders.   BLOOD GAS, ARTERIAL   POCT GLUCOSE FINGERSTICK   CBC AND DIFFERENTIAL    Narrative:     The following orders were created for panel order CBC & Differential.  Procedure                               Abnormality         Status                     ---------                               -----------         ------                     CBC Auto Differential[[id number]]        Abnormal            Final result                 Please view results for these tests on the individual orders.   LIGHT BLUE TOP   GREEN TOP   LAVENDER TOP   RED TOP                   [facility]    Final diagnoses:   Sepsis, due to unspecified organism   Hypothermia, initial encounter   Altered mental status, unspecified altered mental status type   Hypoglycemia            Conor Wylie MD  03/04/18 4141

## 2018-03-05 NOTE — PLAN OF CARE
Problem: Patient Care Overview (Adult)  Goal: Plan of Care Review  Outcome: Ongoing (interventions implemented as appropriate)   03/05/18 1302   Coping/Psychosocial Response Interventions   Plan Of Care Reviewed With patient   Outcome Evaluation   Outcome Summary/Follow up Plan PT eval completed. Pt presents with decreased strength, balance, endurance, and functional mobility. Pt performed supine -> sit with min A and HoB elevated, Mod A with BLE for sit -> supine. Pt was able to perform sit <-> stand with CGA x2 and RW, and ambulated 40 feet with CGAx2 and RW. Pt had no LoB but needed several cues for maneuvering walker around corners and in smaller spaces. Pt will benefit from skilled therapy to improve her safety with funcitonal mobility. Recommend DC to SNF vs extended care facility.        Problem: Inpatient Physical Therapy  Goal: Bed Mobility Goal LTG- PT  Outcome: Ongoing (interventions implemented as appropriate)   03/05/18 1302   Bed Mobility PT LTG   Bed Mobility PT LTG, Date Established 03/05/18   Bed Mobility PT LTG, Time to Achieve by discharge   Bed Mobility PT LTG, Activity Type all bed mobility   Bed Mobility PT LTG, [place] Level supervision required   Bed Mobility PT Goal LTG, Assist Device bed rails     Goal: Transfer Training Goal 1 LTG- PT  Outcome: Ongoing (interventions implemented as appropriate)   03/05/18 1302   Transfer Training PT LTG   Transfer Training PT LTG, Date Established 03/05/18   Transfer Training PT LTG, Time to Achieve by discharge   Transfer Training PT LTG, Activity Type bed to chair /chair to bed;sit to stand/stand to sit   Transfer Training PT LTG, [place] Level supervision required   Transfer Training PT LTG, Assist Device walker, rolling     Goal: Gait Training Goal LTG- PT  Outcome: Ongoing (interventions implemented as appropriate)   03/05/18 1302   Gait Training PT LTG   Gait Training Goal PT LTG, Date Established 03/05/18   Gait Training Goal PT LTG, Time  to Achieve by discharge   Gait Training Goal PT LTG, [place] Level supervision required   Gait Training Goal PT LTG, Assist Device walker, rolling   Gait Training Goal PT LTG, Distance to Achieve 100ft x2 with rest break   Gait Training Goal PT LTG, Additional Goal With no cues needed for obstacle avoidance     Goal: Patient Education Goal LTG- PT  Outcome: Ongoing (interventions implemented as appropriate)   03/05/18 1302   Patient Education PT LTG   Patient Education PT LTG, Date Established 03/05/18   Patient Education PT LTG, Time to Achieve by discharge   Patient Education PT LTG, Education Type HEP;gait;transfers;bed mobility   Patient Education PT LTG, Education Understanding demonstrate adequately;verbalize understanding

## 2018-03-05 NOTE — ED NOTES
Fingerstick glucose completed at this time 119mg/dl     Jose Poe RN  03/04/18 1836       Jose Poe RN  03/04/18 1837       Jose Poe RN  03/04/18 1838

## 2018-03-05 NOTE — THERAPY EVALUATION
Acute Care - Occupational Therapy Initial Evaluation  [facility]     Patient Name: Gregoria Bennett  : 1945  MRN: [id number]  Today's Date: 3/5/2018  Onset of Illness/Injury or Date of Surgery Date: 18  Date of Referral to OT: 18  Referring Physician: Dr. Lozoya    Admit Date: 3/4/2018       ICD-10-CM ICD-9-CM   1. Sepsis, due to unspecified organism A41.9 038.9     995.91   2. Hypothermia, initial encounter T68.XXXA 991.6   3. Altered mental status, unspecified altered mental status type R41.82 780.97   4. Hypoglycemia E16.2 251.2   5. Oropharyngeal dysphagia R13.12 787.22   6. Impaired functional mobility, balance, gait, and endurance Z74.09 V49.89   7. Impaired mobility and ADLs Z74.09 799.89     Patient Active Problem List   Diagnosis   • Sprain   • Urinary tract infection without hematuria   • Sepsis     Past Medical History:   Diagnosis Date   • COPD (chronic obstructive pulmonary disease)    • Coronary artery disease    • Diabetes mellitus    • Hypertension    • Peptic ulcer disease    • Renal insufficiency    • Venous insufficiency      Past Surgical History:   Procedure Laterality Date   • CHOLECYSTECTOMY     • COLON SURGERY            OT ASSESSMENT FLOWSHEET (last 72 hours)      OT Evaluation       18 1340 18 1138 18 1113 18 0820 18 0208    Rehab Evaluation    Document Type  evaluation   see MAR  -MM evaluation   See MAR  -PB (r) LN (t) PB (c) evaluation  -EA     Subjective Information  agree to therapy;complains of;weakness  -MM agree to therapy;complains of;weakness  -PB (r) LN (t) PB (c) agree to therapy  -EA     Patient Effort, Rehab Treatment   good  -PB (r) LN (t) PB (c) good  -EA     General Information    Patient Profile Review  yes  -MM yes  -PB (r) LN (t) PB (c)      Onset of Illness/Injury or Date of Surgery Date  18  -MM 18  -PB (r) LN (t) PB (c)      Referring Physician  Dr. Lozoya  -MM Dr. Lozoya  -PB (r) LN (t) PB (c)      General  Observations  fowlers in bed, with PT, swelling in BLE, IV LUE  -MM Pt supine supine, HoB elevated, swelling noted in BLE, IV in LUE  -PB (r) LN (t) PB (c)      Pertinent History Of Current Problem  Pt was brought to ER from nursing home for hypoglycemia and altered mental status. Pt was found to have sepsis related to UTI, along with general deconditioning and weakness.    -MM Pt was brought to ER from nursing home for hypoglycemia and altered mental status. Pt was found to have sepsis related to UTI, along with general deconditioning and weakness.    -PB (r) LN (t) PB (c)      Precautions/Limitations  fall precautions  -MM fall precautions  -PB (r) LN (t) PB (c)      Prior Level of Function  min assist:;gait;transfer;bed mobility;w/c or scooter;ADL's  -MM min assist:;gait;transfer;bed mobility;w/c or scooter   Pt states she used WC at nursing home after falling  -PB (r) LN (t) PB (c)      Equipment Currently Used at Home  rollator;wheelchair  -MM rollator;wheelchair  -PB (r) LN (t) PB (c)      Plans/Goals Discussed With  patient;agreed upon  -MM patient;agreed upon  -PB (r) LN (t) PB (c)      Risks Reviewed  patient:;LOB;dizziness;increased discomfort;change in vital signs  -MM patient:;LOB;dizziness;increased discomfort;change in vital signs  -PB (r) LN (t) PB (c)      Benefits Reviewed  patient:;improve function;increase independence;increase strength;increase balance  -MM patient:;improve function;increase independence;increase strength;increase balance  -PB (r) LN (t) PB (c)      Barriers to Rehab  previous functional deficit  -MM previous functional deficit  -PB (r) LN (t) PB (c)      Living Environment    Lives With (P)  facility resident  -CD facility resident  -MM facility resident  -PB (r) LN (t) PB (c)  facility resident  -HW    Living Arrangements  extended care facility  -MM extended care facility  -PB (r) LN (t) PB (c)  extended care facility  -HW    Home Accessibility  no concerns  -MM no concerns   -PB (r) LN (t) PB (c)  no concerns  -HW    Stair Railings at Home   none  -PB (r) LN (t) PB (c)  none  -HW    Type of Financial/Environmental Concern   none  -PB (r) LN (t) PB (c)  none  -HW    Transportation Available   ambulance  -PB (r) LN (t) PB (c)  ambulance  -HW    Living Environment Comment     none  -HW    Clinical Impression    Date of Referral to OT  03/05/18  -MM       OT Diagnosis  impaired mobility and adls  -MM       Impairments Found (describe specific impairments)  ergonomics and body mechanics;gait, locomotion, and balance  -MM       Patient/Family Goals Statement  return home  -MM       Criteria for Skilled Therapeutic Interventions Met  yes;treatment indicated  -MM       Rehab Potential  good, to achieve stated therapy goals  -MM       Therapy Frequency  3-5 times/wk  -MM       Predicted Duration of Therapy Intervention (days/wks)  until d/c  -MM       Anticipated Discharge Disposition  skilled nursing facility  -MM       Vital Signs    Pretreatment Heart Rate (beats/min)  69  -MM 69  -PB (r) LN (t) PB (c)      Pre SpO2 (%)  98  -MM 98  -PB (r) LN (t) PB (c)      O2 Delivery Pre Treatment  room air  -MM room air  -PB (r) LN (t) PB (c)      O2 Delivery Intra Treatment  room air  -MM room air  -PB (r) LN (t) PB (c)      Post SpO2 (%)  96  -MM 96  -PB (r) LN (t) PB (c)      O2 Delivery Post Treatment  room air  -MM room air  -PB (r) LN (t) PB (c)      Pre Patient Position  Supine  -MM       Intra Patient Position  Standing  -MM       Post Patient Position  Supine  -MM       Pain Assessment    Pain Assessment  No/denies pain  -MM 0-10  -PB (r) LN (t) PB (c)      Pain Score   0  -PB (r) LN (t) PB (c)      Post Pain Score   0  -PB (r) LN (t) PB (c)      Vision Assessment/Intervention    Visual Impairment  WFL with corrective lenses  -MM WFL with corrective lenses  -PB (r) LN (t) PB (c)      Cognitive Assessment/Intervention    Current Cognitive/Communication Assessment  functional  -MM functional  -PB  (r) LN (t) PB (c) functional  -EA     Orientation Status  oriented x 4  -MM oriented x 4  -PB (r) LN (t) PB (c) oriented x 4  -EA     Follows Commands/Answers Questions  100% of the time;able to follow single-step instructions  -MM able to follow single-step instructions;100% of the time  -PB (r) LN (t) PB (c) able to follow single-step instructions;100% of the time  -EA     Personal Safety  WNL/WFL  -MM WNL/WFL  -PB (r) LN (t) PB (c) WNL/WFL  -EA     Personal Safety Interventions  fall prevention program maintained;gait belt;muscle strengthening facilitated;nonskid shoes/slippers when out of bed;supervised activity  -MM fall prevention program maintained;gait belt;nonskid shoes/slippers when out of bed;supervised activity  -PB (r) LN (t) PB (c)      ROM (Range of Motion)    General ROM  no range of motion deficits identified  -MM       General ROM Detail   BLE AROM WFL  -PB (r) LN (t) PB (c)      MMT (Manual Muscle Testing)    General MMT Assessment Detail  BUE 4-/5  -MM BLE functionally 4/5  -PB (r) LN (t) PB (c)      Bed Mobility, Assessment/Treatment    Bed Mobility, Assistive Device  draw sheet;bed rails  -MM       Bed Mobility, Roll Left, [place]  contact guard assist;supervision required;verbal cues required  -MM contact guard assist;supervision required;verbal cues required  -PB (r) LN (t) PB (c)      Bed Mobility, Roll Right, [place]  contact guard assist;supervision required;verbal cues required  -MM contact guard assist;supervision required;verbal cues required  -PB (r) LN (t) PB (c)      Bed Mobility, Scoot/Bridge, [place]  moderate assist (50% patient effort);2 person assist required  -MM moderate assist (50% patient effort);2 person assist required   with draw sheet  -PB (r) LN (t) PB (c)      Bed Mob, Sidelying to Sit, [place]  minimum assist (75% patient effort);verbal cues required  -MM minimum assist (75% patient effort)  -PB (r) LN (t) PB (c)      Bed Mob, Sit to Sidelying,  [place]  moderate assist (50% patient effort);verbal cues required   assist with BLE  -MM moderate assist (50% patient effort)   with BLE  -PB (r) LN (t) PB (c)      Bed Mobility, Safety Issues  decreased use of legs for bridging/pushing;decreased use of arms for pushing/pulling  -MM decreased use of legs for bridging/pushing  -PB (r) LN (t) PB (c)      Bed Mobility, Impairments  strength decreased  -MM strength decreased  -PB (r) LN (t) PB (c)      Transfer Assessment/Treatment    Transfers, Sit-Stand [place]  contact guard assist;2 person assist required;verbal cues required  -MM contact guard assist;2 person assist required  -PB (r) LN (t) PB (c)      Transfers, Stand-Sit [place]  contact guard assist;2 person assist required;verbal cues required  -MM contact guard assist;2 person assist required  -PB (r) LN (t) PB (c)      Transfers, Sit-Stand-Sit, Assist Device  rolling walker  -MM rolling walker  -PB (r) LN (t) PB (c)      Toilet Transfer, Delta  minimum assist (75% patient effort);verbal cues required  -MM       Toilet Transfer, Assistive Device  rolling walker  -MM       Transfer, Safety Issues  balance decreased during turns;step length decreased;weight-shifting ability decreased  -MM balance decreased during turns;step length decreased;weight-shifting ability decreased  -PB (r) LN (t) PB (c)      Transfer, Impairments  impaired balance;strength decreased  -MM impaired balance;strength decreased  -PB (r) LN (t) PB (c)      Functional Mobility    Functional Mobility- Ind. Level  contact guard assist;2 person assist required;verbal cues required  -MM       Functional Mobility- Device  rolling walker  -MM       Functional Mobility- Comment  down walker, into BR  -MM       Lower Body Dressing Assessment/Training    LB Dressing Assess/Train, Clothing Type  donning:;slipper socks  -MM       LB Dressing Assess/Train, Position  sitting;edge of bed  -MM       LB Dressing Assess/Train,  [place]  verbal cues required;maximum assist (25% patient effort)  -MM       LB Dressing Assess/Train, Impairments  strength decreased;impaired balance  -MM       Toileting Assessment/Training    Toileting Assess/Train, Assistive Device  --   standard commode  -MM       Toileting Assess/Train, Position  sitting;standing  -MM       Toileting Assess/Train, Indepen Level  moderate assist (50% patient effort);verbal cues required  -MM       Toileting Assess/Train, Impairments  impaired balance;decreased flexibility  -MM       Toileting Assess/Train, Comment  toilet hygiene  -MM       Motor Skills/Interventions    Additional Documentation   Balance Skills Training (Group)  -PB (r) LN (t) PB (c)      Balance Skills Training    Sitting-Level of Assistance   Contact guard;Close supervision  -PB (r) LN (t) PB (c)      Sitting-Balance Support   Left upper extremity supported;Right upper extremity supported;Feet supported  -PB (r) LN (t) PB (c)      Standing-Level of Assistance   Contact guard;x2  -PB (r) LN (t) PB (c)      Static Standing Balance Support   assistive device  -PB (r) LN (t) PB (c)      Gait Balance-Level of Assistance   Contact guard;x2  -PB (r) LN (t) PB (c)      Gait Balance Support   assistive device  -PB (r) LN (t) PB (c)      Sensory Assessment/Intervention    Sensory Impairment   hypersensitivity   dorsum of feet  -PB (r) LN (t) PB (c)      Light Touch  LUE;RUE  -MM LLE;RLE  -PB (r) LN (t) PB (c)      LUE Light Touch  WNL  -MM       RUE Light Touch  WNL  -MM       LLE Light Touch   WNL  -PB (r) LN (t) PB (c)      RLE Light Touch   WNL  -PB (r) LN (t) PB (c)      General Therapy Interventions    Planned Therapy Interventions  activity intolerance;adaptive equipment training;ADL retraining;balance training;bed mobility training;energy conservation;home exercise program;strengthening;transfer training  -MM       Positioning and Restraints    Pre-Treatment Position  in bed  -MM in bed  -PB (r) LN (t) PB  (c)      Post Treatment Position  bed  -MM bed  -PB (r) LN (t) PB (c)      In Bed  fowlers;call light within reach;encouraged to call for assist;side rails up x2;notified nsg  -MM fowlers;call light within reach;encouraged to call for assist;side rails up x2;notified nsg  -PB (r) LN (t) PB (c)        03/05/18 0021 03/05/18 0002             General Information    Equipment Currently Used at Home  wheelchair  -HW       Functional Level Prior    Ambulation  3-->assistive equipment and person  -HW       Transferring  3-->assistive equipment and person  -HW       Toileting  3-->assistive equipment and person  -HW       Bathing  3-->assistive equipment and person  -HW       Dressing  2-->assistive person  -HW       Eating  2-->assistive person  -HW       Communication  0-->understands/communicates without difficulty  -HW       Swallowing  0-->swallows foods/liquids without difficulty  -HW       Prior Functional Level Comment  patient is able to transfer herself into a wheelchair to use the restroom  -HW       Muscle Tone Assessment    Muscle Tone Assessment Bilateral Lower Extremities  -HW        Bilateral Lower Extremities Muscle Tone Assessment moderately decreased tone  -HW          User Key  (r) = Recorded By, (t) = Taken By, (c) = Cosigned By    Initials Name Effective Dates     Yee Robles RN 06/16/16 -     PB Rodriguez Meza, PT DPT 08/02/16 -     MM Garrett Garcia, OTR/L 10/21/16 -     EA Priyanka Mendez, MS, CFY-SLP 02/21/17 -     LN Rafa Belcher, PT Student 01/08/18 -     GODFREY Martin 01/17/18 -            Occupational Therapy Education     Title: PT OT SLP Therapies (Active)     Topic: Occupational Therapy (Active)     Point: ADL training (Active)    Description: Instruct learner(s) on proper safety adaptation and remediation techniques during self care or transfers.   Instruct in proper use of assistive devices.    Learning Progress Summary    Learner Readiness Method Response Comment Documented  by Status   Patient Acceptance E NR OT role, benefits and POC  03/05/18 1630 Active                      User Key     Initials Effective Dates Name Provider Type Discipline     10/21/16 -  Garrett Garcia OTR/L Occupational Therapist OT                  OT Recommendation and Plan  Anticipated Discharge Disposition: skilled nursing facility  Planned Therapy Interventions: activity intolerance, adaptive equipment training, ADL retraining, balance training, bed mobility training, energy conservation, home exercise program, strengthening, transfer training  Therapy Frequency: 3-5 times/wk  Plan of Care Review  Plan Of Care Reviewed With: patient  Progress: progress toward functional goals as expected  Outcome Summary/Follow up Plan: OT eval completed. Pt was mod x2 with draw sheet for scooting and sit to supine. Pt was CGA for Rolling R<>L. pt was min A x2 for supine to sit. Pt was CGA x2 for sit to stand from bed, min x1 for sit to stand from commode. Pt was max to sabrina socks. Pt was mod for posterior hygiene. Skilled OT recommended to address adls, functional mobility, strength and activity tolerance. Recommended d/c SNF.          OT Goals       03/05/18 1630          Strength OT LTG    Strength Goal OT LTG, Date Established 03/05/18  -MM      Strength Goal OT LTG, Time to Achieve by discharge  -MM      Strength Goal OT LTG, Measure to Achieve Pt will independently maintain BUE AROM HEP to increase strength to 4+/5.  -MM      Bathing OT LTG    Bathing Goal OT LTG, Date Established 03/05/18  -MM      Bathing Goal OT LTG, Time to Achieve by discharge  -MM      Bathing Goal OT LTG, Activity Type upper body bathing;lower body bathing  -MM      Bathing Goal OT LTG, [place] Level conditional independence;set up required  -MM      Bathing Goal OT LTG, Assist Device grab bars;shower head, detachable;shower chair with back;sponge, long handled  -MM      Toileting OT LTG    Toileting Goal OT LTG, Date Established  03/05/18  -MM      Toileting Goal OT LTG, Time to Achieve by discharge  -MM      Toileting Goal OT LTG, [place] Level conditional independence;set up required  -MM      Toileting Goal OT LTG, Assist Device toilet paper aid  -MM      LB Dressing OT LTG    LB Dressing Goal OT LTG, Date Established 03/05/18  -MM      LB Dressing Goal OT LTG, Time to Achieve by discharge  -MM      LB Dressing Goal OT LTG, [place] Level conditional independence;set up required  -MM      LB Dressing Goal OT LTG, Adaptive Equipment laces, elastic;reacher;shoe horn, long handled;sock-aid  -MM      LB Dressing Goal OT LTG, Additional Goal AE PRN  -MM        User Key  (r) = Recorded By, (t) = Taken By, (c) = Cosigned By    Initials Name Provider Type    CHARLY Garcia, OTR/L Occupational Therapist                Outcome Measures       03/05/18 1600 03/05/18 1300       How much help from another person do you currently need...    Turning from your back to your side while in flat bed without using bedrails?  3  -PB (r) LN (t) PB (c)     Moving from lying on back to sitting on the side of a flat bed without bedrails?  3  -PB (r) LN (t) PB (c)     Moving to and from a bed to a chair (including a wheelchair)?  3  -PB (r) LN (t) PB (c)     Standing up from a chair using your arms (e.g., wheelchair, bedside chair)?  3  -PB (r) LN (t) PB (c)     Climbing 3-5 steps with a railing?  2  -PB (r) LN (t) PB (c)     To walk in hospital room?  3  -PB (r) LN (t) PB (c)     AM-PAC 6 Clicks Score  17  -PB (r) LN (t)     How much help from another is currently needed...    Putting on and taking off regular lower body clothing? 2  -MM      Bathing (including washing, rinsing, and drying) 2  -MM      Toileting (which includes using toilet bed pan or urinal) 2  -MM      Putting on and taking off regular upper body clothing 3  -MM      Taking care of personal grooming (such as brushing teeth) 3  -MM      Eating meals 3  -MM      Score 15  -MM       Functional Assessment    Outcome Measure Options AM-PAC 6 Clicks Daily Activity (OT)  -MM AM-PAC 6 Clicks Basic Mobility (PT)  -PB (r) LN (t) PB (c)       User Key  (r) = Recorded By, (t) = Taken By, (c) = Cosigned By    Initials Name Provider Type    PB Rodriguez Meza, PT DPT Physical Therapist    MM Garrett Garcia, OTR/L Occupational Therapist    LN Rafa Belcher, PT Student PT Student          Time Calculation:   OT Start Time: 1138  OT Stop Time: 1216  OT Time Calculation (min): 38 min    Therapy Charges for Today     Code Description Service Date Service Provider Modifiers Qty    58879053727 HC OT SELFCARE CURRENT 3/5/2018 Garrett Garcia OTR/L GO, CK 1    95863582845 HC OT SELFCARE PROJECTED 3/5/2018 Garrett Garcia OTR/L GO, CI 1    77688650680 HC OT EVAL MOD COMPLEXITY 3 3/5/2018 Garrett Garcia OTR/L GO, KX 1          OT G-codes  OT Professional Judgement Used?: Yes  OT Functional Scales Options: AM-PAC 6 Clicks Daily Activity (OT)  Score: 15  Functional Limitation: Self care  Self Care Current Status (): At least 40 percent but less than 60 percent impaired, limited or restricted  Self Care Goal Status (): At least 1 percent but less than 20 percent impaired, limited or restricted    Garrett Garcia OTR/JODI  3/5/2018

## 2018-03-05 NOTE — PLAN OF CARE
Problem: Infection, Risk/Actual (Adult)  Goal: Identify Related Risk Factors and Signs and Symptoms  Outcome: Ongoing (interventions implemented as appropriate)   03/05/18 5100   Infection, Risk/Actual   Infection, Risk/Actual: Related Risk Factors exposure to microbes;chronic illness/condition   Signs and Symptoms (Infection, Risk/Actual) blood glucose changes;other (see comments)  (wheezing and congestion)

## 2018-03-06 LAB
ALBUMIN SERPL-MCNC: 3.4 G/DL (ref 3.5–5)
ALBUMIN/GLOB SERPL: 1.1 G/DL (ref 1.1–2.5)
ALP SERPL-CCNC: 53 U/L (ref 24–120)
ALT SERPL W P-5'-P-CCNC: 36 U/L (ref 0–54)
AMYLASE SERPL-CCNC: <30 U/L (ref 30–110)
ANION GAP SERPL CALCULATED.3IONS-SCNC: 9 MMOL/L (ref 4–13)
ARTICHOKE IGE QN: 66 MG/DL (ref 0–99)
AST SERPL-CCNC: 26 U/L (ref 7–45)
BASOPHILS # BLD AUTO: 0.03 10*3/MM3 (ref 0–0.2)
BASOPHILS NFR BLD AUTO: 0.4 % (ref 0–2)
BILIRUB SERPL-MCNC: <0.1 MG/DL (ref 0.1–1)
BUN BLD-MCNC: 31 MG/DL (ref 5–21)
BUN/CREAT SERPL: 19.1 (ref 7–25)
CALCIUM SPEC-SCNC: 9 MG/DL (ref 8.4–10.4)
CHLORIDE SERPL-SCNC: 110 MMOL/L (ref 98–110)
CHOLEST SERPL-MCNC: 113 MG/DL (ref 130–200)
CO2 SERPL-SCNC: 29 MMOL/L (ref 24–31)
CREAT BLD-MCNC: 1.62 MG/DL (ref 0.5–1.4)
DEPRECATED RDW RBC AUTO: 46.2 FL (ref 40–54)
EOSINOPHIL # BLD AUTO: 0.2 10*3/MM3 (ref 0–0.7)
EOSINOPHIL NFR BLD AUTO: 2.9 % (ref 0–4)
ERYTHROCYTE [DISTWIDTH] IN BLOOD BY AUTOMATED COUNT: 14.5 % (ref 12–15)
FERRITIN SERPL-MCNC: 31.5 NG/ML (ref 11.1–264)
FOLATE SERPL-MCNC: 17.8 NG/ML
GFR SERPL CREATININE-BSD FRML MDRD: 31 ML/MIN/1.73
GLOBULIN UR ELPH-MCNC: 3 GM/DL
GLUCOSE BLD-MCNC: 56 MG/DL (ref 70–100)
GLUCOSE BLDC GLUCOMTR-MCNC: 111 MG/DL (ref 70–130)
GLUCOSE BLDC GLUCOMTR-MCNC: 175 MG/DL (ref 70–130)
GLUCOSE BLDC GLUCOMTR-MCNC: 313 MG/DL (ref 70–130)
GLUCOSE BLDC GLUCOMTR-MCNC: 74 MG/DL (ref 70–130)
HBA1C MFR BLD: 6.7 %
HCT VFR BLD AUTO: 27.8 % (ref 37–47)
HDLC SERPL-MCNC: 28 MG/DL
HGB BLD-MCNC: 9 G/DL (ref 12–16)
IMM GRANULOCYTES # BLD: 0.03 10*3/MM3 (ref 0–0.03)
IMM GRANULOCYTES NFR BLD: 0.4 % (ref 0–5)
IRON 24H UR-MRATE: 53 MCG/DL (ref 42–180)
IRON SATN MFR SERPL: 15 % (ref 20–45)
LDLC/HDLC SERPL: 2.44 {RATIO}
LIPASE SERPL-CCNC: 49 U/L (ref 23–203)
LYMPHOCYTES # BLD AUTO: 1.79 10*3/MM3 (ref 0.72–4.86)
LYMPHOCYTES NFR BLD AUTO: 26 % (ref 15–45)
MCH RBC QN AUTO: 28.7 PG (ref 28–32)
MCHC RBC AUTO-ENTMCNC: 32.4 G/DL (ref 33–36)
MCV RBC AUTO: 88.5 FL (ref 82–98)
MONOCYTES # BLD AUTO: 0.87 10*3/MM3 (ref 0.19–1.3)
MONOCYTES NFR BLD AUTO: 12.6 % (ref 4–12)
NEUTROPHILS # BLD AUTO: 3.97 10*3/MM3 (ref 1.87–8.4)
NEUTROPHILS NFR BLD AUTO: 57.7 % (ref 39–78)
NRBC BLD MANUAL-RTO: 0 /100 WBC (ref 0–0)
PLATELET # BLD AUTO: 203 10*3/MM3 (ref 130–400)
PMV BLD AUTO: 10.2 FL (ref 6–12)
POTASSIUM BLD-SCNC: 3.8 MMOL/L (ref 3.5–5.3)
PROT SERPL-MCNC: 6.4 G/DL (ref 6.3–8.7)
RBC # BLD AUTO: 3.14 10*6/MM3 (ref 4.2–5.4)
RETICS #: 0.06 10*6/MM3 (ref 0.02–0.13)
RETICS/RBC NFR AUTO: 2.05 % (ref 0.6–1.8)
SODIUM BLD-SCNC: 148 MMOL/L (ref 135–145)
TIBC SERPL-MCNC: 353 MCG/DL (ref 225–420)
TRIGL SERPL-MCNC: 84 MG/DL (ref 0–149)
TSH SERPL DL<=0.05 MIU/L-ACNC: 5.23 MIU/ML (ref 0.47–4.68)
VIT B12 BLD-MCNC: 548 PG/ML (ref 239–931)
WBC NRBC COR # BLD: 6.89 10*3/MM3 (ref 4.8–10.8)

## 2018-03-06 PROCEDURE — 80053 COMPREHEN METABOLIC PANEL: CPT | Performed by: FAMILY MEDICINE

## 2018-03-06 PROCEDURE — 92526 ORAL FUNCTION THERAPY: CPT

## 2018-03-06 PROCEDURE — 94799 UNLISTED PULMONARY SVC/PX: CPT

## 2018-03-06 PROCEDURE — 82746 ASSAY OF FOLIC ACID SERUM: CPT | Performed by: FAMILY MEDICINE

## 2018-03-06 PROCEDURE — 97116 GAIT TRAINING THERAPY: CPT

## 2018-03-06 PROCEDURE — 25010000002 POTASSIUM CHLORIDE PER 2 MEQ OF POTASSIUM: Performed by: FAMILY MEDICINE

## 2018-03-06 PROCEDURE — 25010000002 CEFTRIAXONE PER 250 MG: Performed by: FAMILY MEDICINE

## 2018-03-06 PROCEDURE — 82607 VITAMIN B-12: CPT | Performed by: FAMILY MEDICINE

## 2018-03-06 PROCEDURE — 25010000002 ENOXAPARIN PER 10 MG: Performed by: FAMILY MEDICINE

## 2018-03-06 PROCEDURE — 83690 ASSAY OF LIPASE: CPT | Performed by: FAMILY MEDICINE

## 2018-03-06 PROCEDURE — 82728 ASSAY OF FERRITIN: CPT | Performed by: FAMILY MEDICINE

## 2018-03-06 PROCEDURE — 83540 ASSAY OF IRON: CPT | Performed by: FAMILY MEDICINE

## 2018-03-06 PROCEDURE — 25010000002 THIAMINE PER 100 MG: Performed by: FAMILY MEDICINE

## 2018-03-06 PROCEDURE — 97110 THERAPEUTIC EXERCISES: CPT

## 2018-03-06 PROCEDURE — 80061 LIPID PANEL: CPT | Performed by: FAMILY MEDICINE

## 2018-03-06 PROCEDURE — 94760 N-INVAS EAR/PLS OXIMETRY 1: CPT

## 2018-03-06 PROCEDURE — 84443 ASSAY THYROID STIM HORMONE: CPT | Performed by: FAMILY MEDICINE

## 2018-03-06 PROCEDURE — 85025 COMPLETE CBC W/AUTO DIFF WBC: CPT | Performed by: FAMILY MEDICINE

## 2018-03-06 PROCEDURE — 83036 HEMOGLOBIN GLYCOSYLATED A1C: CPT | Performed by: FAMILY MEDICINE

## 2018-03-06 PROCEDURE — 85045 AUTOMATED RETICULOCYTE COUNT: CPT | Performed by: FAMILY MEDICINE

## 2018-03-06 PROCEDURE — 82962 GLUCOSE BLOOD TEST: CPT

## 2018-03-06 PROCEDURE — 63710000001 INSULIN ASPART PER 5 UNITS: Performed by: INTERNAL MEDICINE

## 2018-03-06 PROCEDURE — 83550 IRON BINDING TEST: CPT | Performed by: FAMILY MEDICINE

## 2018-03-06 PROCEDURE — 82150 ASSAY OF AMYLASE: CPT | Performed by: FAMILY MEDICINE

## 2018-03-06 RX ORDER — INSULIN ASPART 100 [IU]/ML
40 INJECTION, SUSPENSION SUBCUTANEOUS EVERY MORNING
Status: DISCONTINUED | OUTPATIENT
Start: 2018-03-07 | End: 2018-03-07 | Stop reason: HOSPADM

## 2018-03-06 RX ORDER — FAMOTIDINE 20 MG/1
20 TABLET, FILM COATED ORAL DAILY
Status: DISCONTINUED | OUTPATIENT
Start: 2018-03-07 | End: 2018-03-07 | Stop reason: HOSPADM

## 2018-03-06 RX ADMIN — ACETAMINOPHEN 650 MG: 325 TABLET ORAL at 20:27

## 2018-03-06 RX ADMIN — GABAPENTIN 100 MG: 100 CAPSULE ORAL at 14:11

## 2018-03-06 RX ADMIN — POTASSIUM CHLORIDE 75 ML/HR: 2 INJECTION, SOLUTION, CONCENTRATE INTRAVENOUS at 14:54

## 2018-03-06 RX ADMIN — IPRATROPIUM BROMIDE AND ALBUTEROL SULFATE 3 ML: 2.5; .5 SOLUTION RESPIRATORY (INHALATION) at 15:10

## 2018-03-06 RX ADMIN — IPRATROPIUM BROMIDE AND ALBUTEROL SULFATE 3 ML: 2.5; .5 SOLUTION RESPIRATORY (INHALATION) at 11:20

## 2018-03-06 RX ADMIN — FAMOTIDINE 20 MG: 20 TABLET, FILM COATED ORAL at 08:41

## 2018-03-06 RX ADMIN — IPRATROPIUM BROMIDE AND ALBUTEROL SULFATE 3 ML: 2.5; .5 SOLUTION RESPIRATORY (INHALATION) at 07:36

## 2018-03-06 RX ADMIN — GABAPENTIN 100 MG: 100 CAPSULE ORAL at 20:27

## 2018-03-06 RX ADMIN — INSULIN ASPART 40 UNITS: 100 INJECTION, SUSPENSION SUBCUTANEOUS at 20:26

## 2018-03-06 RX ADMIN — ENOXAPARIN SODIUM 30 MG: 30 INJECTION SUBCUTANEOUS at 19:17

## 2018-03-06 RX ADMIN — GABAPENTIN 100 MG: 100 CAPSULE ORAL at 06:25

## 2018-03-06 RX ADMIN — ATENOLOL 50 MG: 50 TABLET ORAL at 08:41

## 2018-03-06 RX ADMIN — INSULIN ASPART 50 UNITS: 100 INJECTION, SUSPENSION SUBCUTANEOUS at 08:41

## 2018-03-06 RX ADMIN — ATORVASTATIN CALCIUM 10 MG: 10 TABLET, FILM COATED ORAL at 08:41

## 2018-03-06 RX ADMIN — CLOPIDOGREL 75 MG: 75 TABLET, FILM COATED ORAL at 08:41

## 2018-03-06 RX ADMIN — SODIUM CHLORIDE 75 ML/HR: 4.5 INJECTION, SOLUTION INTRAVENOUS at 00:24

## 2018-03-06 RX ADMIN — NIFEDIPINE 30 MG: 30 TABLET, FILM COATED, EXTENDED RELEASE ORAL at 06:25

## 2018-03-06 RX ADMIN — CEFTRIAXONE SODIUM 1 G: 1 INJECTION, POWDER, FOR SOLUTION INTRAMUSCULAR; INTRAVENOUS at 11:45

## 2018-03-06 RX ADMIN — IPRATROPIUM BROMIDE AND ALBUTEROL SULFATE 3 ML: 2.5; .5 SOLUTION RESPIRATORY (INHALATION) at 20:31

## 2018-03-06 NOTE — PLAN OF CARE
Problem: Patient Care Overview (Adult)  Goal: Plan of Care Review  Outcome: Ongoing (interventions implemented as appropriate)   03/06/18 1107   Coping/Psychosocial Response Interventions   Plan Of Care Reviewed With patient   Patient Care Overview   Progress progress towards functional goals is fair   Outcome Evaluation   Outcome Summary/Follow up Plan Pt. requires assist of 1 to transfer to EOB. Stands with contact guard assist(CGA) and ambulate 110' with rwx. Performed LE ex's. Tolerated well. Will benefit from continued strengthening and increased activity.

## 2018-03-06 NOTE — PLAN OF CARE
Problem: Patient Care Overview (Adult)  Goal: Plan of Care Review  Outcome: Ongoing (interventions implemented as appropriate)   03/06/18 1122   Coping/Psychosocial Response Interventions   Plan Of Care Reviewed With patient   Patient Care Overview   Progress progress toward functional goals as expected   Outcome Evaluation   Outcome Summary/Follow up Plan Pt. progressing as expected, no problems othwer than fatigue at this tx!

## 2018-03-06 NOTE — PROGRESS NOTES
[facility] Medicine Services  INPATIENT PROGRESS NOTE    Length of Stay: 2  Date of Admission: 3/4/2018  Primary Care Physician: Chu Isaac MD    Subjective   Chief Complaint: Complaining of neuropathy pain    HPI   Discussed patient possible due to high sodium.  Plan to start banana bag with free water.  Patient has a history of cholecystectomy.  Kidney function slightly worse.  Episode of sugar fasting this morning, 54, plan to cut back nighttime insulin.    Review of Systems   Constitutional: Positive for activity change, appetite change and fatigue. Negative for chills and fever.   HENT: Negative for hearing loss, nosebleeds, tinnitus and trouble swallowing.    Eyes: Negative for visual disturbance.   Respiratory: Negative for cough, chest tightness, shortness of breath and wheezing.    Cardiovascular: Negative for chest pain, palpitations and leg swelling.   Gastrointestinal: Negative for abdominal distention, abdominal pain, blood in stool, constipation, diarrhea, nausea and vomiting.   Endocrine: Negative for cold intolerance, heat intolerance, polydipsia, polyphagia and polyuria.   Genitourinary: Negative for decreased urine volume, difficulty urinating, dysuria, flank pain, frequency and hematuria.   Musculoskeletal: Positive for arthralgias, back pain, gait problem and myalgias. Negative for joint swelling.   Skin: Negative for rash.   Allergic/Immunologic: Negative for immunocompromised state.   Neurological: Positive for weakness. Negative for dizziness, syncope, light-headedness and headaches.   Hematological: Negative for adenopathy. Does not bruise/bleed easily.   Psychiatric/Behavioral: Negative for confusion and sleep disturbance. The patient is not nervous/anxious.           All pertinent negatives and positives are as above. All other systems have been reviewed and are negative unless otherwise stated.     Objective    Temp:  [97.9 °F (36.6 °C)-99.4 °F  (37.4 °C)] 98.3 °F (36.8 °C)  Heart Rate:  [68-78] 68  Resp:  [14-20] 18  BP: (127-152)/(45-66) 136/61    Intake/Output Summary (Last 24 hours) at 03/06/18 0952  Last data filed at 03/06/18 0023   Gross per 24 hour   Intake             1905 ml   Output                0 ml   Net             1905 ml     Physical Exam   Constitutional: She appears well-developed.   HENT:   Head: Normocephalic and atraumatic.   Eyes: Conjunctivae and EOM are normal. Pupils are equal, round, and reactive to light.   Neck: Neck supple. No JVD present. No thyromegaly present.   Cardiovascular: Normal rate, regular rhythm, normal heart sounds and intact distal pulses.  Exam reveals no gallop and no friction rub.    No murmur heard.  Pulmonary/Chest: Effort normal and breath sounds normal. No respiratory distress. She has no wheezes. She has no rales. She exhibits no tenderness.   Abdominal: Soft. Bowel sounds are normal. She exhibits no distension. There is no tenderness. There is no rebound and no guarding.   Obese   Musculoskeletal: Normal range of motion. She exhibits no edema, tenderness or deformity.   Lymphadenopathy:     She has no cervical adenopathy.   Neurological: She is alert. She displays normal reflexes. No cranial nerve deficit. She exhibits abnormal muscle tone. Coordination abnormal.   Skin: Skin is warm and dry. No rash noted.   Psychiatric: She has a normal mood and affect. Her behavior is normal.       Results Review:  Lab Results (last 24 hours)     Procedure Component Value Units Date/Time    POC Glucose Once [[id number]]  (Abnormal) Collected:  03/05/18 0954    Specimen:  Blood Updated:  03/05/18 1011     Glucose 174 (H) mg/dL       : [id number] Evelyn RebeccaMeter ID: [id number]       POC Glucose Once [[id number]]  (Abnormal) Collected:  03/05/18 1448    Specimen:  Blood Updated:  03/05/18 1501     Glucose 395 (H) mg/dL       : [id number] Evelyn RebeccaMeter ID: [id number]       POC Glucose Once [[id number]]   (Abnormal) Collected:  03/05/18 1449    Specimen:  Blood Updated:  03/05/18 1501     Glucose 404 (H) mg/dL       : [id number] Evelyn RebeccaMeter ID: [id number]       Blood Culture - Blood, Blood, Venous Line [[id number]]  (Normal) Collected:  03/04/18 1945    Specimen:  Blood from Blood, Venous Line Updated:  03/05/18 2031     Blood Culture No growth at 24 hours    Blood Culture - Blood, Blood, Venous Line [[id number]]  (Normal) Collected:  03/04/18 1945    Specimen:  Blood from Blood, Venous Line Updated:  03/05/18 2031     Blood Culture No growth at 24 hours    POC Glucose Once [[id number]]  (Abnormal) Collected:  03/05/18 2210    Specimen:  Blood Updated:  03/05/18 2229     Glucose 361 (H) mg/dL       : [id number] Sinan RachelMeter ID: [id number]       CBC & Differential [[id number]] Collected:  03/06/18 0523    Specimen:  Blood Updated:  03/06/18 0606    Narrative:       The following orders were created for panel order CBC & Differential.  Procedure                               Abnormality         Status                     ---------                               -----------         ------                     CBC Auto Differential[[id number]]        Abnormal            Final result                 Please view results for these tests on the individual orders.    Reticulocytes [[id number]]  (Abnormal) Collected:  03/06/18 0523    Specimen:  Blood Updated:  03/06/18 0606     Reticulocyte % 2.05 (H) %      Reticulocyte Absolute 0.0644 10*6/mm3     CBC Auto Differential [[id number]]  (Abnormal) Collected:  03/06/18 0523    Specimen:  Blood Updated:  03/06/18 0606     WBC 6.89 10*3/mm3      RBC 3.14 (L) 10*6/mm3      Hemoglobin 9.0 (L) g/dL      Hematocrit 27.8 (L) %      MCV 88.5 fL      MCH 28.7 pg      MCHC 32.4 (L) g/dL      RDW 14.5 %      RDW-SD 46.2 fl      MPV 10.2 fL      Platelets 203 10*3/mm3      Neutrophil % 57.7 %      Lymphocyte % 26.0 %      Monocyte % 12.6 (H) %      Eosinophil % 2.9 %       Basophil % 0.4 %      Immature Grans % 0.4 %      Neutrophils, Absolute 3.97 10*3/mm3      Lymphocytes, Absolute 1.79 10*3/mm3      Monocytes, Absolute 0.87 10*3/mm3      Eosinophils, Absolute 0.20 10*3/mm3      Basophils, Absolute 0.03 10*3/mm3      Immature Grans, Absolute 0.03 10*3/mm3      nRBC 0.0 /100 WBC     Amylase [[id number]]  (Abnormal) Collected:  03/06/18 0523    Specimen:  Blood Updated:  03/06/18 0616     Amylase <30 (L) U/L     Comprehensive Metabolic Panel [[id number]]  (Abnormal) Collected:  03/06/18 0523    Specimen:  Blood Updated:  03/06/18 0616     Glucose 56 (L) mg/dL      BUN 31 (H) mg/dL      Creatinine 1.62 (H) mg/dL      Sodium 148 (H) mmol/L      Potassium 3.8 mmol/L      Chloride 110 mmol/L      CO2 29.0 mmol/L      Calcium 9.0 mg/dL      Total Protein 6.4 g/dL      Albumin 3.40 (L) g/dL      ALT (SGPT) 36 U/L      AST (SGOT) 26 U/L      Alkaline Phosphatase 53 U/L      Total Bilirubin <0.1 (L) mg/dL      eGFR Non African Amer 31 (L) mL/min/1.73      Globulin 3.0 gm/dL      A/G Ratio 1.1 g/dL      BUN/Creatinine Ratio 19.1     Anion Gap 9.0 mmol/L     Narrative:       The MDRD GFR formula is only valid for adults with stable renal function between ages 18 and 70.    Lipase [[id number]]  (Normal) Collected:  03/06/18 0523    Specimen:  Blood Updated:  03/06/18 0616     Lipase 49 U/L     Lipid Panel [[id number]]  (Abnormal) Collected:  03/06/18 0523    Specimen:  Blood Updated:  03/06/18 0626     Total Cholesterol 113 (L) mg/dL      Triglycerides 84 mg/dL      HDL Cholesterol 28 (L) mg/dL      LDL Cholesterol  66 mg/dL      LDL/HDL Ratio 2.44    Iron Profile [[id number]]  (Abnormal) Collected:  03/06/18 0523    Specimen:  Blood Updated:  03/06/18 0631     Iron 53 mcg/dL      TIBC 353 mcg/dL      Iron Saturation 15 (L) %     Hemoglobin A1c [[id number]] Collected:  03/06/18 0523    Specimen:  Blood Updated:  03/06/18 0637     Hemoglobin A1C 6.7 %     Narrative:       Less than 6.0            Non-Diabetic Range  6.0-7.0                 ADA Therapeutic Target  Greater than 7.0        Action Suggested    TSH [[id number]]  (Abnormal) Collected:  03/06/18 0523    Specimen:  Blood Updated:  03/06/18 0647     TSH 5.230 (H) mIU/mL     Ferritin [[id number]]  (Normal) Collected:  03/06/18 0523    Specimen:  Blood Updated:  03/06/18 0651     Ferritin 31.50 ng/mL     Urine Culture - Urine, Urine, Clean Catch [[id number]]  (Abnormal) Collected:  03/04/18 2007    Specimen:  Urine from Urine, Catheter Updated:  03/06/18 0711     Urine Culture --      >100,000 CFU/mL Gram Negative Bacilli (A)    Narrative:       Identification and susceptibility to follow.    Vitamin B12 [[id number]]  (Normal) Collected:  03/06/18 0523    Specimen:  Blood Updated:  03/06/18 0721     Vitamin B-12 548 pg/mL     Folate [[id number]] Collected:  03/06/18 0523    Specimen:  Blood Updated:  03/06/18 0721     Folate 17.80 ng/mL     POC Glucose Once [[id number]]  (Normal) Collected:  03/06/18 0755    Specimen:  Blood Updated:  03/06/18 0825     Glucose 74 mg/dL       : [id number] Salinas ID: [id number]              Cultures:  Blood Culture   Date Value Ref Range Status   03/04/2018 No growth at 24 hours  Preliminary   03/04/2018 No growth at 24 hours  Preliminary     Urine Culture   Date Value Ref Range Status   03/04/2018 >100,000 CFU/mL Gram Negative Bacilli (A)  Preliminary       Radiology Data:    Imaging Results (last 24 hours)     Procedure Component Value Units Date/Time    XR Chest 1 View [[id number]] Collected:  03/05/18 1516     Updated:  03/05/18 1520    Narrative:       EXAMINATION:   XR CHEST 1 VW-  3/5/2018 15:16 CST     HISTORY: Possible pneumonia     Frontal upright radiograph of the chest 3/5/2018 14:48 CST     COMPARISON: 03/04/2018.     FINDINGS:   There is no obvious consolidation. There some increased density in the  right lung base this may be an early inflammatory process.. Cardiac  silhouettes moderately  enlarged..      The osseous structures and surrounding soft tissues demonstrate no acute  abnormality.       Impression:       1. Mild increase in density in the right lung base. An early  inflammatory process cannot be excluded.        2. Moderate cardiomegaly.        This report was finalized on 03/05/2018 15:17 by Dr. Gino Richard MD.          Allergies   Allergen Reactions   • Aspirin Rash   • Tetracyclines & Related Rash       Scheduled meds:     atenolol 50 mg Oral Daily   atorvastatin 10 mg Oral Daily   ceftriaxone 1 g Intravenous Q24H   clopidogrel 75 mg Oral Daily   enoxaparin 30 mg Subcutaneous Q24H   famotidine 20 mg Oral BID   gabapentin 100 mg Oral Q8H   insulin aspart prot-insulin aspart 40 Units Subcutaneous Nightly   [START ON 3/7/2018] insulin aspart prot-insulin aspart 40 Units Subcutaneous QAM   ipratropium-albuterol 3 mL Nebulization 4x Daily - RT   NIFEdipine CC 30 mg Oral Q24H       PRN meds:  •  acetaminophen  •  ondansetron  •  sodium chloride  •  Insert peripheral IV **AND** sodium chloride    Assessment/Plan     Active Problems:    Sepsis      Plan:  Sepsis/questionable pneumonia-resolved.  Lactic acid 0.8 today.  DC vancomycin and Zosyn.  CTA shows patchy groundglass pasty both lungs, patchy edema favored over atelectasis.     Alter mental status-back to baseline.  CT scan head-no acute change.      CHF-BMP 1.9k.  Echocardiogram ejection fraction 55%, diastolic dysfunction, grade 2, aortic valve sclerosis without significant stenosis.     Hypernatremia- banana bag free water.      Epigastric pain.  Lipase and amylase.  CT scan abdomen pelvic-no acute changes.    Increase Pepcid.     Renal insufficiency/Dehydration - D5 free water.      Diabetes- continue NPH 70/30.  Fingerstick 4 times a day     Hypertension/hyperlipidemia/CAD-continue atenolol, Lipitor, Plavix, Procardia.     High TSH-free T4 and free T3.     UTI-start Rocephin.     Anemia- stable     Lovenox  prophylaxis     Deconditioning.  PT and OT consult    Blood culture no growth in 24 hours.  Urine culture Escherichia coli.     Discharge Plannin-2 days.      Sen Lozoya MD   18   9:52 AM

## 2018-03-06 NOTE — THERAPY TREATMENT NOTE
Acute Care - Speech Language Pathology   Swallow Treatment Note [facility]     Patient Name: Gregoria Bennett  : 1945  MRN: [id number]  Today's Date: 3/6/2018  Onset of Illness/Injury or Date of Surgery Date: 18     Referring Physician: Dr. Lozoya      Admit Date: 3/4/2018  Swallow treatment completed. Pt alert and cooperative. St observed diet in epic not consistent with st recommendation. Educated RNKassidy on concerns. RN changed order to recommended mech soft and nectar thick liquids. Recommended to continue crushing meds and giving in applesause. St to follow and treat.    Negar Ponce, Speech Therapy Student 3/6/2018 11:57 AM  Visit Dx:      ICD-10-CM ICD-9-CM   1. Sepsis, due to unspecified organism A41.9 038.9     995.91   2. Hypothermia, initial encounter T68.XXXA 991.6   3. Altered mental status, unspecified altered mental status type R41.82 780.97   4. Hypoglycemia E16.2 251.2   5. Oropharyngeal dysphagia R13.12 787.22   6. Impaired functional mobility, balance, gait, and endurance Z74.09 V49.89   7. Impaired mobility and ADLs Z74.09 799.89     Patient Active Problem List   Diagnosis   • Sprain   • Urinary tract infection without hematuria   • Sepsis             Adult Rehabilitation Note       18 1105 18 1038 18 0825    Rehab Assessment/Intervention    Discipline (P)  speech language pathologist  -NII physical therapy assistant  -HAN occupational therapy assistant  -CJ    Document Type (P)  therapy note (daily note)  -NII therapy note (daily note)  -HAN therapy note (daily note)  -    Subjective Information (P)  no complaints;agree to therapy  -NII agree to therapy;complains of;fatigue  -HAN agree to therapy;complains of;weakness;fatigue  -CJ    Patient Effort, Rehab Treatment (P)  good  -RG  good  -CJ    Precautions/Limitations  fall precautions  -HAN fall precautions  -CJ    Recorded by [RG] Negar Ponce, Speech Therapy Student [HAN] Lisa Lowe, PTA [CJ]  Duncan Aaron, MIRANDA/L    Pain Assessment    Pain Assessment (P)  0-10  -RG No/denies pain  -HAN No/denies pain  -    Pain Score (P)  3  -RG      Pain Location (P)  Abdomen  -RG      Pain Orientation (P)  Upper  -RG      Recorded by [RG] Negar Ponce, Speech Therapy Student [HAN] Lisa Lowe PTA [CJ] Duncan Aaron, MIRANDA/L    Improve timing of pharyngeal response    Timing of Pharyngeal Response Progress (P)  30%;with inconsistent cues  -RG      Comments: Improve timing of pharyngeal response (P)  Pt completed cold bolus exercises x15 with a 3-4 swallow second delay.  Pt's laryngeal movement was noted to be significantly weak with swallow.   -RG      Recorded by [RG] Negar Ponce, Speech Therapy Student      Improve tongue base & pharyngeal wall squeeze    Tongue Base/Pharyngeal Wall Squeeze Progress (P)  30%;with inconsistent cues  -RG      Comments: Improve tongue base & pharyngeal wall squeeze (P)  Pt was able to complete 3 effortful swallows given 4 attempts.  Pt needed verbal and tactile cueing to complete swallows.   -RG      Recorded by [RG] Negar Ponce, Speech Therapy Student      Bed Mobility, Assessment/Treatment    Bed Mobility, Assistive Device  bed rails;draw sheet  -     Bed Mobility, Roll Right, [place]  verbal cues required;minimum assist (75% patient effort)  -     Bed Mob, Sidelying to Sit, [place]  verbal cues required;minimum assist (75% patient effort)  -     Bed Mobility, Comment   fowlers in bed!  -    Recorded by  [HAN] Lisa Lowe PTA [CJ] Duncan Aaron, MIRANDA/L    Transfer Assessment/Treatment    Transfers, Sit-Stand [place]  verbal cues required;contact guard assist  -     Transfers, Stand-Sit [place]  verbal cues required;contact guard assist  -     Recorded by  [HAN] Lisa Lowe PTA     Gait Assessment/Treatment    Gait, [place] Level  contact guard assist  -     Gait, Assistive Device  rolling walker   -HAN     Gait, Distance (Feet)  110  -HAN     Gait, Gait Deviations  daniel decreased;forward flexed posture;limb motion velocity decreased;step length decreased  -HAN     Recorded by  [HAN] Lisa Lowe PTA     Therapy Exercises    Bilateral Lower Extremities  AROM:;15 reps;supine;sitting;ankle pumps/circles;heel slides;hip abduction/adduction;hip flexion;LAQ  -HAN     Bilateral Upper Extremity   AROM:;20 reps;sitting;elbow flexion/extension;shoulder extension/flexion   3lb. bar, 2lb. dumb bells! No shd. flex exs!  -    Recorded by  [HAN] Lisa Lowe PTA [CJ] RUBEN Tavarez/L    Positioning and Restraints    Pre-Treatment Position  in bed  -HAN in bed  -    Post Treatment Position  chair  - bed  -    In Bed  sitting;call light within reach;encouraged to call for assist  -HAN call light within reach;encouraged to call for assist;fowlers;side rails up x2  -CJ    Recorded by  [HAN] Lisa Lowe PTA [CJ] LU TavarezA/L      User Key  (r) = Recorded By, (t) = Taken By, (c) = Cosigned By    Initials Name Effective Dates     RUBEN Tavarez/JODI 08/02/16 -     HAN Lowe PTA 08/02/16 -     RG Negar Ponce, Speech Therapy Student 12/11/17 -                   IP SLP Goals       03/06/18 1148 03/05/18 0916       Safely Consume Diet    Safely Consume Diet- SLP, Date Established  03/05/18  -EA     Safely Consume Diet- SLP, Time to Achieve  by discharge  -EA     Safely Consume Diet- SLP, Activity Level  Patient will improve oral skills for more efficient eating;Patient will improve timing of pharyngeal response for safer, more efficient swallow;Patient will improve tongue base/pharyngeal wall squeeze for safer, more efficient swallow  -EA     Safely Consume Diet- SLP, Date Goal Reviewed (P)  03/06/18  -RG 03/05/18  -EA     Safely Consume Diet- SLP, Outcome  goal ongoing  -EA       User Key  (r) = Recorded By, (t) = Taken By, (c) = Cosigned By    Initials Name Provider  Type    TIAN Mendez, MS, CFY-SLP Speech and Language Pathologist    NII Ponce, Speech Therapy Student Speech Therapy Student          EDUCATION  The patient has been educated in the following areas:   Dysphagia (Swallowing Impairment).    SLP Recommendation and Plan                                           Plan of Care Review  Outcome Summary/Follow up Plan: (P) Swallow treatment completed.  Pt's diet had been changed to regular diet after an NPO order had been canceled.  RNKassidy aware and changed [facility] back [facility].  Recommended to continue crushing meds and giving in applesause.  St to follow and treat.        SLP Outcome Measures (last 72 hours)      SLP Outcome Measures       03/05/18 0900          SLP Outcome Measures    Outcome Measure Used? Adult NOMS  -EA      FCM Scores    Swallowing FCM Score 5  -EA        User Key  (r) = Recorded By, (t) = Taken By, (c) = Cosigned By    Initials Name Effective Dates    TIAN Mendez MS, CFY-SLP 02/21/17 -              Time Calculation:         Time Calculation- SLP       03/06/18 1147          Time Calculation- SLP    SLP Start Time (P)  1105  -RG      SLP Stop Time (P)  1128  -RG      SLP Time Calculation (min) (P)  23 min  -RG      SLP Received On (P)  03/06/18  -RG        User Key  (r) = Recorded By, (t) = Taken By, (c) = Cosigned By    Initials Name Provider Type    NII Ponce, Speech Therapy Student Speech Therapy Student          Therapy Charges for Today     Code Description Service Date Service Provider Modifiers Qty    [id number]  ST TREATMENT SWALLOW 2 3/6/2018 Negar Ponce, Speech Therapy Student HANSEL REEVES 1          SLP G-Codes  SLP NOMS Used?: Yes  Functional Limitations: Swallowing  Swallow Current Status (): At least 20 percent but less than 40 percent impaired, limited or restricted  Swallow Goal Status (): At least 1 percent but less than 20 percent impaired,  limited or restricted      Negar Ponce, Speech Therapy Student  3/6/2018

## 2018-03-06 NOTE — PLAN OF CARE
Problem: Patient Care Overview (Adult)  Goal: Plan of Care Review  Outcome: Ongoing (interventions implemented as appropriate)   03/06/18 0522   Coping/Psychosocial Response Interventions   Plan Of Care Reviewed With patient   Patient Care Overview   Progress no change   Outcome Evaluation   Outcome Summary/Follow up Plan , pt insulin order changed, Novolog 70/30 given as ordered. VSS.        Problem: Diabetes, Type 2 (Adult)  Goal: Signs and Symptoms of Listed Potential Problems Will be Absent or Manageable (Diabetes, Type 2)  Outcome: Ongoing (interventions implemented as appropriate)      Problem: Infection, Risk/Actual (Adult)  Goal: Identify Related Risk Factors and Signs and Symptoms  Outcome: Outcome(s) achieved Date Met: 03/06/18      Problem: Sepsis (Adult)  Goal: Signs and Symptoms of Listed Potential Problems Will be Absent or Manageable (Sepsis)  Outcome: Ongoing (interventions implemented as appropriate)

## 2018-03-06 NOTE — PLAN OF CARE
Problem: Patient Care Overview (Adult)  Goal: Plan of Care Review  Outcome: Ongoing (interventions implemented as appropriate)   03/06/18 1122 03/06/18 1148   Coping/Psychosocial Response Interventions   Plan Of Care Reviewed With patient --    Patient Care Overview   Progress progress toward functional goals as expected --    Outcome Evaluation   Outcome Summary/Follow up Plan --  Swallow treatment completed.St observed diet in epic not consistent with st recommendation. Educated Kassidy LOCKHART on concerns. RN changed order to recommended mech soft and nectar thick liquids. Recommended to continue crushing meds and giving in applesause. St to follow and treat.        Problem: Inpatient SLP  Goal: Dysphagia- Patient will safely consume diet as per recommendation with no signs/symptoms of aspiration  Outcome: Ongoing (interventions implemented as appropriate)   03/05/18 0916 03/06/18 1148   Safely Consume Diet   Safely Consume Diet- SLP, Date Established 03/05/18 --    Safely Consume Diet- SLP, Time to Achieve by discharge --    Safely Consume Diet- SLP, Activity Level Patient will improve oral skills for more efficient eating;Patient will improve timing of pharyngeal response for safer, more efficient swallow;Patient will improve tongue base/pharyngeal wall squeeze for safer, more efficient swallow --    Safely Consume Diet- SLP, Date Goal Reviewed --  03/06/18   Safely Consume Diet- SLP, Outcome goal ongoing --

## 2018-03-07 VITALS
TEMPERATURE: 97.6 F | WEIGHT: 201 LBS | HEIGHT: 67 IN | DIASTOLIC BLOOD PRESSURE: 58 MMHG | HEART RATE: 73 BPM | OXYGEN SATURATION: 97 % | BODY MASS INDEX: 31.55 KG/M2 | RESPIRATION RATE: 18 BRPM | SYSTOLIC BLOOD PRESSURE: 164 MMHG

## 2018-03-07 LAB
ALBUMIN SERPL-MCNC: 3.7 G/DL (ref 3.5–5)
ALBUMIN/GLOB SERPL: 1.1 G/DL (ref 1.1–2.5)
ALP SERPL-CCNC: 58 U/L (ref 24–120)
ALT SERPL W P-5'-P-CCNC: 29 U/L (ref 0–54)
ANION GAP SERPL CALCULATED.3IONS-SCNC: 11 MMOL/L (ref 4–13)
AST SERPL-CCNC: 30 U/L (ref 7–45)
BACTERIA SPEC AEROBE CULT: ABNORMAL
BACTERIA SPEC AEROBE CULT: ABNORMAL
BASOPHILS # BLD AUTO: 0.06 10*3/MM3 (ref 0–0.2)
BASOPHILS NFR BLD AUTO: 0.7 % (ref 0–2)
BILIRUB SERPL-MCNC: 0.1 MG/DL (ref 0.1–1)
BUN BLD-MCNC: 27 MG/DL (ref 5–21)
BUN/CREAT SERPL: 18.5 (ref 7–25)
CALCIUM SPEC-SCNC: 9 MG/DL (ref 8.4–10.4)
CHLORIDE SERPL-SCNC: 109 MMOL/L (ref 98–110)
CO2 SERPL-SCNC: 29 MMOL/L (ref 24–31)
CREAT BLD-MCNC: 1.46 MG/DL (ref 0.5–1.4)
DEPRECATED RDW RBC AUTO: 45.3 FL (ref 40–54)
EOSINOPHIL # BLD AUTO: 0.45 10*3/MM3 (ref 0–0.7)
EOSINOPHIL NFR BLD AUTO: 5.4 % (ref 0–4)
ERYTHROCYTE [DISTWIDTH] IN BLOOD BY AUTOMATED COUNT: 14.5 % (ref 12–15)
GFR SERPL CREATININE-BSD FRML MDRD: 35 ML/MIN/1.73
GLOBULIN UR ELPH-MCNC: 3.3 GM/DL
GLUCOSE BLD-MCNC: 63 MG/DL (ref 70–100)
GLUCOSE BLDC GLUCOMTR-MCNC: 101 MG/DL (ref 70–130)
GLUCOSE BLDC GLUCOMTR-MCNC: 177 MG/DL (ref 70–130)
GLUCOSE BLDC GLUCOMTR-MCNC: 260 MG/DL (ref 70–130)
HCT VFR BLD AUTO: 31.6 % (ref 37–47)
HGB BLD-MCNC: 10.4 G/DL (ref 12–16)
IMM GRANULOCYTES # BLD: 0.03 10*3/MM3 (ref 0–0.03)
IMM GRANULOCYTES NFR BLD: 0.4 % (ref 0–5)
LYMPHOCYTES # BLD AUTO: 1.67 10*3/MM3 (ref 0.72–4.86)
LYMPHOCYTES NFR BLD AUTO: 20 % (ref 15–45)
MCH RBC QN AUTO: 28.8 PG (ref 28–32)
MCHC RBC AUTO-ENTMCNC: 32.9 G/DL (ref 33–36)
MCV RBC AUTO: 87.5 FL (ref 82–98)
MONOCYTES # BLD AUTO: 1.02 10*3/MM3 (ref 0.19–1.3)
MONOCYTES NFR BLD AUTO: 12.2 % (ref 4–12)
NEUTROPHILS # BLD AUTO: 5.14 10*3/MM3 (ref 1.87–8.4)
NEUTROPHILS NFR BLD AUTO: 61.3 % (ref 39–78)
NRBC BLD MANUAL-RTO: 0 /100 WBC (ref 0–0)
PLATELET # BLD AUTO: 239 10*3/MM3 (ref 130–400)
PMV BLD AUTO: 9.7 FL (ref 6–12)
POTASSIUM BLD-SCNC: 4.2 MMOL/L (ref 3.5–5.3)
PROT SERPL-MCNC: 7 G/DL (ref 6.3–8.7)
RBC # BLD AUTO: 3.61 10*6/MM3 (ref 4.2–5.4)
SODIUM BLD-SCNC: 149 MMOL/L (ref 135–145)
T3FREE SERPL-MCNC: 2.82 PG/ML (ref 2.77–5.27)
T4 FREE SERPL-MCNC: 0.83 NG/DL (ref 0.78–2.19)
WBC NRBC COR # BLD: 8.37 10*3/MM3 (ref 4.8–10.8)

## 2018-03-07 PROCEDURE — 25010000002 THIAMINE PER 100 MG: Performed by: FAMILY MEDICINE

## 2018-03-07 PROCEDURE — 97116 GAIT TRAINING THERAPY: CPT

## 2018-03-07 PROCEDURE — 94799 UNLISTED PULMONARY SVC/PX: CPT

## 2018-03-07 PROCEDURE — 97110 THERAPEUTIC EXERCISES: CPT

## 2018-03-07 PROCEDURE — 82962 GLUCOSE BLOOD TEST: CPT

## 2018-03-07 PROCEDURE — 92526 ORAL FUNCTION THERAPY: CPT

## 2018-03-07 PROCEDURE — 25010000002 POTASSIUM CHLORIDE PER 2 MEQ OF POTASSIUM: Performed by: FAMILY MEDICINE

## 2018-03-07 PROCEDURE — 25010000002 CEFTRIAXONE PER 250 MG: Performed by: FAMILY MEDICINE

## 2018-03-07 PROCEDURE — 84481 FREE ASSAY (FT-3): CPT | Performed by: FAMILY MEDICINE

## 2018-03-07 PROCEDURE — 85025 COMPLETE CBC W/AUTO DIFF WBC: CPT | Performed by: FAMILY MEDICINE

## 2018-03-07 PROCEDURE — 63710000001 INSULIN ASPART PER 5 UNITS: Performed by: FAMILY MEDICINE

## 2018-03-07 PROCEDURE — 94760 N-INVAS EAR/PLS OXIMETRY 1: CPT

## 2018-03-07 PROCEDURE — 84439 ASSAY OF FREE THYROXINE: CPT | Performed by: FAMILY MEDICINE

## 2018-03-07 PROCEDURE — 80053 COMPREHEN METABOLIC PANEL: CPT | Performed by: FAMILY MEDICINE

## 2018-03-07 RX ORDER — INSULIN ASPART 100 [IU]/ML
30 INJECTION, SUSPENSION SUBCUTANEOUS 2 TIMES DAILY WITH MEALS
Refills: 12 | Status: ON HOLD
Start: 2018-03-07 | End: 2019-12-16

## 2018-03-07 RX ORDER — PANTOPRAZOLE SODIUM 40 MG/1
40 TABLET, DELAYED RELEASE ORAL DAILY
Status: ON HOLD
Start: 2018-03-07 | End: 2019-12-16

## 2018-03-07 RX ADMIN — GABAPENTIN 100 MG: 100 CAPSULE ORAL at 16:29

## 2018-03-07 RX ADMIN — CEFTRIAXONE SODIUM 1 G: 1 INJECTION, POWDER, FOR SOLUTION INTRAMUSCULAR; INTRAVENOUS at 11:43

## 2018-03-07 RX ADMIN — NIFEDIPINE 30 MG: 30 TABLET, FILM COATED, EXTENDED RELEASE ORAL at 06:38

## 2018-03-07 RX ADMIN — IPRATROPIUM BROMIDE AND ALBUTEROL SULFATE 3 ML: 2.5; .5 SOLUTION RESPIRATORY (INHALATION) at 10:23

## 2018-03-07 RX ADMIN — POTASSIUM CHLORIDE 75 ML/HR: 2 INJECTION, SOLUTION, CONCENTRATE INTRAVENOUS at 09:22

## 2018-03-07 RX ADMIN — GABAPENTIN 100 MG: 100 CAPSULE ORAL at 06:38

## 2018-03-07 RX ADMIN — ATENOLOL 50 MG: 50 TABLET ORAL at 09:22

## 2018-03-07 RX ADMIN — INSULIN ASPART 40 UNITS: 100 INJECTION, SUSPENSION SUBCUTANEOUS at 09:20

## 2018-03-07 RX ADMIN — IPRATROPIUM BROMIDE AND ALBUTEROL SULFATE 3 ML: 2.5; .5 SOLUTION RESPIRATORY (INHALATION) at 06:50

## 2018-03-07 RX ADMIN — ATORVASTATIN CALCIUM 10 MG: 10 TABLET, FILM COATED ORAL at 09:22

## 2018-03-07 RX ADMIN — FAMOTIDINE 20 MG: 20 TABLET, FILM COATED ORAL at 09:22

## 2018-03-07 RX ADMIN — IPRATROPIUM BROMIDE AND ALBUTEROL SULFATE 3 ML: 2.5; .5 SOLUTION RESPIRATORY (INHALATION) at 14:59

## 2018-03-07 RX ADMIN — CLOPIDOGREL 75 MG: 75 TABLET, FILM COATED ORAL at 09:22

## 2018-03-07 NOTE — PLAN OF CARE
Problem: Inpatient SLP  Goal: Dysphagia- Patient will safely consume diet as per recommendation with no signs/symptoms of aspiration  Outcome: Ongoing (interventions implemented as appropriate)   03/05/18 0916 03/07/18 1350   Safely Consume Diet   Safely Consume Diet- SLP, Date Established 03/05/18 --    Safely Consume Diet- SLP, Time to Achieve by discharge --    Safely Consume Diet- SLP, Activity Level Patient will improve oral skills for more efficient eating;Patient will improve timing of pharyngeal response for safer, more efficient swallow;Patient will improve tongue base/pharyngeal wall squeeze for safer, more efficient swallow --    Safely Consume Diet- SLP, Date Goal Reviewed --  03/07/18   Safely Consume Diet- SLP, Outcome goal ongoing --

## 2018-03-07 NOTE — PLAN OF CARE
Problem: Patient Care Overview (Adult)  Goal: Plan of Care Review  Outcome: Ongoing (interventions implemented as appropriate)   03/07/18 1517   Coping/Psychosocial Response Interventions   Plan Of Care Reviewed With patient   Patient Care Overview   Progress progress toward functional goals as expected   Outcome Evaluation   Outcome Summary/Follow up Plan Pt. progressing as expected, pt. states that at the snf she does most of everything for herself!

## 2018-03-07 NOTE — PLAN OF CARE
Problem: Patient Care Overview (Adult)  Goal: Plan of Care Review  Outcome: Ongoing (interventions implemented as appropriate)   03/07/18 0652   Coping/Psychosocial Response Interventions   Plan Of Care Reviewed With patient   Patient Care Overview   Progress no change   Outcome Evaluation   Outcome Summary/Follow up Plan Increased SOA noted with exertion this shift, with audible wheezing noted. O2 sats stable on room air. Continue to monitor       Problem: Diabetes, Type 2 (Adult)  Goal: Signs and Symptoms of Listed Potential Problems Will be Absent or Manageable (Diabetes, Type 2)  Outcome: Ongoing (interventions implemented as appropriate)      Problem: Infection, Risk/Actual (Adult)  Goal: Infection Prevention/Resolution  Outcome: Ongoing (interventions implemented as appropriate)      Problem: Sepsis (Adult)  Goal: Signs and Symptoms of Listed Potential Problems Will be Absent or Manageable (Sepsis)  Outcome: Ongoing (interventions implemented as appropriate)

## 2018-03-07 NOTE — PLAN OF CARE
Problem: Patient Care Overview (Adult)  Goal: Plan of Care Review  Outcome: Ongoing (interventions implemented as appropriate)   03/07/18 1019   Coping/Psychosocial Response Interventions   Plan Of Care Reviewed With patient   Patient Care Overview   Progress progress toward functional goals as expected   Outcome Evaluation   Outcome Summary/Follow up Plan Pt. transfers and ambulates 180-200' with RWX and contact guard asist(CGA).Performs LE ex's.

## 2018-03-07 NOTE — DISCHARGE PLACEMENT REQUEST
"MILA AGEE [phone]  ColumbusGregoria (72 y.o. Female)     Date of Birth Social Security Number Address Home Phone MRN    [id number]  [id number] [facility]  [facility] [address] [phone] [id number]    Congregational Marital Status          Mosque        Admission Date Admission Type Admitting Provider Attending Provider Department, Room/Bed    3/4/18 Emergency Sen Lozoya MD Truong, Khai C, MD [facility] 4C, 494/1    Discharge Date Discharge Disposition Discharge Destination         Skilled Nursing Facility ([place] - External)             Attending Provider: Sen Lozoya MD     Allergies:  Aspirin, Tetracyclines & Related    Isolation:  None   Infection:  None   Code Status:  FULL    Ht:  170.2 cm (67\")   Wt:  91.2 kg (201 lb)    Admission Cmt:  None   Principal Problem:  None                Active Insurance as of 3/4/2018     Primary Coverage     Payor Plan Insurance Group Employer/Plan Group    MEDICARE MEDICARE A & B      Payor Plan Address Payor Plan Phone Number Effective From Effective To    [address] [phone] 7/[birth year]1973     [place] [address]       Subscriber Name Subscriber Birth Date Member ID       GREGORIA DIAS 1945 [id number]           Secondary Coverage     Payor Plan Insurance Group Employer/Plan Group    [place]      Payor Plan Address Payor Plan Phone Number Effective From Effective To    [address] [phone] 3/4/2017     [place] [address]       Subscriber Name Subscriber Birth Date Member ID       GREGORIA DIAS 1945 [id number]                 Emergency Contacts      (Rel.) Home Phone Work Phone Mobile Phone    Maria Guadalupe Patel (Other) [phone] -- --                 Discharge Summary      Sen Lozoya MD at 3/7/2018  3:07 PM              [facility] Medicine Services  DISCHARGE SUMMARY       Date of Admission: 3/4/2018  Date of Discharge:  " 3/7/2018  Primary Care Physician: Chu Isaac MD    Presenting Problem/History of Present Illness:  Sepsis, due to unspecified organism [A41.9]     Final Discharge Diagnoses:  Hospital Problem List     Sepsis          Pertinent Test Results:   IMPRESSION: Chest x-ray  1. Mild increase in density in the right lung base. An early  inflammatory process cannot be excluded.  2. Moderate cardiomegaly.    Summary: CT scan abdomen pelvic  1. No acute abnormality is seen within the abdomen or pelvis.    Summary: CT scan of chest  1. Patchy groundglass opacity within both lungs. Patchy edema favored  over atelectasis.  2. No focal consolidation or pneumothorax.    IMPRESSION: CT scan ahead  1. No acute intracranial abnormality is seen.      Chief Complaint on Day of Discharge: none    History of Present Illness on Day of Discharge:   72-year-old female with past medical history of COPD, coronary artery disease, diabetes mellitus type II, hypertension, GERD, venous insufficiency and renal insufficiency was brought into the ER from nursing home for hypoglycemia.  Apparently patient was found to be somewhat more altered than her baseline mentally.  Patient's blood sugar was noted to be in the 40s.  In the ER during initial evaluation patient was found to be hypothermic with low blood sugar.  EMS administered oral glucose so repeat blood glucose was noted to be in the 180s in the ER.  Patient was started on warm blanket for hypothermia.  During initial evaluation patient was found to be sepsis positive.  Patient will be admitted for further evaluation and treatment.  Imaging study was noted for possible underlying pneumonia.       Hospital Course:    The patient is a 72 y.o. female who presented to [facility] with sepsis/pneumonia.  Lab shows lack gases trending down.  Last lactic acid level was 0.8.  Blood culture showed no growth in 2 days.  Urine culture shows Klebsiella, patient finished 3 days of  "Rocephin.  Flu test negative.  Initial antibiotics was vancomycin and Zosyn, this was DC after culture came back negative.  CTA shows patchy groundglass both lungs, patchy edema favored over atelectasis.  With treatment, patient altered mental status returned to baseline.  CT scan ahead shows no acute changes.      Patient did complain epigastric pain.  Amylase and lipase was negative.  CT scan of the abdomen pelvic shows no acute changes.    Renal insufficiency/dehydration improve with IV fluids.  Hypernatremia, stable.  Patient to stay on low-salt diet.      Diabetes,  patient to continue NPH 70/30.  Fingerstick 4 times a day.      Hypertension/hyperlipidemia/CAD-continue atenolol, Lipitor, Plavix, Procardia.     Vital signs stable, afebrile.  Patient back to baseline.  Patient to stay on low-salt diet and encourage oral hydration.    Condition on Discharge:  stable    Physical Exam on Discharge:  /58 (BP Location: Right arm, Patient Position: Sitting)  Pulse 73  Temp 97.6 °F (36.4 °C) (Tympanic)   Resp 18  Ht 170.2 cm (67\")  Wt 91.2 kg (201 lb)  SpO2 97%  BMI 31.48 kg/m2  Physical Exam  Constitutional: She appears well-developed.   HENT:   Head: Normocephalic and atraumatic.   Eyes: Conjunctivae and EOM are normal. Pupils are equal, round, and reactive to light.   Neck: Neck supple. No JVD present. No thyromegaly present.   Cardiovascular: Normal rate, regular rhythm, normal heart sounds and intact distal pulses.  Exam reveals no gallop and no friction rub.    No murmur heard.  Pulmonary/Chest: Effort normal and breath sounds normal. No respiratory distress. She has no wheezes. She has no rales. She exhibits no tenderness.   Abdominal: Soft. Bowel sounds are normal. She exhibits no distension. There is no tenderness. There is no rebound and no guarding.   Obese   Musculoskeletal: Normal range of motion. She exhibits no edema, tenderness or deformity.   Lymphadenopathy:     She has no cervical " adenopathy.   Neurological: She is alert. She displays normal reflexes. No cranial nerve deficit. She exhibits abnormal muscle tone. Coordination abnormal.   Skin: Skin is warm and dry. No rash noted.   Psychiatric: She has a normal mood and affect. Her behavior is normal.        Discharge Disposition:  Skilled Nursing Facility ([place] - External)    Discharge Medications:   Gregoria Bennett   Home Medication Instructions RUPINDER:[id number]    Printed on:03/07/18 2443   Medication Information                      atenolol (TENORMIN) 50 MG tablet  Take 1 tablet by mouth Daily.             atorvastatin (LIPITOR) 10 MG tablet  Take 10 mg by mouth daily.             clopidogrel (PLAVIX) 75 MG tablet  Take 75 mg by mouth daily.             cycloSPORINE (RESTASIS) 0.05 % ophthalmic emulsion  Administer 1 drop to both eyes Every 12 (Twelve) Hours.             insulin aspart prot-insulin aspart (novoLOG 70/30) (70-30) 100 UNIT/ML injection  Inject 30 Units under the skin 2 (Two) Times a Day With Meals.             ipratropium-albuterol (DUO-NEB) 0.5-2.5 mg/mL nebulizer  Take 3 mL by nebulization Every 4 (Four) Hours As Needed for Shortness of Air.             NIFEdipine XL (ADALAT CC) 30 MG 24 hr tablet  Take 1 tablet by mouth Daily.             pantoprazole (PROTONIX) 40 MG EC tablet  Take 1 tablet by mouth Daily.                 Discharge Diet:   Diet Instructions     Advance Diet As Tolerated       Low-sodium diet                 Activity at Discharge:   Activity Instructions     Activity as Tolerated                     Discharge Care Plan/Instructions:     Follow-up Appointments:   Follow-up with nursing home physician as soon as possible.    Sen Lozoya MD  03/07/18  3:07 PM    Time: Greater than 30 minutes         Electronically signed by Sen Lozoya MD at 3/7/2018  3:22 PM

## 2018-03-07 NOTE — DISCHARGE SUMMARY
[facility] Medicine Services  DISCHARGE SUMMARY       Date of Admission: 3/4/2018  Date of Discharge:  3/7/2018  Primary Care Physician: Chu Isaac MD    Presenting Problem/History of Present Illness:  Sepsis, due to unspecified organism [A41.9]     Final Discharge Diagnoses:  Hospital Problem List     Sepsis          Pertinent Test Results:   IMPRESSION: Chest x-ray  1. Mild increase in density in the right lung base. An early  inflammatory process cannot be excluded.  2. Moderate cardiomegaly.    Summary: CT scan abdomen pelvic  1. No acute abnormality is seen within the abdomen or pelvis.    Summary: CT scan of chest  1. Patchy groundglass opacity within both lungs. Patchy edema favored  over atelectasis.  2. No focal consolidation or pneumothorax.    IMPRESSION: CT scan ahead  1. No acute intracranial abnormality is seen.      Chief Complaint on Day of Discharge: none    History of Present Illness on Day of Discharge:   72-year-old female with past medical history of COPD, coronary artery disease, diabetes mellitus type II, hypertension, GERD, venous insufficiency and renal insufficiency was brought into the ER from nursing home for hypoglycemia.  Apparently patient was found to be somewhat more altered than her baseline mentally.  Patient's blood sugar was noted to be in the 40s.  In the ER during initial evaluation patient was found to be hypothermic with low blood sugar.  EMS administered oral glucose so repeat blood glucose was noted to be in the 180s in the ER.  Patient was started on warm blanket for hypothermia.  During initial evaluation patient was found to be sepsis positive.  Patient will be admitted for further evaluation and treatment.  Imaging study was noted for possible underlying pneumonia.       Hospital Course:    The patient is a 72 y.o. female who presented to [facility] with sepsis/pneumonia.  Lab shows lack gases trending down.   "Last lactic acid level was 0.8.  Blood culture showed no growth in 2 days.  Urine culture shows Klebsiella, patient finished 3 days of Rocephin.  Flu test negative.  Initial antibiotics was vancomycin and Zosyn, this was DC after culture came back negative.  CTA shows patchy groundglass both lungs, patchy edema favored over atelectasis.  With treatment, patient altered mental status returned to baseline.  CT scan ahead shows no acute changes.      Patient did complain epigastric pain.  Amylase and lipase was negative.  CT scan of the abdomen pelvic shows no acute changes.    Renal insufficiency/dehydration improve with IV fluids.  Hypernatremia, stable.  Patient to stay on low-salt diet.      Diabetes,  patient to continue NPH 70/30.  Fingerstick 4 times a day.      Hypertension/hyperlipidemia/CAD-continue atenolol, Lipitor, Plavix, Procardia.     Vital signs stable, afebrile.  Patient back to baseline.  Patient to stay on low-salt diet and encourage oral hydration.    Condition on Discharge:  stable    Physical Exam on Discharge:  /58 (BP Location: Right arm, Patient Position: Sitting)  Pulse 73  Temp 97.6 °F (36.4 °C) (Tympanic)   Resp 18  Ht 170.2 cm (67\")  Wt 91.2 kg (201 lb)  SpO2 97%  BMI 31.48 kg/m2  Physical Exam  Constitutional: She appears well-developed.   HENT:   Head: Normocephalic and atraumatic.   Eyes: Conjunctivae and EOM are normal. Pupils are equal, round, and reactive to light.   Neck: Neck supple. No JVD present. No thyromegaly present.   Cardiovascular: Normal rate, regular rhythm, normal heart sounds and intact distal pulses.  Exam reveals no gallop and no friction rub.    No murmur heard.  Pulmonary/Chest: Effort normal and breath sounds normal. No respiratory distress. She has no wheezes. She has no rales. She exhibits no tenderness.   Abdominal: Soft. Bowel sounds are normal. She exhibits no distension. There is no tenderness. There is no rebound and no guarding.   Obese "   Musculoskeletal: Normal range of motion. She exhibits no edema, tenderness or deformity.   Lymphadenopathy:     She has no cervical adenopathy.   Neurological: She is alert. She displays normal reflexes. No cranial nerve deficit. She exhibits abnormal muscle tone. Coordination abnormal.   Skin: Skin is warm and dry. No rash noted.   Psychiatric: She has a normal mood and affect. Her behavior is normal.        Discharge Disposition:  Skilled Nursing Facility ([place] - External)    Discharge Medications:   Gregoria Bennett   Home Medication Instructions RUPINDER:[id number]    Printed on:03/07/18 8081   Medication Information                      atenolol (TENORMIN) 50 MG tablet  Take 1 tablet by mouth Daily.             atorvastatin (LIPITOR) 10 MG tablet  Take 10 mg by mouth daily.             clopidogrel (PLAVIX) 75 MG tablet  Take 75 mg by mouth daily.             cycloSPORINE (RESTASIS) 0.05 % ophthalmic emulsion  Administer 1 drop to both eyes Every 12 (Twelve) Hours.             insulin aspart prot-insulin aspart (novoLOG 70/30) (70-30) 100 UNIT/ML injection  Inject 30 Units under the skin 2 (Two) Times a Day With Meals.             ipratropium-albuterol (DUO-NEB) 0.5-2.5 mg/mL nebulizer  Take 3 mL by nebulization Every 4 (Four) Hours As Needed for Shortness of Air.             NIFEdipine XL (ADALAT CC) 30 MG 24 hr tablet  Take 1 tablet by mouth Daily.             pantoprazole (PROTONIX) 40 MG EC tablet  Take 1 tablet by mouth Daily.                 Discharge Diet:   Diet Instructions     Advance Diet As Tolerated       Low-sodium diet                 Activity at Discharge:   Activity Instructions     Activity as Tolerated                     Discharge Care Plan/Instructions:     Follow-up Appointments:   Follow-up with nursing home physician as soon as possible.    Sen Lozoya MD  03/07/18  3:07 PM    Time: Greater than 30 minutes

## 2018-03-07 NOTE — PLAN OF CARE
Problem: Patient Care Overview (Adult)  Goal: Plan of Care Review  Outcome: Ongoing (interventions implemented as appropriate)   03/06/18 1759   Coping/Psychosocial Response Interventions   Plan Of Care Reviewed With patient   Patient Care Overview   Progress progress toward functional goals as expected   Outcome Evaluation   Outcome Summary/Follow up Plan No c/o pain. VSS. Will continue to monitor.       Problem: Diabetes, Type 2 (Adult)  Goal: Signs and Symptoms of Listed Potential Problems Will be Absent or Manageable (Diabetes, Type 2)  Outcome: Ongoing (interventions implemented as appropriate)   03/06/18 1759   Diabetes, Type 2   Problems Assessed (Type 2 Diabetes) all   Problems Present (Type 2 Diabetes) impaired glycemic control       Problem: Infection, Risk/Actual (Adult)  Goal: Infection Prevention/Resolution  Outcome: Ongoing (interventions implemented as appropriate)   03/06/18 1759   Infection, Risk/Actual (Adult)   Infection Prevention/Resolution making progress toward outcome       Problem: Sepsis (Adult)  Goal: Signs and Symptoms of Listed Potential Problems Will be Absent or Manageable (Sepsis)  Outcome: Ongoing (interventions implemented as appropriate)   03/06/18 1759   Sepsis   Problems Assessed (Sepsis) all   Problems Present (Sepsis) glycemic control, impaired

## 2018-03-07 NOTE — PROGRESS NOTES
Continued Stay Note  [facility]     Patient Name: Gregoria Bennett  MRN: [id number]  Today's Date: 3/7/2018    Admit Date: 3/4/2018          Discharge Plan       03/07/18 1544    Case Management/Social Work Plan    Plan [facility]    Final Note    Final Note Pt is [facility]. Spoke with Sandra nassar to inform, pt will be readmitted at [facility] level care. Called guardian (Hilario Armstrong [phone] spoke with Estefany) and informed of d/c status. Pt does not qualify for ambulance and no funds for taxi. Pt will be provided voucher to pay for ride back to NH.                Discharge Codes     None        Expected Discharge Date and Time     Expected Discharge Date Expected Discharge Time    Mar 7, 2018             KALPESH Mendiola

## 2018-03-07 NOTE — THERAPY TREATMENT NOTE
Acute Care - Speech Language Pathology   Swallow Treatment Note [facility]     Patient Name: Gregoria Bennett  : 1945  MRN: [id number]  Today's Date: 3/7/2018  Onset of Illness/Injury or Date of Surgery Date: 18     Referring Physician: Dr. Lozoya      Admit Date: 3/4/2018    Visit Dx:      ICD-10-CM ICD-9-CM   1. Sepsis, due to unspecified organism A41.9 038.9     995.91   2. Hypothermia, initial encounter T68.XXXA 991.6   3. Altered mental status, unspecified altered mental status type R41.82 780.97   4. Hypoglycemia E16.2 251.2   5. Oropharyngeal dysphagia R13.12 787.22   6. Impaired functional mobility, balance, gait, and endurance Z74.09 V49.89   7. Impaired mobility and ADLs Z74.09 799.89     Patient Active Problem List   Diagnosis   • Sprain   • Urinary tract infection without hematuria   • Sepsis             Adult Rehabilitation Note       18 1333 18 0941 18 1455    Rehab Assessment/Intervention    Discipline speech language pathologist  -MB physical therapy assistant  -HAN physical therapy assistant  -HAN    Document Type therapy note (daily note)  -MB therapy note (daily note)  -HAN therapy note (daily note)  -HAN    Subjective Information no complaints;agree to therapy  -MB no complaints;agree to therapy  -HAN no complaints;agree to therapy  -HAN    Patient Effort, Rehab Treatment good  -MB      Symptoms Noted Comment  pleasantly confused  -HAN     Precautions/Limitations  fall precautions  -HAN fall precautions  -HAN    Recorded by [MB] Brianna Hernandez CCC-SLP [HAN] Lisa Lowe PTA [HAN] Lisa Lowe PTA    Pain Assessment    Pain Assessment  No/denies pain  -HAN No/denies pain  -HAN    Recorded by  [HAN] Lisa Lowe PTA [HAN] Lisa Lowe PTA    Swallow Assessment/Intervention    Additional Documentation Dysphagia/Swallow Intervention (Group)  -MB      Recorded by [MB] Brianna Hernandez CCC-SLP      Dysphagia/Swallow Intervention    Dysphagia/Swallow Therapeutic  Feed Pt will tolerate trials of current diet and upgraded consistencies with no overt s/s of aspiration. Pt completed trials of thin liquids with no immediate s/s of aspiration. She did have delayed coughs during swallow exercises and a 1x immediate cough after lingual stimulation via ice chip.  -MB      Recorded by [MB] XOCHITL Gaspar      Improve oral skills    Oral Skills Progress 80%;without cues  -MB      Recorded by [MB] CARLOTA GasparSLP      Improve timing of pharyngeal response    Timing of Pharyngeal Response Progress 70%;without cues  -MB      Recorded by [MB] XOCHITL Gaspar      Improve tongue base & pharyngeal wall squeeze    Tongue Base/Pharyngeal Wall Squeeze Progress 60%;without cues  -MB      Recorded by [MB] XOCHITL Gaspar      Bed Mobility, Assessment/Treatment    Bed Mobility, Assistive Device   bed rails  -HAN    Bed Mobility, Roll Left, [place]   contact guard assist  -HAN    Bed Mob, Sidelying to Sit, [place]   contact guard assist  -HAN    Bed Mobility, Comment  in chair  -HAN Does better getting oob on L.  -HAN    Recorded by  [HAN] Lisa Lowe PTA [HAN] Lisa Lowe PTA    Transfer Assessment/Treatment    Transfers, Sit-Stand [place]  verbal cues required;contact guard assist   slow but able  -HAN verbal cues required;contact guard assist  -HAN    Transfers, Stand-Sit [place]  verbal cues required;stand by assist  -HAN verbal cues required;stand by assist  -HAN    Recorded by  [HAN] Lisa Lowe PTA [HAN] Lisa Lowe PTA    Gait Assessment/Treatment    Gait, [place] Level  verbal cues required;contact guard assist  -HAN contact guard assist  -HAN    Gait, Assistive Device  rolling walker  -HAN rolling walker  -HAN    Gait, Distance (Feet)  180  -  -HAN    Gait, Gait Deviations  daniel decreased;forward flexed posture  -HAN daniel decreased;forward flexed posture  -HAN    Recorded by  [HAN] Lisa Lowe PTA  [HAN] Lisa Lowe PTA    Motor Skills/Interventions    Additional Documentation  Balance Skills Training (Group)  -HAN     Recorded by  [HAN] Lisa Lowe PTA     Balance Skills Training    Sitting-Balance Support  Feet supported  -HAN     Sitting-Balance Activities  Lateral lean;Forward lean;Reaching across midline  -HAN     Recorded by  [HAN] Lisa Lowe PTA     Therapy Exercises    Bilateral Lower Extremities  AROM:;20 reps;sitting;ankle pumps/circles;hip abduction/adduction;hip flexion;LAQ   hip flex 2x10,hip abd/add w MR x15  -HAN AROM:;15 reps;sitting;ankle pumps/circles;hip abduction/adduction;hip flexion;LAQ  -HAN    Recorded by  [HAN] Lisa Lowe PTA [HAN] Lisa Lowe PTA    Positioning and Restraints    Pre-Treatment Position  sitting in chair/recliner  -HAN in bed  -HAN    Post Treatment Position  chair  -HAN chair  -HAN    In Chair  reclined;call light within reach;encouraged to call for assist  -HAN sitting;call light within reach  -HAN    Recorded by  [HAN] Lisa Lowe PTA [HAN] Lisa Lowe PTA      03/06/18 1105 03/06/18 1038 03/06/18 0825    Rehab Assessment/Intervention    Discipline speech language pathologist  -TM,RG,2 physical therapy assistant  -HAN occupational therapy assistant  -    Document Type therapy note (daily note)  -DENIZ,RG,2 therapy note (daily note)  - therapy note (daily note)  -    Subjective Information no complaints;agree to therapy  -TM,RG,TM2 agree to therapy;complains of;fatigue  -HAN agree to therapy;complains of;weakness;fatigue  -CJ    Patient Effort, Rehab Treatment good  -TM,RG,TM2  good  -CJ    Precautions/Limitations  fall precautions  -HAN fall precautions  -CJ    Recorded by [DENIZ,RG,TM2] Negin Khan CCC-SLP (r) Negar Ponce, Speech Therapy Student (t) Negin Khan CCC-SLP (c) [HAN] Lisa Lowe PTA [CJ] Duncan Aaron, MIRANDA/L    Pain Assessment    Pain Assessment 0-10  -TM,RG,TM2 No/denies pain  -HAN No/denies pain  -CJ     Pain Score 3  -TM,RG,TM2      Pain Location Abdomen  -TM,RG,TM2      Pain Orientation Upper  -TM,RG,TM2      Recorded by [TM,RG,TM2] Negin Khan CCC-SLP (r) Negar Ponce, Speech Therapy Student (t) Negin Khan CCC-SLP (c) [HAN] Lisa Lowe PTA [CJ] Duncan Aaron, MIRANDA/L    Improve timing of pharyngeal response    Timing of Pharyngeal Response Progress 30%;with inconsistent cues  -TM,RG,TM2      Comments: Improve timing of pharyngeal response Pt completed cold bolus exercises x15 with a 3-4 swallow second delay.  Pt's laryngeal movement was noted to be significantly weak with swallow.   -TM,RG,TM2      Recorded by [TM,RG,TM2] Negin Khan CCC-SLP (r) Negar Ponce, Speech Therapy Student (t) Negin Khan CCC-SLP (c)      Improve tongue base & pharyngeal wall squeeze    Tongue Base/Pharyngeal Wall Squeeze Progress 30%;with inconsistent cues  -TM,RG,TM2      Comments: Improve tongue base & pharyngeal wall squeeze Pt was able to complete 3 effortful swallows given 4 attempts.  Pt needed verbal and tactile cueing to complete swallows.   -TM,RG,TM2      Recorded by [TM,RG,TM2] Negin Khan CCC-SLP (r) Negar Ponce, Speech Therapy Student (t) Negin Khan CCC-SLP (c)      Bed Mobility, Assessment/Treatment    Bed Mobility, Assistive Device  bed rails;draw sheet  -     Bed Mobility, Roll Right, [place]  verbal cues required;minimum assist (75% patient effort)  -     Bed Mob, Sidelying to Sit, [place]  verbal cues required;minimum assist (75% patient effort)  -     Bed Mobility, Comment   fowlers in bed!  -CJ    Recorded by  [HAN] Lisa Lowe PTA [CJ] Duncan Aaron, MIRANDA/L    Transfer Assessment/Treatment    Transfers, Sit-Stand [place]  verbal cues required;contact guard assist  -     Transfers, Stand-Sit [place]  verbal cues required;contact guard assist  -     Recorded by  [HAN] Lisa Lowe PTA     Gait Assessment/Treatment     Gait, [place] Level  contact guard assist  -HAN     Gait, Assistive Device  rolling walker  -HAN     Gait, Distance (Feet)  110  -HAN     Gait, Gait Deviations  daniel decreased;forward flexed posture;limb motion velocity decreased;step length decreased  -HAN     Recorded by  [HAN] Lisa Lowe PTA     Therapy Exercises    Bilateral Lower Extremities  AROM:;15 reps;supine;sitting;ankle pumps/circles;heel slides;hip abduction/adduction;hip flexion;LAQ  -HAN     Bilateral Upper Extremity   AROM:;20 reps;sitting;elbow flexion/extension;shoulder extension/flexion   3lb. bar, 2lb. dumb bells! No shd. flex exs!  -    Recorded by  [HAN] Lisa Lowe PTA [CJ] RUBEN Tavarez/L    Positioning and Restraints    Pre-Treatment Position  in bed  -HAN in bed  -    Post Treatment Position  chair  -HAN bed  -CJ    In Bed  sitting;call light within reach;encouraged to call for assist  -HAN call light within reach;encouraged to call for assist;fowlers;side rails up x2  -CJ    Recorded by  [HAN] Lisa Lowe PTA [CJ] LU TavarezA/L      User Key  (r) = Recorded By, (t) = Taken By, (c) = Cosigned By    Initials Name Effective Dates    MOUSTAPHA Hernandez, [facility]-SLP 08/02/16 -     CJ RUBEN Tavarez/L 08/02/16 -     TM Negin Khan CCC-SLP 08/02/16 -     HAN Lowe PTA 08/02/16 -     RG Negar Ponce, Speech Therapy Student 12/11/17 -                   IP SLP Goals       03/07/18 1350 03/06/18 1148 03/05/18 0916    Safely Consume Diet    Safely Consume Diet- SLP, Date Established   03/05/18  -EA    Safely Consume Diet- SLP, Time to Achieve   by discharge  -EA    Safely Consume Diet- SLP, Activity Level   Patient will improve oral skills for more efficient eating;Patient will improve timing of pharyngeal response for safer, more efficient swallow;Patient will improve tongue base/pharyngeal wall squeeze for safer, more efficient swallow  -EA    Safely Consume Diet- SLP, Date Goal  Reviewed 03/07/18  -MB 03/06/18  -TM (r) RG (t) TM (c) 03/05/18  -EA    Safely Consume Diet- SLP, Outcome   goal ongoing  -EA      User Key  (r) = Recorded By, (t) = Taken By, (c) = Cosigned By    Initials Name Provider Type    MOUSTAPHA Hernandez CCC-SLP Speech and Language Pathologist    DENIZ Khan CCC-SLP Speech and Language Pathologist    TIAN Mendez MS, CFY-SLP Speech and Language Pathologist    RG Negar Ponce, Speech Therapy Student Speech Therapy Student          EDUCATION  The patient has been educated in the following areas:   Dysphagia (Swallowing Impairment).    SLP Recommendation and Plan                                                   SLP Outcome Measures (last 72 hours)      SLP Outcome Measures       03/05/18 0900          SLP Outcome Measures    Outcome Measure Used? Adult NOMS  -EA      FCM Scores    Swallowing FCM Score 5  -EA        User Key  (r) = Recorded By, (t) = Taken By, (c) = Cosigned By    Initials Name Effective Dates    EA Priyanka Mendez MS, CFY-SLP 02/21/17 - 03/06/18             Time Calculation:         Time Calculation- SLP       03/07/18 1351          Time Calculation- SLP    SLP Start Time 1333  -MB      SLP Stop Time 1347  -MB      SLP Time Calculation (min) 14 min  -MB      SLP Received On 03/07/18  -MB        User Key  (r) = Recorded By, (t) = Taken By, (c) = Cosigned By    Initials Name Provider Type    MOUSTAPHA Hernandez CCC-SLP Speech and Language Pathologist          Therapy Charges for Today     Code Description Service Date Service Provider Modifiers Qty    [id number]  ST TREATMENT SWALLOW 1 3/7/2018 Brianna Hernandez CCC-SLP GN, KX 1          SLP G-Codes  SLP NOMS Used?: Yes  Functional Limitations: Swallowing  Swallow Current Status (): At least 20 percent but less than 40 percent impaired, limited or restricted  Swallow Goal Status (): At least 1 percent but less than 20 percent impaired, limited or restricted      Brianna OH  KATHYA Hernandez-SLP  3/7/2018

## 2018-03-08 NOTE — PLAN OF CARE
Problem: Inpatient Physical Therapy  Goal: Bed Mobility Goal LTG- PT  Outcome: Unable to achieve outcome(s) by discharge Date Met: 03/08/18 03/05/18 1302 03/08/18 0746   Bed Mobility PT LTG   Bed Mobility PT LTG, Date Established 03/05/18 --    Bed Mobility PT LTG, Time to Achieve by discharge --    Bed Mobility PT LTG, Activity Type all bed mobility --    Bed Mobility PT LTG, [place] Level supervision required --    Bed Mobility PT Goal LTG, Assist Device bed rails --    Bed Mobility PT LTG, Date Goal Reviewed --  03/08/18   Bed Mobility PT LTG, Outcome --  goal not met   Bed Mobility PT LTG, Reason Goal Not Met --  discharged from facility     Goal: Transfer Training Goal 1 LTG- PT  Outcome: Unable to achieve outcome(s) by discharge Date Met: 03/08/18 03/05/18 1302 03/08/18 0746   Transfer Training PT LTG   Transfer Training PT LTG, Date Established 03/05/18 --    Transfer Training PT LTG, Time to Achieve by discharge --    Transfer Training PT LTG, Activity Type bed to chair /chair to bed;sit to stand/stand to sit --    Transfer Training PT LTG, [place] Level supervision required --    Transfer Training PT LTG, Assist Device walker, rolling --    Transfer Training PT LTG, Date Goal Reviewed --  03/08/18   Transfer Training PT LTG, Outcome --  goal not met   Transfer Training PT LTG, Reason Goal Not Met --  discharged from facility     Goal: Gait Training Goal LTG- PT  Outcome: Unable to achieve outcome(s) by discharge Date Met: 03/08/18 03/05/18 1302 03/08/18 0746   Gait Training PT LTG   Gait Training Goal PT LTG, Date Established 03/05/18 --    Gait Training Goal PT LTG, Time to Achieve by discharge --    Gait Training Goal PT LTG, [place] Level supervision required --    Gait Training Goal PT LTG, Assist Device walker, rolling --    Gait Training Goal PT LTG, Distance to Achieve 100ft x2 with rest break --    Gait Training Goal PT LTG, Additional Goal With no cues needed for obstacle  avoidance --    Gait Training Goal PT LTG, Date Goal Reviewed --  03/08/18   Gait Training Goal PT LTG, Outcome --  goal not met   Gait Training Goal PT LTG, Reason Goal Not Met --  discharged from facility     Goal: Patient Education Goal LTG- PT  Outcome: Unable to achieve outcome(s) by discharge Date Met: 03/08/18 03/05/18 1302 03/08/18 0746   Patient Education PT LTG   Patient Education PT LTG, Date Established 03/05/18 --    Patient Education PT LTG, Time to Achieve by discharge --    Patient Education PT LTG, Education Type HEP;gait;transfers;bed mobility --    Patient Education PT LTG, Education Understanding demonstrate adequately;verbalize understanding --    Patient Education PT LTG, Date Goal Reviewed --  03/08/18   Patient Education PT LTG Outcome --  goal not met   Patient Education PT LTG, Reason Goal Not Met --  discharged from facility

## 2018-03-08 NOTE — THERAPY DISCHARGE NOTE
Acute Care - Physical Therapy Discharge Summary  [facility]       Patient Name: Gregoria Benntet  : 1945  MRN: [id number]    Today's Date: 3/8/2018  Onset of Illness/Injury or Date of Surgery Date: 18    Date of Referral to PT: 18  Referring Physician: Dr. Lozoya      Admit Date: 3/4/2018      PT Recommendation and Plan    Visit Dx:    ICD-10-CM ICD-9-CM   1. Sepsis, due to unspecified organism A41.9 038.9     995.91   2. Hypothermia, initial encounter T68.XXXA 991.6   3. Altered mental status, unspecified altered mental status type R41.82 780.97   4. Hypoglycemia E16.2 251.2   5. Oropharyngeal dysphagia R13.12 787.22   6. Impaired functional mobility, balance, gait, and endurance Z74.09 V49.89   7. Impaired mobility and ADLs Z74.09 799.89             Outcome Measures       18 1320 18 1000 18 1100    How much help from another person do you currently need...    Turning from your back to your side while in flat bed without using bedrails?  3  -HAN 3  -HAN    Moving from lying on back to sitting on the side of a flat bed without bedrails?  3  -HAN 3  -HAN    Moving to and from a bed to a chair (including a wheelchair)?  3  -HAN 3  -HAN    Standing up from a chair using your arms (e.g., wheelchair, bedside chair)?  3  -HAN 3  -HAN    Climbing 3-5 steps with a railing?  1  -HAN 1  -HAN    To walk in hospital room?  3  -HAN 3  -HAN    AM-PAC 6 Clicks Score  16  -HAN 16  -HAN    How much help from another is currently needed...    Putting on and taking off regular lower body clothing? 2  -CJ      Bathing (including washing, rinsing, and drying) 2  -CJ      Toileting (which includes using toilet bed pan or urinal) 2  -CJ      Putting on and taking off regular upper body clothing 3  -CJ      Taking care of personal grooming (such as brushing teeth) 3  -CJ      Eating meals 3  -CJ      Score 15  -CJ        18 0825 18 1600 18 1300    How much help from another person do you currently  need...    Turning from your back to your side while in flat bed without using bedrails?   3  -PB (r) LN (t) PB (c)    Moving from lying on back to sitting on the side of a flat bed without bedrails?   3  -PB (r) LN (t) PB (c)    Moving to and from a bed to a chair (including a wheelchair)?   3  -PB (r) LN (t) PB (c)    Standing up from a chair using your arms (e.g., wheelchair, bedside chair)?   3  -PB (r) LN (t) PB (c)    Climbing 3-5 steps with a railing?   2  -PB (r) LN (t) PB (c)    To walk in hospital room?   3  -PB (r) LN (t) PB (c)    AM-PAC 6 Clicks Score   17  -PB (r) LN (t)    How much help from another is currently needed...    Putting on and taking off regular lower body clothing? 2  - 2  -MM     Bathing (including washing, rinsing, and drying) 2  - 2  -MM     Toileting (which includes using toilet bed pan or urinal) 2  - 2  -MM     Putting on and taking off regular upper body clothing 3  - 3  -MM     Taking care of personal grooming (such as brushing teeth) 3  - 3  -MM     Eating meals 3  - 3  -MM     Score 15  - 15  -MM     Functional Assessment    Outcome Measure Options AM-PAC 6 Clicks Daily Activity (OT)  -CJ AM-PAC 6 Clicks Daily Activity (OT)  -MM AM-PAC 6 Clicks Basic Mobility (PT)  -PB (r) LN (t) PB (c)      User Key  (r) = Recorded By, (t) = Taken By, (c) = Cosigned By    Initials Name Provider Type    LI Aaron, MIRANDA/L Occupational Therapy Assistant    HAN Lowe, PTA Physical Therapy Assistant    PB Rodriguez Meza, PT DPT Physical Therapist    MM Garrett Garcia, OTR/L Occupational Therapist    LN Rafa Belcher, PT Student PT Student                      IP PT Goals       03/08/18 0746 03/05/18 1302       Bed Mobility PT LTG    Bed Mobility PT LTG, Date Established  03/05/18  -PB (r) LN (t) PB (c)     Bed Mobility PT LTG, Time to Achieve  by discharge  -PB (r) LN (t) PB (c)     Bed Mobility PT LTG, Activity Type  all bed mobility  -PB (r) LN (t) PB (c)      Bed Mobility PT LTG, [place] Level  supervision required  -PB (r) LN (t) PB (c)     Bed Mobility PT Goal  LTG, Assist Device  bed rails  -PB (r) LN (t) PB (c)     Bed Mobility PT LTG, Date Goal Reviewed 03/08/18  -      Bed Mobility PT LTG, Outcome goal not met  -      Bed Mobility PT LTG, Reason Goal Not Met discharged from facility  -      Transfer Training PT LTG    Transfer Training PT LTG, Date Established  03/05/18  -PB (r) LN (t) PB (c)     Transfer Training PT LTG, Time to Achieve  by discharge  -PB (r) LN (t) PB (c)     Transfer Training PT LTG, Activity Type  bed to chair /chair to bed;sit to stand/stand to sit  -PB (r) LN (t) PB (c)     Transfer Training PT LTG, [place] Level  supervision required  -PB (r) LN (t) PB (c)     Transfer Training PT LTG, Assist Device  walker, rolling  -PB (r) LN (t) PB (c)     Transfer Training PT  LTG, Date Goal Reviewed 03/08/18  -      Transfer Training PT LTG, Outcome goal not met  -      Transfer Training PT LTG, Reason Goal Not Met discharged from facility  -      Gait Training PT LTG    Gait Training Goal PT LTG, Date Established  03/05/18  -PB (r) LN (t) PB (c)     Gait Training Goal PT LTG, Time to Achieve  by discharge  -PB (r) LN (t) PB (c)     Gait Training Goal PT LTG, [place] Level  supervision required  -PB (r) LN (t) PB (c)     Gait Training Goal PT LTG, Assist Device  walker, rolling  -PB (r) LN (t) PB (c)     Gait Training Goal PT LTG, Distance to Achieve  100ft x2 with rest break  -PB (r) LN (t) PB (c)     Gait Training Goal PT LTG, Additional Goal  With no cues needed for obstacle avoidance  -PB (r) LN (t) PB (c)     Gait Training Goal PT LTG, Date Goal Reviewed 03/08/18  -      Gait Training Goal PT LTG, Outcome goal not met  -      Gait Training Goal PT LTG, Reason Goal Not Met discharged from facility  -      Patient Education PT LTG    Patient Education PT LTG, Date Established  03/05/18  -PB (r) LN (t) PB (c)      Patient Education PT LTG, Time to Achieve  by discharge  -PB (r) LN (t) PB (c)     Patient Education PT LTG, Education Type  HEP;gait;transfers;bed mobility  -PB (r) LN (t) PB (c)     Patient Education PT LTG, Education Understanding  demonstrate adequately;verbalize understanding  -PB (r) LN (t) PB (c)     Patient Education PT LTG, Date Goal Reviewed 03/08/18  -      Patient Education PT LTG Outcome goal not met  -      Patient Education PT LTG, Reason Goal Not Met discharged from facility  -        User Key  (r) = Recorded By, (t) = Taken By, (c) = Cosigned By    Initials Name Provider Type     Chastity Merchant PTA Physical Therapy Assistant    PB Rodriguez Meza, PT DPT Physical Therapist    LN Rafa Belcher, PT Student PT Student              PT Discharge Summary  Anticipated Discharge Disposition: skilled nursing facility  Reason for Discharge: Discharge from facility  Outcomes Achieved: Unable to make functional progress toward goals at this time  Discharge Destination: SNF      Chastity Merchant PTA   3/8/2018

## 2018-03-08 NOTE — PLAN OF CARE
Problem: Inpatient Occupational Therapy  Goal: Strength Goal LTG- OT  Outcome: Outcome(s) achieved Date Met: 03/08/18 03/05/18 1630 03/08/18 0622   Strength OT LTG   Strength Goal OT LTG, Date Established 03/05/18 --    Strength Goal OT LTG, Time to Achieve by discharge --    Strength Goal OT LTG, Measure to Achieve Pt will independently maintain BUE AROM HEP to increase strength to 4+/5. --    Strength Goal OT LTG, Date Goal Reviewed --  03/08/18   Strength Goal OT LTG, Outcome --  goal met   Strength Goal OT LTG, Reason Goal Not Met --  discharged from facility     Goal: Bathing Goal LTG- OT  Outcome: Unable to achieve outcome(s) by discharge Date Met: 03/08/18 03/05/18 1630 03/08/18 0622   Bathing OT LTG   Bathing Goal OT LTG, Date Established 03/05/18 --    Bathing Goal OT LTG, Time to Achieve by discharge --    Bathing Goal OT LTG, Activity Type upper body bathing;lower body bathing --    Bathing Goal OT LTG, [place] Level conditional independence;set up required --    Bathing Goal OT LTG, Assist Device grab bars;shower head, detachable;shower chair with back;sponge, long handled --    Bathing Goal OT LTG, Date Goal Reviewed --  03/08/18   Bathing Goal OT LTG, Outcome --  goal not met   Bathing Goal OT LTG, Reason Goal Not Met --  discharged from facility     Goal: Toileting Goal LTG- OT  Outcome: Unable to achieve outcome(s) by discharge Date Met: 03/08/18 03/05/18 1630 03/08/18 0622   Toileting OT LTG   Toileting Goal OT LTG, Date Established 03/05/18 --    Toileting Goal OT LTG, Time to Achieve by discharge --    Toileting Goal OT LTG, [place] Level conditional independence;set up required --    Toileting Goal OT LTG, Assist Device toilet paper aid --    Toileting Goal OT LTG, Date Goal Reviewed --  03/01/18   Toileting Goal OT LTG, Outcome --  goal not met   Toileting Goal OT LTG, Reason Goal Not Met --  discharged from facility     Goal: LB Dressing Goal LTG- OT  Outcome: Unable to achieve  outcome(s) by discharge Date Met: 03/08/18 03/05/18 1630 03/08/18 0622   LB Dressing OT LTG   LB Dressing Goal OT LTG, Date Established 03/05/18 --    LB Dressing Goal OT LTG, Time to Achieve by discharge --    LB Dressing Goal OT LTG, [place] Level conditional independence;set up required --    LB Dressing Goal OT LTG, Adaptive Equipment laces, elastic;reacher;shoe horn, long handled;sock-aid --    LB Dressing Goal OT LTG, Additional Goal AE PRN --    LB Dressing Goal OT LTG, Date Goal Reviewed --  03/08/18   LB Dressing Goal OT LTG, Outcome --  goal not met   LB Dressing Goal OT LTG, Reason Goal Not Met --  discharged from facility

## 2018-03-08 NOTE — THERAPY DISCHARGE NOTE
Acute Care - Occupational Therapy Discharge Summary  [facility]     Patient Name: Gregoria Bennett  : 1945  MRN: [id number]    Today's Date: 3/8/2018  Onset of Illness/Injury or Date of Surgery Date: 18    Date of Referral to OT: 18  Referring Physician: Dr. Lozoya      Admit Date: 3/4/2018        OT Recommendation and Plan    Visit Dx:    ICD-10-CM ICD-9-CM   1. Sepsis, due to unspecified organism A41.9 038.9     995.91   2. Hypothermia, initial encounter T68.XXXA 991.6   3. Altered mental status, unspecified altered mental status type R41.82 780.97   4. Hypoglycemia E16.2 251.2   5. Oropharyngeal dysphagia R13.12 787.22   6. Impaired functional mobility, balance, gait, and endurance Z74.09 V49.89   7. Impaired mobility and ADLs Z74.09 799.89                     OT Goals       18 0622 18 1630       Strength OT LTG    Strength Goal OT LTG, Date Established  18  -MM     Strength Goal OT LTG, Time to Achieve  by discharge  -MM     Strength Goal OT LTG, Measure to Achieve  Pt will independently maintain BUE AROM HEP to increase strength to 4+/5.  -MM     Strength Goal OT LTG, Date Goal Reviewed (P)  18  -      Strength Goal OT LTG, Outcome (P)  goal met  -      Strength Goal OT LTG, Reason Goal Not Met (P)  discharged from [facility]  -      Bathing OT LTG    Bathing Goal OT LTG, Date Established  18  -MM     Bathing Goal OT LTG, Time to Achieve  by discharge  -MM     Bathing Goal OT LTG, Activity Type  upper body bathing;lower body bathing  -MM     Bathing Goal OT LTG, [place] Level  conditional independence;set up required  -MM     Bathing Goal OT LTG, Assist Device  grab bars;shower head, detachable;shower chair with back;sponge, long handled  -MM     Bathing Goal OT LTG, Date Goal Reviewed (P)  18  -      Bathing Goal OT LTG, Outcome (P)  goal not met  -      Bathing Goal OT LTG, Reason Goal Not Met (P)  discharged from [facility]  -      Toileting  OT LTG    Toileting Goal OT LTG, Date Established  03/05/18  -MM     Toileting Goal OT LTG, Time to Achieve  by discharge  -MM     Toileting Goal OT LTG, [place] Level  conditional independence;set up required  -MM     Toileting Goal OT LTG, Assist Device  toilet paper aid  -MM     Toileting Goal OT LTG, Date Goal Reviewed (P)  03/01/18  -      Toileting Goal OT LTG, Outcome (P)  goal not met  -      Toileting Goal OT LTG, Reason Goal Not Met (P)  discharged from facility  -      LB Dressing OT LTG    LB Dressing Goal OT LTG, Date Established  03/05/18  -MM     LB Dressing Goal OT LTG, Time to Achieve  by discharge  -MM     LB Dressing Goal OT LTG, [place] Level  conditional independence;set up required  -MM     LB Dressing Goal OT LTG, Adaptive Equipment  laces, elastic;reacher;shoe horn, long handled;sock-aid  -MM     LB Dressing Goal OT LTG, Additional Goal  AE PRN  -MM     LB Dressing Goal OT LTG, Date Goal Reviewed (P)  03/08/18  -      LB Dressing Goal OT LTG, Outcome (P)  goal not met  -      LB Dressing Goal OT LTG, Reason Goal Not Met (P)  discharged from facility  -        User Key  (r) = Recorded By, (t) = Taken By, (c) = Cosigned By    Initials Name Provider Type     RUBEN Tavarez/L Occupational Therapy Assistant    CHARLY Garcia, SHERRYR/L Occupational Therapist                Outcome Measures       03/07/18 1320 03/07/18 1000 03/06/18 1100    How much help from another person do you currently need...    Turning from your back to your side while in flat bed without using bedrails?  3  -HAN 3  -HAN    Moving from lying on back to sitting on the side of a flat bed without bedrails?  3  -HAN 3  -HAN    Moving to and from a bed to a chair (including a wheelchair)?  3  -HAN 3  -HAN    Standing up from a chair using your arms (e.g., wheelchair, bedside chair)?  3  -HAN 3  -HAN    Climbing 3-5 steps with a railing?  1  -HAN 1  -HAN    To walk in hospital room?  3  -HAN 3  -HAN    AM-PAC  6 Clicks Score  16  -HAN 16  -HAN    How much help from another is currently needed...    Putting on and taking off regular lower body clothing? 2  -CJ      Bathing (including washing, rinsing, and drying) 2  -CJ      Toileting (which includes using toilet bed pan or urinal) 2  -CJ      Putting on and taking off regular upper body clothing 3  -CJ      Taking care of personal grooming (such as brushing teeth) 3  -CJ      Eating meals 3  -      Score 15  -CJ        03/06/18 0825 03/05/18 1600 03/05/18 1300    How much help from another person do you currently need...    Turning from your back to your side while in flat bed without using bedrails?   3  -PB (r) LN (t) PB (c)    Moving from lying on back to sitting on the side of a flat bed without bedrails?   3  -PB (r) LN (t) PB (c)    Moving to and from a bed to a chair (including a wheelchair)?   3  -PB (r) LN (t) PB (c)    Standing up from a chair using your arms (e.g., wheelchair, bedside chair)?   3  -PB (r) LN (t) PB (c)    Climbing 3-5 steps with a railing?   2  -PB (r) LN (t) PB (c)    To walk in hospital room?   3  -PB (r) LN (t) PB (c)    AM-PAC 6 Clicks Score   17  -PB (r) LN (t)    How much help from another is currently needed...    Putting on and taking off regular lower body clothing? 2  -CJ 2  -MM     Bathing (including washing, rinsing, and drying) 2  -CJ 2  -MM     Toileting (which includes using toilet bed pan or urinal) 2  -CJ 2  -MM     Putting on and taking off regular upper body clothing 3  -CJ 3  -MM     Taking care of personal grooming (such as brushing teeth) 3  -CJ 3  -MM     Eating meals 3  - 3  -MM     Score 15  - 15  -MM     Functional Assessment    Outcome Measure Options AM-PAC 6 Clicks Daily Activity (OT)  -CJ AM-PAC 6 Clicks Daily Activity (OT)  -MM AM-PAC 6 Clicks Basic Mobility (PT)  -PB (r) LN (t) PB (c)      User Key  (r) = Recorded By, (t) = Taken By, (c) = Cosigned By    Initials Name Provider Type    LI Aaron,  MIRANDA/L Occupational Therapy Assistant    HAN Lowe, PTA Physical Therapy Assistant    PB Rodriguez Meza, PT DPT Physical Therapist    MM Garrett Garcia, OTR/L Occupational Therapist    LN Rafa Belcher, PT Student PT Student          Therapy Charges for Today     Code Description Service Date Service Provider Modifiers Qty    78368421720 HC OT THER PROC EA 15 MIN 3/7/2018 LU TavarezA/L GO, KX 2          OT Discharge Summary  Reason for Discharge: Discharge from facility  Outcomes Achieved: Refer to plan of care for updates on goals achieved  Discharge Destination:  (d/c'd under [facility] care at snf/rehab!)      MIKAELA Obando  3/8/2018

## 2018-03-09 LAB
BACTERIA SPEC AEROBE CULT: NORMAL
BACTERIA SPEC AEROBE CULT: NORMAL

## 2018-03-09 NOTE — THERAPY DISCHARGE NOTE
Acute Care - Speech Language Pathology Discharge Summary  [facility]       Patient Name: Gregoria Bennett  : 1945  MRN: [id number]    Today's Date: 3/9/2018  Onset of Illness/Injury or Date of Surgery Date: 18       Referring Physician: Dr. Lozoya        Admit Date: 3/4/2018      SLP Recommendation and Plan  Mechanical soft solids and nectar thick liquids    Visit Dx:    ICD-10-CM ICD-9-CM   1. Sepsis, due to unspecified organism A41.9 038.9     995.91   2. Hypothermia, initial encounter T68.XXXA 991.6   3. Altered mental status, unspecified altered mental status type R41.82 780.97   4. Hypoglycemia E16.2 251.2   5. Oropharyngeal dysphagia R13.12 787.22   6. Impaired functional mobility, balance, gait, and endurance Z74.09 V49.89   7. Impaired mobility and ADLs Z74.09 799.89                     IP SLP Goals       18 1758 18 1350 18 1148    Safely Consume Diet    Safely Consume Diet- SLP, Date Goal Reviewed  18  -MB 18  -TM (r) RG (t) TM (c)    Safely Consume Diet- SLP, Outcome goal partially met  -MB      Safely Consume Diet- SLP, Reason Goal Not Met discharged from facility  -MB        18 0916          Safely Consume Diet    Safely Consume Diet- SLP, Date Established 18  -EA      Safely Consume Diet- SLP, Time to Achieve by discharge  -EA      Safely Consume Diet- SLP, Activity Level Patient will improve oral skills for more efficient eating;Patient will improve timing of pharyngeal response for safer, more efficient swallow;Patient will improve tongue base/pharyngeal wall squeeze for safer, more efficient swallow  -EA      Safely Consume Diet- SLP, Date Goal Reviewed 18  -EA      Safely Consume Diet- SLP, Outcome goal ongoing  -EA        User Key  (r) = Recorded By, (t) = Taken By, (c) = Cosigned By    Initials Name Provider Type    MOUSTAPHA Hernandez, CCC-SLP Speech and Language Pathologist    DENIZ Khan, CCC-SLP Speech and Language Pathologist     TIAN Mendez, MS, CFY-SLP Speech and Language Pathologist    RG Negar Ponce, Speech Therapy Student Speech Therapy Student                  SLP Discharge Summary  Anticipated Discharge Disposition: skilled nursing facility  Reason for Discharge: Discharge from facility  Outcomes Achieved: Patient able to partially acheive established goals  Discharge Destination: SNF      Brianna Hernandez, CCC-SLP  3/9/2018

## 2018-03-09 NOTE — PLAN OF CARE
Problem: Inpatient SLP  Goal: Dysphagia- Patient will safely consume diet as per recommendation with no signs/symptoms of aspiration  Outcome: Unable to achieve outcome(s) by discharge Date Met: 03/09/18 03/05/18 0916 03/07/18 1350 03/09/18 1758   Safely Consume Diet   Safely Consume Diet- SLP, Date Established 03/05/18 --  --    Safely Consume Diet- SLP, Time to Achieve by discharge --  --    Safely Consume Diet- SLP, Activity Level Patient will improve oral skills for more efficient eating;Patient will improve timing of pharyngeal response for safer, more efficient swallow;Patient will improve tongue base/pharyngeal wall squeeze for safer, more efficient swallow --  --    Safely Consume Diet- SLP, Date Goal Reviewed --  03/07/18 --    Safely Consume Diet- SLP, Outcome --  --  goal partially met   Safely Consume Diet- SLP, Reason Goal Not Met --  --  discharged from facility

## 2019-02-03 ENCOUNTER — HOSPITAL ENCOUNTER (INPATIENT)
Age: 74
LOS: 2 days | Discharge: NURSING, REHAB OR LONG-TERM CARE | DRG: 682 | End: 2019-02-05
Attending: EMERGENCY MEDICINE | Admitting: FAMILY MEDICINE
Payer: MEDICARE

## 2019-02-03 ENCOUNTER — APPOINTMENT (OUTPATIENT)
Dept: GENERAL RADIOLOGY | Age: 74
DRG: 682 | End: 2019-02-03
Payer: MEDICARE

## 2019-02-03 ENCOUNTER — APPOINTMENT (OUTPATIENT)
Dept: CT IMAGING | Age: 74
DRG: 682 | End: 2019-02-03
Payer: MEDICARE

## 2019-02-03 PROBLEM — A41.9 SEPSIS (HCC): Status: ACTIVE | Noted: 2019-02-03

## 2019-02-03 PROBLEM — N30.01 ACUTE CYSTITIS WITH HEMATURIA: Status: ACTIVE | Noted: 2019-02-03

## 2019-02-03 PROBLEM — G93.41 ACUTE METABOLIC ENCEPHALOPATHY: Status: ACTIVE | Noted: 2019-02-03

## 2019-02-03 LAB
ALBUMIN SERPL-MCNC: 3.7 G/DL (ref 3.5–5.2)
ALP BLD-CCNC: 71 U/L (ref 35–104)
ALT SERPL-CCNC: 9 U/L (ref 5–33)
ANION GAP SERPL CALCULATED.3IONS-SCNC: 12 MMOL/L (ref 7–19)
AST SERPL-CCNC: 20 U/L (ref 5–32)
BACTERIA: ABNORMAL /HPF
BASOPHILS ABSOLUTE: 0 K/UL (ref 0–0.2)
BASOPHILS RELATIVE PERCENT: 0.3 % (ref 0–1)
BILIRUB SERPL-MCNC: <0.2 MG/DL (ref 0.2–1.2)
BILIRUBIN URINE: NEGATIVE
BLOOD, URINE: ABNORMAL
BUN BLDV-MCNC: 47 MG/DL (ref 8–23)
CALCIUM SERPL-MCNC: 9 MG/DL (ref 8.8–10.2)
CHLORIDE BLD-SCNC: 104 MMOL/L (ref 98–111)
CLARITY: ABNORMAL
CO2: 27 MMOL/L (ref 22–29)
COLOR: YELLOW
CREAT SERPL-MCNC: 2.1 MG/DL (ref 0.5–0.9)
EOSINOPHILS ABSOLUTE: 0.2 K/UL (ref 0–0.6)
EOSINOPHILS RELATIVE PERCENT: 1.4 % (ref 0–5)
EPITHELIAL CELLS, UA: ABNORMAL /HPF
GFR NON-AFRICAN AMERICAN: 23
GLUCOSE BLD-MCNC: 213 MG/DL (ref 70–99)
GLUCOSE BLD-MCNC: 215 MG/DL (ref 70–99)
GLUCOSE BLD-MCNC: 231 MG/DL (ref 74–109)
GLUCOSE BLD-MCNC: 312 MG/DL (ref 70–99)
GLUCOSE URINE: 100 MG/DL
HCT VFR BLD CALC: 36.1 % (ref 37–47)
HEMOGLOBIN: 11.4 G/DL (ref 12–16)
KETONES, URINE: ABNORMAL MG/DL
LACTIC ACID: 1 MMOL/L (ref 0.5–1.9)
LEUKOCYTE ESTERASE, URINE: ABNORMAL
LYMPHOCYTES ABSOLUTE: 0.2 K/UL (ref 1.1–4.5)
LYMPHOCYTES RELATIVE PERCENT: 1.8 % (ref 20–40)
MCH RBC QN AUTO: 28.8 PG (ref 27–31)
MCHC RBC AUTO-ENTMCNC: 31.6 G/DL (ref 33–37)
MCV RBC AUTO: 91.2 FL (ref 81–99)
MONOCYTES ABSOLUTE: 0.5 K/UL (ref 0–0.9)
MONOCYTES RELATIVE PERCENT: 4.3 % (ref 0–10)
NEUTROPHILS ABSOLUTE: 10.7 K/UL (ref 1.5–7.5)
NEUTROPHILS RELATIVE PERCENT: 91.1 % (ref 50–65)
NITRITE, URINE: NEGATIVE
PDW BLD-RTO: 13 % (ref 11.5–14.5)
PERFORMED ON: ABNORMAL
PH UA: 6
PLATELET # BLD: 242 K/UL (ref 130–400)
PMV BLD AUTO: 9.4 FL (ref 9.4–12.3)
POTASSIUM SERPL-SCNC: 4.4 MMOL/L (ref 3.5–5)
PROTEIN UA: 300 MG/DL
RAPID INFLUENZA  B AGN: NEGATIVE
RAPID INFLUENZA A AGN: NEGATIVE
RBC # BLD: 3.96 M/UL (ref 4.2–5.4)
RBC UA: ABNORMAL /HPF (ref 0–2)
SODIUM BLD-SCNC: 143 MMOL/L (ref 136–145)
SPECIFIC GRAVITY UA: 1.01
TOTAL PROTEIN: 7.8 G/DL (ref 6.6–8.7)
URINE REFLEX TO CULTURE: YES
UROBILINOGEN, URINE: 0.2 E.U./DL
WBC # BLD: 11.8 K/UL (ref 4.8–10.8)
WBC UA: ABNORMAL /HPF (ref 0–5)
YEAST: ABNORMAL /HPF

## 2019-02-03 PROCEDURE — 6370000000 HC RX 637 (ALT 250 FOR IP): Performed by: EMERGENCY MEDICINE

## 2019-02-03 PROCEDURE — 2580000003 HC RX 258: Performed by: EMERGENCY MEDICINE

## 2019-02-03 PROCEDURE — 74150 CT ABDOMEN W/O CONTRAST: CPT

## 2019-02-03 PROCEDURE — 87040 BLOOD CULTURE FOR BACTERIA: CPT

## 2019-02-03 PROCEDURE — 82948 REAGENT STRIP/BLOOD GLUCOSE: CPT

## 2019-02-03 PROCEDURE — 99285 EMERGENCY DEPT VISIT HI MDM: CPT

## 2019-02-03 PROCEDURE — 87086 URINE CULTURE/COLONY COUNT: CPT

## 2019-02-03 PROCEDURE — 6360000002 HC RX W HCPCS: Performed by: EMERGENCY MEDICINE

## 2019-02-03 PROCEDURE — 87804 INFLUENZA ASSAY W/OPTIC: CPT

## 2019-02-03 PROCEDURE — 85025 COMPLETE CBC W/AUTO DIFF WBC: CPT

## 2019-02-03 PROCEDURE — 81001 URINALYSIS AUTO W/SCOPE: CPT

## 2019-02-03 PROCEDURE — 83605 ASSAY OF LACTIC ACID: CPT

## 2019-02-03 PROCEDURE — 99285 EMERGENCY DEPT VISIT HI MDM: CPT | Performed by: EMERGENCY MEDICINE

## 2019-02-03 PROCEDURE — 2580000003 HC RX 258: Performed by: FAMILY MEDICINE

## 2019-02-03 PROCEDURE — 96365 THER/PROPH/DIAG IV INF INIT: CPT

## 2019-02-03 PROCEDURE — 99223 1ST HOSP IP/OBS HIGH 75: CPT | Performed by: FAMILY MEDICINE

## 2019-02-03 PROCEDURE — 6370000000 HC RX 637 (ALT 250 FOR IP): Performed by: FAMILY MEDICINE

## 2019-02-03 PROCEDURE — 80053 COMPREHEN METABOLIC PANEL: CPT

## 2019-02-03 PROCEDURE — 71045 X-RAY EXAM CHEST 1 VIEW: CPT

## 2019-02-03 PROCEDURE — 36415 COLL VENOUS BLD VENIPUNCTURE: CPT

## 2019-02-03 PROCEDURE — 6360000002 HC RX W HCPCS: Performed by: FAMILY MEDICINE

## 2019-02-03 PROCEDURE — 2700000000 HC OXYGEN THERAPY PER DAY

## 2019-02-03 PROCEDURE — 1210000000 HC MED SURG R&B

## 2019-02-03 RX ORDER — DEXTROSE MONOHYDRATE 25 G/50ML
12.5 INJECTION, SOLUTION INTRAVENOUS PRN
Status: DISCONTINUED | OUTPATIENT
Start: 2019-02-03 | End: 2019-02-05 | Stop reason: HOSPADM

## 2019-02-03 RX ORDER — ACETAMINOPHEN 325 MG/1
650 TABLET ORAL ONCE
Status: COMPLETED | OUTPATIENT
Start: 2019-02-03 | End: 2019-02-03

## 2019-02-03 RX ORDER — PANTOPRAZOLE SODIUM 40 MG/1
40 TABLET, DELAYED RELEASE ORAL 2 TIMES DAILY
COMMUNITY

## 2019-02-03 RX ORDER — CLOPIDOGREL BISULFATE 75 MG/1
75 TABLET ORAL DAILY
COMMUNITY

## 2019-02-03 RX ORDER — 0.9 % SODIUM CHLORIDE 0.9 %
500 INTRAVENOUS SOLUTION INTRAVENOUS ONCE
Status: COMPLETED | OUTPATIENT
Start: 2019-02-03 | End: 2019-02-03

## 2019-02-03 RX ORDER — SODIUM CHLORIDE 9 MG/ML
INJECTION, SOLUTION INTRAVENOUS CONTINUOUS
Status: DISCONTINUED | OUTPATIENT
Start: 2019-02-03 | End: 2019-02-05

## 2019-02-03 RX ORDER — SODIUM CHLORIDE 0.9 % (FLUSH) 0.9 %
10 SYRINGE (ML) INJECTION PRN
Status: DISCONTINUED | OUTPATIENT
Start: 2019-02-03 | End: 2019-02-05 | Stop reason: HOSPADM

## 2019-02-03 RX ORDER — TRAMADOL HYDROCHLORIDE 50 MG/1
50 TABLET ORAL EVERY 6 HOURS PRN
Status: ON HOLD | COMMUNITY
End: 2019-02-05

## 2019-02-03 RX ORDER — ONDANSETRON 2 MG/ML
4 INJECTION INTRAMUSCULAR; INTRAVENOUS EVERY 6 HOURS PRN
Status: DISCONTINUED | OUTPATIENT
Start: 2019-02-03 | End: 2019-02-05 | Stop reason: HOSPADM

## 2019-02-03 RX ORDER — NICOTINE POLACRILEX 4 MG
15 LOZENGE BUCCAL PRN
Status: DISCONTINUED | OUTPATIENT
Start: 2019-02-03 | End: 2019-02-05 | Stop reason: HOSPADM

## 2019-02-03 RX ORDER — NIFEDIPINE 60 MG/1
60 TABLET, FILM COATED, EXTENDED RELEASE ORAL DAILY
COMMUNITY

## 2019-02-03 RX ORDER — ATENOLOL 50 MG/1
50 TABLET ORAL DAILY
COMMUNITY
End: 2022-09-14

## 2019-02-03 RX ORDER — 0.9 % SODIUM CHLORIDE 0.9 %
1000 INTRAVENOUS SOLUTION INTRAVENOUS ONCE
Status: COMPLETED | OUTPATIENT
Start: 2019-02-03 | End: 2019-02-03

## 2019-02-03 RX ORDER — SODIUM CHLORIDE 0.9 % (FLUSH) 0.9 %
10 SYRINGE (ML) INJECTION EVERY 12 HOURS SCHEDULED
Status: DISCONTINUED | OUTPATIENT
Start: 2019-02-03 | End: 2019-02-05 | Stop reason: HOSPADM

## 2019-02-03 RX ORDER — DEXTROSE MONOHYDRATE 50 MG/ML
100 INJECTION, SOLUTION INTRAVENOUS PRN
Status: DISCONTINUED | OUTPATIENT
Start: 2019-02-03 | End: 2019-02-05 | Stop reason: HOSPADM

## 2019-02-03 RX ADMIN — ACETAMINOPHEN 650 MG: 325 TABLET, FILM COATED ORAL at 07:36

## 2019-02-03 RX ADMIN — SODIUM CHLORIDE 500 ML: 9 INJECTION, SOLUTION INTRAVENOUS at 10:07

## 2019-02-03 RX ADMIN — Medication 1 G: at 13:46

## 2019-02-03 RX ADMIN — SODIUM CHLORIDE 1000 ML: 9 INJECTION, SOLUTION INTRAVENOUS at 07:36

## 2019-02-03 RX ADMIN — INSULIN LISPRO 2 UNITS: 100 INJECTION, SOLUTION INTRAVENOUS; SUBCUTANEOUS at 21:34

## 2019-02-03 RX ADMIN — CEFTRIAXONE 1 G: 1 INJECTION, POWDER, FOR SOLUTION INTRAMUSCULAR; INTRAVENOUS at 07:36

## 2019-02-03 RX ADMIN — INSULIN LISPRO 2 UNITS: 100 INJECTION, SOLUTION INTRAVENOUS; SUBCUTANEOUS at 17:10

## 2019-02-03 RX ADMIN — SODIUM CHLORIDE: 9 INJECTION, SOLUTION INTRAVENOUS at 13:42

## 2019-02-03 RX ADMIN — ENOXAPARIN SODIUM 40 MG: 40 INJECTION SUBCUTANEOUS at 13:46

## 2019-02-03 NOTE — ED PROVIDER NOTES
[facility]  eMERGENCY dEPARTMENT eNCOUnter      Pt Name: Scott Goetz  MRN: [id number]  [place] 1945  Date of evaluation: 2/3/2019  Provider: Nellie Hankins MD    CHIEF COMPLAINT       Chief Complaint   Patient presents with    Fever     I saw this patient on arriving to work at 0700. HISTORY OF PRESENT ILLNESS   (Location/Symptom, Timing/Onset,Context/Setting, Quality, Duration, Modifying Factors, Severity)  Note limiting factors. Scott Goetz is a 74 y.o. female who presents to the emergency department with fever 104.7 at [facility] [place], operated by [facility]. Pt with fever. Last creat 1.4 based on labs from [facility] last year. Pt is dnr, has state appt guardian. Chronically on O2. Smells of urine in diaper, confused. Has L flank pain on exam. Hard to get more history. The history is provided by the patient, the EMS personnel and medical records. The history is limited by the condition of the patient. NursingNotes were reviewed. REVIEW OF SYSTEMS    (2-9 systems for level 4, 10 or more for level 5)     Review of Systems   Unable to perform ROS: Mental status change       A complete review of systems was performed and is negative except as noted above in the HPI.        PAST MEDICAL HISTORY     Past Medical History:   Diagnosis Date    Anxiety     Cataract     Chronic kidney disease     Colon polyp     Constipation     COPD (chronic obstructive pulmonary disease) ([facility] [address]     DDD (degenerative disc disease)     Glaucoma     Hyperlipidemia     Hypertension     Irritable bowel syndrome     Low back pain     Obesity     PUD (peptic ulcer disease)     Type II or unspecified type diabetes mellitus without mention of complication, not stated as uncontrolled     Unspecified sleep apnea     Venous insufficiency          SURGICAL HISTORY       Past Surgical History:   Procedure Laterality Date    CHOLECYSTECTOMY      with Spleen repair    COLONOSCOPY  7/21/2011    [facility]    DILATION AND CURETTAGE OF UTERUS      x3    UPPER GASTROINTESTINAL ENDOSCOPY  7/28/2011    [facility]         CURRENT MEDICATIONS       Previous Medications    ALBUTEROL (VENTOLIN IN)    Inhale  into the lungs. ASCORBIC ACID (VITAMIN C) 500 MG TABLET    Take 500 mg by mouth daily. ATORVASTATIN CALCIUM (LIPITOR PO)    Take  by mouth. BREWERS YEAST PO    Take  by mouth. CLOPIDOGREL (PLAVIX) 75 MG TABLET    Take 75 mg by mouth daily    CYCLOSPORINE (RESTASIS OP)    Apply  to eye. DICYCLOMINE (BENTYL) 10 MG CAPSULE    Take 10 mg by mouth 3 times daily. FUROSEMIDE (LASIX PO)    Take  by mouth. GABAPENTIN PO    Take  by mouth. INSULIN ASPART (NOVOLOG) 100 UNIT/ML INJECTION    Inject  into the skin 3 times daily (before meals). INULIN (FIBERCHOICE PO)    Take  by mouth. IPRATROPIUM-ALBUTEROL (DUONEB) 0.5-2.5 (3) MG/3ML SOLN NEBULIZER SOLUTION    Inhale 1 vial into the lungs every 4 hours. IRON PO    Take  by mouth. LECITHIN PO    Take  by mouth. LISINOPRIL PO    Take  by mouth. LORATADINE PO    Take  by mouth. MECLIZINE HCL PO    Take  by mouth. METFORMIN HCL (GLUCOPHAGE PO)    Take  by mouth. MILK THISTLE PO    Take  by mouth. MULTIPLE VITAMIN (MULTI-VITAMIN PO)    Take  by mouth. NIFEDIPINE (ADALAT CC) 30 MG EXTENDED RELEASE TABLET    Take 30 mg by mouth daily    NITROGLYCERIN (NITROSTAT) 0.4 MG SL TABLET    Place 0.4 mg under the tongue every 5 minutes as needed. NYSTATIN PO    Take  by mouth. OMEPRAZOLE (PRILOSEC) 20 MG CAPSULE    TAKE 2 CAPSULES BY MOUTH EVERY MORNING (BEFORE BREAKFAST). POTASSIUM (POTASSIMIN PO)    Take  by mouth. RANITIDINE (ZANTAC) 150 MG TABLET    Take 150 mg by mouth 2 times daily. SELENIUM PO    Take  by mouth. SERTRALINE (ZOLOFT) 25 MG TABLET    Take 25 mg by mouth daily    SUCRALFATE (CARAFATE) 1 GM TABLET    Take 1 g by mouth 3 times daily.       TRAMADOL (ULTRAM) 50 MG TABLET    Take 50 mg by mouth every 6 hours as needed for Pain. .    TRIAMCINOLONE ACETONIDE,NASAL, (NASACORT AQ NA)    by Nasal route. VITAMIN E 1000 UNIT CAPSULE    Take 1,000 Units by mouth daily. ALLERGIES     Asa buff (mag [buffered aspirin] and Tetracyclines & related    FAMILY HISTORY       Family History   Problem Relation Age of Onset    Cancer Father           SOCIAL HISTORY       Social History     Social History    Marital status:      Spouse name: N/A    Number of children: N/A    Years of education: N/A     Social History Main Topics    Smoking status: Never Smoker    Smokeless tobacco: Not on file    Alcohol use No    Drug use: No    Sexual activity: Not on file     Other Topics Concern    Not on file     Social History Narrative    No narrative on file       SCREENINGS    [place] Coma Scale  Eye Opening: Spontaneous  Best Verbal Response: Confused  Best Motor Response: Obeys commands  [place] Coma Scale Score: 14        PHYSICAL EXAM    (up to 7 for level 4, 8 or more for level 5)     ED Triage Vitals [02/03/19 0621]   BP Temp Temp Source Pulse Resp SpO2 Height Weight   (!) 124/51 102 °F (38.9 °C) Rectal 83 20 93 % -- --       Physical Exam   Constitutional:   Chronically ill appearing, smells of urine, laying in bed. confused   HENT:   Head: Normocephalic and atraumatic. Some crusting around eyes    Eyes: Pupils are equal, round, and reactive to light. EOM are normal.   Neck: Normal range of motion. Neck supple. Cardiovascular: Normal rate and regular rhythm. Pulmonary/Chest: Effort normal and breath sounds normal.   Abdominal: Soft. Tenderness: LUQ and L flank    Genitourinary:   Genitourinary Comments: Wearing diaper, smells of urine    Musculoskeletal:   Seems to have chronic contractures of feet but can move them, R UE with tremor    Neurological: She is alert. She displays tremor (RUE). GCS eye subscore is 4. GCS verbal subscore is 4. GCS motor subscore is 6.    Skin: Skin is warm and dry. Hot to touch    Psychiatric:   Confused not combative    Nursing note and vitals reviewed. DIAGNOSTIC RESULTS     EKG: All EKG's are interpreted by the Emergency Department Physician who either signs or Co-signs this chart in the absence of a cardiologist.      RADIOLOGY:   Non-plain film images such as CT, Ultrasound and MRI are read by the radiologist. Bhavani Ingles images are visualized and preliminarily interpreted by the emergency physician with the below findings:      Interpretation per the Radiologist below, if available at the time of this note:    CT Kidney WO Contrast   Final Result   No acute findings in the abdomen or pelvis. No urinary tract stones or   obstructive uropathy. Mild/moderate levoscoliosis of the lumbar spine   with associated degenerative changes. Signed by Dr Holly Sandhoff on 2/3/2019 7:29 AM      XR CHEST PORTABLE   Final Result   No acute findings.    Signed by Dr Holly Sandhoff on 2/3/2019 7:20 AM            ED BEDSIDE ULTRASOUND:   Performed by ED Physician - none    LABS:  Labs Reviewed   CBC WITH AUTO DIFFERENTIAL - Abnormal; Notable for the following:        Result Value    WBC 11.8 (*)     RBC 3.96 (*)     Hemoglobin 11.4 (*)     Hematocrit 36.1 (*)     MCHC 31.6 (*)     Neutrophils % 91.1 (*)     Lymphocytes % 1.8 (*)     Neutrophils # 10.7 (*)     Lymphocytes # 0.2 (*)     All other components within normal limits   COMPREHENSIVE METABOLIC PANEL - Abnormal; Notable for the following:     Glucose 231 (*)     BUN 47 (*)     CREATININE 2.1 (*)     GFR Non- 23 (*)     All other components within normal limits   URINE RT REFLEX TO CULTURE - Abnormal; Notable for the following:     Clarity, UA TURBID (*)     Glucose, Ur 100 (*)     Ketones, Urine TRACE (*)     Blood, Urine MODERATE (*)     Leukocyte Esterase, Urine LARGE (*)     All other components within normal limits   MICROSCOPIC URINALYSIS - Abnormal; Notable for the following:     WBC, UA 16-20 (*) RBC, UA 3-5 (*)     Yeast, UA Rare (*)     All other components within normal limits   RAPID INFLUENZA A/B ANTIGENS   URINE CULTURE   CULTURE BLOOD #1   CULTURE BLOOD #2   LACTIC ACID, PLASMA       All other labs were within normal range or not returned as of this dictation. EMERGENCY DEPARTMENT COURSE and DIFFERENTIALDIAGNOSIS/MDM:   Vitals:    Vitals:    02/03/19 0700 02/03/19 0800 02/03/19 0850 02/03/19 0900   BP: (!) 136/56 (!) 144/58 (!) 140/60 (!) 146/62   Pulse:   78    Resp: 20 20 17 17   Temp:   99.6 °F (37.6 °C)    TempSrc:       SpO2:   98%        MDM  Number of Diagnoses or Management Options  Acute encephalopathy: new and requires workup  Acute pyelonephritis: new and requires workup  Septicemia [facility]): new and requires workup  Diagnosis management comments: Patient with high fever elevated white blood cell count UTI likely Pylo/UTI. Patient DNR. Acute encephalopathy as well. Start IV antibiotics given Rocephin based on prior cultures. Patient had culture sent. Patient with Stanley catheter likely acute on chronic renal insufficiency as well. Based on prior it seemed like her creatinine was about 1.0-1.5 last year. IV fluids. Lactate normal. Stable for admission. Discussed with Dr. Nikita Gresham. Amount and/or Complexity of Data Reviewed  Clinical lab tests: ordered and reviewed  Tests in the radiology section of CPT®: ordered and reviewed  Decide to obtain previous medical records or to obtain history from someone other than the patient: yes  Obtain history from someone other than the patient: yes  Discuss the patient with other providers: yes    Risk of Complications, Morbidity, and/or Mortality  Presenting problems: high    Patient Progress  Patient progress: stable        CONSULTS:  None    PROCEDURES:  Unless otherwise notedbelow, none     Procedures    FINAL IMPRESSION     1. Septicemia ([facility] [address]    2. Acute encephalopathy    3. Acute pyelonephritis    4.  Acute on chronic renal insufficiency DISPOSITION/PLAN   DISPOSITION  Admit       PATIENT REFERRED TO:  Kenny Craft  [address]            DISCHARGE MEDICATIONS:  New Prescriptions    No medications on file          (Please note that portions of this note were completed with a voice recognition program.  Efforts were made to edit the dictations butoccasionally words are mis-transcribed.)    Carmen Topete MD (electronically signed)  AttendingEmergency Physician         Carmen Topete MD  02/03/19 4078

## 2019-02-03 NOTE — ED NOTES
Report from PHIL at [facility]. Patient's temp 104.7 axillary. Patient is normally alert X 2. Patient is now confused per PHIL. Patient was \"ok\" at 0400 but now is altered with temp. Patient is DNR.        Katty Dickson RN  02/03/19 3742

## 2019-02-03 NOTE — H&P
[address] - History & Physical      PCP: Julian Mcfadden    Date of Admission: 2/3/2019    Date of Service: 2/3/2019    Chief Complaint:  Mental status changes    History Of Present Illness: The patient is a 74 y.o. female who presented to ER from SNF with AMS, not able to provide much information. She was febrile and confused in ED, UA is consistent with UTI. Stanley was replaced. Past Medical History:        Diagnosis Date    Anxiety     CAD (coronary artery disease)     Cataract     Chronic kidney disease     Colon polyp     Constipation     COPD (chronic obstructive pulmonary disease) (HCC)     DDD (degenerative disc disease)     Glaucoma     Hyperlipidemia     Hypertension     Irritable bowel syndrome     Low back pain     Obesity     PUD (peptic ulcer disease)     Type II or unspecified type diabetes mellitus without mention of complication, not stated as uncontrolled     Unspecified sleep apnea     Venous insufficiency        Past Surgical History:        Procedure Laterality Date    CHOLECYSTECTOMY      with Spleen repair    [facility]21/[facility]    DILATION AND CURETTAGE OF UTERUS      x3    UPPER GASTROINTESTINAL ENDOSCOPY  7/28/2011    [facility]       Home Medications:  Prior to Admission medications    Medication Sig Start Date End Date Taking? Authorizing Provider   clopidogrel (PLAVIX) 75 MG tablet Take 75 mg by mouth daily   Yes Historical Provider, MD   sertraline (ZOLOFT) 25 MG tablet Take 25 mg by mouth daily   Yes Historical Provider, MD   traMADol (ULTRAM) 50 MG tablet Take 50 mg by mouth every 6 hours as needed for Pain. .   Yes Historical Provider, MD   NIFEdipine (ADALAT CC) 30 MG extended release tablet Take 30 mg by mouth daily   Yes Historical Provider, MD   Multiple Vitamin (MULTI-VITAMIN PO) Take  by mouth.      Yes Historical Provider, MD   ipratropium-albuterol (DUONEB) 0.5-2.5 (3) MG/3ML SOLN nebulizer solution Inhale 1 vial into the lungs every 4 hours. Yes Historical Provider, MD   IRON PO Take  by mouth. Yes Historical Provider, MD   Albuterol (VENTOLIN IN) Inhale  into the lungs. Yes Historical Provider, MD   Inulin (FIBERCHOICE PO) Take  by mouth. Yes Historical Provider, MD   GABAPENTIN PO Take  by mouth. Yes Historical Provider, MD   insulin aspart (NOVOLOG) 100 UNIT/ML injection Inject  into the skin 3 times daily (before meals). Yes Historical Provider, MD   Furosemide (LASIX PO) Take  by mouth. Yes Historical Provider, MD   nitroGLYCERIN (NITROSTAT) 0.4 MG SL tablet Place 0.4 mg under the tongue every 5 minutes as needed. Yes Historical Provider, MD   Potassium (POTASSIMIN PO) Take  by mouth. Yes Historical Provider, MD   omeprazole (PRILOSEC) 20 MG capsule TAKE 2 CAPSULES BY MOUTH EVERY MORNING (BEFORE BREAKFAST). 12/3/12   KYAW Paiz   NYSTATIN PO Take  by mouth. Historical Provider, MD   BREWERS YEAST PO Take  by mouth. Historical Provider, MD   sucralfate (CARAFATE) 1 GM tablet Take 1 g by mouth 3 times daily. Historical Provider, MD   dicyclomine (BENTYL) 10 MG capsule Take 10 mg by mouth 3 times daily. Historical Provider, MD   Ascorbic Acid (VITAMIN C) 500 MG tablet Take 500 mg by mouth daily. Historical Provider, MD   vitamin E 1000 UNIT capsule Take 1,000 Units by mouth daily. Historical Provider, MD   MetFORMIN HCl (GLUCOPHAGE PO) Take  by mouth. Historical Provider, MD   ranitidine (ZANTAC) 150 MG tablet Take 150 mg by mouth 2 times daily. Historical Provider, MD   LECITHIN PO Take  by mouth. Historical Provider, MD   Atorvastatin Calcium (LIPITOR PO) Take  by mouth. Historical Provider, MD   LISINOPRIL PO Take  by mouth. Historical Provider, MD   LORATADINE PO Take  by mouth. Historical Provider, MD   MECLIZINE HCL PO Take  by mouth. Historical Provider, MD   MILK THISTLE PO Take  by mouth. any focal sensory/motor deficits. Cranial nerves: II-XII intact, grossly non-focal.  Psychiatric: Alert and disoriented. Diagnostic Data:  CXR: I have reviewed the report and films with the following interpretation:   No acute findings    CT Kidney WO Contrast : I have reviewed the report and films with the following interpretation:   No acute findings in the abdomen or pelvis. No urinary tract stones or  obstructive uropathy. Mild/moderate levoscoliosis of the lumbar spine  with associated degenerative changes. CBC:  Recent Labs      02/03/19 0620   WBC  11.8*   HGB  11.4*   HCT  36.1*   PLT  242     BMP:  Recent Labs      02/03/19 0620   NA  143   K  4.4   CL  104   CO2  27   BUN  47*   CREATININE  2.1*   CALCIUM  9.0     Recent Labs      02/03/19 0620   AST  20   ALT  9   BILITOT  <0.2   ALKPHOS  71     Coag Panel: No results for input(s): INR, PROTIME, APTT in the last 72 hours. Cardiac Enzymes: No results for input(s): Jadene Moh in the last 72 hours. ABGs:No results found for: PHART, PO2ART, GCY6JLB  Urinalysis:  Lab Results   Component Value Date    NITRU Negative 02/03/2019    WBCUA 16-20 02/03/2019    BACTERIA 1+ 02/03/2019    RBCUA 3-5 02/03/2019    BLOODU MODERATE 02/03/2019    SPECGRAV 1.015 02/03/2019    GLUCOSEU 100 02/03/2019       Active Hospital Problems    Diagnosis Date Noted    Sepsis ([address] [A41.9] 02/03/2019    Acute cystitis with hematuria [N30.01] 02/03/2019    Acute metabolic encephalopathy [X44.72] 02/03/2019       Assessment and Plan:  Principal Problem:    Sepsis ([address]  Active Problems:    Acute cystitis with hematuria    Acute metabolic encephalopathy  Resolved Problems:    * No resolved hospital problems. *      1. Admit to medical floor inpatient. Serial vitals, diet diabetic, activity up with assistance. 2. IVF NS 75 cc/hour. Bolus as needed  3. Follow labs CBC/BMP. Urine and blood cultures  4. Consults: PT/OT  5. Start medications Rocephin IV  6.  Home medications reconciled. 7. DVT prophylaxis with Lovenox  8. Code status DNR per patient records from SNF  9. Oxygen as needed  10.  Disposition medical floor        [facility] service  2/3/2019  11:58 AM

## 2019-02-03 NOTE — LETTER
including skilled nursing facilities, home health agencies, inpatient rehabilitation facilities, and long term care hospitals. [facility] is working closely with the doctors and other health care providers that care for you during and following your hospital stay and for a period of time after you leave the hospital. By working together, the health care providers are trying to more efficiently provide well-managed, high quality, patient-centered care as you undergo treatment. Hospitals, doctors, and other health care providers that care for you following a hospital stay may receive an additional payment for providing better, more coordinated health care. Medicare will monitor your care to make sure you and others get high quality care. Your feedback is important     Medicare may also ask you to answer a survey about the services and care you received from [address] will be mailed to you. Your feedback will improve care for all people with Medicare who receive care from [facility]. Completion of this survey is optional.     Get more information     For more information about the Bundled Payments for [facility], you can:    · Visit the CMS BPCI Advanced Website at http://vergara-shi.net/ initiatives/bpci-advanced   · Call the [facility] team at (352) 410-9880. · Call 1-800-MEDICARE (7-510.516.4136). TTY users can call 5-389.572.7790     If you have concerns or complaints about your care, talk to your health care provider, or contact your Beneficiary and Family Centered Quality Improvement Organization [facility]). To get your [facility]'s phone number, visit Medicare.gov/contacts or call 1-800-MEDICARE. · To find a different hospital, visit www. hospitalcompare.[facility].gov or call 1-800[employer] MEDICARE (1-502.250.2771). TTY users should call 7-658.104.2558. · To find a different doctor, visit Medicare's Physician Compare website, HDTapes.co.nz, or call 1-800-MEDICARE (152 8952). TTY users should call 9-954.187.2477. · To find a different skilled nursing facility, visit [facility] website, https://www.[employer]/, or call 1-800-MEDICARE (1- 698.107.8948). TTY users should call 6-934.633.8566. · To find a different long term care hospital, visit [facility] Compare website, [employer].be, or call 1-800- MEDICARE (650 6726). TTY users should call 2-652.855.8034. · To find a different inpatient rehabilitation facility, visit [facility] Compare website, www.medicare.gov/ inpatientrehabilitation facilitycompare, or call 1-800-MEDICARE (3-148.881.5189). TTY users should call 2- [phone]. · To find a different home health agency, visit [address] website, www.medicare.gov/homehealthcompare, or call 1-800-MEDICARE (8-677- 765-0423). TTY users should call [phone].

## 2019-02-03 NOTE — ED NOTES
Patient arrive with 2 briefs and pad, all urine soaked on arrival.   No breakdown noted. Patient cleansed and new, dry brief, applied.        Destin Hernandez RN  02/03/19 [address], RN  02/03/19 6312

## 2019-02-04 LAB
AMMONIA: 38 UMOL/L (ref 11–51)
ANION GAP SERPL CALCULATED.3IONS-SCNC: 12 MMOL/L (ref 7–19)
BASOPHILS ABSOLUTE: 0 K/UL (ref 0–0.2)
BASOPHILS RELATIVE PERCENT: 0.3 % (ref 0–1)
BUN BLDV-MCNC: 36 MG/DL (ref 8–23)
CALCIUM SERPL-MCNC: 8.2 MG/DL (ref 8.8–10.2)
CHLORIDE BLD-SCNC: 111 MMOL/L (ref 98–111)
CO2: 23 MMOL/L (ref 22–29)
CREAT SERPL-MCNC: 1.7 MG/DL (ref 0.5–0.9)
EOSINOPHILS ABSOLUTE: 0.2 K/UL (ref 0–0.6)
EOSINOPHILS RELATIVE PERCENT: 3.4 % (ref 0–5)
GFR NON-AFRICAN AMERICAN: 29
GLUCOSE BLD-MCNC: 180 MG/DL (ref 70–99)
GLUCOSE BLD-MCNC: 196 MG/DL (ref 74–109)
GLUCOSE BLD-MCNC: 233 MG/DL (ref 70–99)
GLUCOSE BLD-MCNC: 254 MG/DL (ref 70–99)
GLUCOSE BLD-MCNC: 265 MG/DL (ref 70–99)
HCT VFR BLD CALC: 33.2 % (ref 37–47)
HEMOGLOBIN: 10 G/DL (ref 12–16)
LACTIC ACID: 1 MMOL/L (ref 0.5–1.9)
LYMPHOCYTES ABSOLUTE: 0.4 K/UL (ref 1.1–4.5)
LYMPHOCYTES RELATIVE PERCENT: 6.3 % (ref 20–40)
MCH RBC QN AUTO: 28.2 PG (ref 27–31)
MCHC RBC AUTO-ENTMCNC: 30.1 G/DL (ref 33–37)
MCV RBC AUTO: 93.8 FL (ref 81–99)
MONOCYTES ABSOLUTE: 0.4 K/UL (ref 0–0.9)
MONOCYTES RELATIVE PERCENT: 6.3 % (ref 0–10)
NEUTROPHILS ABSOLUTE: 5.4 K/UL (ref 1.5–7.5)
NEUTROPHILS RELATIVE PERCENT: 82.8 % (ref 50–65)
PDW BLD-RTO: 13.2 % (ref 11.5–14.5)
PERFORMED ON: ABNORMAL
PLATELET # BLD: 204 K/UL (ref 130–400)
PMV BLD AUTO: 9.5 FL (ref 9.4–12.3)
POTASSIUM REFLEX MAGNESIUM: 3.9 MMOL/L (ref 3.5–5)
RBC # BLD: 3.54 M/UL (ref 4.2–5.4)
SODIUM BLD-SCNC: 146 MMOL/L (ref 136–145)
TSH SERPL DL<=0.05 MIU/L-ACNC: 1.24 UIU/ML (ref 0.27–4.2)
WBC # BLD: 6.5 K/UL (ref 4.8–10.8)

## 2019-02-04 PROCEDURE — 80048 BASIC METABOLIC PNL TOTAL CA: CPT

## 2019-02-04 PROCEDURE — 84443 ASSAY THYROID STIM HORMONE: CPT

## 2019-02-04 PROCEDURE — 1210000000 HC MED SURG R&B

## 2019-02-04 PROCEDURE — 99232 SBSQ HOSP IP/OBS MODERATE 35: CPT | Performed by: HOSPITALIST

## 2019-02-04 PROCEDURE — 2580000003 HC RX 258: Performed by: FAMILY MEDICINE

## 2019-02-04 PROCEDURE — 36415 COLL VENOUS BLD VENIPUNCTURE: CPT

## 2019-02-04 PROCEDURE — 6360000002 HC RX W HCPCS: Performed by: FAMILY MEDICINE

## 2019-02-04 PROCEDURE — 6370000000 HC RX 637 (ALT 250 FOR IP): Performed by: HOSPITALIST

## 2019-02-04 PROCEDURE — 85025 COMPLETE CBC W/AUTO DIFF WBC: CPT

## 2019-02-04 PROCEDURE — 6370000000 HC RX 637 (ALT 250 FOR IP): Performed by: FAMILY MEDICINE

## 2019-02-04 PROCEDURE — 82140 ASSAY OF AMMONIA: CPT

## 2019-02-04 PROCEDURE — C9113 INJ PANTOPRAZOLE SODIUM, VIA: HCPCS | Performed by: HOSPITALIST

## 2019-02-04 PROCEDURE — 82948 REAGENT STRIP/BLOOD GLUCOSE: CPT

## 2019-02-04 PROCEDURE — 6360000002 HC RX W HCPCS: Performed by: HOSPITALIST

## 2019-02-04 PROCEDURE — 2700000000 HC OXYGEN THERAPY PER DAY

## 2019-02-04 PROCEDURE — 83605 ASSAY OF LACTIC ACID: CPT

## 2019-02-04 RX ORDER — NIFEDIPINE 30 MG/1
30 TABLET, EXTENDED RELEASE ORAL DAILY
Status: DISCONTINUED | OUTPATIENT
Start: 2019-02-04 | End: 2019-02-05 | Stop reason: HOSPADM

## 2019-02-04 RX ORDER — PHENAZOPYRIDINE HYDROCHLORIDE 100 MG/1
100 TABLET, FILM COATED ORAL
Status: DISCONTINUED | OUTPATIENT
Start: 2019-02-04 | End: 2019-02-05 | Stop reason: HOSPADM

## 2019-02-04 RX ORDER — METRONIDAZOLE 250 MG/1
500 TABLET ORAL EVERY 8 HOURS SCHEDULED
Status: DISCONTINUED | OUTPATIENT
Start: 2019-02-04 | End: 2019-02-05 | Stop reason: HOSPADM

## 2019-02-04 RX ORDER — ATENOLOL 50 MG/1
50 TABLET ORAL DAILY
Status: DISCONTINUED | OUTPATIENT
Start: 2019-02-04 | End: 2019-02-05 | Stop reason: HOSPADM

## 2019-02-04 RX ORDER — LACTOBACILLUS RHAMNOSUS GG 10B CELL
1 CAPSULE ORAL DAILY
Status: DISCONTINUED | OUTPATIENT
Start: 2019-02-04 | End: 2019-02-05 | Stop reason: HOSPADM

## 2019-02-04 RX ORDER — HYDRALAZINE HYDROCHLORIDE 20 MG/ML
5 INJECTION INTRAMUSCULAR; INTRAVENOUS EVERY 4 HOURS PRN
Status: DISCONTINUED | OUTPATIENT
Start: 2019-02-04 | End: 2019-02-05 | Stop reason: HOSPADM

## 2019-02-04 RX ORDER — CLOPIDOGREL BISULFATE 75 MG/1
75 TABLET ORAL DAILY
Status: DISCONTINUED | OUTPATIENT
Start: 2019-02-04 | End: 2019-02-05 | Stop reason: HOSPADM

## 2019-02-04 RX ORDER — PANTOPRAZOLE SODIUM 40 MG/10ML
40 INJECTION, POWDER, LYOPHILIZED, FOR SOLUTION INTRAVENOUS 2 TIMES DAILY
Status: DISCONTINUED | OUTPATIENT
Start: 2019-02-04 | End: 2019-02-05 | Stop reason: HOSPADM

## 2019-02-04 RX ORDER — CIPROFLOXACIN 2 MG/ML
400 INJECTION, SOLUTION INTRAVENOUS EVERY 12 HOURS
Status: DISCONTINUED | OUTPATIENT
Start: 2019-02-04 | End: 2019-02-05 | Stop reason: HOSPADM

## 2019-02-04 RX ADMIN — NIFEDIPINE 30 MG: 30 TABLET, FILM COATED, EXTENDED RELEASE ORAL at 18:47

## 2019-02-04 RX ADMIN — ENOXAPARIN SODIUM 40 MG: 40 INJECTION SUBCUTANEOUS at 12:30

## 2019-02-04 RX ADMIN — ATENOLOL 50 MG: 50 TABLET ORAL at 18:47

## 2019-02-04 RX ADMIN — INSULIN LISPRO 1 UNITS: 100 INJECTION, SOLUTION INTRAVENOUS; SUBCUTANEOUS at 08:46

## 2019-02-04 RX ADMIN — CLOPIDOGREL BISULFATE 75 MG: 75 TABLET ORAL at 18:47

## 2019-02-04 RX ADMIN — HYDROMORPHONE HYDROCHLORIDE 0.5 MG: 1 INJECTION, SOLUTION INTRAMUSCULAR; INTRAVENOUS; SUBCUTANEOUS at 12:30

## 2019-02-04 RX ADMIN — SODIUM CHLORIDE: 9 INJECTION, SOLUTION INTRAVENOUS at 08:47

## 2019-02-04 RX ADMIN — PHENAZOPYRIDINE HYDROCHLORIDE 100 MG: 100 TABLET ORAL at 12:30

## 2019-02-04 RX ADMIN — PANTOPRAZOLE SODIUM 40 MG: 40 INJECTION, POWDER, FOR SOLUTION INTRAVENOUS at 12:30

## 2019-02-04 RX ADMIN — Medication 1 G: at 12:29

## 2019-02-04 RX ADMIN — METRONIDAZOLE 500 MG: 250 TABLET ORAL at 18:47

## 2019-02-04 RX ADMIN — PHENAZOPYRIDINE HYDROCHLORIDE 100 MG: 100 TABLET ORAL at 18:47

## 2019-02-04 RX ADMIN — Medication 1 CAPSULE: at 18:48

## 2019-02-04 RX ADMIN — Medication 10 ML: at 08:47

## 2019-02-04 RX ADMIN — CIPROFLOXACIN 400 MG: 2 INJECTION, SOLUTION INTRAVENOUS at 18:48

## 2019-02-04 ASSESSMENT — PAIN SCALES - GENERAL: PAINLEVEL_OUTOF10: 6

## 2019-02-05 VITALS
BODY MASS INDEX: 27.7 KG/M2 | TEMPERATURE: 98.8 F | HEART RATE: 54 BPM | OXYGEN SATURATION: 98 % | DIASTOLIC BLOOD PRESSURE: 68 MMHG | RESPIRATION RATE: 18 BRPM | WEIGHT: 176.5 LBS | HEIGHT: 67 IN | SYSTOLIC BLOOD PRESSURE: 145 MMHG

## 2019-02-05 PROBLEM — Z51.5 PALLIATIVE CARE PATIENT: Status: ACTIVE | Noted: 2019-02-05

## 2019-02-05 LAB
ANION GAP SERPL CALCULATED.3IONS-SCNC: 15 MMOL/L (ref 7–19)
BASOPHILS ABSOLUTE: 0 K/UL (ref 0–0.2)
BASOPHILS RELATIVE PERCENT: 0.2 % (ref 0–1)
BUN BLDV-MCNC: 30 MG/DL (ref 8–23)
CALCIUM SERPL-MCNC: 8.5 MG/DL (ref 8.8–10.2)
CHLORIDE BLD-SCNC: 108 MMOL/L (ref 98–111)
CO2: 21 MMOL/L (ref 22–29)
CREAT SERPL-MCNC: 1.6 MG/DL (ref 0.5–0.9)
EOSINOPHILS ABSOLUTE: 0.1 K/UL (ref 0–0.6)
EOSINOPHILS RELATIVE PERCENT: 0.5 % (ref 0–5)
GFR NON-AFRICAN AMERICAN: 31
GLUCOSE BLD-MCNC: 219 MG/DL (ref 70–99)
GLUCOSE BLD-MCNC: 237 MG/DL (ref 70–99)
GLUCOSE BLD-MCNC: 257 MG/DL (ref 74–109)
HCT VFR BLD CALC: 35.6 % (ref 37–47)
HEMOGLOBIN: 11 G/DL (ref 12–16)
LYMPHOCYTES ABSOLUTE: 0.6 K/UL (ref 1.1–4.5)
LYMPHOCYTES RELATIVE PERCENT: 6.6 % (ref 20–40)
MAGNESIUM: 1.6 MG/DL (ref 1.6–2.4)
MCH RBC QN AUTO: 29 PG (ref 27–31)
MCHC RBC AUTO-ENTMCNC: 30.9 G/DL (ref 33–37)
MCV RBC AUTO: 93.9 FL (ref 81–99)
MONOCYTES ABSOLUTE: 0.5 K/UL (ref 0–0.9)
MONOCYTES RELATIVE PERCENT: 5.5 % (ref 0–10)
NEUTROPHILS ABSOLUTE: 7.9 K/UL (ref 1.5–7.5)
NEUTROPHILS RELATIVE PERCENT: 86.2 % (ref 50–65)
PDW BLD-RTO: 13.1 % (ref 11.5–14.5)
PERFORMED ON: ABNORMAL
PERFORMED ON: ABNORMAL
PLATELET # BLD: 204 K/UL (ref 130–400)
PMV BLD AUTO: 9.7 FL (ref 9.4–12.3)
POTASSIUM REFLEX MAGNESIUM: 3.4 MMOL/L (ref 3.5–5)
RBC # BLD: 3.79 M/UL (ref 4.2–5.4)
SODIUM BLD-SCNC: 144 MMOL/L (ref 136–145)
URINE CULTURE, ROUTINE: NORMAL
WBC # BLD: 9.2 K/UL (ref 4.8–10.8)

## 2019-02-05 PROCEDURE — 97165 OT EVAL LOW COMPLEX 30 MIN: CPT

## 2019-02-05 PROCEDURE — 83735 ASSAY OF MAGNESIUM: CPT

## 2019-02-05 PROCEDURE — 2580000003 HC RX 258: Performed by: FAMILY MEDICINE

## 2019-02-05 PROCEDURE — 85025 COMPLETE CBC W/AUTO DIFF WBC: CPT

## 2019-02-05 PROCEDURE — 97530 THERAPEUTIC ACTIVITIES: CPT

## 2019-02-05 PROCEDURE — 80048 BASIC METABOLIC PNL TOTAL CA: CPT

## 2019-02-05 PROCEDURE — C9113 INJ PANTOPRAZOLE SODIUM, VIA: HCPCS | Performed by: HOSPITALIST

## 2019-02-05 PROCEDURE — 2700000000 HC OXYGEN THERAPY PER DAY

## 2019-02-05 PROCEDURE — 6370000000 HC RX 637 (ALT 250 FOR IP): Performed by: HOSPITALIST

## 2019-02-05 PROCEDURE — 82948 REAGENT STRIP/BLOOD GLUCOSE: CPT

## 2019-02-05 PROCEDURE — 99239 HOSP IP/OBS DSCHRG MGMT >30: CPT | Performed by: HOSPITALIST

## 2019-02-05 PROCEDURE — 6360000002 HC RX W HCPCS: Performed by: FAMILY MEDICINE

## 2019-02-05 PROCEDURE — 97535 SELF CARE MNGMENT TRAINING: CPT

## 2019-02-05 PROCEDURE — 36415 COLL VENOUS BLD VENIPUNCTURE: CPT

## 2019-02-05 PROCEDURE — 6360000002 HC RX W HCPCS: Performed by: HOSPITALIST

## 2019-02-05 RX ORDER — CIPROFLOXACIN 250 MG/1
250 TABLET, FILM COATED ORAL 2 TIMES DAILY
Qty: 14 TABLET | Refills: 0
Start: 2019-02-05 | End: 2019-02-12

## 2019-02-05 RX ORDER — LACTOBACILLUS RHAMNOSUS GG 10B CELL
1 CAPSULE ORAL DAILY
Qty: 7 CAPSULE | Refills: 0 | Status: ON HOLD
Start: 2019-02-06 | End: 2022-10-03 | Stop reason: ALTCHOICE

## 2019-02-05 RX ORDER — CLONIDINE HYDROCHLORIDE 0.1 MG/1
0.1 TABLET ORAL 2 TIMES DAILY
Status: DISCONTINUED | OUTPATIENT
Start: 2019-02-05 | End: 2019-02-05 | Stop reason: HOSPADM

## 2019-02-05 RX ORDER — POTASSIUM CHLORIDE 20 MEQ/1
20 TABLET, EXTENDED RELEASE ORAL ONCE
Status: DISCONTINUED | OUTPATIENT
Start: 2019-02-05 | End: 2019-02-05 | Stop reason: HOSPADM

## 2019-02-05 RX ORDER — HYDRALAZINE HYDROCHLORIDE 50 MG/1
50 TABLET, FILM COATED ORAL EVERY 8 HOURS SCHEDULED
Status: DISCONTINUED | OUTPATIENT
Start: 2019-02-05 | End: 2019-02-05 | Stop reason: HOSPADM

## 2019-02-05 RX ORDER — METRONIDAZOLE 500 MG/1
500 TABLET ORAL EVERY 8 HOURS SCHEDULED
Qty: 21 TABLET | Refills: 0
Start: 2019-02-05 | End: 2019-02-12

## 2019-02-05 RX ORDER — PHENAZOPYRIDINE HYDROCHLORIDE 100 MG/1
100 TABLET, FILM COATED ORAL
Qty: 9 TABLET | Refills: 0
Start: 2019-02-05 | End: 2019-02-08

## 2019-02-05 RX ORDER — TRAMADOL HYDROCHLORIDE 50 MG/1
50 TABLET ORAL EVERY 8 HOURS PRN
Qty: 9 TABLET | Refills: 0 | Status: SHIPPED | OUTPATIENT
Start: 2019-02-05 | End: 2019-02-08

## 2019-02-05 RX ORDER — POTASSIUM CHLORIDE 20 MEQ/1
10 TABLET, EXTENDED RELEASE ORAL DAILY
Qty: 3 TABLET | Refills: 0
Start: 2019-02-05 | End: 2022-09-14

## 2019-02-05 RX ADMIN — NIFEDIPINE 30 MG: 30 TABLET, FILM COATED, EXTENDED RELEASE ORAL at 08:55

## 2019-02-05 RX ADMIN — HYDROMORPHONE HYDROCHLORIDE 0.5 MG: 1 INJECTION, SOLUTION INTRAMUSCULAR; INTRAVENOUS; SUBCUTANEOUS at 09:00

## 2019-02-05 RX ADMIN — HYDRALAZINE HYDROCHLORIDE 50 MG: 50 TABLET, FILM COATED ORAL at 09:08

## 2019-02-05 RX ADMIN — ENOXAPARIN SODIUM 40 MG: 40 INJECTION SUBCUTANEOUS at 10:50

## 2019-02-05 RX ADMIN — ATENOLOL 50 MG: 50 TABLET ORAL at 08:56

## 2019-02-05 RX ADMIN — METRONIDAZOLE 500 MG: 250 TABLET ORAL at 09:08

## 2019-02-05 RX ADMIN — CLOPIDOGREL BISULFATE 75 MG: 75 TABLET ORAL at 08:56

## 2019-02-05 RX ADMIN — Medication 1 CAPSULE: at 08:56

## 2019-02-05 RX ADMIN — Medication 10 ML: at 08:56

## 2019-02-05 RX ADMIN — CIPROFLOXACIN 400 MG: 2 INJECTION, SOLUTION INTRAVENOUS at 09:08

## 2019-02-05 RX ADMIN — PHENAZOPYRIDINE HYDROCHLORIDE 100 MG: 100 TABLET ORAL at 10:50

## 2019-02-05 RX ADMIN — PHENAZOPYRIDINE HYDROCHLORIDE 100 MG: 100 TABLET ORAL at 08:56

## 2019-02-05 RX ADMIN — PANTOPRAZOLE SODIUM 40 MG: 40 INJECTION, POWDER, FOR SOLUTION INTRAVENOUS at 08:56

## 2019-02-05 RX ADMIN — CLONIDINE HYDROCHLORIDE 0.1 MG: 0.1 TABLET ORAL at 10:50

## 2019-02-05 ASSESSMENT — PAIN DESCRIPTION - PAIN TYPE: TYPE: ACUTE PAIN

## 2019-02-05 ASSESSMENT — PAIN SCALES - GENERAL
PAINLEVEL_OUTOF10: 8
PAINLEVEL_OUTOF10: 8

## 2019-02-05 NOTE — PROGRESS NOTES
Orientation  Orientation  Overall Orientation Status: Within Functional Limits     Objective    ADL  UE Bathing: Minimal assistance  LE Bathing: Maximum assistance  UE Dressing: Minimal assistance  LE Dressing: Maximum assistance  Toileting: Maximum assistance        Balance  Sitting Balance: Contact guard assistance  Standing Balance: Maximum assistance  Standing Balance  Sit to stand: Maximum assistance  Stand to sit: Maximum assistance  Functional Mobility  Functional - Mobility Device: Rolling Walker  Assist Level: Maximum assistance  Functional Mobility Comments: stand and stepped to bedside chair   Bed mobility  Rolling to Left: Maximum assistance  Rolling to Right: Maximum assistance  Supine to Sit: Maximum assistance  Sit to Supine: Maximum assistance  Comment: pt max A to roll L/R for hygiene s/p bowel movement   Transfers  Sit to stand: Maximum assistance  Stand to sit: Maximum assistance  Transfer Comments: stand step back from chair to bed        Cognition  Overall Cognitive Status: WFL        LUE AROM (degrees)  LUE AROM : WFL  Left Hand AROM (degrees)  Left Hand AROM: WFL  RUE AROM (degrees)  RUE AROM : WFL  Right Hand AROM (degrees)  Right Hand AROM: WFL        Assessment   Performance deficits / Impairments: Decreased functional mobility ; Decreased ADL status; Decreased balance;Decreased high-level IADLs;Decreased endurance;Decreased ROM  Assessment: Pt benefits from skilled OT to address decreased pt I to complete functional mobility, balance, endurnace, and ADL Tasks   Treatment Diagnosis: Sepsis  Prognosis: Good  Decision Making: Medium Complexity  REQUIRES OT FOLLOW UP: Yes  Activity Tolerance  Activity Tolerance: Patient Tolerated treatment well  Safety Devices  Safety Devices in place: Yes  Type of devices: Call light within reach;Nurse notified; Left in chair          Plan   Plan  Times per week: 3-5x/wk   Current Treatment Recommendations: Balance Training, Safety Education & Training, Equipment Evaluation, Education, & procurement, Self-Care / ADL, Home Management Training, Functional Mobility Training, Endurance Training, Patient/Caregiver Education & Training       Goals  Short term goals  Time Frame for Short term goals: 1 week   Short term goal 1: Pt will be Min A to stand and step to toilet  Short term goal 2: Pt will be Set-up to complete UE bathing/dressing   Patient Goals   Patient goals :  To go home       Therapy Time   Individual Concurrent Group Co-treatment   Time In 1430         Time Out 1500         Minutes 30         Timed Code Treatment Minutes: 30 Minutes     Electronically signed by Tisha Alvares OT on 2/5/2019 at 3:53 PM    Tisha Alvares OT

## 2019-02-05 NOTE — PROGRESS NOTES
Iliana Haynes MD   40 mg at 02/04/19 1230    0.9 % sodium chloride infusion   Intravenous Continuous Gordon Chatman MD 75 mL/hr at 02/04/19 0847      glucose (GLUTOSE) 40 % oral gel 15 g  15 g Oral PRN Gordon Chatman MD        dextrose 50 % solution 12.5 g  12.5 g Intravenous PRN Gordon Chatman MD        glucagon (rDNA) injection 1 mg  1 mg Intramuscular PRN Gordon Chatman MD        dextrose 5 % solution  100 mL/hr Intravenous PRN Gordon Chatman MD        insulin lispro (HUMALOG) injection vial 0-6 Units  0-6 Units Subcutaneous TID WC Gordon Chatman MD   Stopped at 02/04/19 1201    insulin lispro (HUMALOG) injection vial 0-3 Units  0-3 Units Subcutaneous Nightly Gordon Chatman MD   2 Units at 02/03/19 2134     DVT Prophylaxis: Lovenox 40 mg sq daily    Continuous Infusions:   sodium chloride 75 mL/hr at 02/04/19 0847    dextrose         Intake/Output Summary (Last 24 hours) at 02/04/19 1803  Last data filed at 02/04/19 1125   Gross per 24 hour   Intake              480 ml   Output             1300 ml   Net             -820 ml     CBC:   Recent Labs      02/03/19   0620  02/04/19   0419   WBC  11.8*  6.5   HGB  11.4*  10.0*   PLT  242  204     BMP:  Recent Labs      02/03/19   0620  02/04/19   0419   NA  143  146*   K  4.4  3.9   CL  104  111   CO2  27  23   BUN  47*  36*   CREATININE  2.1*  1.7*   GLUCOSE  231*  196*           Objective:   Vitals: BP (!) 180/70   Pulse 73   Temp 99.6 °F (37.6 °C) (Temporal)   Resp 24   Ht 5' 7\" (1.702 m)   Wt 176 lb 8 oz (80.1 kg)   SpO2 94%   BMI 27.64 kg/m²   General appearance: alert, appears stated age and cooperative  Skin: Skin color, texture, turgor normal.   HEENT: Head: Normocephalic, no lesions, without obvious abnormality.   Neck: no adenopathy, no carotid bruit, no JVD and supple, symmetrical, trachea midline  Lungs: clear to auscultation bilaterally  Heart: regular rate and rhythm, S1, S2 normal, no murmur, click, rub or gallop  Abdomen: soft. Suprapubic tenderness, bs present  Extremities: extremities normal, atraumatic, no cyanosis or edema  Lymphatic: No significant lymph node enlargement papable  Neurologic: Mental status: Alert, oriented, thought content appropriate        Assessment & Plan:    Metabolic encephalopathy - better  SILVESTRE on CKD- better   UTI- on cipro  Enteritis with diarrhea - start flagyl and lactobacillus      Disposition: back to NH when better     Beth Sales

## 2019-02-05 NOTE — PROGRESS NOTES
MD notified that patient's BP is 145/68, and discharge may be completed   Electronically signed by Ra Peng RN on 2/5/2019 at 12:31 PM

## 2019-02-05 NOTE — PROGRESS NOTES
Occupational Therapy   Occupational Therapy Initial Assessment  Date: 2019   Patient Name: Janeen Bolden  MRN: [id number]     : 1945    Date of Service: 2019    Discharge Recommendations:  [address]       Patient Diagnosis(es): The primary encounter diagnosis was Septicemia [facility]). Diagnoses of Acute encephalopathy, Acute pyelonephritis, and Acute on chronic renal insufficiency were also pertinent to this visit. has a past medical history of Anxiety; CAD (coronary artery disease); Cataract; Chronic kidney disease; Colon polyp; Constipation; COPD (chronic obstructive pulmonary disease) ([address] DDD (degenerative disc disease); Glaucoma; Hyperlipidemia; Hypertension; Irritable bowel syndrome; Low back pain; Obesity; Palliative care patient; PUD (peptic ulcer disease); Type II or unspecified type diabetes mellitus without mention of complication, not stated as uncontrolled; Unspecified sleep apnea; and Venous insufficiency. has a past surgical history that includes Cholecystectomy; Dilation and curettage of uterus; Colonoscopy (2011); and Upper gastrointestinal endoscopy (2011).     Treatment Diagnosis: Sepsis      Restrictions  Restrictions/Precautions  Restrictions/Precautions: Fall Risk, Isolation  Position Activity Restriction  Other position/activity restrictions: C- Diff    Subjective   General  Additional Pertinent Hx: Pt to ED from SNF for AMS   Referring Practitioner: Dr. Mendoza Comment  Diagnosis: Sepsis  Subjective  Subjective: Pt pleasant and cooperative for sesion   Pain Assessment  Patient Currently in Pain: Yes  Pain Assessment: Faces  Pain Level: 8  Pain Type: Acute pain  Pain Intervention(s): Repositioned  Pre Treatment Pain Screening  Pain at present: 8  Scale Used: Numeric Score  Intervention List: Patient able to continue with treatment  Oxygen Therapy  SpO2: 98 %  O2 Device: Nasal cannula  O2 Flow Rate (L/min): 2 L/min     Social/Functional History  Social/Functional History  Type of Home: Facility [facility])  Bathroom Shower/Tub: Walk-in shower  Bathroom Toilet: Handicap height  Active : Yes  Mode of Transportation: Car       Objective   Vision: Within Functional Limits  Hearing: Within functional limits    Orientation  Overall Orientation Status: Within Functional Limits  Observation/Palpation  Posture: Fair  Observation: kyphotic posturing  Balance  Sitting Balance: Contact guard assistance  Standing Balance: Maximum assistance  Standing Balance  Sit to stand: Maximum assistance  Stand to sit: Maximum assistance  Functional Mobility  Functional - Mobility Device: Rolling Walker  Assist Level: Maximum assistance  Functional Mobility Comments: stand and stepped to bedside chair   ADL  UE Bathing: Minimal assistance  LE Bathing: Maximum assistance  UE Dressing: Minimal assistance  LE Dressing: Maximum assistance  Toileting: Maximum assistance        Bed mobility  Supine to Sit: Maximum assistance  Sit to Supine: Maximum assistance  Transfers  Sit to stand: Maximum assistance  Stand to sit: Maximum assistance     Cognition  Overall Cognitive Status: WFL        Sensation  Overall Sensation Status: WFL        LUE AROM (degrees)  LUE AROM : WFL  Left Hand AROM (degrees)  Left Hand AROM: WFL  RUE AROM (degrees)  RUE AROM : WFL  Right Hand AROM (degrees)  Right Hand AROM: WFL  LUE Strength  LUE Strength Comment: 3+/5 all joints/planes of movement   RUE Strength  RUE Strength Comment: 3+/5 all joints/planes of movement        Assessment   Performance deficits / Impairments: Decreased functional mobility ; Decreased ADL status; Decreased balance;Decreased high-level IADLs;Decreased endurance;Decreased ROM  Assessment: Pt benefits from skilled OT to address decreased pt I to complete functional mobility, balance, endurnace, and ADL Tasks   Treatment Diagnosis: Sepsis  Prognosis: Good  Decision Making: Medium Complexity  REQUIRES OT FOLLOW UP: Yes  Treatment Initiated:  Pt Max A bed mobility, pt Max A to stand and step to bedside chair with cues to advance LEs  Activity Tolerance  Activity Tolerance: Patient Tolerated treatment well  Safety Devices  Safety Devices in place: Yes  Type of devices: Call light within reach;Nurse notified; Left in chair         Plan   Plan  Times per week: 3-5x/wk   Current Treatment Recommendations: Balance Training, Safety Education & Training, Equipment Evaluation, Education, & procurement, Self-Care / ADL, Home Management Training, Functional Mobility Training, Endurance Training, Patient/Caregiver Education & Training       Goals  Short term goals  Time Frame for Short term goals: 1 week   Short term goal 1: Pt will be Min A to stand and step to toilet  Short term goal 2: Pt will be Set-up to complete UE bathing/dressing   Patient Goals   Patient goals :  To go home      Electronically signed by Josué Lazo OT on 2/5/2019 at 1:28 PM    Josué Lazo OT

## 2019-02-05 NOTE — DISCHARGE INSTR - COC
Continuity of Care Form    Patient Name: Bernice Mccloud   :  1945  MRN:  [id number]    Admit date:  2/3/2019  Discharge date:  19    Code Status Order: [facility]   Advance Directives:   [facility] Documentation     Date/Time Healthcare Directive Type of Healthcare Directive Copy in [address] Agent's Name Healthcare Agent's Phone Number    19 1203  Yes, patient has an advance directive for healthcare treatment  Living will  No, copy requested from medical records  --  --  --          Admitting Physician:  Cynthia Moseley MD  PCP: Bernardino Sepulveda    Discharging Nurse: Kamaljit Streeter [address] Unit/Room#: 8867/190-86  Discharging Unit Phone Number: [phone]    Emergency Contact:   Extended Emergency Contact Information  Primary Emergency Contact: Sheeba Wallace   [address] Phone: [phone]  Relation: Other    Past Surgical History:  Past Surgical History:   Procedure Laterality Date    CHOLECYSTECTOMY      with Spleen repair    [facility]    DILATION AND CURETTAGE OF UTERUS      x3    UPPER GASTROINTESTINAL ENDOSCOPY  2011    [facility]       Immunization History: There is no immunization history on file for this patient. Active Problems:  Patient Active Problem List   Diagnosis Code    Upper abdominal pain R10.10    Diarrhea R19.7    Change in bowel habits R19.4    History of colon polyps Z86.010    Sepsis [address] [address] A41.9    Acute cystitis with hematuria N30.01    Acute metabolic encephalopathy U54.07    Palliative care patient Z51.5       Isolation/Infection:   Isolation          C Diff Contact            Nurse Assessment:  Last Vital Signs: BP (!) 145/68   Pulse 54   Temp 98.8 °F (37.1 °C) (Temporal)   Resp 18   Ht 5' 7\" (1.702 m)   Wt 176 lb 8 oz (80.1 kg)   SpO2 98%   BMI 27.64 kg/m²     Last documented pain score (0-10 scale): Pain Level: 8  Last Weight:    Wt Discharging to Facility/ Agency   · Name:   · Address:  · Phone:  · Fax:    Dialysis Facility (if applicable)   · Name:  · Address:  · Dialysis Schedule:  · Phone:  · Fax:    / signature: {Esignature:[id number]}    PHYSICIAN SECTION    Prognosis: {Prognosis:6188462381}    Condition at Discharge: Araceli Wheat Patient Condition:121612036}    Rehab Potential (if transferring to Rehab): {Prognosis:3380815078}    Recommended Labs or Other Treatments After Discharge: ***    Physician Certification: I certify the above information and transfer of Felipe Vilchis  is necessary for the continuing treatment of the diagnosis listed and that she requires {Admit to Appropriate Level of Care:71280} for {GREATER/LESS:391574886} 30 days.      Update Admission H&P: {CHP DME Changes in GCZCV:421289203}    PHYSICIAN SIGNATURE:  {Esignature:590413425}

## 2019-02-05 NOTE — CARE COORDINATION
[address] Transitions Interview     2019    Patient: Singh Lynch Patient : 1945   MRN: [id number]  Reason for Admission: No discharge information exists for this patient. RARS: Readmission Risk Score: 17       Spoke with: Singh Lynch        Readmission Risk  Patient Active Problem List   Diagnosis    Upper abdominal pain    Diarrhea    Change in bowel habits    History of colon polyps    Sepsis ([address]    Acute cystitis with hematuria    Acute metabolic encephalopathy    Palliative care patient       Inpatient Assessment  Care Transitions Summary    Care Transitions Inpatient Review  Medication Review  Are you able to afford your medications?:  Yes  How often do you have difficulty taking your medications?:  I always take them as prescribed. Housing Review  Who do you live with?:  Alone  Are you an active caregiver in your home?:  No  Social Support  Do you have a ?:  No  Do you have a [address] [place] [address]?:  Yes  [address] [facility]  [facility]  Patient DME:  Straight cane, Walker, Wheelchair  Functional Review  Ability to seek help/take action for Emergent/Urgent situations i.e. fire, crime, inclement weather or health crisis. :  Dependent  Ability handle personal hygiene needs (bathing/dressing/grooming):  Dependent  Ability to manage medications:  Dependent  Ability to prepare food:  Dependent  Ability to maintain home (clean home, laundry):  Dependent  Ability to drive and/or has transportation:  Dependent  Ability to do shopping:  Dependent  Ability to manage finances:  Dependent  Is patient able to live independently?:  No  Hearing and Vision  Visual Impairment:  Reading glasses  Hearing Impairment:  None  Care Transitions Interventions         Follow Up : Met at bedside with Ms. Sadler, discussed BPCI-A process and presented the CMS Beneficiary Letter. Contact information given.   Gabi Lynn is agreeable with appropriate follow up after discharge. Patient states she lives at [facility], but is unsure how long she has lived there. She states that she has no children, nieces or nephews. She does state that her [employer] family here in [place] do check on her some. She says she is able to help with her bathing but does need some assistance. She states that [facility] does come and see her. (unsure of what home care this is). Patient was informed that [facility] would be following up on her upon return to SNF. Will follow as inpatient and follow up at discharge, as indicated. No future appointments.     Health Maintenance  Health Maintenance Due   Topic Date Due    Lipid screen  05/15/1983    DEXA (modify frequency per FRAX score)  05/15/2008       Cherylene Amend, RN

## 2019-02-05 NOTE — PROGRESS NOTES
Palliative care initiated by notes and RN. Per nurse report pt is confused and pleasant. Pt is up in chair asleep at present time. Pt has state appt. Guardian. Brought to ED d/t elevated temp, AMS. Pt is a DNR  Unable to assess spiritual.  Pt to return to [facility] today per MD note.   Electronically signed by Deana Fernandez RN on 2/5/2019 at 2:09 PM

## 2019-02-08 LAB
BLOOD CULTURE, ROUTINE: NORMAL
CULTURE, BLOOD 2: NORMAL

## 2019-02-08 NOTE — CARE COORDINATION
Call placed to [facility] regarding condition of patient when she arrived at the ED (2 briefs on as well as a pad and all were wet from urine). It was noted that the patient had no signs of skin breakdown when she arrived. SÁNCHEZ Ruffin reports that the patient prefers and requests to have 2 incontinent briefs on at all times. She did apologize that the patient had not been cleaned up prior to her leaving the facility, stating that they should have checked her. No further issues or concerns noted at this time.   Electronically signed by Dalia Ahumada RN on 2/8/2019 at 3:13 PM

## 2019-11-22 ENCOUNTER — LAB REQUISITION (OUTPATIENT)
Dept: LAB | Facility: HOSPITAL | Age: 74
End: 2019-11-22

## 2019-11-22 DIAGNOSIS — Z00.00 ROUTINE GENERAL MEDICAL EXAMINATION AT A HEALTH CARE FACILITY: ICD-10-CM

## 2019-11-22 LAB
ANION GAP SERPL CALCULATED.3IONS-SCNC: 13 MMOL/L (ref 5–15)
BASOPHILS # BLD AUTO: 0.03 10*3/MM3 (ref 0–0.2)
BASOPHILS NFR BLD AUTO: 0.3 % (ref 0–1.5)
BUN BLD-MCNC: 70 MG/DL (ref 8–23)
BUN/CREAT SERPL: 21.9 (ref 7–25)
CALCIUM SPEC-SCNC: 9.3 MG/DL (ref 8.6–10.5)
CHLORIDE SERPL-SCNC: 113 MMOL/L (ref 98–107)
CO2 SERPL-SCNC: 17 MMOL/L (ref 22–29)
CREAT BLD-MCNC: 3.2 MG/DL (ref 0.57–1)
DEPRECATED RDW RBC AUTO: 42.7 FL (ref 37–54)
EOSINOPHIL # BLD AUTO: 0.27 10*3/MM3 (ref 0–0.4)
EOSINOPHIL NFR BLD AUTO: 2.8 % (ref 0.3–6.2)
ERYTHROCYTE [DISTWIDTH] IN BLOOD BY AUTOMATED COUNT: 12.9 % (ref 12.3–15.4)
GFR SERPL CREATININE-BSD FRML MDRD: 14 ML/MIN/1.73
GFR SERPL CREATININE-BSD FRML MDRD: ABNORMAL ML/MIN/{1.73_M2}
GLUCOSE BLD-MCNC: 155 MG/DL (ref 65–99)
HCT VFR BLD AUTO: 30.1 % (ref 34–46.6)
HGB BLD-MCNC: 9.6 G/DL (ref 12–15.9)
IMM GRANULOCYTES # BLD AUTO: 0.03 10*3/MM3 (ref 0–0.05)
IMM GRANULOCYTES NFR BLD AUTO: 0.3 % (ref 0–0.5)
LYMPHOCYTES # BLD AUTO: 1.58 10*3/MM3 (ref 0.7–3.1)
LYMPHOCYTES NFR BLD AUTO: 16.4 % (ref 19.6–45.3)
MCH RBC QN AUTO: 29.1 PG (ref 26.6–33)
MCHC RBC AUTO-ENTMCNC: 31.9 G/DL (ref 31.5–35.7)
MCV RBC AUTO: 91.2 FL (ref 79–97)
MONOCYTES # BLD AUTO: 0.92 10*3/MM3 (ref 0.1–0.9)
MONOCYTES NFR BLD AUTO: 9.6 % (ref 5–12)
NEUTROPHILS # BLD AUTO: 6.79 10*3/MM3 (ref 1.7–7)
NEUTROPHILS NFR BLD AUTO: 70.6 % (ref 42.7–76)
NRBC BLD AUTO-RTO: 0 /100 WBC (ref 0–0.2)
PLATELET # BLD AUTO: 214 10*3/MM3 (ref 140–450)
PMV BLD AUTO: 10 FL (ref 6–12)
POTASSIUM BLD-SCNC: 5.4 MMOL/L (ref 3.5–5.2)
RBC # BLD AUTO: 3.3 10*6/MM3 (ref 3.77–5.28)
SODIUM BLD-SCNC: 143 MMOL/L (ref 136–145)
WBC NRBC COR # BLD: 9.62 10*3/MM3 (ref 3.4–10.8)

## 2019-11-22 PROCEDURE — 85025 COMPLETE CBC W/AUTO DIFF WBC: CPT

## 2019-11-22 PROCEDURE — 80048 BASIC METABOLIC PNL TOTAL CA: CPT

## 2019-12-14 ENCOUNTER — LAB REQUISITION (OUTPATIENT)
Dept: LAB | Facility: HOSPITAL | Age: 74
End: 2019-12-14

## 2019-12-14 DIAGNOSIS — Z00.00 ROUTINE GENERAL MEDICAL EXAMINATION AT A HEALTH CARE FACILITY: ICD-10-CM

## 2019-12-14 LAB
ALBUMIN SERPL-MCNC: 4.1 G/DL (ref 3.5–5.2)
ALBUMIN/GLOB SERPL: 1.6 G/DL
ALP SERPL-CCNC: 62 U/L (ref 39–117)
ALT SERPL W P-5'-P-CCNC: 12 U/L (ref 1–33)
ANION GAP SERPL CALCULATED.3IONS-SCNC: 14 MMOL/L (ref 5–15)
AST SERPL-CCNC: 17 U/L (ref 1–32)
BASOPHILS # BLD AUTO: 0.05 10*3/MM3 (ref 0–0.2)
BASOPHILS NFR BLD AUTO: 0.8 % (ref 0–1.5)
BILIRUB SERPL-MCNC: <0.2 MG/DL (ref 0.2–1.2)
BUN BLD-MCNC: 51 MG/DL (ref 8–23)
BUN/CREAT SERPL: 19.9 (ref 7–25)
CALCIUM SPEC-SCNC: 9.4 MG/DL (ref 8.6–10.5)
CHLORIDE SERPL-SCNC: 108 MMOL/L (ref 98–107)
CO2 SERPL-SCNC: 17 MMOL/L (ref 22–29)
CREAT BLD-MCNC: 2.56 MG/DL (ref 0.57–1)
DEPRECATED RDW RBC AUTO: 44.3 FL (ref 37–54)
EOSINOPHIL # BLD AUTO: 0.38 10*3/MM3 (ref 0–0.4)
EOSINOPHIL NFR BLD AUTO: 6.3 % (ref 0.3–6.2)
ERYTHROCYTE [DISTWIDTH] IN BLOOD BY AUTOMATED COUNT: 13.2 % (ref 12.3–15.4)
GFR SERPL CREATININE-BSD FRML MDRD: 18 ML/MIN/1.73
GLOBULIN UR ELPH-MCNC: 2.5 GM/DL
GLUCOSE BLD-MCNC: 192 MG/DL (ref 65–99)
HCT VFR BLD AUTO: 30.6 % (ref 34–46.6)
HGB BLD-MCNC: 9.8 G/DL (ref 12–15.9)
IMM GRANULOCYTES # BLD AUTO: 0.02 10*3/MM3 (ref 0–0.05)
IMM GRANULOCYTES NFR BLD AUTO: 0.3 % (ref 0–0.5)
LYMPHOCYTES # BLD AUTO: 1.85 10*3/MM3 (ref 0.7–3.1)
LYMPHOCYTES NFR BLD AUTO: 30.7 % (ref 19.6–45.3)
MCH RBC QN AUTO: 29.6 PG (ref 26.6–33)
MCHC RBC AUTO-ENTMCNC: 32 G/DL (ref 31.5–35.7)
MCV RBC AUTO: 92.4 FL (ref 79–97)
MONOCYTES # BLD AUTO: 0.63 10*3/MM3 (ref 0.1–0.9)
MONOCYTES NFR BLD AUTO: 10.5 % (ref 5–12)
NEUTROPHILS # BLD AUTO: 3.09 10*3/MM3 (ref 1.7–7)
NEUTROPHILS NFR BLD AUTO: 51.4 % (ref 42.7–76)
NRBC BLD AUTO-RTO: 0 /100 WBC (ref 0–0.2)
PLATELET # BLD AUTO: 214 10*3/MM3 (ref 140–450)
PMV BLD AUTO: 10 FL (ref 6–12)
POTASSIUM BLD-SCNC: 5.5 MMOL/L (ref 3.5–5.2)
PROT SERPL-MCNC: 6.6 G/DL (ref 6–8.5)
RBC # BLD AUTO: 3.31 10*6/MM3 (ref 3.77–5.28)
SODIUM BLD-SCNC: 139 MMOL/L (ref 136–145)
WBC NRBC COR # BLD: 6.02 10*3/MM3 (ref 3.4–10.8)

## 2019-12-14 PROCEDURE — 85025 COMPLETE CBC W/AUTO DIFF WBC: CPT

## 2019-12-14 PROCEDURE — 80053 COMPREHEN METABOLIC PANEL: CPT

## 2019-12-16 ENCOUNTER — HOSPITAL ENCOUNTER (INPATIENT)
Facility: HOSPITAL | Age: 74
LOS: 6 days | Discharge: NURSING, REHAB OR LONG-TERM CARE | End: 2019-12-22
Attending: EMERGENCY MEDICINE | Admitting: FAMILY MEDICINE

## 2019-12-16 ENCOUNTER — APPOINTMENT (OUTPATIENT)
Dept: GENERAL RADIOLOGY | Facility: HOSPITAL | Age: 74
End: 2019-12-16

## 2019-12-16 ENCOUNTER — APPOINTMENT (OUTPATIENT)
Dept: NEUROLOGY | Facility: HOSPITAL | Age: 74
End: 2019-12-16

## 2019-12-16 ENCOUNTER — APPOINTMENT (OUTPATIENT)
Dept: CT IMAGING | Facility: HOSPITAL | Age: 74
End: 2019-12-16

## 2019-12-16 DIAGNOSIS — Z78.9 IMPAIRED MOBILITY AND ADLS: ICD-10-CM

## 2019-12-16 DIAGNOSIS — N39.0 ACUTE UTI (URINARY TRACT INFECTION): ICD-10-CM

## 2019-12-16 DIAGNOSIS — R65.20 SEVERE SEPSIS (HCC): ICD-10-CM

## 2019-12-16 DIAGNOSIS — E87.20 METABOLIC ACIDOSIS: ICD-10-CM

## 2019-12-16 DIAGNOSIS — R13.10 DYSPHAGIA, UNSPECIFIED TYPE: ICD-10-CM

## 2019-12-16 DIAGNOSIS — Z74.09 IMPAIRED FUNCTIONAL MOBILITY AND ACTIVITY TOLERANCE: ICD-10-CM

## 2019-12-16 DIAGNOSIS — Z74.09 IMPAIRED MOBILITY AND ADLS: ICD-10-CM

## 2019-12-16 DIAGNOSIS — N18.9 CHRONIC KIDNEY DISEASE, UNSPECIFIED CKD STAGE: ICD-10-CM

## 2019-12-16 DIAGNOSIS — R41.82 ALTERED MENTAL STATUS, UNSPECIFIED ALTERED MENTAL STATUS TYPE: Primary | ICD-10-CM

## 2019-12-16 DIAGNOSIS — A41.9 SEVERE SEPSIS (HCC): ICD-10-CM

## 2019-12-16 LAB
ALBUMIN SERPL-MCNC: 4.2 G/DL (ref 3.5–5.2)
ALBUMIN/GLOB SERPL: 1.4 G/DL
ALP SERPL-CCNC: 67 U/L (ref 39–117)
ALT SERPL W P-5'-P-CCNC: 20 U/L (ref 1–33)
AMORPH URATE CRY URNS QL MICRO: ABNORMAL /HPF
AMPHET+METHAMPHET UR QL: NEGATIVE
AMPHETAMINES UR QL: NEGATIVE
ANION GAP SERPL CALCULATED.3IONS-SCNC: 14 MMOL/L (ref 5–15)
APTT PPP: 29.4 SECONDS (ref 24.1–35)
ARTERIAL PATENCY WRIST A: ABNORMAL
ARTERIAL PATENCY WRIST A: ABNORMAL
AST SERPL-CCNC: 32 U/L (ref 1–32)
ATMOSPHERIC PRESS: 748 MMHG
ATMOSPHERIC PRESS: 750 MMHG
BACTERIA UR QL AUTO: ABNORMAL /HPF
BARBITURATES UR QL SCN: NEGATIVE
BASE EXCESS BLDA CALC-SCNC: -10.3 MMOL/L (ref 0–2)
BASE EXCESS BLDA CALC-SCNC: -9 MMOL/L (ref 0–2)
BASOPHILS # BLD AUTO: 0.04 10*3/MM3 (ref 0–0.2)
BASOPHILS NFR BLD AUTO: 0.3 % (ref 0–1.5)
BDY SITE: ABNORMAL
BDY SITE: ABNORMAL
BENZODIAZ UR QL SCN: NEGATIVE
BILIRUB SERPL-MCNC: 0.2 MG/DL (ref 0.2–1.2)
BILIRUB UR QL STRIP: NEGATIVE
BODY TEMPERATURE: 37 C
BODY TEMPERATURE: 37 C
BUN BLD-MCNC: 51 MG/DL (ref 8–23)
BUN/CREAT SERPL: 20.1 (ref 7–25)
BUPRENORPHINE SERPL-MCNC: NEGATIVE NG/ML
CALCIUM SPEC-SCNC: 9.5 MG/DL (ref 8.6–10.5)
CANNABINOIDS SERPL QL: NEGATIVE
CHLORIDE SERPL-SCNC: 110 MMOL/L (ref 98–107)
CLARITY UR: ABNORMAL
CO2 SERPL-SCNC: 19 MMOL/L (ref 22–29)
COCAINE UR QL: NEGATIVE
COLOR UR: YELLOW
CREAT BLD-MCNC: 2.54 MG/DL (ref 0.57–1)
D-LACTATE SERPL-SCNC: 1.8 MMOL/L (ref 0.5–2)
DEPRECATED RDW RBC AUTO: 42.3 FL (ref 37–54)
EOSINOPHIL # BLD AUTO: 0.12 10*3/MM3 (ref 0–0.4)
EOSINOPHIL NFR BLD AUTO: 0.8 % (ref 0.3–6.2)
ERYTHROCYTE [DISTWIDTH] IN BLOOD BY AUTOMATED COUNT: 13.1 % (ref 12.3–15.4)
GAS FLOW AIRWAY: 15 LPM
GFR SERPL CREATININE-BSD FRML MDRD: 18 ML/MIN/1.73
GLOBULIN UR ELPH-MCNC: 3.1 GM/DL
GLUCOSE BLD-MCNC: 160 MG/DL (ref 65–99)
GLUCOSE BLDC GLUCOMTR-MCNC: 261 MG/DL (ref 70–130)
GLUCOSE UR STRIP-MCNC: NEGATIVE MG/DL
HCO3 BLDA-SCNC: 16.8 MMOL/L (ref 20–26)
HCO3 BLDA-SCNC: 17.3 MMOL/L (ref 20–26)
HCT VFR BLD AUTO: 31.6 % (ref 34–46.6)
HGB BLD-MCNC: 10.3 G/DL (ref 12–15.9)
HGB UR QL STRIP.AUTO: ABNORMAL
HOROWITZ INDEX BLD+IHG-RTO: 100 %
HYALINE CASTS UR QL AUTO: ABNORMAL /LPF
IMM GRANULOCYTES # BLD AUTO: 0.07 10*3/MM3 (ref 0–0.05)
IMM GRANULOCYTES NFR BLD AUTO: 0.5 % (ref 0–0.5)
INR PPP: 1.01 (ref 0.91–1.09)
KETONES UR QL STRIP: NEGATIVE
LEUKOCYTE ESTERASE UR QL STRIP.AUTO: ABNORMAL
LYMPHOCYTES # BLD AUTO: 0.87 10*3/MM3 (ref 0.7–3.1)
LYMPHOCYTES NFR BLD AUTO: 6.1 % (ref 19.6–45.3)
Lab: ABNORMAL
MAGNESIUM SERPL-MCNC: 1.6 MG/DL (ref 1.6–2.4)
MCH RBC QN AUTO: 28.9 PG (ref 26.6–33)
MCHC RBC AUTO-ENTMCNC: 32.6 G/DL (ref 31.5–35.7)
MCV RBC AUTO: 88.5 FL (ref 79–97)
METHADONE UR QL SCN: NEGATIVE
MODALITY: ABNORMAL
MODALITY: ABNORMAL
MONOCYTES # BLD AUTO: 0.93 10*3/MM3 (ref 0.1–0.9)
MONOCYTES NFR BLD AUTO: 6.5 % (ref 5–12)
NEUTROPHILS # BLD AUTO: 12.29 10*3/MM3 (ref 1.7–7)
NEUTROPHILS NFR BLD AUTO: 85.8 % (ref 42.7–76)
NITRITE UR QL STRIP: POSITIVE
NOTIFIED BY: ABNORMAL
NOTIFIED WHO: ABNORMAL
NRBC BLD AUTO-RTO: 0 /100 WBC (ref 0–0.2)
NT-PROBNP SERPL-MCNC: 4790 PG/ML (ref 5–1800)
OPIATES UR QL: NEGATIVE
OXYCODONE UR QL SCN: NEGATIVE
PCO2 BLDA: 38.2 MM HG (ref 35–45)
PCO2 BLDA: 41.2 MM HG (ref 35–45)
PCP UR QL SCN: NEGATIVE
PH BLDA: 7.22 PH UNITS (ref 7.35–7.45)
PH BLDA: 7.27 PH UNITS (ref 7.35–7.45)
PH UR STRIP.AUTO: 6 [PH] (ref 5–8)
PLATELET # BLD AUTO: 215 10*3/MM3 (ref 140–450)
PMV BLD AUTO: 9.9 FL (ref 6–12)
PO2 BLDA: 317 MM HG (ref 83–108)
PO2 BLDA: 65.5 MM HG (ref 83–108)
POTASSIUM BLD-SCNC: 4.8 MMOL/L (ref 3.5–5.2)
PROCALCITONIN SERPL-MCNC: 0.22 NG/ML (ref 0.1–0.25)
PROPOXYPH UR QL: NEGATIVE
PROT SERPL-MCNC: 7.3 G/DL (ref 6–8.5)
PROT UR QL STRIP: ABNORMAL
PROTHROMBIN TIME: 13.6 SECONDS (ref 11.9–14.6)
RBC # BLD AUTO: 3.57 10*6/MM3 (ref 3.77–5.28)
RBC # UR: ABNORMAL /HPF
REF LAB TEST METHOD: ABNORMAL
SAO2 % BLDCOA: 100 % (ref 94–99)
SAO2 % BLDCOA: 93.9 % (ref 94–99)
SODIUM BLD-SCNC: 143 MMOL/L (ref 136–145)
SP GR UR STRIP: 1.02 (ref 1–1.03)
SQUAMOUS #/AREA URNS HPF: ABNORMAL /HPF
T4 FREE SERPL-MCNC: 0.86 NG/DL (ref 0.93–1.7)
TRICYCLICS UR QL SCN: NEGATIVE
TROPONIN T SERPL-MCNC: 0.06 NG/ML (ref 0–0.03)
TSH SERPL DL<=0.05 MIU/L-ACNC: 3.02 UIU/ML (ref 0.27–4.2)
UROBILINOGEN UR QL STRIP: ABNORMAL
VENTILATOR MODE: ABNORMAL
VENTILATOR MODE: ABNORMAL
WBC NRBC COR # BLD: 14.32 10*3/MM3 (ref 3.4–10.8)
WBC UR QL AUTO: ABNORMAL /HPF

## 2019-12-16 PROCEDURE — 87205 SMEAR GRAM STAIN: CPT | Performed by: FAMILY MEDICINE

## 2019-12-16 PROCEDURE — 99285 EMERGENCY DEPT VISIT HI MDM: CPT

## 2019-12-16 PROCEDURE — 81001 URINALYSIS AUTO W/SCOPE: CPT | Performed by: EMERGENCY MEDICINE

## 2019-12-16 PROCEDURE — 36600 WITHDRAWAL OF ARTERIAL BLOOD: CPT

## 2019-12-16 PROCEDURE — 70450 CT HEAD/BRAIN W/O DYE: CPT

## 2019-12-16 PROCEDURE — 94799 UNLISTED PULMONARY SVC/PX: CPT

## 2019-12-16 PROCEDURE — 84439 ASSAY OF FREE THYROXINE: CPT | Performed by: EMERGENCY MEDICINE

## 2019-12-16 PROCEDURE — 83735 ASSAY OF MAGNESIUM: CPT | Performed by: EMERGENCY MEDICINE

## 2019-12-16 PROCEDURE — 87040 BLOOD CULTURE FOR BACTERIA: CPT | Performed by: EMERGENCY MEDICINE

## 2019-12-16 PROCEDURE — 94760 N-INVAS EAR/PLS OXIMETRY 1: CPT

## 2019-12-16 PROCEDURE — 95816 EEG AWAKE AND DROWSY: CPT | Performed by: PSYCHIATRY & NEUROLOGY

## 2019-12-16 PROCEDURE — 83605 ASSAY OF LACTIC ACID: CPT | Performed by: EMERGENCY MEDICINE

## 2019-12-16 PROCEDURE — 84443 ASSAY THYROID STIM HORMONE: CPT | Performed by: EMERGENCY MEDICINE

## 2019-12-16 PROCEDURE — 83880 ASSAY OF NATRIURETIC PEPTIDE: CPT | Performed by: EMERGENCY MEDICINE

## 2019-12-16 PROCEDURE — 93010 ELECTROCARDIOGRAM REPORT: CPT | Performed by: INTERNAL MEDICINE

## 2019-12-16 PROCEDURE — 99291 CRITICAL CARE FIRST HOUR: CPT | Performed by: PSYCHIATRY & NEUROLOGY

## 2019-12-16 PROCEDURE — 25010000002 VANCOMYCIN: Performed by: EMERGENCY MEDICINE

## 2019-12-16 PROCEDURE — 25010000002 LEVETIRACETAM IN NACL 0.82% 500 MG/100ML SOLUTION: Performed by: PSYCHIATRY & NEUROLOGY

## 2019-12-16 PROCEDURE — 25010000002 METHYLPREDNISOLONE PER 125 MG: Performed by: EMERGENCY MEDICINE

## 2019-12-16 PROCEDURE — 87186 SC STD MICRODIL/AGAR DIL: CPT | Performed by: FAMILY MEDICINE

## 2019-12-16 PROCEDURE — 71045 X-RAY EXAM CHEST 1 VIEW: CPT

## 2019-12-16 PROCEDURE — 74018 RADEX ABDOMEN 1 VIEW: CPT

## 2019-12-16 PROCEDURE — 84145 PROCALCITONIN (PCT): CPT | Performed by: EMERGENCY MEDICINE

## 2019-12-16 PROCEDURE — 85730 THROMBOPLASTIN TIME PARTIAL: CPT | Performed by: EMERGENCY MEDICINE

## 2019-12-16 PROCEDURE — 93005 ELECTROCARDIOGRAM TRACING: CPT | Performed by: EMERGENCY MEDICINE

## 2019-12-16 PROCEDURE — 82803 BLOOD GASES ANY COMBINATION: CPT

## 2019-12-16 PROCEDURE — 25010000002 PIPERACILLIN SOD-TAZOBACTAM PER 1 G: Performed by: EMERGENCY MEDICINE

## 2019-12-16 PROCEDURE — 82962 GLUCOSE BLOOD TEST: CPT

## 2019-12-16 PROCEDURE — 84484 ASSAY OF TROPONIN QUANT: CPT | Performed by: EMERGENCY MEDICINE

## 2019-12-16 PROCEDURE — 87086 URINE CULTURE/COLONY COUNT: CPT | Performed by: EMERGENCY MEDICINE

## 2019-12-16 PROCEDURE — 80307 DRUG TEST PRSMV CHEM ANLYZR: CPT | Performed by: EMERGENCY MEDICINE

## 2019-12-16 PROCEDURE — 87147 CULTURE TYPE IMMUNOLOGIC: CPT | Performed by: FAMILY MEDICINE

## 2019-12-16 PROCEDURE — 25010000002 MAGNESIUM SULFATE IN D5W 1G/100ML (PREMIX) 1-5 GM/100ML-% SOLUTION: Performed by: PSYCHIATRY & NEUROLOGY

## 2019-12-16 PROCEDURE — 85025 COMPLETE CBC W/AUTO DIFF WBC: CPT | Performed by: EMERGENCY MEDICINE

## 2019-12-16 PROCEDURE — 25010000002 CEFTRIAXONE PER 250 MG: Performed by: FAMILY MEDICINE

## 2019-12-16 PROCEDURE — 63710000001 INSULIN LISPRO (HUMAN) PER 5 UNITS: Performed by: INTERNAL MEDICINE

## 2019-12-16 PROCEDURE — P9612 CATHETERIZE FOR URINE SPEC: HCPCS

## 2019-12-16 PROCEDURE — 85610 PROTHROMBIN TIME: CPT | Performed by: EMERGENCY MEDICINE

## 2019-12-16 PROCEDURE — 25010000002 NALOXONE PER 1 MG: Performed by: EMERGENCY MEDICINE

## 2019-12-16 PROCEDURE — 25010000002 KETOROLAC TROMETHAMINE PER 15 MG: Performed by: EMERGENCY MEDICINE

## 2019-12-16 PROCEDURE — 87070 CULTURE OTHR SPECIMN AEROBIC: CPT | Performed by: FAMILY MEDICINE

## 2019-12-16 PROCEDURE — 95816 EEG AWAKE AND DROWSY: CPT

## 2019-12-16 PROCEDURE — 80053 COMPREHEN METABOLIC PANEL: CPT | Performed by: EMERGENCY MEDICINE

## 2019-12-16 RX ORDER — LEVETIRACETAM 5 MG/ML
500 INJECTION INTRAVASCULAR EVERY 12 HOURS SCHEDULED
Status: DISCONTINUED | OUTPATIENT
Start: 2019-12-16 | End: 2019-12-18

## 2019-12-16 RX ORDER — NOREPINEPHRINE BITARTRATE 1 MG/ML
INJECTION, SOLUTION INTRAVENOUS
Status: DISCONTINUED
Start: 2019-12-16 | End: 2019-12-16 | Stop reason: WASHOUT

## 2019-12-16 RX ORDER — ACETAMINOPHEN 325 MG/1
650 TABLET ORAL EVERY 6 HOURS PRN
COMMUNITY

## 2019-12-16 RX ORDER — DOCUSATE SODIUM 100 MG/1
100 CAPSULE, LIQUID FILLED ORAL EVERY MORNING
COMMUNITY
End: 2022-11-11 | Stop reason: HOSPADM

## 2019-12-16 RX ORDER — DEXTROSE MONOHYDRATE 25 G/50ML
25 INJECTION, SOLUTION INTRAVENOUS
Status: DISCONTINUED | OUTPATIENT
Start: 2019-12-16 | End: 2019-12-22 | Stop reason: HOSPADM

## 2019-12-16 RX ORDER — TRIAMCINOLONE ACETONIDE 55 UG/1
1 SPRAY, METERED NASAL DAILY
COMMUNITY
End: 2022-11-11 | Stop reason: HOSPADM

## 2019-12-16 RX ORDER — IPRATROPIUM BROMIDE AND ALBUTEROL SULFATE 2.5; .5 MG/3ML; MG/3ML
3 SOLUTION RESPIRATORY (INHALATION) DAILY PRN
COMMUNITY
End: 2022-11-11 | Stop reason: HOSPADM

## 2019-12-16 RX ORDER — MULTIVIT WITH MINERALS/LUTEIN
1000 TABLET ORAL DAILY
COMMUNITY
End: 2022-11-11 | Stop reason: HOSPADM

## 2019-12-16 RX ORDER — LEVETIRACETAM 5 MG/ML
500 INJECTION INTRAVASCULAR EVERY 12 HOURS SCHEDULED
Status: DISCONTINUED | OUTPATIENT
Start: 2019-12-16 | End: 2019-12-16

## 2019-12-16 RX ORDER — KETOROLAC TROMETHAMINE 15 MG/ML
15 INJECTION, SOLUTION INTRAMUSCULAR; INTRAVENOUS ONCE
Status: COMPLETED | OUTPATIENT
Start: 2019-12-16 | End: 2019-12-16

## 2019-12-16 RX ORDER — NITROGLYCERIN 0.4 MG/1
0.4 TABLET SUBLINGUAL
COMMUNITY

## 2019-12-16 RX ORDER — GUAIFENESIN 600 MG/1
600 TABLET, EXTENDED RELEASE ORAL 2 TIMES DAILY
COMMUNITY
End: 2022-11-11 | Stop reason: HOSPADM

## 2019-12-16 RX ORDER — CLOPIDOGREL BISULFATE 75 MG/1
75 TABLET ORAL DAILY
Status: CANCELLED | OUTPATIENT
Start: 2019-12-16

## 2019-12-16 RX ORDER — NICOTINE POLACRILEX 4 MG
15 LOZENGE BUCCAL
Status: DISCONTINUED | OUTPATIENT
Start: 2019-12-16 | End: 2019-12-22 | Stop reason: HOSPADM

## 2019-12-16 RX ORDER — ERGOCALCIFEROL 1.25 MG/1
50000 CAPSULE ORAL WEEKLY
COMMUNITY
End: 2022-11-11 | Stop reason: HOSPADM

## 2019-12-16 RX ORDER — MAGNESIUM SULFATE 1 G/100ML
1 INJECTION INTRAVENOUS ONCE
Status: COMPLETED | OUTPATIENT
Start: 2019-12-16 | End: 2019-12-16

## 2019-12-16 RX ORDER — TRAMADOL HYDROCHLORIDE 50 MG/1
50 TABLET ORAL EVERY 6 HOURS PRN
Status: ON HOLD | COMMUNITY
End: 2019-12-22 | Stop reason: SDUPTHER

## 2019-12-16 RX ORDER — CETIRIZINE HYDROCHLORIDE 10 MG/1
10 TABLET ORAL DAILY
COMMUNITY
End: 2022-11-11 | Stop reason: HOSPADM

## 2019-12-16 RX ORDER — CRANBERRY FRUIT EXTRACT 200 MG
200 CAPSULE ORAL EVERY MORNING
COMMUNITY
End: 2022-11-11 | Stop reason: HOSPADM

## 2019-12-16 RX ORDER — ATROPINE SULFATE 10 MG/ML
1 SOLUTION/ DROPS OPHTHALMIC 3 TIMES DAILY
Status: DISCONTINUED | OUTPATIENT
Start: 2019-12-16 | End: 2019-12-16

## 2019-12-16 RX ORDER — POTASSIUM CHLORIDE 750 MG/1
10 TABLET, EXTENDED RELEASE ORAL EVERY MORNING
COMMUNITY
End: 2022-11-11 | Stop reason: HOSPADM

## 2019-12-16 RX ORDER — CYCLOSPORINE 0.5 MG/ML
1 EMULSION OPHTHALMIC EVERY 12 HOURS
Status: CANCELLED | OUTPATIENT
Start: 2019-12-16

## 2019-12-16 RX ORDER — ATORVASTATIN CALCIUM 10 MG/1
10 TABLET, FILM COATED ORAL DAILY
Status: CANCELLED | OUTPATIENT
Start: 2019-12-16

## 2019-12-16 RX ORDER — SCOLOPAMINE TRANSDERMAL SYSTEM 1 MG/1
1 PATCH, EXTENDED RELEASE TRANSDERMAL
Status: DISCONTINUED | OUTPATIENT
Start: 2019-12-16 | End: 2019-12-22 | Stop reason: HOSPADM

## 2019-12-16 RX ORDER — PANTOPRAZOLE SODIUM 40 MG/1
40 TABLET, DELAYED RELEASE ORAL DAILY
Status: CANCELLED | OUTPATIENT
Start: 2019-12-16

## 2019-12-16 RX ORDER — PANTOPRAZOLE SODIUM 40 MG/1
40 TABLET, DELAYED RELEASE ORAL 2 TIMES DAILY
COMMUNITY
End: 2022-11-11 | Stop reason: HOSPADM

## 2019-12-16 RX ORDER — TRIAMCINOLONE ACETONIDE 1 MG/G
1 CREAM TOPICAL 3 TIMES DAILY PRN
COMMUNITY
End: 2022-11-11 | Stop reason: HOSPADM

## 2019-12-16 RX ORDER — METHYLPREDNISOLONE SODIUM SUCCINATE 125 MG/2ML
125 INJECTION, POWDER, LYOPHILIZED, FOR SOLUTION INTRAMUSCULAR; INTRAVENOUS ONCE
Status: COMPLETED | OUTPATIENT
Start: 2019-12-16 | End: 2019-12-16

## 2019-12-16 RX ORDER — MULTIVITAMIN WITH FOLIC ACID 400 MCG
1 TABLET ORAL EVERY MORNING
COMMUNITY

## 2019-12-16 RX ORDER — GABAPENTIN 400 MG/1
400 CAPSULE ORAL 3 TIMES DAILY
COMMUNITY
End: 2019-12-22 | Stop reason: HOSPADM

## 2019-12-16 RX ORDER — NYSTATIN 100000 [USP'U]/G
1 POWDER TOPICAL EVERY 12 HOURS SCHEDULED
Status: DISCONTINUED | OUTPATIENT
Start: 2019-12-16 | End: 2019-12-22 | Stop reason: HOSPADM

## 2019-12-16 RX ORDER — SODIUM CHLORIDE 9 MG/ML
75 INJECTION, SOLUTION INTRAVENOUS CONTINUOUS
Status: DISCONTINUED | OUTPATIENT
Start: 2019-12-16 | End: 2019-12-18

## 2019-12-16 RX ORDER — ATROPINE SULFATE 10 MG/ML
1 SOLUTION/ DROPS OPHTHALMIC 2 TIMES DAILY PRN
Status: DISCONTINUED | OUTPATIENT
Start: 2019-12-16 | End: 2019-12-22 | Stop reason: HOSPADM

## 2019-12-16 RX ORDER — FERROUS SULFATE 325(65) MG
325 TABLET ORAL
COMMUNITY

## 2019-12-16 RX ORDER — NALOXONE HCL 0.4 MG/ML
0.4 VIAL (ML) INJECTION ONCE
Status: COMPLETED | OUTPATIENT
Start: 2019-12-16 | End: 2019-12-16

## 2019-12-16 RX ORDER — NYSTATIN 100000 [USP'U]/G
1 POWDER TOPICAL 2 TIMES DAILY PRN
COMMUNITY
End: 2022-11-11 | Stop reason: HOSPADM

## 2019-12-16 RX ORDER — POLYETHYLENE GLYCOL 3350 17 G/17G
17 POWDER, FOR SOLUTION ORAL DAILY PRN
COMMUNITY

## 2019-12-16 RX ORDER — ASCORBIC ACID 500 MG
500 TABLET ORAL DAILY
COMMUNITY
End: 2022-11-11 | Stop reason: HOSPADM

## 2019-12-16 RX ORDER — SERTRALINE HYDROCHLORIDE 25 MG/1
25 TABLET, FILM COATED ORAL EVERY MORNING
COMMUNITY
End: 2022-11-11 | Stop reason: HOSPADM

## 2019-12-16 RX ADMIN — LEVETIRACETAM 500 MG: 5 INJECTION INTRAVENOUS at 17:21

## 2019-12-16 RX ADMIN — SCOPOLAMINE 1 PATCH: 1 PATCH, EXTENDED RELEASE TRANSDERMAL at 21:47

## 2019-12-16 RX ADMIN — INSULIN LISPRO 4 UNITS: 100 INJECTION, SOLUTION INTRAVENOUS; SUBCUTANEOUS at 21:47

## 2019-12-16 RX ADMIN — PIPERACILLIN AND TAZOBACTAM 4.5 G: 4; .5 INJECTION, POWDER, LYOPHILIZED, FOR SOLUTION INTRAVENOUS; PARENTERAL at 12:56

## 2019-12-16 RX ADMIN — ATROPINE SULFATE 1 DROP: 10 SOLUTION/ DROPS OPHTHALMIC at 22:40

## 2019-12-16 RX ADMIN — NYSTATIN 1 APPLICATION: 100000 POWDER TOPICAL at 21:47

## 2019-12-16 RX ADMIN — VANCOMYCIN HYDROCHLORIDE 1000 MG: 1 INJECTION, POWDER, LYOPHILIZED, FOR SOLUTION INTRAVENOUS at 14:09

## 2019-12-16 RX ADMIN — SODIUM CHLORIDE 1000 ML: 9 INJECTION, SOLUTION INTRAVENOUS at 12:52

## 2019-12-16 RX ADMIN — NALOXONE HYDROCHLORIDE 0.4 MG: 0.4 INJECTION, SOLUTION INTRAMUSCULAR; INTRAVENOUS; SUBCUTANEOUS at 12:41

## 2019-12-16 RX ADMIN — CEFTRIAXONE SODIUM 1 G: 1 INJECTION, POWDER, FOR SOLUTION INTRAMUSCULAR; INTRAVENOUS at 17:21

## 2019-12-16 RX ADMIN — KETOROLAC TROMETHAMINE 15 MG: 15 INJECTION, SOLUTION INTRAMUSCULAR; INTRAVENOUS at 14:10

## 2019-12-16 RX ADMIN — SODIUM CHLORIDE 75 ML/HR: 9 INJECTION, SOLUTION INTRAVENOUS at 17:21

## 2019-12-16 RX ADMIN — ACETAMINOPHEN 650 MG: 325 SUPPOSITORY RECTAL at 13:29

## 2019-12-16 RX ADMIN — SODIUM CHLORIDE 1000 ML: 9 INJECTION, SOLUTION INTRAVENOUS at 14:42

## 2019-12-16 RX ADMIN — MAGNESIUM SULFATE 1 G: 1 INJECTION INTRAVENOUS at 17:21

## 2019-12-16 RX ADMIN — METHYLPREDNISOLONE SODIUM SUCCINATE 125 MG: 125 INJECTION, POWDER, FOR SOLUTION INTRAMUSCULAR; INTRAVENOUS at 12:43

## 2019-12-16 NOTE — H&P
[facility] Medicine Services  HISTORY AND PHYSICAL    Date of Admission: 12/16/2019  Primary Care Physician: Chu Isaac MD    Subjective     Chief Complaint: AMS    History of Present Illness  Mrs. Bennett is a 74 year old lady who is a lau of the [facility] with a history of dementia and COPD.  She presents today from the nursing home with AMS.  She was reportedly confused in the ED.  When she arrived on the 4th floor, she was completely unresponsive.  Per nursing staff she has had intermittent twitching and urinary incontinence.  She was febrile down in the ED and her UA was positive for UTI.        Review of Systems   Unable to perform ROS: Mental status change        Otherwise complete ROS reviewed and negative except as mentioned in the HPI.    Past Medical History:   Past Medical History:   Diagnosis Date   • Chronic kidney disease, stage 4 (severe) (CMS/[facility])    • COPD (chronic obstructive pulmonary disease) (CMS/[facility])    • Coronary artery disease    • Dementia (CMS/[facility])    • Diabetes mellitus (CMS/[facility])    • History of dermatomyositis    • Hypertension    • Peptic ulcer disease    • Pulmonary disease    • Renal insufficiency    • Urinary tract infection    • Venous insufficiency      Past Surgical History:  Past Surgical History:   Procedure Laterality Date   • CHOLECYSTECTOMY     • COLON SURGERY       Social History:  reports that she has quit smoking. She does not have any smokeless tobacco history on file. She reports that she does not drink alcohol or use drugs.    Family History: HTN    Allergies:  Allergies   Allergen Reactions   • Aspirin Rash   • Tetracyclines & Related Rash     Medications:  Prior to Admission medications    Medication Sig Start Date End Date Taking? Authorizing Provider   atenolol (TENORMIN) 50 MG tablet Take 1 tablet by mouth Daily. 11/23/17   Susanna Gordillo MD   atorvastatin (LIPITOR) 10 MG tablet Take 10 mg by mouth daily.    Provider,  MD Tammi   clopidogrel (PLAVIX) 75 MG tablet Take 75 mg by mouth daily.    Provider, MD Tammi   cycloSPORINE (RESTASIS) 0.05 % ophthalmic emulsion Administer 1 drop to both eyes Every 12 (Twelve) Hours.    Provider, MD Tammi   insulin aspart prot-insulin aspart (novoLOG 70/30) (70-30) 100 UNIT/ML injection Inject 30 Units under the skin 2 (Two) Times a Day With Meals. 3/7/18   Sen Lozoya MD   ipratropium-albuterol (DUO-NEB) 0.5-2.5 mg/mL nebulizer Take 3 mL by nebulization Every 4 (Four) Hours As Needed for Shortness of Air. 11/22/17   Susanna Gordillo MD   NIFEdipine XL (ADALAT CC) 30 MG 24 hr tablet Take 1 tablet by mouth Daily. 11/23/17   Susanna Gordillo MD   pantoprazole (PROTONIX) 40 MG EC tablet Take 1 tablet by mouth Daily. 3/7/18   Sen Lozoya MD     Objective     Vital Signs: /54 (BP Location: Right arm, Patient Position: Lying)   Pulse 73   Temp 98.5 °F (36.9 °C) (Axillary)   Resp 20   Wt 79.4 kg (175 lb 1.6 oz)   SpO2 94%   BMI 27.42 kg/m²   Physical Exam   Constitutional: She appears well-developed and well-nourished.   HENT:   Head: Normocephalic and atraumatic.   Right Ear: External ear normal.   Left Ear: External ear normal.   Nose: Nose normal.   Mouth/Throat: Oropharynx is clear and moist.   Eyes: Conjunctivae and EOM are normal.   Neck: Normal range of motion. Neck supple.   Cardiovascular: Normal rate, regular rhythm and normal heart sounds.   Pulmonary/Chest: Effort normal. She has decreased breath sounds.   Abdominal: Soft. Bowel sounds are normal. She exhibits no distension. There is no tenderness.   Musculoskeletal: Normal range of motion.   Neurological: She is unresponsive.   Skin: Skin is warm and dry.   Psychiatric: She has a normal mood and affect. Her speech is normal and behavior is normal. Cognition and memory are normal.       Results Reviewed:  Lab Results (last 24 hours)     Procedure Component Value Units Date/Time    Urine Drug Screen  - Urine, Catheter [[id number]]  (Normal) Collected:  12/16/19 1301    Specimen:  Urine, Catheter Updated:  12/16/19 1557     THC, Screen, Urine Negative     Phencyclidine (PCP), Urine Negative     Cocaine Screen, Urine Negative     Methamphetamine, Ur Negative     Opiate Screen Negative     Amphetamine Screen, Urine Negative     Benzodiazepine Screen, Urine Negative     Tricyclic Antidepressants Screen Negative     Methadone Screen, Urine Negative     Barbiturates Screen, Urine Negative     Oxycodone Screen, Urine Negative     Propoxyphene Screen Negative     Buprenorphine, Screen, Urine Negative    Narrative:       Cutoff For Drugs Screened:    Amphetamines               500 ng/ml  Barbiturates               200 ng/ml  Benzodiazepines            150 ng/ml  Cocaine                    150 ng/ml  Methadone                  200 ng/ml  Opiates                    100 ng/ml  Phencyclidine               25 ng/ml  THC                            50 ng/ml  Methamphetamine            500 ng/ml  Tricyclic Antidepressants  300 ng/ml  Oxycodone                  100 ng/ml  Propoxyphene               300 ng/ml  Buprenorphine               10 ng/ml    The normal value for all drugs tested is negative. This report includes unconfirmed screening results, with the cutoff values listed, to be used for medical treatment purposes only.  Unconfirmed results must not be used for non-medical purposes such as employment or legal testing.  Clinical consideration should be applied to any drug of abuse test, particularly when unconfirmed results are used.      T4, Free [[id number]]  (Abnormal) Collected:  12/16/19 1215    Specimen:  Blood from Arm, Left Updated:  12/16/19 1357     Free T4 0.86 ng/dL     TSH [[id number]]  (Normal) Collected:  12/16/19 1215    Specimen:  Blood from Arm, Left Updated:  12/16/19 1356     TSH 3.020 uIU/mL     Blood Culture - Blood, Arm, Right [[id number]] Collected:  12/16/19 1246    Specimen:  Blood from Arm, Right  Updated:  12/16/19 1327    Urinalysis With Culture If Indicated - Urine, Catheter [[id number]]  (Abnormal) Collected:  12/16/19 1301    Specimen:  Urine, Catheter Updated:  12/16/19 1314     Color, UA Yellow     Appearance, UA Turbid     pH, UA 6.0     Specific Gravity, UA 1.020     Glucose, UA Negative     Ketones, UA Negative     Bilirubin, UA Negative     Blood, UA Moderate (2+)     Protein,  mg/dL (2+)     Leuk Esterase, UA Large (3+)     Nitrite, UA Positive     Urobilinogen, UA 0.2 E.U./dL    Urinalysis, Microscopic Only - Urine, Catheter [[id number]]  (Abnormal) Collected:  12/16/19 1301    Specimen:  Urine, Catheter Updated:  12/16/19 1314     RBC, UA 21-30 /HPF      WBC, UA 31-50 /HPF      Bacteria, UA 4+ /HPF      Squamous Epithelial Cells, UA 0-2 /HPF      Hyaline Casts, UA None Seen /LPF      Amorphous Crystals, UA Small/1+ /HPF      Methodology Manual Light Microscopy    Urine Culture - Urine, Urine, Catheter [[id number]] Collected:  12/16/19 1301    Specimen:  Urine, Catheter Updated:  12/16/19 1314    Troponin [[id number]]  (Abnormal) Collected:  12/16/19 1215    Specimen:  Blood from Arm, Left Updated:  12/16/19 1307     Troponin T 0.056 ng/mL     Narrative:       Troponin T Reference Range:  <= 0.03 ng/mL-   Negative for AMI  >0.03 ng/mL-     Abnormal for myocardial necrosis.  Clinicians would have to utilize clinical acumen, EKG, Troponin and serial changes to determine if it is an Acute Myocardial Infarction or myocardial injury due to an underlying chronic condition.     Procalcitonin [[id number]]  (Normal) Collected:  12/16/19 1215    Specimen:  Blood from Arm, Left Updated:  12/16/19 1306     Procalcitonin 0.22 ng/mL     Narrative:       As a Marker for Sepsis (Non-Neonates):   1. <0.5 ng/mL represents a low risk of severe sepsis and/or septic shock.  1. >2 ng/mL represents a high risk of severe sepsis and/or septic shock.    As a Marker for Lower Respiratory Tract Infections that require  "antibiotic therapy:  PCT on Admission     Antibiotic Therapy             6-12 Hrs later  > 0.5                Strongly Recommended            >0.25 - <0.5         Recommended  0.1 - 0.25           Discouraged                   Remeasure/reassess PCT  <0.1                 Strongly Discouraged          Remeasure/reassess PCT      As 28 day mortality risk marker: \"Change in Procalcitonin Result\" (> 80 % or <=80 %) if Day 0 (or Day 1) and Day 4 values are available. Refer to http://www.[email or web address]-pct-calculator.com/   Change in PCT <=80 %   A decrease of PCT levels below or equal to 80 % defines a positive change in PCT test result representing a higher risk for 28-day all-cause mortality of patients diagnosed with severe sepsis or septic shock.  Change in PCT > 80 %   A decrease of PCT levels of more than 80 % defines a negative change in PCT result representing a lower risk for 28-day all-cause mortality of patients diagnosed with severe sepsis or septic shock.                  Comprehensive Metabolic Panel [[id number]]  (Abnormal) Collected:  12/16/19 1215    Specimen:  Blood from Arm, Left Updated:  12/16/19 1305     Glucose 160 mg/dL      BUN 51 mg/dL      Creatinine 2.54 mg/dL      Sodium 143 mmol/L      Potassium 4.8 mmol/L      Chloride 110 mmol/L      CO2 19.0 mmol/L      Calcium 9.5 mg/dL      Total Protein 7.3 g/dL      Albumin 4.20 g/dL      ALT (SGPT) 20 U/L      AST (SGOT) 32 U/L      Alkaline Phosphatase 67 U/L      Total Bilirubin 0.2 mg/dL      eGFR Non African Amer 18 mL/min/1.73      Globulin 3.1 gm/dL      A/G Ratio 1.4 g/dL      BUN/Creatinine Ratio 20.1     Anion Gap 14.0 mmol/L     Narrative:       GFR Normal >60  Chronic Kidney Disease <60  Kidney Failure <15      Lactic Acid, Plasma [[id number]]  (Normal) Collected:  12/16/19 1215    Specimen:  Blood from Arm, Left Updated:  12/16/19 1258     Lactate 1.8 mmol/L     BNP [[id number]]  (Abnormal) Collected:  12/16/19 1215    Specimen:  Blood from Arm, " Left Updated:  12/16/19 1256     proBNP 4,790.0 pg/mL     Narrative:       Among patients with dyspnea, NT-proBNP is highly sensitive for the detection of acute congestive heart failure. In addition NT-proBNP of <300 pg/ml effectively rules out acute congestive heart failure with 99% negative predictive value.      Magnesium [[id number]]  (Normal) Collected:  12/16/19 1215    Specimen:  Blood from Arm, Left Updated:  12/16/19 1256     Magnesium 1.6 mg/dL     Protime-INR [[id number]]  (Normal) Collected:  12/16/19 1215    Specimen:  Blood from Arm, Left Updated:  12/16/19 1248     Protime 13.6 Seconds      INR 1.01    aPTT [[id number]]  (Normal) Collected:  12/16/19 1215    Specimen:  Blood from Arm, Left Updated:  12/16/19 1248     PTT 29.4 seconds     Blood Culture - Blood, Arm, Left [[id number]] Collected:  12/16/19 1215    Specimen:  Blood from Arm, Left Updated:  12/16/19 1246    CBC & Differential [[id number]] Collected:  12/16/19 1215    Specimen:  Blood from Arm, Left Updated:  12/16/19 1243    Narrative:       The following orders were created for panel order CBC & Differential.  Procedure                               Abnormality         Status                     ---------                               -----------         ------                     CBC Auto Differential[[id number]]        Abnormal            Final result                 Please view results for these tests on the individual orders.    CBC Auto Differential [[id number]]  (Abnormal) Collected:  12/16/19 1215    Specimen:  Blood from Arm, Left Updated:  12/16/19 1243     WBC 14.32 10*3/mm3      RBC 3.57 10*6/mm3      Hemoglobin 10.3 g/dL      Hematocrit 31.6 %      MCV 88.5 fL      MCH 28.9 pg      MCHC 32.6 g/dL      RDW 13.1 %      RDW-SD 42.3 fl      MPV 9.9 fL      Platelets 215 10*3/mm3      Neutrophil % 85.8 %      Lymphocyte % 6.1 %      Monocyte % 6.5 %      Eosinophil % 0.8 %      Basophil % 0.3 %      Immature Grans % 0.5 %       Neutrophils, Absolute 12.29 10*3/mm3      Lymphocytes, Absolute 0.87 10*3/mm3      Monocytes, Absolute 0.93 10*3/mm3      Eosinophils, Absolute 0.12 10*3/mm3      Basophils, Absolute 0.04 10*3/mm3      Immature Grans, Absolute 0.07 10*3/mm3      nRBC 0.0 /100 WBC     Blood Gas, Arterial [[id number]]  (Abnormal) Collected:  12/16/19 1214    Specimen:  Arterial Blood Updated:  12/16/19 1217     Site Right Radial     Andrea's Test N/A     pH, Arterial 7.266 pH units      Comment: 84 Value below reference range        pCO2, Arterial 38.2 mm Hg      pO2, Arterial 65.5 mm Hg      Comment: 84 Value below reference range        HCO3, Arterial 17.3 mmol/L      Comment: 84 Value below reference range        Base Excess, Arterial -9.0 mmol/L      Comment: 84 Value below reference range        O2 Saturation, Arterial 93.9 %      Comment: 84 Value below reference range        Temperature 37.0 C      Barometric Pressure for Blood Gas 750 mmHg      Modality Room Air     Ventilator Mode NA     Collected by [id number]     Comment: Meter: [id number]     :  [id number]           Imaging Results (Last 24 Hours)     Procedure Component Value Units Date/Time    CT Head Without Contrast [[id number]] Collected:  12/16/19 1301     Updated:  12/16/19 1312    Narrative:       CT HEAD WO CONTRAST- 12/16/2019 12:31 PM CST     HISTORY: Confusion/delirium, altered LOC, unexplained      COMPARISON: 03/04/2018     DOSE LENGTH PRODUCT: 700 mGy cm. Automated exposure control was also  utilized to decrease patient radiation dose.     TECHNIQUE: Axial images of brain obtained without contrast.     FINDINGS:  There is similar mild cerebral volume loss. There is no  intracranial hemorrhage or mass effect. There are periventricular  hypodensities favorable for chronic small vessel ischemia. There is a  questionable hypodensity involving the right superior cerebellum just  below the cerebellar tentorium on the axial and coronal reconstructed  images  with no associated mass effect. No additional acute signs of  ischemia localized. No abnormal extra axial fluid collection or  subarachnoid hemorrhage.     No air-fluid level seen within the visible paranasal sinuses. Mastoid  air cells are well-aerated.       Impression:       1. A questionable hypodensity of the superior right cerebellum may  represent chronic ischemia with no prominent associated mass effect. No  convincing acute intracranial process. No Intracranial hemorrhage.  2. Mild cerebral volume loss with chronic small vessel ischemia.   This report was finalized on 12/16/2019 13:09 by Dr. Aby Godinez MD.    XR Chest 1 View [[id number]] Collected:  12/16/19 1300     Updated:  12/16/19 1305    Narrative:       EXAMINATION: XR CHEST 1 VW-. 12/16/2019 1:00 PM CST     CHEST, ONE VIEW:     HISTORY: Shortness of air     COMPARISON: 03/05/2018, 3/4/2018 and 11/19/2017     A single frontal chest radiograph was obtained.     FINDINGS:     The level inspiration is relatively shallow and lung volumes diminished.  The perihilar fissure markings are mildly prominent with peribronchial  cuffing observed similarly on previous examinations, suspect chronic  changes. Acute interstitial infectious or inflammatory changes difficult  to exclude.     No definite parenchymal infiltration observed.     The heart is normal in size without evidence of overt heart failure.     The bony structures are intact.     Postsurgical changes noted in the upper abdomen.                                     Impression:       1. Mild perihilar interstitial prominence, question chronic changes. No  definite parenchymal infiltration.     This report was finalized on 12/16/2019 13:02 by Dr. Rodriguez Aaron MD.        I have personally reviewed and interpreted the radiology studies and ECG obtained at time of admission.     Assessment / Plan     Assessment:   Active Hospital Problems    Diagnosis   • Altered mental status         1.  Metabolic  Encephalopathy  -IV abx    2.   Sepsis  -IV abx    3.  Acute bacterial Cystitis  -IV abx    4.  ROOPA on CKD 4  -IVF    5.  COPD  -duonebs prn    6.  CAD  -Plavix  -Lipitor    7.  Dementia  -supportive care    8.  T2DM  -SSI    9.  Peptic Ulcer Disease  -protonix    10.  Troponin elevation  -likely type 2 due to sepsis    Code Status: DNR     I discussed the patient's findings and my recommendations with the nursing staff    Estimated length of stay 3-5 days    Discussed case with Dr. Jung of Neuro    Edward Lozano MD   12/16/19   3:59 PM

## 2019-12-16 NOTE — CONSULTS
Neurology Consult Note    Patient:  Gregoria Bennett   YOB: 1945  MRN:  [id number]  Date of Admission:  12/16/2019 11:51 AM    Date: 12/16/2019    Referring Provider: Edward Lozano MD  Reason for Consultation: Concern for seizures      History of present illness:     This is a 74 y.o.  female with H/O dementia, DM, hypertension CKD stage 4, COPD, CAD, GERD evaluated for seizures    Patient is a nursing home resident and is usually pleasantly demented.  She is typically awake and alert and interactive.  This morning she was not as awake and alert with a decreased level of consciousness and was brought into ED. Labs suggest a UTI and renal failure    She was admitted to  B and there she became completely unresponsive.  Her legs were stiff and there was twitching of her right face and arm She did not respond to sternal rub    Patient is a DNR      Past Medical History:   Diagnosis Date   • Chronic kidney disease, stage 4 (severe) (CMS/[facility])    • COPD (chronic obstructive pulmonary disease) (CMS/[facility])    • Coronary artery disease    • Dementia (CMS/[facility])    • Diabetes mellitus (CMS/[facility])    • History of dermatomyositis    • Hypertension    • Peptic ulcer disease    • Pulmonary disease    • Renal insufficiency    • Urinary tract infection    • Venous insufficiency        Past Surgical History:   Procedure Laterality Date   • CHOLECYSTECTOMY     • COLON SURGERY       Prior to admission medications as listed by the nursing home include the following atenolol 50 mg every morning  · Atorvastatin 10 mg every morning  · Plavix 75 mg every morning  · Docusate 1 capsule in the morning  · Ferrous sulfate 325 mg every morning  · Gabapentin 100 mg 4 times a day this was started on September 9, 2019  · Cranberry extract 200 mg every morning  · Mucinex extended release 12-hour 30s/64 times a day  · Nasacort 1 spray each nostril daily  · Nifedipine ER 30 mg every morning  · Nitroglycerin sublingual 0.4 mg as  needed  · NovoLog subcu sliding scale  · Pantoprazole 40 mg every morning  · MiraLAX as needed  · Potassium chloride 10 mEq every morning  · Restasis ophthalmic solution 1 drop 2 times a day  · Selenium 200 mcg every morning  · Sertraline 25 mg every afternoon  · Multivitamin every morning  · Tramadol 50 mg as needed  · Aristocort as needed  · Vitamin C 500 mg every AM  · Vitamin D3 50,000 units once a week  · Vitamin D 1000 units every morning  · Voltaren transdermal gel 1%  · Zyrtec 10 mg every morning      Prior to Admission medications    Medication Sig Start Date End Date Taking? Authorizing Provider   atenolol (TENORMIN) 50 MG tablet Take 1 tablet by mouth Daily. 11/23/17   Susanna Gordillo MD   atorvastatin (LIPITOR) 10 MG tablet Take 10 mg by mouth daily.    ProviderTammi MD   clopidogrel (PLAVIX) 75 MG tablet Take 75 mg by mouth daily.    ProviderTammi MD   cycloSPORINE (RESTASIS) 0.05 % ophthalmic emulsion Administer 1 drop to both eyes Every 12 (Twelve) Hours.    ProviderTammi MD   insulin aspart prot-insulin aspart (novoLOG 70/30) (70-30) 100 UNIT/ML injection Inject 30 Units under the skin 2 (Two) Times a Day With Meals. 3/7/18   Sen Lozoya MD   ipratropium-albuterol (DUO-NEB) 0.5-2.5 mg/mL nebulizer Take 3 mL by nebulization Every 4 (Four) Hours As Needed for Shortness of Air. 11/22/17   Susanna Gordillo MD   NIFEdipine XL (ADALAT CC) 30 MG 24 hr tablet Take 1 tablet by mouth Daily. 11/23/17   Susanna Gordillo MD   pantoprazole (PROTONIX) 40 MG EC tablet Take 1 tablet by mouth Daily. 3/7/18   Sen Lozoya MD       Hospital scheduled medications:     cefTRIAXone 1 g Intravenous Q24H   levETIRAcetam 500 mg Intravenous Q12H     Hospital PRN medications:      Allergies   Allergen Reactions   • Aspirin Rash   • Tetracyclines & Related Rash       Social History     Socioeconomic History   • Marital status:      Spouse name: Not on file   • Number of children:  "Not on file   • Years of education: Not on file   • Highest education level: Not on file   Tobacco Use   • Smoking status: Former Smoker   Substance and Sexual Activity   • Alcohol use: No   • Drug use: No     Family History   Problem Relation Age of Onset   • Breast cancer Neg Hx        Review of Systems  A 14 point review of systems was unobtainable as she is not speaking and is unresponsive    Vital Signs   Temp:  [98.5 °F (36.9 °C)-102.6 °F (39.2 °C)] 98.5 °F (36.9 °C)  Heart Rate:  [70-74] 73  Resp:  [19-20] 20  BP: (143-163)/(47-98) 150/54    General Exam:  Head:  Normal cephalic, atraumatic  HEENT:  Neck supple  Fundoscopic Exam:  No signs of disc edema  CVS:  Regular rate and rhythm.  No murmurs  Carotid Examination:  No bruits  Lungs:  Clear to auscultation  Abdomen:  Non-tender, Non-distended  Extremities:  No signs of peripheral edema  Skin:  No rashes    Neurologic Exam:    Mental Status:    -Unresponsive but then opened her eyes briefly and said \"what\" Then did not follow any commands or answer any questions. Within a couple of minutes she began to have right facial twitching and right arm myoclonic jerking. She did not respond to sternal rub then       CN II:  Visual fields--unable to assess.  Pupils equally reactive to light  CN III, IV, VI:  Extraocular Muscles conjugate and no dolls eyes  CN V:  Facial sensory unable to assess  CN VII:  Facial motor asymmetric--episodic right facial twitch  CN VIII:  Gross hearing intact  To voice initially as she opened her eyes when her name called but stopped when twitching started again  CN IX/X:  Palate elevates --unable to assess  CN XI:  Shoulder shrug--unable to assess  CN XII:  Tongue  Protrusion--unable to assess    Motor: (strength out of 5:  1= minimal movement, 2 = movement in plane of gravity, 3 = movement against gravity, 4 = movement against some resistance, 5 = full strength)    -Right Upper Ext: right arm twitch   -Left Upper Ext: flaccid  -Right " Lower Ext:flaccid  -Left Lower Ext: flaccid    DTR:  -Right   Bicep: 2+ Triceps: 2+ Brachioradialis: 2+   Patella: 2+ Ankle: 2+ Neg Babinski  -Left   Bicep: 2+ Triceps: 2+ Brachioradialis: 2+   Patella: 2+ Ankle: 2+ Neg Babinski    Sensory:  -no response to tactile stimulation    Coordination:  -no spontaneous movement    Gait  -not tested as she is unresponsive      Results Review:  Lab Results (last 7 days)     Procedure Component Value Units Date/Time    Blood Gas, Arterial [[id number]]  (Abnormal) Collected:  12/16/19 1620    Specimen:  Arterial Blood Updated:  12/16/19 1627     Site Left Brachial     Andrea's Test N/A     pH, Arterial 7.219 pH units      Comment: 85 Value below critical limit        pCO2, Arterial 41.2 mm Hg      pO2, Arterial 317.0 mm Hg      Comment: 83 Value above reference range        HCO3, Arterial 16.8 mmol/L      Comment: 84 Value below reference range        Base Excess, Arterial -10.3 mmol/L      Comment: 84 Value below reference range        O2 Saturation, Arterial 100.0 %      Comment: 83 Value above reference range        Temperature 37.0 C      Barometric Pressure for Blood Gas 748 mmHg      Modality NRB     FIO2 100 %      Flow Rate 15.0 lpm      Ventilator Mode NA     Notified Who DR. CUMMINS     Notified By [id number]     Notified Time 12/16/2019 16:25     Collected by [id number]     Comment: Meter: [id number]     :  [id number]       Urine Drug Screen - Urine, Catheter [[id number]]  (Normal) Collected:  12/16/19 1301    Specimen:  Urine, Catheter Updated:  12/16/19 1557     THC, Screen, Urine Negative     Phencyclidine (PCP), Urine Negative     Cocaine Screen, Urine Negative     Methamphetamine, Ur Negative     Opiate Screen Negative     Amphetamine Screen, Urine Negative     Benzodiazepine Screen, Urine Negative     Tricyclic Antidepressants Screen Negative     Methadone Screen, Urine Negative     Barbiturates Screen, Urine Negative     Oxycodone Screen, Urine Negative      Propoxyphene Screen Negative     Buprenorphine, Screen, Urine Negative    Narrative:       Cutoff For Drugs Screened:    Amphetamines               500 ng/ml  Barbiturates               200 ng/ml  Benzodiazepines            150 ng/ml  Cocaine                    150 ng/ml  Methadone                  200 ng/ml  Opiates                    100 ng/ml  Phencyclidine               25 ng/ml  THC                            50 ng/ml  Methamphetamine            500 ng/ml  Tricyclic Antidepressants  300 ng/ml  Oxycodone                  100 ng/ml  Propoxyphene               300 ng/ml  Buprenorphine               10 ng/ml    The normal value for all drugs tested is negative. This report includes unconfirmed screening results, with the cutoff values listed, to be used for medical treatment purposes only.  Unconfirmed results must not be used for non-medical purposes such as employment or legal testing.  Clinical consideration should be applied to any drug of abuse test, particularly when unconfirmed results are used.      T4, Free [[id number]]  (Abnormal) Collected:  12/16/19 1215    Specimen:  Blood from Arm, Left Updated:  12/16/19 1357     Free T4 0.86 ng/dL     TSH [[id number]]  (Normal) Collected:  12/16/19 1215    Specimen:  Blood from Arm, Left Updated:  12/16/19 1356     TSH 3.020 uIU/mL     Blood Culture - Blood, Arm, Right [[id number]] Collected:  12/16/19 1246    Specimen:  Blood from Arm, Right Updated:  12/16/19 1327    Urinalysis With Culture If Indicated - Urine, Catheter [[id number]]  (Abnormal) Collected:  12/16/19 1301    Specimen:  Urine, Catheter Updated:  12/16/19 1314     Color, UA Yellow     Appearance, UA Turbid     pH, UA 6.0     Specific Gravity, UA 1.020     Glucose, UA Negative     Ketones, UA Negative     Bilirubin, UA Negative     Blood, UA Moderate (2+)     Protein,  mg/dL (2+)     Leuk Esterase, UA Large (3+)     Nitrite, UA Positive     Urobilinogen, UA 0.2 E.U./dL    Urinalysis, Microscopic  "Only - Urine, Catheter [[id number]]  (Abnormal) Collected:  12/16/19 1301    Specimen:  Urine, Catheter Updated:  12/16/19 1314     RBC, UA 21-30 /HPF      WBC, UA 31-50 /HPF      Bacteria, UA 4+ /HPF      Squamous Epithelial Cells, UA 0-2 /HPF      Hyaline Casts, UA None Seen /LPF      Amorphous Crystals, UA Small/1+ /HPF      Methodology Manual Light Microscopy    Urine Culture - Urine, Urine, Catheter [[id number]] Collected:  12/16/19 1301    Specimen:  Urine, Catheter Updated:  12/16/19 1314    Troponin [[id number]]  (Abnormal) Collected:  12/16/19 1215    Specimen:  Blood from Arm, Left Updated:  12/16/19 1307     Troponin T 0.056 ng/mL     Narrative:       Troponin T Reference Range:  <= 0.03 ng/mL-   Negative for AMI  >0.03 ng/mL-     Abnormal for myocardial necrosis.  Clinicians would have to utilize clinical acumen, EKG, Troponin and serial changes to determine if it is an Acute Myocardial Infarction or myocardial injury due to an underlying chronic condition.     Procalcitonin [[id number]]  (Normal) Collected:  12/16/19 1215    Specimen:  Blood from Arm, Left Updated:  12/16/19 1306     Procalcitonin 0.22 ng/mL     Narrative:       As a Marker for Sepsis (Non-Neonates):   1. <0.5 ng/mL represents a low risk of severe sepsis and/or septic shock.  1. >2 ng/mL represents a high risk of severe sepsis and/or septic shock.    As a Marker for Lower Respiratory Tract Infections that require antibiotic therapy:  PCT on Admission     Antibiotic Therapy             6-12 Hrs later  > 0.5                Strongly Recommended            >0.25 - <0.5         Recommended  0.1 - 0.25           Discouraged                   Remeasure/reassess PCT  <0.1                 Strongly Discouraged          Remeasure/reassess PCT      As 28 day mortality risk marker: \"Change in Procalcitonin Result\" (> 80 % or <=80 %) if Day 0 (or Day 1) and Day 4 values are available. Refer to http://www.[employer]-pct-calculator.com/   Change in PCT <=80 % "   A decrease of PCT levels below or equal to 80 % defines a positive change in PCT test result representing a higher risk for 28-day all-cause mortality of patients diagnosed with severe sepsis or septic shock.  Change in PCT > 80 %   A decrease of PCT levels of more than 80 % defines a negative change in PCT result representing a lower risk for 28-day all-cause mortality of patients diagnosed with severe sepsis or septic shock.                  Comprehensive Metabolic Panel [[id number]]  (Abnormal) Collected:  12/16/19 1215    Specimen:  Blood from Arm, Left Updated:  12/16/19 1305     Glucose 160 mg/dL      BUN 51 mg/dL      Creatinine 2.54 mg/dL      Sodium 143 mmol/L      Potassium 4.8 mmol/L      Chloride 110 mmol/L      CO2 19.0 mmol/L      Calcium 9.5 mg/dL      Total Protein 7.3 g/dL      Albumin 4.20 g/dL      ALT (SGPT) 20 U/L      AST (SGOT) 32 U/L      Alkaline Phosphatase 67 U/L      Total Bilirubin 0.2 mg/dL      eGFR Non African Amer 18 mL/min/1.73      Globulin 3.1 gm/dL      A/G Ratio 1.4 g/dL      BUN/Creatinine Ratio 20.1     Anion Gap 14.0 mmol/L     Narrative:       GFR Normal >60  Chronic Kidney Disease <60  Kidney Failure <15      Lactic Acid, Plasma [[id number]]  (Normal) Collected:  12/16/19 1215    Specimen:  Blood from Arm, Left Updated:  12/16/19 1258     Lactate 1.8 mmol/L     BNP [[id number]]  (Abnormal) Collected:  12/16/19 1215    Specimen:  Blood from Arm, Left Updated:  12/16/19 1256     proBNP 4,790.0 pg/mL     Narrative:       Among patients with dyspnea, NT-proBNP is highly sensitive for the detection of acute congestive heart failure. In addition NT-proBNP of <300 pg/ml effectively rules out acute congestive heart failure with 99% negative predictive value.      Magnesium [[id number]]  (Normal) Collected:  12/16/19 1215    Specimen:  Blood from Arm, Left Updated:  12/16/19 1256     Magnesium 1.6 mg/dL     Protime-INR [[id number]]  (Normal) Collected:  12/16/19 1215    Specimen:   Blood from Arm, Left Updated:  12/16/19 1248     Protime 13.6 Seconds      INR 1.01    aPTT [[id number]]  (Normal) Collected:  12/16/19 1215    Specimen:  Blood from Arm, Left Updated:  12/16/19 1248     PTT 29.4 seconds     Blood Culture - Blood, Arm, Left [[id number]] Collected:  12/16/19 1215    Specimen:  Blood from Arm, Left Updated:  12/16/19 1246    CBC & Differential [[id number]] Collected:  12/16/19 1215    Specimen:  Blood from Arm, Left Updated:  12/16/19 1243    Narrative:       The following orders were created for panel order CBC & Differential.  Procedure                               Abnormality         Status                     ---------                               -----------         ------                     CBC Auto Differential[[id number]]        Abnormal            Final result                 Please view results for these tests on the individual orders.    CBC Auto Differential [[id number]]  (Abnormal) Collected:  12/16/19 1215    Specimen:  Blood from Arm, Left Updated:  12/16/19 1243     WBC 14.32 10*3/mm3      RBC 3.57 10*6/mm3      Hemoglobin 10.3 g/dL      Hematocrit 31.6 %      MCV 88.5 fL      MCH 28.9 pg      MCHC 32.6 g/dL      RDW 13.1 %      RDW-SD 42.3 fl      MPV 9.9 fL      Platelets 215 10*3/mm3      Neutrophil % 85.8 %      Lymphocyte % 6.1 %      Monocyte % 6.5 %      Eosinophil % 0.8 %      Basophil % 0.3 %      Immature Grans % 0.5 %      Neutrophils, Absolute 12.29 10*3/mm3      Lymphocytes, Absolute 0.87 10*3/mm3      Monocytes, Absolute 0.93 10*3/mm3      Eosinophils, Absolute 0.12 10*3/mm3      Basophils, Absolute 0.04 10*3/mm3      Immature Grans, Absolute 0.07 10*3/mm3      nRBC 0.0 /100 WBC     Blood Gas, Arterial [[id number]]  (Abnormal) Collected:  12/16/19 1214    Specimen:  Arterial Blood Updated:  12/16/19 1217     Site Right Radial     Andrea's Test N/A     pH, Arterial 7.266 pH units      Comment: 84 Value below reference range        pCO2, Arterial 38.2 mm Hg       pO2, Arterial 65.5 mm Hg      Comment: 84 Value below reference range        HCO3, Arterial 17.3 mmol/L      Comment: 84 Value below reference range        Base Excess, Arterial -9.0 mmol/L      Comment: 84 Value below reference range        O2 Saturation, Arterial 93.9 %      Comment: 84 Value below reference range        Temperature 37.0 C      Barometric Pressure for Blood Gas 750 mmHg      Modality Room Air     Ventilator Mode NA     Collected by [id number]     Comment: Meter: [id number]     :  [id number]             .  Imaging Results (Last 24 Hours)     Procedure Component Value Units Date/Time    CT Head Without Contrast [[id number]] Collected:  12/16/19 1301     Updated:  12/16/19 1312    Narrative:       CT HEAD WO CONTRAST- 12/16/2019 12:31 PM CST     HISTORY: Confusion/delirium, altered LOC, unexplained      COMPARISON: 03/04/2018     DOSE LENGTH PRODUCT: 700 mGy cm. Automated exposure control was also  utilized to decrease patient radiation dose.     TECHNIQUE: Axial images of brain obtained without contrast.     FINDINGS:  There is similar mild cerebral volume loss. There is no  intracranial hemorrhage or mass effect. There are periventricular  hypodensities favorable for chronic small vessel ischemia. There is a  questionable hypodensity involving the right superior cerebellum just  below the cerebellar tentorium on the axial and coronal reconstructed  images with no associated mass effect. No additional acute signs of  ischemia localized. No abnormal extra axial fluid collection or  subarachnoid hemorrhage.     No air-fluid level seen within the visible paranasal sinuses. Mastoid  air cells are well-aerated.       Impression:       1. A questionable hypodensity of the superior right cerebellum may  represent chronic ischemia with no prominent associated mass effect. No  convincing acute intracranial process. No Intracranial hemorrhage.  2. Mild cerebral volume loss with chronic small vessel  ischemia.   This report was finalized on 12/16/2019 13:09 by Dr. Aby Godinez MD.    XR Chest 1 View [[id number]] Collected:  12/16/19 1300     Updated:  12/16/19 1305    Narrative:       EXAMINATION: XR CHEST 1 VW-. 12/16/2019 1:00 PM CST     CHEST, ONE VIEW:     HISTORY: Shortness of air     COMPARISON: 03/05/2018, 3/4/2018 and 11/19/2017     A single frontal chest radiograph was obtained.     FINDINGS:     The level inspiration is relatively shallow and lung volumes diminished.  The perihilar fissure markings are mildly prominent with peribronchial  cuffing observed similarly on previous examinations, suspect chronic  changes. Acute interstitial infectious or inflammatory changes difficult  to exclude.     No definite parenchymal infiltration observed.     The heart is normal in size without evidence of overt heart failure.     The bony structures are intact.     Postsurgical changes noted in the upper abdomen.                                     Impression:       1. Mild perihilar interstitial prominence, question chronic changes. No  definite parenchymal infiltration.     This report was finalized on 12/16/2019 13:02 by Dr. Rodriguez Aaron MD.              Impression    1. Decreased responsiveness with seizure like activity--however this is looking more like myoclonic jerking.  She is on gabapentin which is unknown because of this.  2. H/O dementia  3. UR symptoms  4. UTI  5. Renal failure  6. Anemia  7. Leukocytosis with likely underlying infections    Plan    · STAT EEG  · STAT IV Keppra 500 mg  · Replace magnesium with 1 gram  · Antibiotics per Hospitalist  · MRI of brain--ideal with and without but can get without due to renal failure  · Hold gabapentin  · Do not give tramadol      I discussed the patients findings and my recommendations with patient, nursing staff and Dr Lozano     35 minutes of critical care time was performed ,during this time I consulted with the nursing staff and also with other  providers in regard to the patient's care. I examined the patient, called for the STAT EEG and STAT Keppra. Await MRI once jerking stopped    Patti Jung MD  12/16/19  4:46 PM

## 2019-12-16 NOTE — ED PROVIDER NOTES
Subjective   Patient with decreased level of consciousness completion      Shortness of Breath   Severity:  Moderate  Onset quality:  Gradual  Timing:  Constant  Progression:  Worsening  Chronicity:  New  Context: not activity, not animal exposure, not emotional upset, not occupational exposure, not pollens, not URI and not weather changes    Relieved by:  Nothing  Worsened by:  Nothing  Ineffective treatments:  None tried  Associated symptoms: wheezing    Associated symptoms: no abdominal pain, no chest pain, no claudication, no cough, no neck pain, no sputum production and no syncope    Risk factors: obesity    Risk factors: no recent alcohol use    Altered Mental Status   Associated symptoms: no abdominal pain        Review of Systems   Unable to perform ROS: Mental status change   Respiratory: Positive for shortness of breath and wheezing. Negative for cough and sputum production.    Cardiovascular: Negative for chest pain, claudication and syncope.   Gastrointestinal: Negative for abdominal pain.   Musculoskeletal: Negative for neck pain.       Past Medical History:   Diagnosis Date   • COPD (chronic obstructive pulmonary disease) (CMS/HCC)    • Coronary artery disease    • Diabetes mellitus (CMS/[facility])    • Hypertension    • Peptic ulcer disease    • Renal insufficiency    • Venous insufficiency        Allergies   Allergen Reactions   • Aspirin Rash   • Tetracyclines & Related Rash       Past Surgical History:   Procedure Laterality Date   • CHOLECYSTECTOMY     • COLON SURGERY         Family History   Problem Relation Age of Onset   • Breast cancer Neg Hx        Social History     Socioeconomic History   • Marital status:      Spouse name: Not on file   • Number of children: Not on file   • Years of education: Not on file   • Highest education level: Not on file   Tobacco Use   • Smoking status: Former Smoker   Substance and Sexual Activity   • Alcohol use: No   • Drug use: No           Objective    Physical Exam   Constitutional: She appears lethargic. She appears toxic. She appears distressed.   HENT:   Head: Normocephalic and atraumatic.   Eyes: Pupils are equal, round, and reactive to light. Conjunctivae and EOM are normal.   Neck: Normal range of motion. Neck supple.   Cardiovascular: Normal rate, regular rhythm, normal heart sounds and intact distal pulses.   Pulmonary/Chest: Effort normal. Tachypnea noted. She has decreased breath sounds in the right lower field and the left lower field. She has wheezes in the right lower field and the left lower field.   Abdominal: Soft. Bowel sounds are normal.   Musculoskeletal: Normal range of motion.   Neurological: She has normal reflexes. She appears lethargic. She is disoriented. No cranial nerve deficit. GCS eye subscore is 3. GCS verbal subscore is 4. GCS motor subscore is 3.   No obvious focal neuro deficits but has altered mental status minimally responsive and not change anything   Skin: Skin is warm. Capillary refill takes more than 3 seconds.   Psychiatric: She has a normal mood and affect.   Nursing note and vitals reviewed.      Procedures           ED Course  ED Course as of Dec 16 1352   Mon Dec 16, 2019   1344 She with altered mental status decreased level of consciousness and lethargy toxic appearing febrile has got a UTI and pneumonia the prognosis of this patient is extremely guarded there is no family members available I am told the patient is a DNR the patient be is admitted to hospital service has received vancomycin and Zosyn over here along with steroids and IV fluids she does not appear to be respiratory in congestive heart failure with elevated BNP is because of her renal insufficiency.  Is elevated probably because of sepsis    [TS]   1347 Q sofa 2  Stokes II :31 73 % nonoperative mortality    [TS]      ED Course User Index  [TS] Andres Oropeza MD                      No data recorded                        MDM  Number of Diagnoses or  Management Options  Diagnosis management comments: Differential Diagnosis:  I considered toxic-metabolic etiology, hypoglycemia, hyperglycemia, diabetic ketoacidosis, drug overdose, ethanol intoxication, thiamine deficiency, hypothermia, hyponatremia, hypernatremia, organ failure, liver failure, kidney failure, thyroid failure, adrenal failure, hypoxia, hypercarbia, ischemic stroke, intracranial bleed, subarachnoid hemorrhage, closed head injury, subdural hematoma, seizure activity, syncopal episode, infectious etiology, hypertensive encephalopathy, vasculitis, thrombotic thrombocytopenic purpura and disseminated intravascular coagulation as a possible cause of altered mental status in this patient. This is a partial list of diagnoses considered.              Amount and/or Complexity of Data Reviewed  Clinical lab tests: ordered and reviewed  Tests in the radiology section of CPT®: ordered and reviewed  Tests in the medicine section of CPT®: reviewed and ordered  Decide to obtain previous medical records or to obtain history from someone other than the patient: yes    Risk of Complications, Morbidity, and/or Mortality  Presenting problems: moderate  Diagnostic procedures: moderate  Management options: moderate        Final diagnoses:   Altered mental status, unspecified altered mental status type   Severe sepsis (CMS/HCC)   Acute UTI (urinary tract infection)   Chronic kidney disease, unspecified CKD stage   Metabolic acidosis              Andres Oropeza MD  12/16/19 [id number]

## 2019-12-17 ENCOUNTER — APPOINTMENT (OUTPATIENT)
Dept: MRI IMAGING | Facility: HOSPITAL | Age: 74
End: 2019-12-17

## 2019-12-17 PROBLEM — G93.41 METABOLIC ENCEPHALOPATHY: Status: ACTIVE | Noted: 2019-12-17

## 2019-12-17 PROBLEM — F03.90 DEMENTIA WITHOUT BEHAVIORAL DISTURBANCE (HCC): Status: ACTIVE | Noted: 2019-12-17

## 2019-12-17 PROBLEM — N18.4 CKD (CHRONIC KIDNEY DISEASE) STAGE 4, GFR 15-29 ML/MIN (HCC): Status: ACTIVE | Noted: 2019-12-17

## 2019-12-17 LAB
ANION GAP SERPL CALCULATED.3IONS-SCNC: 12 MMOL/L (ref 5–15)
BACTERIA SPEC AEROBE CULT: NORMAL
BUN BLD-MCNC: 51 MG/DL (ref 8–23)
BUN/CREAT SERPL: 20.9 (ref 7–25)
CALCIUM SPEC-SCNC: 8.3 MG/DL (ref 8.6–10.5)
CHLORIDE SERPL-SCNC: 116 MMOL/L (ref 98–107)
CO2 SERPL-SCNC: 18 MMOL/L (ref 22–29)
CREAT BLD-MCNC: 2.44 MG/DL (ref 0.57–1)
GFR SERPL CREATININE-BSD FRML MDRD: 19 ML/MIN/1.73
GLUCOSE BLD-MCNC: 193 MG/DL (ref 65–99)
GLUCOSE BLDC GLUCOMTR-MCNC: 172 MG/DL (ref 70–130)
GLUCOSE BLDC GLUCOMTR-MCNC: 189 MG/DL (ref 70–130)
GLUCOSE BLDC GLUCOMTR-MCNC: 216 MG/DL (ref 70–130)
GLUCOSE BLDC GLUCOMTR-MCNC: 231 MG/DL (ref 70–130)
MAGNESIUM SERPL-MCNC: 2.1 MG/DL (ref 1.6–2.4)
POTASSIUM BLD-SCNC: 4.4 MMOL/L (ref 3.5–5.2)
SODIUM BLD-SCNC: 146 MMOL/L (ref 136–145)

## 2019-12-17 PROCEDURE — 25010000002 CEFTRIAXONE PER 250 MG: Performed by: FAMILY MEDICINE

## 2019-12-17 PROCEDURE — 97166 OT EVAL MOD COMPLEX 45 MIN: CPT | Performed by: OCCUPATIONAL THERAPIST

## 2019-12-17 PROCEDURE — 99233 SBSQ HOSP IP/OBS HIGH 50: CPT | Performed by: CLINICAL NURSE SPECIALIST

## 2019-12-17 PROCEDURE — 82962 GLUCOSE BLOOD TEST: CPT

## 2019-12-17 PROCEDURE — 83735 ASSAY OF MAGNESIUM: CPT | Performed by: CLINICAL NURSE SPECIALIST

## 2019-12-17 PROCEDURE — 94799 UNLISTED PULMONARY SVC/PX: CPT

## 2019-12-17 PROCEDURE — 25010000002 LEVETIRACETAM IN NACL 0.82% 500 MG/100ML SOLUTION: Performed by: PSYCHIATRY & NEUROLOGY

## 2019-12-17 PROCEDURE — 80048 BASIC METABOLIC PNL TOTAL CA: CPT | Performed by: CLINICAL NURSE SPECIALIST

## 2019-12-17 PROCEDURE — 92610 EVALUATE SWALLOWING FUNCTION: CPT

## 2019-12-17 PROCEDURE — 97162 PT EVAL MOD COMPLEX 30 MIN: CPT | Performed by: PHYSICAL THERAPIST

## 2019-12-17 PROCEDURE — 70551 MRI BRAIN STEM W/O DYE: CPT

## 2019-12-17 PROCEDURE — 63710000001 INSULIN LISPRO (HUMAN) PER 5 UNITS: Performed by: INTERNAL MEDICINE

## 2019-12-17 RX ADMIN — NYSTATIN 1 APPLICATION: 100000 POWDER TOPICAL at 22:22

## 2019-12-17 RX ADMIN — INSULIN LISPRO 2 UNITS: 100 INJECTION, SOLUTION INTRAVENOUS; SUBCUTANEOUS at 18:40

## 2019-12-17 RX ADMIN — INSULIN LISPRO 2 UNITS: 100 INJECTION, SOLUTION INTRAVENOUS; SUBCUTANEOUS at 12:19

## 2019-12-17 RX ADMIN — INSULIN LISPRO 3 UNITS: 100 INJECTION, SOLUTION INTRAVENOUS; SUBCUTANEOUS at 22:21

## 2019-12-17 RX ADMIN — CEFTRIAXONE SODIUM 1 G: 1 INJECTION, POWDER, FOR SOLUTION INTRAMUSCULAR; INTRAVENOUS at 16:23

## 2019-12-17 RX ADMIN — SODIUM CHLORIDE 75 ML/HR: 9 INJECTION, SOLUTION INTRAVENOUS at 06:08

## 2019-12-17 RX ADMIN — NYSTATIN 1 APPLICATION: 100000 POWDER TOPICAL at 08:31

## 2019-12-17 RX ADMIN — LEVETIRACETAM 500 MG: 5 INJECTION INTRAVENOUS at 08:31

## 2019-12-17 RX ADMIN — SODIUM CHLORIDE 75 ML/HR: 9 INJECTION, SOLUTION INTRAVENOUS at 22:22

## 2019-12-17 RX ADMIN — INSULIN LISPRO 3 UNITS: 100 INJECTION, SOLUTION INTRAVENOUS; SUBCUTANEOUS at 08:31

## 2019-12-17 RX ADMIN — LEVETIRACETAM 500 MG: 5 INJECTION INTRAVENOUS at 22:22

## 2019-12-17 NOTE — PROGRESS NOTES
Neurology Progress Note      Chief Complaint:  F/u unresponsiveness with myoclonic jerking. Seizures.     Subjective     Subjective:  Patient moved from ICU to [facility]. Patient is alone in room. Appears to be sleeping. Did not open eyes to voice. Grimaced to sternal rub and then became alert and responsive. Denies pain.     EEG pending    MRI brain: no acute process. Chronic changes of aging and volume loss.      Medications:  Current Facility-Administered Medications   Medication Dose Route Frequency Provider Last Rate Last Dose   • atropine 1 % ophthalmic solution 1 drop  1 drop Sublingual BID PRN Ross Dinero MD   1 drop at 12/16/19 2240   • cefTRIAXone (ROCEPHIN) 1 g/100 mL 0.9% NS (MBP)  1 g Intravenous Q24H Edward Lozano MD   1 g at 12/16/19 1721   • dextrose (D50W) 25 g/ 50mL Intravenous Solution 25 g  25 g Intravenous Q15 Min PRN Ross Dinero MD       • dextrose (GLUTOSE) oral gel 15 g  15 g Oral Q15 Min PRN Ross Dinero MD       • glucagon (human recombinant) (GLUCAGEN DIAGNOSTIC) injection 1 mg  1 mg Subcutaneous Q15 Min PRN Ross Dinero MD       • insulin lispro (humaLOG) injection 2-7 Units  2-7 Units Subcutaneous 4x Daily With Meals & Nightly Ross Dinero MD   3 Units at 12/17/19 0831   • levETIRAcetam in NaCl 0.82% (KEPPRA) IVPB 500 mg  500 mg Intravenous Q12H Patti Espinoza MD   500 mg at 12/17/19 0831   • nystatin (MYCOSTATIN) powder 1 application  1 application Topical Q12H Ross Dinero MD   1 application at 12/17/19 0831   • Scopolamine (TRANSDERM-SCOP) 1.5 MG/3DAYS patch 1 patch  1 patch Transdermal Q72H Ross Dinero MD   1 patch at 12/16/19 2147   • sodium chloride 0.9 % infusion  75 mL/hr Intravenous Continuous Edward Lozano MD 75 mL/hr at 12/17/19 0608 75 mL/hr at 12/17/19 0608       Review of Systems:   -A 14 point review of systems is completed and is negative.       Objective      Vital Signs  Temp:  [97.1 °F (36.2 °C)-102.6  °F (39.2 °C)] 98.3 °F (36.8 °C)  Heart Rate:  [49-74] 52  Resp:  [12-20] 14  BP: (117-178)/(41-98) 168/50    Physical Exam:    General Exam:  Head:  Normal cephalic, atraumatic  HEENT:  Neck supple. Appears to have dried blood on mouth and on front teeth.  Fundoscopic Exam:  No signs of disc edema  CVS:  Regular rate and rhythm.  No murmurs  Carotid Examination:  No bruits  Lungs:  Clear to auscultation  Abdomen:  Non-tender, Non-distended  Extremities:  No signs of peripheral edema  Skin:  No rashes    Neurologic Exam:    Mental Status:    -Awake, Alert, Oriented to person and place. Stated was October. Unable to state upcoming holiday even when given cues.  -No word finding difficulties  -No aphasia  -No dysarthria  -Follows simple  commands    CN II:  Visual fields full.  Pupils equally reactive to light  CN III, IV, VI:  Extraocular Muscles full with no signs of nystagmus  CN V:  Facial sensory is symmetric with no asymmetries.  CN VII:  Facial motor symmetric  CN VIII:  Gross hearing intact bilaterally  CN IX:  Palate elevates symmetrically  CN X:  Palate elevates symmetrically  CN XI:  Shoulder shrug symmetric  CN XII:  Tongue protrudes to midline  Motor: (strength out of 5:  1= minimal movement, 2 = movement in plane of gravity, 3 = movement against gravity, 4 = movement against some resistance, 5 = full strength)    -moves upper and lower extremities symmetrically.  Increase tone bilateral lower extremities. Patient able to wiggles toes bilateral and moves both feet. Unable to raise either leg off the bed.     DTR:  -Right   Bicep: 2+ Tricep: 2+ Brachoradialis: 2+   Patella: 2+ Ankle: 2+ Neg Babinski  -Left   Bicep: 2+ Tricep: 2+ Brachoradialis: 2+   Patella: 2+ Ankle: 2+ Neg Babinski    Sensory:  -Intact to light touch, pinprick, temperature, pain, and proprioception    Coordination:  -Finger to nose intact  - no myoclonus noted but resting tremor noted in left hand.  - no increase tone or cog  wheeling.    Gait  -not attempted for safety reasons.      Results Review:    I reviewed the patient's new clinical results.    Results from last 7 days   Lab Units 12/16/19  1215 12/14/19  1752   WBC 10*3/mm3 14.32* 6.02   HEMOGLOBIN g/dL 10.3* 9.8*   HEMATOCRIT % 31.6* 30.6*   PLATELETS 10*3/mm3 215 214        Results from last 7 days   Lab Units 12/16/19  1215 12/14/19  1752   SODIUM mmol/L 143 139   POTASSIUM mmol/L 4.8 5.5*   CHLORIDE mmol/L 110* 108*   CO2 mmol/L 19.0* 17.0*   BUN mg/dL 51* 51*   CREATININE mg/dL 2.54* 2.56*   CALCIUM mg/dL 9.5 9.4   BILIRUBIN mg/dL 0.2 <0.2*   ALK PHOS U/L 67 62   ALT (SGPT) U/L 20 12   AST (SGOT) U/L 32 17   GLUCOSE mg/dL 160* 192*        Lab Results   Component Value Date    PHOS 4.4 03/05/2018    MG 1.6 12/16/2019    PROTIME 13.6 12/16/2019    INR 1.01 12/16/2019     No components found for: POCGLUC  No components found for: A1C  Lab Results   Component Value Date    HDL 28 (L) 03/06/2018    LDL 66 03/06/2018     No components found for: B12  Lab Results   Component Value Date    TSH 3.020 12/16/2019     Imaging Results (Last 24 Hours)     Procedure Component Value Units Date/Time    MRI Brain Without Contrast [[id number]] Collected:  12/17/19 0942     Updated:  12/17/19 0952    Narrative:       HISTORY: Encephalopathy, epilepsy     MRI BRAIN: Multiplanar imaging the brain is performed without contrast.     There is no abnormal diffusion restriction. There is mild to moderate  generalized cerebral volume loss. There are hyperintense periventricular  FLAIR signal changes with few scattered punctate hyperintense FLAIR  signal foci of the white matter. Appearance is favorable for chronic  small vessel ischemia. No abnormal signal changes seen within the  superior right cerebellum the site of the questionable hypodensity on  the recent CT head exam, likely artifactual. The hippocampal regions are  symmetrical with no abnormal signal. There is no abnormal extra-axial  fluid  collection.     There is smooth focal thickening of the right frontal calvarium, in  retrospect seen on the CT exam. This focus is unchanged from the CT head  of 09/26/2016 favoring a benign process. Limited assessment of the  orbits and base of skull is unremarkable. Mild chronic mucosal  thickening of the ethmoid sinuses. There are normal flow-voids in the  distal internal carotid and basilar arteries. Normal flow void in the  sagittal sinus.       Impression:       1. No acute intracranial abnormality. Acute signs of ischemia,  hemorrhage, or mass.  2. Mild to moderate generalized cerebral volume loss. Nonspecific  hyperintense FLAIR signal changes likely due to chronic small vessel  ischemia.  3. Chronic changes of the right frontal calvarium, stable from 2016.  This report was finalized on 12/17/2019 09:49 by Dr. Aby Godinez MD.    XR Abdomen KUB [[id number]] Collected:  12/16/19 1907     Updated:  12/16/19 1911    Narrative:       XR ABDOMEN KUB- 12/16/2019 7:02 PM CST     HISTORY: Implant/metal rule out.; R41.82-Altered mental status,  unspecified; A41.9-Sepsis, unspecified organism; R65.20-Severe sepsis  without septic shock; N39.0-Urinary tract infection, site not specified;  N18.9-Chronic kidney disease, unspecified; E87.2-Acidosis       COMPARISON: 06/15/2015     FINDINGS:  There is a nonobstructive bowel gas pattern. A moderate amount of stool  is present in the colon. No pathologic calcification or organomegaly is  visualized. Surgical clips are seen in the upper abdomen.     Evaluation for free intraperitoneal air is limited on a supine  radiograph, but there are no definite findings of free intraperitoneal  air.      The osseous structures demonstrate no acute abnormality. Scoliosis is  seen. Degenerative changes at L2-L3 have progressed since the previous  study.       Impression:       1. No radiographic evidence of acute abdominopelvic process.         This report was finalized on 12/16/2019  19:08 by Dr. Aki Quintero MD.    CT Head Without Contrast [[id number]] Collected:  12/16/19 1301     Updated:  12/16/19 1312    Narrative:       CT HEAD WO CONTRAST- 12/16/2019 12:31 PM CST     HISTORY: Confusion/delirium, altered LOC, unexplained      COMPARISON: 03/04/2018     DOSE LENGTH PRODUCT: 700 mGy cm. Automated exposure control was also  utilized to decrease patient radiation dose.     TECHNIQUE: Axial images of brain obtained without contrast.     FINDINGS:  There is similar mild cerebral volume loss. There is no  intracranial hemorrhage or mass effect. There are periventricular  hypodensities favorable for chronic small vessel ischemia. There is a  questionable hypodensity involving the right superior cerebellum just  below the cerebellar tentorium on the axial and coronal reconstructed  images with no associated mass effect. No additional acute signs of  ischemia localized. No abnormal extra axial fluid collection or  subarachnoid hemorrhage.     No air-fluid level seen within the visible paranasal sinuses. Mastoid  air cells are well-aerated.       Impression:       1. A questionable hypodensity of the superior right cerebellum may  represent chronic ischemia with no prominent associated mass effect. No  convincing acute intracranial process. No Intracranial hemorrhage.  2. Mild cerebral volume loss with chronic small vessel ischemia.   This report was finalized on 12/16/2019 13:09 by Dr. Aby Godinez MD.    XR Chest 1 View [[id number]] Collected:  12/16/19 1300     Updated:  12/16/19 1305    Narrative:       EXAMINATION: XR CHEST 1 VW-. 12/16/2019 1:00 PM CST     CHEST, ONE VIEW:     HISTORY: Shortness of air     COMPARISON: 03/05/2018, 3/4/2018 and 11/19/2017     A single frontal chest radiograph was obtained.     FINDINGS:     The level inspiration is relatively shallow and lung volumes diminished.  The perihilar fissure markings are mildly prominent with peribronchial  cuffing observed similarly  on previous examinations, suspect chronic  changes. Acute interstitial infectious or inflammatory changes difficult  to exclude.     No definite parenchymal infiltration observed.     The heart is normal in size without evidence of overt heart failure.     The bony structures are intact.     Postsurgical changes noted in the upper abdomen.                                     Impression:       1. Mild perihilar interstitial prominence, question chronic changes. No  definite parenchymal infiltration.     This report was finalized on 12/16/2019 13:02 by Dr. Rodriguez Aaron MD.          MRI brain personally reviewed by me and no acute process.    Assessment/Plan     Hospital Problem List      Altered mental status    CKD (chronic kidney disease) stage 4, GFR 15-29 ml/min (CMS/[facility])    Dementia without behavioral disturbance (CMS/[facility])    Metabolic encephalopathy    Impression:  1. Metabolic encephalopathy. Received 1 gram magnesium yesterday.  2. Decreased responsiveness with myoclonic jerking -resolved. MRI brain negative. EEG pending. Started on Keppra 500 mg IV. Gabapentin held.   3. Dementia  4. UTI  5. ROOPA with CKD    Plan:  1. Check BMP and magnesium level today.  2. Continue Keppra 500 mg IV every 12 hours  3. UTI per primary team  4. Recommend remain off Gabapentin at this time  5. Resting tremor left hand noted. Will continue to monitor.   6. Consult OT/PT and ST as patient high risk for aspiration.         Mattie Ramírez, APRN  12/17/19  10:43 AM

## 2019-12-17 NOTE — PLAN OF CARE
Pt disoriented x4. Occasionally will follow commands. BP systolic 180s, MD aware. Sinus avelino in the 50s, all other vitals stable. Thick secretions, NT suction done. Atropine drops given x1. Low urine output- CKD. No Bms this shift. Will continue to monitor.

## 2019-12-17 NOTE — THERAPY EVALUATION
Acute Care - Occupational Therapy Initial Evaluation  [facility]     Patient Name: Gregoria Bennett  : 1945  MRN: [id number]  Today's Date: 2019  Onset of Illness/Injury or Date of Surgery: 19  Date of Referral to OT: 19  Referring Physician: Dr. Lozano    Admit Date: 2019       ICD-10-CM ICD-9-CM   1. Altered mental status, unspecified altered mental status type R41.82 780.97   2. Severe sepsis (CMS/HCC) A41.9 038.9    R65.20 995.92   3. Acute UTI (urinary tract infection) N39.0 599.0   4. Chronic kidney disease, unspecified CKD stage N18.9 585.9   5. Metabolic acidosis E87.2 276.2   6. Dysphagia, unspecified type R13.10 787.20   7. Impaired functional mobility and activity tolerance Z74.09 V49.89   8. Impaired mobility and ADLs Z74.09 799.89     Patient Active Problem List   Diagnosis   • Sprain   • Urinary tract infection without hematuria   • Sepsis (CMS/HCC)   • Altered mental status   • CKD (chronic kidney disease) stage 4, GFR 15-29 ml/min (CMS/HCC)   • Dementia without behavioral disturbance (CMS/HCC)   • Metabolic encephalopathy     Past Medical History:   Diagnosis Date   • Chronic kidney disease, stage 4 (severe) (CMS/HCC)    • COPD (chronic obstructive pulmonary disease) (CMS/HCC)    • Coronary artery disease    • Dementia (CMS/HCC)    • Diabetes mellitus (CMS/[facility])    • History of dermatomyositis    • Hypertension    • Peptic ulcer disease    • Pulmonary disease    • Renal insufficiency    • Urinary tract infection    • Venous insufficiency      Past Surgical History:   Procedure Laterality Date   • CHOLECYSTECTOMY     • COLON SURGERY            OT ASSESSMENT FLOWSHEET (last 12 hours)      Occupational Therapy Evaluation     Row Name 19 1411                   OT Evaluation Time/Intention    Subjective Information  no complaints  -MM        Document Type  evaluation;other (see comments) see MAR  -MM        Mode of Treatment  occupational therapy  -MM           General  Information    Patient Profile Reviewed?  yes  -MM        Onset of Illness/Injury or Date of Surgery  12/16/19  -MM        Referring Physician  Dr. Lozano  -MM        Patient Observations  alert;cooperative;agree to therapy  -MM        Patient/Family Observations  no family present  -MM        General Observations of Patient  fowlers, IV, asleep but easy to awaken  -MM        Prior Level of Function  -- unsure 2' to cognition  -MM        Equipment Currently Used at Home  -- unsure 2' to cognition  -MM        Pertinent History of Current Functional Problem  metabolic encephalopathy, UTI, AMS  -MM        Existing Precautions/Restrictions  fall  -MM        Limitations/Impairments  safety/cognitive;hearing  -MM        Risks Reviewed  patient:;LOB;nausea/vomiting;dizziness;increased discomfort;change in vital signs;increased drainage;lines disloged  -MM        Benefits Reviewed  patient:;increase independence;improve function;increase strength;increase balance;decrease pain;improve skin integrity;increase knowledge;decrease risk of DVT  -MM        Barriers to Rehab  medically complex;previous functional deficit;cognitive status;physical barrier;contractures  -MM           Relationship/Environment    Lives With  facility resident  -MM           Resource/Environmental Concerns    Current Living Arrangements  residential facility  -MM           Cognitive Assessment/Interventions    Additional Documentation  Cognitive Assessment/Intervention (Group)  -MM           Cognitive Assessment/Intervention- PT/OT    Affect/Mental Status (Cognitive)  WNL  -MM        Orientation Status (Cognition)  oriented to;person;place;disoriented to;time  -MM        Follows Commands (Cognition)  follows one step commands;50-74% accuracy;delayed response/completion;increased processing time needed;initiation impaired;physical/tactile prompts required;repetition of directions required;verbal cues/prompting required  -MM        Safety Deficit  (Cognitive)  mild deficit;moderate deficit;ability to follow commands;at risk behavior observed;awareness of need for assistance;insight into deficits/self awareness;judgment;problem solving  -MM        Personal Safety Interventions  fall prevention program maintained;gait belt;muscle strengthening facilitated;nonskid shoes/slippers when out of bed;supervised activity;elopement precautions initiated  -MM           Safety Issues, Functional Mobility    Safety Issues Affecting Function (Mobility)  ability to follow commands;at risk behavior observed;awareness of need for assistance;insight into deficits/self awareness;judgment;problem solving;friction/shear risk  -MM        Impairments Affecting Function (Mobility)  balance;cognition;endurance/activity tolerance;postural/trunk control;range of motion (ROM);strength  -MM           Bed Mobility Assessment/Treatment    Bed Mobility Assessment/Treatment  supine-sit;sit-supine  -MM        Supine-Sit [place] (Bed Mobility)  moderate assist (50% patient effort);2 person assist;verbal cues  -MM        Sit-Supine [place] (Bed Mobility)  minimum assist (75% patient effort);verbal cues;nonverbal cues (demo/gesture)  -MM        Assistive Device (Bed Mobility)  bed rails;head of bed elevated  -MM           Transfer Assessment/Treatment    Comment (Transfers)  not appropriate to attempt at this time  -MM           ADL Assessment/Intervention    BADL Assessment/Intervention  lower body dressing  -MM           Lower Body Dressing Assessment/Training    Lower Body Dressing [place] Level  don;socks;maximum assist (25% patient effort)  -MM        Lower Body Dressing Position  supine  -MM           BADL Safety/Performance    Impairments, BADL Safety/Performance  balance;cognition;endurance/activity tolerance;strength  -MM           General ROM    GENERAL ROM COMMENTS  AROM WFL BUE  -MM           MMT (Manual Muscle Testing)    General MMT Comments  difficult to formally  assess 2' cognition  -MM           Sensory Assessment/Intervention    Sensory General Assessment  no sensation deficits identified  -MM           Positioning and Restraints    Pre-Treatment Position  in bed  -MM        Post Treatment Position  bed  -MM        In Bed  fowlers;call light within reach;encouraged to call for assist;with OT;side rails up x2;SCD pump applied;exit alarm on  -MM           Pain Scale: FACES Pre/Post-Treatment    Pain: FACES Scale, Pretreatment  0-->no hurt  -MM        Pain: FACES Scale, Post-Treatment  0-->no hurt  -MM           Plan of Care Review    Plan of Care Reviewed With  patient  -MM        Progress  no change  -MM        Outcome Summary  OT eval completed. Unsure to gather baseline function 2' pt's cognition. Pt is agreeable to therapy. Pt has decreased command following and is oriented to self and place only. Pt was max A to sabrina socks. Pt was min A for EOB sitting with verbal and tactile cues. Pt required assistance for bed mobility. Skilled OT recommended to address adls, functional mobiltiy and safety. Recommended d/c return to SNF.  -MM           Clinical Impression (OT)    Date of Referral to OT  12/17/19  -MM        OT Diagnosis  impaired mobility and adls  -MM        Prognosis (OT Eval)  good  -MM        Functional Level at Time of Evaluation (OT Eval)  adeqaute  -MM        Patient/Family Goals Statement (OT Eval)  pt did not state  -MM        Criteria for Skilled Therapeutic Interventions Met (OT Eval)  yes;treatment indicated  -MM        Rehab Potential (OT Eval)  good, to achieve stated therapy goals  -MM        Therapy Frequency (OT Eval)  5 times/wk  -MM        Predicted Duration of Therapy Intervention (Therapy Eval)  until d/c  -MM        Care Plan Review (OT)  evaluation/treatment results reviewed;care plan/treatment goals reviewed;risks/benefits reviewed;current/potential barriers reviewed;patient/other agree to care plan  -MM        Anticipated Discharge  Disposition (OT)  skilled nursing facility  -MM           Planned OT Interventions    Planned Therapy Interventions (OT Eval)  activity tolerance training;adaptive equipment training;BADL retraining;cognitive/visual perception retraining;functional balance retraining;neuromuscular control/coordination retraining;occupation/activity based interventions;passive ROM/stretching;patient/caregiver education/training;ROM/therapeutic exercise;strengthening exercise;transfer/mobility retraining  -MM           OT Goals    Bathing Goal Selection (OT)  bathing, OT goal 1  -MM        Dressing Goal Selection (OT)  dressing, OT goal 1  -MM        Grooming Goal Selection (OT)  grooming, OT goal 1  -MM        Additional Documentation  Grooming Goal Selection (OT) (Row)  -MM           Bathing Goal 1 (OT)    Activity/Assistive Device (Bathing Goal 1, OT)  upper body bathing  -MM        [place] Level/Cues Needed (Bathing Goal 1, OT)  minimum assist (75% or more patient effort);verbal cues required;tactile cues required;set-up required  -MM        Time Frame (Bathing Goal 1, OT)  10 days  -MM        Progress/Outcomes (Bathing Goal 1, OT)  goal ongoing  -MM           Dressing Goal 1 (OT)    Activity/Assistive Device (Dressing Goal 1, OT)  upper body dressing  -MM        [place]/Cues Needed (Dressing Goal 1, OT)  contact guard assist;verbal cues required;tactile cues required;set-up required  -MM        Time Frame (Dressing Goal 1, OT)  10 days  -MM        Progress/Outcome (Dressing Goal 1, OT)  goal ongoing  -MM           Grooming Goal 1 (OT)    Activity/Device (Grooming Goal 1, OT)  grooming skills, all  -MM        [place] (Grooming Goal 1, OT)  supervision required;set-up required;tactile cues required;verbal cues required  -MM        Time Frame (Grooming Goal 1, OT)  10 days  -MM        Progress/Outcome (Grooming Goal 1, OT)  goal ongoing  -MM           Living Environment    Home Accessibility  wheelchair accessible   -MM          User Key  (r) = Recorded By, (t) = Taken By, (c) = Cosigned By    Initials Name Effective Dates    MM Garrett Garcia, OTR/L 04/03/18 -                OT Recommendation and Plan  Outcome Summary/Treatment Plan (OT)  Anticipated Discharge Disposition (OT): skilled nursing facility  Planned Therapy Interventions (OT Eval): activity tolerance training, adaptive equipment training, BADL retraining, cognitive/visual perception retraining, functional balance retraining, neuromuscular control/coordination retraining, occupation/activity based interventions, passive ROM/stretching, patient/caregiver education/training, ROM/therapeutic exercise, strengthening exercise, transfer/mobility retraining  Therapy Frequency (OT Eval): 5 times/wk  Plan of Care Review  Plan of Care Reviewed With: patient  Plan of Care Reviewed With: patient  Outcome Summary: OT eval completed. Unsure to gather baseline function 2' pt's cognition. Pt is agreeable to therapy. Pt has decreased command following and is oriented to self and place only. Pt was max A to sabrina socks. Pt was min A for EOB sitting with verbal and tactile cues. Pt required assistance for bed mobility. Skilled OT recommended to address adls, functional mobiltiy and safety. Recommended d/c return to SNF.    Outcome Measures     Row Name 12/17/19 1600             How much help from another is currently needed...    Putting on and taking off regular lower body clothing?  2  -MM      Bathing (including washing, rinsing, and drying)  2  -MM      Toileting (which includes using toilet bed pan or urinal)  2  -MM      Putting on and taking off regular upper body clothing  2  -MM      Taking care of personal grooming (such as brushing teeth)  2  -MM      Eating meals  2  -MM      AM-PAC 6 Clicks Score (OT)  12  -MM         Functional Assessment    Outcome Measure Options  AM-PAC 6 Clicks Daily Activity (OT)  -MM        User Key  (r) = Recorded By, (t) = Taken By, (c) =  Cosigned By    Initials Name Provider Type    Garrett Howell, OTR/L Occupational Therapist          Time Calculation:   Time Calculation- OT     Row Name 12/17/19 1411             Time Calculation- OT    OT Start Time  1411  -MM      OT Stop Time  1439  -MM      OT Time Calculation (min)  28 min  -MM      OT Received On  12/17/19  -MM      OT Goal Re-Cert Due Date  12/27/19  -MM        User Key  (r) = Recorded By, (t) = Taken By, (c) = Cosigned By    Initials Name Provider Type    Garrett Howell, OTR/L Occupational Therapist        Therapy Charges for Today     Code Description Service Date Service Provider Modifiers Qty    95559384717 HC OT EVAL MOD COMPLEXITY 2 12/17/2019 Garrett Garcia OTR/L GO 1               TETE Bhatti/JODI  12/17/2019

## 2019-12-17 NOTE — PROGRESS NOTES
Discharge Planning Assessment  [facility]     Patient Name: Gregoria Bennett  MRN: [id number]  Today's Date: 12/17/2019    Admit Date: 12/16/2019    Discharge Needs Assessment     Row Name 12/17/19 1033       Living Environment    Lives With  facility resident    Name(s) of Who Lives With Patient  MARIANNE DOWNS [place]    Current Living Arrangements  residential facility    Primary Care Provided by  other (see comments)    Provides Primary Care For  no one, unable/limited ability to care for self    Family Caregiver if Needed  none    Family Caregiver Names  PUBLIC GUARDIAN    Able to Return to Prior Arrangements  yes    Living Arrangement Comments  [facility]       Resource/Environmental Concerns    Resource/Environmental Concerns  none    Transportation Concerns  other (see comments) EMS        Transition Planning    Patient/Family Anticipates Transition to  long term care facility    Patient/Family Anticipated Services at Transition  skilled nursing    Transportation Anticipated  other (see comments) EMS       Discharge Needs Assessment    Readmission Within the Last 30 Days  no previous admission in last 30 days    Concerns to be Addressed  no discharge needs identified    Equipment Currently Used at Home  walker, standard;respiratory supplies;grab bar;hospital bed;oxygen;walker, rolling;shower chair;bath bench;commode    Anticipated Changes Related to Illness  none    Equipment Needed After Discharge  none    Outpatient/Agency/Support Group Needs  skilled nursing facility    Provided post acute provider list?  -- N/A FROM [facility]    Patient's Choice of Community Agency(s)  CURRENT RESIDENT OF [facility]    Discharge Coordination/Progress  PLAN TO RETURN TO [facility] AT TIME OF DC. ATTEJMPTED TO NOTIFY GUARDIAN, MESSAGE LEFT ON VM. CONTACT LIST UPDATED PER CM. UNABLE TO CONTACT SURROGATE LISTED OF 2004.  PATIENT HAS PCP.         Discharge Plan    No documentation.        Destination      Coordination has not been started for this encounter.      Durable Medical Equipment      Coordination has not been started for this encounter.      Dialysis/Infusion      Coordination has not been started for this encounter.      Home Medical Care      Coordination has not been started for this encounter.      Therapy      Coordination has not been started for this encounter.      Community Resources      Coordination has not been started for this encounter.          Demographic Summary    No documentation.       Functional Status    No documentation.       Psychosocial    No documentation.       Abuse/Neglect    No documentation.       Legal    No documentation.       Substance Abuse    No documentation.       Patient Forms    No documentation.           Krista Galvan RN

## 2019-12-17 NOTE — PLAN OF CARE
Problem: Patient Care Overview  Goal: Plan of Care Review  Outcome: Ongoing (interventions implemented as appropriate)   OT eval completed. Unsure to gather baseline function 2' pt's cognition. Pt is agreeable to therapy. Pt has decreased command following and is oriented to self and place only. Pt was max A to sabrina socks. Pt was min A for EOB sitting with verbal and tactile cues. Pt required assistance for bed mobility. Skilled OT recommended to address adls, functional mobiltiy and safety. Recommended d/c return to SNF.

## 2019-12-17 NOTE — PROGRESS NOTES
[facility] Medicine Services  INPATIENT PROGRESS NOTE    Patient Name: Gregoria Bennett  Date of Admission: 12/16/2019  Today's Date: 12/17/19  Length of Stay: 1  Primary Care Physician: Chu Isaac MD    Subjective   Chief Complaint: pleasantly confused  HPI   Doing well.  Afebrile.  Pleasantly confused  More alert and responsive        Review of Systems   Unable to perform ROS: Dementia        All pertinent negatives and positives are as above. All other systems have been reviewed and are negative unless otherwise stated.     Objective    Temp:  [97.1 °F (36.2 °C)-100.6 °F (38.1 °C)] 98.3 °F (36.8 °C)  Heart Rate:  [49-74] 52  Resp:  [12-20] 14  BP: (117-178)/(41-97) 168/50  Physical Exam   Constitutional: She appears well-developed and well-nourished.   HENT:   Head: Normocephalic and atraumatic.   Right Ear: External ear normal.   Left Ear: External ear normal.   Nose: Nose normal.   Mouth/Throat: Oropharynx is clear and moist.   Eyes: Conjunctivae and EOM are normal.   Neck: Normal range of motion. Neck supple.   Cardiovascular: Normal rate, regular rhythm and normal heart sounds.   Pulmonary/Chest: Effort normal and breath sounds normal.   Abdominal: Soft. Bowel sounds are normal. She exhibits no distension. There is no tenderness.   Musculoskeletal: Normal range of motion.   Neurological: She is alert. She is disoriented.   Skin: Skin is warm and dry.   Psychiatric: She has a normal mood and affect. Her speech is normal and behavior is normal. Cognition and memory are impaired.           Results Review:  I have reviewed the labs, radiology results, and diagnostic studies.    Laboratory Data:   Results from last 7 days   Lab Units 12/16/19  1215 12/14/19  1752   WBC 10*3/mm3 14.32* 6.02   HEMOGLOBIN g/dL 10.3* 9.8*   HEMATOCRIT % 31.6* 30.6*   PLATELETS 10*3/mm3 215 214        Results from last 7 days   Lab Units 12/16/19  1215 12/14/19  1752   SODIUM mmol/L 143 139    POTASSIUM mmol/L 4.8 5.5*   CHLORIDE mmol/L 110* 108*   CO2 mmol/L 19.0* 17.0*   BUN mg/dL 51* 51*   CREATININE mg/dL 2.54* 2.56*   CALCIUM mg/dL 9.5 9.4   BILIRUBIN mg/dL 0.2 <0.2*   ALK PHOS U/L 67 62   ALT (SGPT) U/L 20 12   AST (SGOT) U/L 32 17   GLUCOSE mg/dL 160* 192*       Culture Data:   Urine Culture   Date Value Ref Range Status   12/16/2019 >100,000 CFU/mL Mixed Melissa Isolated  Final       Radiology Data:   Imaging Results (Last 24 Hours)     Procedure Component Value Units Date/Time    MRI Brain Without Contrast [[id number]] Collected:  12/17/19 0942     Updated:  12/17/19 0952    Narrative:       HISTORY: Encephalopathy, epilepsy     MRI BRAIN: Multiplanar imaging the brain is performed without contrast.     There is no abnormal diffusion restriction. There is mild to moderate  generalized cerebral volume loss. There are hyperintense periventricular  FLAIR signal changes with few scattered punctate hyperintense FLAIR  signal foci of the white matter. Appearance is favorable for chronic  small vessel ischemia. No abnormal signal changes seen within the  superior right cerebellum the site of the questionable hypodensity on  the recent CT head exam, likely artifactual. The hippocampal regions are  symmetrical with no abnormal signal. There is no abnormal extra-axial  fluid collection.     There is smooth focal thickening of the right frontal calvarium, in  retrospect seen on the CT exam. This focus is unchanged from the CT head  of 09/26/2016 favoring a benign process. Limited assessment of the  orbits and base of skull is unremarkable. Mild chronic mucosal  thickening of the ethmoid sinuses. There are normal flow-voids in the  distal internal carotid and basilar arteries. Normal flow void in the  sagittal sinus.       Impression:       1. No acute intracranial abnormality. Acute signs of ischemia,  hemorrhage, or mass.  2. Mild to moderate generalized cerebral volume loss. Nonspecific  hyperintense FLAIR  signal changes likely due to chronic small vessel  ischemia.  3. Chronic changes of the right frontal calvarium, stable from 2016.  This report was finalized on 12/17/2019 09:49 by Dr. Aby Godinez MD.    XR Abdomen KUB [[id number]] Collected:  12/16/19 1907     Updated:  12/16/19 1911    Narrative:       XR ABDOMEN KUB- 12/16/2019 7:02 PM CST     HISTORY: Implant/metal rule out.; R41.82-Altered mental status,  unspecified; A41.9-Sepsis, unspecified organism; R65.20-Severe sepsis  without septic shock; N39.0-Urinary tract infection, site not specified;  N18.9-Chronic kidney disease, unspecified; E87.2-Acidosis       COMPARISON: 06/15/2015     FINDINGS:  There is a nonobstructive bowel gas pattern. A moderate amount of stool  is present in the colon. No pathologic calcification or organomegaly is  visualized. Surgical clips are seen in the upper abdomen.     Evaluation for free intraperitoneal air is limited on a supine  radiograph, but there are no definite findings of free intraperitoneal  air.      The osseous structures demonstrate no acute abnormality. Scoliosis is  seen. Degenerative changes at L2-L3 have progressed since the previous  study.       Impression:       1. No radiographic evidence of acute abdominopelvic process.         This report was finalized on 12/16/2019 19:08 by Dr. Aki Quintero MD.    CT Head Without Contrast [[id number]] Collected:  12/16/19 1301     Updated:  12/16/19 1312    Narrative:       CT HEAD WO CONTRAST- 12/16/2019 12:31 PM CST     HISTORY: Confusion/delirium, altered LOC, unexplained      COMPARISON: 03/04/2018     DOSE LENGTH PRODUCT: 700 mGy cm. Automated exposure control was also  utilized to decrease patient radiation dose.     TECHNIQUE: Axial images of brain obtained without contrast.     FINDINGS:  There is similar mild cerebral volume loss. There is no  intracranial hemorrhage or mass effect. There are periventricular  hypodensities favorable for chronic small vessel  ischemia. There is a  questionable hypodensity involving the right superior cerebellum just  below the cerebellar tentorium on the axial and coronal reconstructed  images with no associated mass effect. No additional acute signs of  ischemia localized. No abnormal extra axial fluid collection or  subarachnoid hemorrhage.     No air-fluid level seen within the visible paranasal sinuses. Mastoid  air cells are well-aerated.       Impression:       1. A questionable hypodensity of the superior right cerebellum may  represent chronic ischemia with no prominent associated mass effect. No  convincing acute intracranial process. No Intracranial hemorrhage.  2. Mild cerebral volume loss with chronic small vessel ischemia.   This report was finalized on 12/16/2019 13:09 by Dr. Aby Godinez MD.    XR Chest 1 View [[id number]] Collected:  12/16/19 1300     Updated:  12/16/19 1305    Narrative:       EXAMINATION: XR CHEST 1 VW-. 12/16/2019 1:00 PM CST     CHEST, ONE VIEW:     HISTORY: Shortness of air     COMPARISON: 03/05/2018, 3/4/2018 and 11/19/2017     A single frontal chest radiograph was obtained.     FINDINGS:     The level inspiration is relatively shallow and lung volumes diminished.  The perihilar fissure markings are mildly prominent with peribronchial  cuffing observed similarly on previous examinations, suspect chronic  changes. Acute interstitial infectious or inflammatory changes difficult  to exclude.     No definite parenchymal infiltration observed.     The heart is normal in size without evidence of overt heart failure.     The bony structures are intact.     Postsurgical changes noted in the upper abdomen.                                     Impression:       1. Mild perihilar interstitial prominence, question chronic changes. No  definite parenchymal infiltration.     This report was finalized on 12/16/2019 13:02 by Dr. Rodriguez Aaron MD.          I have reviewed the patient's current medications.      Assessment/Plan     Active Hospital Problems    Diagnosis   • CKD (chronic kidney disease) stage 4, GFR 15-29 ml/min (CMS/[facility])   • Dementia without behavioral disturbance (CMS/[facility])   • Metabolic encephalopathy   • Altered mental status       1.  Metabolic Encephalopathy  -IV abx     2.   Sepsis  -IV abx     3.  Acute bacterial Cystitis  -IV abx     4.  ROOPA on CKD 4  -IV abx     5.  COPD  -duonebs prn     6.  CAD  -Plavix  -Lipitor     7.  Dementia  -supportive care     8.  T2DM  -SSI     9.  Peptic Ulcer Disease  -protonix     10.  Troponin elevation  -likely type 2 due to sepsis    11.  Myoclonic jerking  -Neuro following  -Keppra          Discharge Planning: I expect the patient to be discharged to SNF in 1-2 days    Edward Lozano MD   12/17/19   12:00 PM

## 2019-12-17 NOTE — THERAPY EVALUATION
Acute Care - Speech Language Pathology   Swallow Initial Evaluation [facility]     Patient Name: Gregoria Bennett  : 1945  MRN: [id number]  Today's Date: 2019               Admit Date: 2019  Clinical bedside swallow evaluation completed.Pureed, honey, nectar, and thin consistencices were presented. Initially pt exhibited a tonic bite on the spoon, but improved through the rest of trials. She was fatigued throughout, but was able to participate. She exhibited excessive lingual movement with bolus preparation and transit, but didn't appear to have any significant oral residue. She displayed audible swallows with thin as well as a delayed cough after trials had been completed. She also had minimal anterior loss of thin water 1x. No solids were presented due to pt's decreased attention.     Recommend:  1. A pureed diet and nectar thick liquids.   2. Meds crushed in applesauce.   3. Ok for thin water between meals approximately 30 minutes after any other PO intake.   4. 1:1 assistance with meals.   5. SLP will follow and treat.  Brianna Hernandez, CCC-SLP 2019 12:38 PM    Visit Dx:     ICD-10-CM ICD-9-CM   1. Altered mental status, unspecified altered mental status type R41.82 780.97   2. Severe sepsis (CMS/HCC) A41.9 038.9    R65.20 995.92   3. Acute UTI (urinary tract infection) N39.0 599.0   4. Chronic kidney disease, unspecified CKD stage N18.9 585.9   5. Metabolic acidosis E87.2 276.2   6. Dysphagia, unspecified type R13.10 787.20     Patient Active Problem List   Diagnosis   • Sprain   • Urinary tract infection without hematuria   • Sepsis (CMS/HCC)   • Altered mental status   • CKD (chronic kidney disease) stage 4, GFR 15-29 ml/min (CMS/HCC)   • Dementia without behavioral disturbance (CMS/HCC)   • Metabolic encephalopathy     Past Medical History:   Diagnosis Date   • Chronic kidney disease, stage 4 (severe) (CMS/HCC)    • COPD (chronic obstructive pulmonary disease) (CMS/HCC)    •  Coronary artery disease    • Dementia (CMS/HCC)    • Diabetes mellitus (CMS/HCC)    • History of dermatomyositis    • Hypertension    • Peptic ulcer disease    • Pulmonary disease    • Renal insufficiency    • Urinary tract infection    • Venous insufficiency      Past Surgical History:   Procedure Laterality Date   • CHOLECYSTECTOMY     • COLON SURGERY          SWALLOW EVALUATION (last 72 hours)      SLP Adult Swallow Evaluation     Row Name 12/17/19 1049                   Rehab Evaluation    Document Type  evaluation  -MB        Subjective Information  no complaints  -MB        Patient Observations  cooperative Fatigued  -MB        Patient/Family Observations  No family present, pt a lau of the [facility]  -MB           General Information    Patient Profile Reviewed  yes  -MB        Pertinent History Of Current Problem  AMS, lethargy, UTI, sepsis, dementia, COPD, CXR: mild perihilar prominence, question chronic changes, no definite parenchymal infiltration. Hx of CKD CAD, DM, HTN, renal insufficiency.  -MB        Current Method of Nutrition  NPO  -MB        Precautions/Limitations, Vision  other (see comments) unknown  -MB        Precautions/Limitations, Hearing  WFL;for purposes of eval  -MB        Prior Level of Function-Communication  cognitive-linguistic impairment  -MB        Prior Level of Function-Swallowing  other (see comments) unknown  -MB        Plans/Goals Discussed with  patient  -MB        Barriers to Rehab  cognitive status  -MB        Patient's Goals for Discharge  patient did not state  -MB           Pain Assessment    Additional Documentation  Pain Scale: FACES Pre/Post-Treatment (Group)  -MB           Pain Scale: FACES Pre/Post-Treatment    Pain: FACES Scale, Pretreatment  0-->no hurt  -MB           Oral Motor and Function    Dentition Assessment  poor oral hygiene;missing teeth;teeth are in poor condition  -MB        Secretion Management  dried secretions in oral cavity  -MB        Mucosal Quality   dry  -MB           Oral Musculature and Cranial Nerve Assessment    Oral Motor General Assessment  generalized oral motor weakness;oral labial or buccal impairment;lingual impairment  -MB        Oral Labial or Buccal Impairment, Detail, Cranial Nerve VII (Facial):  CN7: Motor Impairment;reduced ROM;reduced strength bilaterally  -MB        Lingual Impairment, Detail. Cranial Nerves IX, XII (Glossopharyngeal and Hypoglossal)  CN12: Motor Impairment;reduced lingual ROM;reduced strength;bilaterally  -MB           General Eating/Swallowing Observations    Eating/Swallowing Skills  fed by SLP  -MB        Positioning During Eating  upright in bed  -MB        Utensils Used  spoon;straw  -MB        Consistencies Trialed  pureed;thin liquids;nectar/syrup-thick liquids;honey-thick liquids  -MB           Clinical Swallow Eval    Oral Prep Phase  impaired  -MB        Oral Transit  impaired  -MB        Oral Residue  WFL  -MB        Pharyngeal Phase  suspected pharyngeal impairment  -MB        Esophageal Phase  unremarkable  -MB        Clinical Swallow Evaluation Summary  Pureed, honey, nectar, and thin consistencices were presented. Initially pt exhibited a tonic bite on the spoon, but improved through the rest of trials. She was fatigued throughout, but was able to participate. She exhibited excessive lingual movement with bolus preparation and transit, but didn't appear to have any significant oral residue. She displayed audible swallows with thin as well as a delayed cough after trials had been completed. She also had minimal anterior loss of thin water 1x. No solids were presented due to pt's decreased attention.  -MB           Oral Prep Concerns    Oral Prep Concerns  anterior loss;increased prep time  -MB        Anterior Loss  thin  -MB        Increased Prep Time  all consistencies  -MB           Oral Transit Concerns    Oral Transit Concerns  increased oral transit time  -MB        Increased Oral Transit Time  all  consistencies  -MB           Pharyngeal Phase Concerns    Pharyngeal Phase Concerns  cough;other (see comments) audible swallow  -MB        Cough  thin  -MB           Clinical Impression    SLP Swallowing Diagnosis  mild-moderate;oral dysfunction  -MB        Functional Impact  risk of aspiration/pneumonia  -MB        Rehab Potential/Prognosis, Swallowing  adequate, monitor progress closely  -MB        Swallow Criteria for Skilled Therapeutic Interventions Met  demonstrates skilled criteria  -MB           Recommendations    Therapy Frequency (Swallow)  3 days per week  -MB        Predicted Duration Therapy Intervention (Days)  until discharge  -MB        SLP Diet Recommendation  puree;nectar thick liquids;water between meals after oral care, with supervision;ice chips between meals after oral care, with supervision  -MB        Recommended Precautions and Strategies  upright posture during/after eating;small bites of food and sips of liquid;alternate between small bites of food and sips of liquid  -MB        SLP Rec. for Method of Medication Administration  meds crushed;with pudding or applesauce  -MB        Monitor for Signs of Aspiration  yes;cough;gurgly voice;throat clearing;pneumonia;notify SLP if any concerns  -MB        Anticipated Dischage Disposition  extended care facility  -MB           Swallow Goals (SLP)    Oral Nutrition/Hydration Goal Selection (SLP)  oral nutrition/hydration, SLP goal 1  -MB        Pharyngeal Strengthening Exercise Goal Selection (SLP)  pharyngeal strengthening exercise, SLP goal 1  -MB        Additional Documentation  pharyngeal strengthening exercise goal selection (SLP)  -MB           Oral Nutrition/Hydration Goal 1 (SLP)    Oral Nutrition/Hydration Goal 1, SLP  Pt will tolerate least restrictive diet with no overt s/s of aspiration.  -MB        Time Frame (Oral Nutrition/Hydration Goal 1, SLP)  by discharge  -MB        Barriers (Oral Nutrition/Hydration Goal 1, SLP)  n/a  -MB         Progress/Outcomes (Oral Nutrition/Hydration Goal 1, SLP)  goal ongoing  -MB           Pharyngeal Strengthening Exercise Goal 1 (SLP)    Activity (Pharyngeal Strengthening Goal 1, SLP)  increase timing;increase squeeze/positive pressure generation  -MB        Increase Timing  gustatory stimulation (sour/cold)  -MB        Increase Squeeze/Positive Pressure Generation  hard effortful swallow  -MB        [place]/Accuracy (Pharyngeal Strengthening Goal 1, SLP)  independently (over 90% accuracy)  -MB        Time Frame (Pharyngeal Strengthening Goal 1, SLP)  short term goal (STG);by discharge  -MB        Barriers (Pharyngeal Strengthening Goal 1, SLP)  n/a  -MB        Progress/Outcomes (Pharyngeal Strengthening Goal 1, SLP)  goal ongoing  -MB          User Key  (r) = Recorded By, (t) = Taken By, (c) = Cosigned By    Initials Name Effective Dates    Brianna Rosales, CCC-SLP 08/02/16 -           EDUCATION  The patient has been educated in the following areas:   Dysphagia (Swallowing Impairment).    SLP Recommendation and Plan  SLP Swallowing Diagnosis: mild-moderate, oral dysfunction  SLP Diet Recommendation: puree, nectar thick liquids, water between meals after oral care, with supervision, ice chips between meals after oral care, with supervision  Recommended Precautions and Strategies: upright posture during/after eating, small bites of food and sips of liquid, alternate between small bites of food and sips of liquid  SLP Rec. for Method of Medication Administration: meds crushed, with pudding or applesauce     Monitor for Signs of Aspiration: yes, cough, gurgly voice, throat clearing, pneumonia, notify SLP if any concerns     Swallow Criteria for Skilled Therapeutic Interventions Met: demonstrates skilled criteria  Anticipated Dischage Disposition: extended care facility  Rehab Potential/Prognosis, Swallowing: adequate, monitor progress closely  Therapy Frequency (Swallow): 3 days per week  Predicted  Duration Therapy Intervention (Days): until discharge       Plan of Care Reviewed With: patient, other (see comments)(Nurse, Dalila)  Outcome Summary: Clinical bedside swallow evaluation completed.Pureed, honey, nectar, and thin consistencices were presented. Initially pt exhibited a tonic bite on the spoon, but improved through the rest of trials. She was fatigued throughout, but was able to participate. She exhibited excessive lingual movement with bolus preparation and transit, but didn't appear to have any significant oral residue. She displayed audible swallows with thin as well as a delayed cough after trials had been completed. She also had minimal anterior loss of thin water 1x. No solids were presented due to pt's decreased attention. Recommend a pureed diet and nectar thick liquids. Meds crushed in applesauce. Ok for thin water between meals approximately 30 minutes after any other PO intake. 1:1 assistance with meals. SLP will follow and treat.    SLP GOALS     Row Name 12/17/19 1041             Oral Nutrition/Hydration Goal 1 (SLP)    Oral Nutrition/Hydration Goal 1, SLP  Pt will tolerate least restrictive diet with no overt s/s of aspiration.  -MB      Time Frame (Oral Nutrition/Hydration Goal 1, SLP)  by discharge  -MB      Barriers (Oral Nutrition/Hydration Goal 1, SLP)  n/a  -MB      Progress/Outcomes (Oral Nutrition/Hydration Goal 1, SLP)  goal ongoing  -MB         Pharyngeal Strengthening Exercise Goal 1 (SLP)    Activity (Pharyngeal Strengthening Goal 1, SLP)  increase timing;increase squeeze/positive pressure generation  -MB      Increase Timing  gustatory stimulation (sour/cold)  -MB      Increase Squeeze/Positive Pressure Generation  hard effortful swallow  -MB      [place]/Accuracy (Pharyngeal Strengthening Goal 1, SLP)  independently (over 90% accuracy)  -MB      Time Frame (Pharyngeal Strengthening Goal 1, SLP)  short term goal (STG);by discharge  -MB      Barriers (Pharyngeal  Strengthening Goal 1, SLP)  n/a  -MB      Progress/Outcomes (Pharyngeal Strengthening Goal 1, SLP)  goal ongoing  -MB        User Key  (r) = Recorded By, (t) = Taken By, (c) = Cosigned By    Initials Name Provider Type    Brianna Rosales CCC-SLP Speech and Language Pathologist             Time Calculation:   Time Calculation- SLP     Row Name 12/17/19 1237             Time Calculation- SLP    SLP Start Time  1049  -MB      SLP Stop Time  1145  -MB      SLP Time Calculation (min)  56 min  -MB      SLP Received On  12/17/19  -MB      SLP Goal Re-Cert Due Date  12/27/19  -MB        User Key  (r) = Recorded By, (t) = Taken By, (c) = Cosigned By    Initials Name Provider Type    Brianna Rosales CCC-SLP Speech and Language Pathologist          Therapy Charges for Today     Code Description Service Date Service Provider Modifiers Qty    [id number] HC ST EVAL ORAL PHARYNG SWALLOW 4 12/17/2019 Brianna Hernandez CCC-SLP GN 1               XOCHITL Upton  12/17/2019

## 2019-12-17 NOTE — PLAN OF CARE
Problem: Patient Care Overview  Goal: Plan of Care Review  Outcome: Ongoing (interventions implemented as appropriate)  Flowsheets (Taken 12/17/2019 1235)  Plan of Care Reviewed With: patient; other (see comments) (Nurse, Dalila)  Outcome Summary: Clinical bedside swallow evaluation completed.Pureed, honey, nectar, and thin consistencices were presented. Initially pt exhibited a tonic bite on the spoon, but improved through the rest of trials. She was fatigued throughout, but was able to participate. She exhibited excessive lingual movement with bolus preparation and transit, but didn't appear to have any significant oral residue. She displayed audible swallows with thin as well as a delayed cough after trials had been completed. She also had minimal anterior loss of thin water 1x. No solids were presented due to pt's decreased attention. Recommend a pureed diet and nectar thick liquids. Meds crushed in applesauce. Ok for thin water between meals approximately 30 minutes after any other PO intake. 1:1 assistance with meals. SLP will follow and treat.

## 2019-12-17 NOTE — PLAN OF CARE
Sleeps between care, wakes easily. Oriented to name. NIH - 15 upon arrival to this unit. Worked with therapy. Oral care provided frequently. Total feed, puree diet with nectar liquids. Incontinent, Purewick in place. SCDs in place. Elevated systolic pressures, runs slightly bradycardic (52-57 since arrival to this unit). ABX administered per orders. No seizure activity noted.

## 2019-12-17 NOTE — THERAPY EVALUATION
Patient Name: Gregoria Bennett  : 1945    MRN: [id number]                              Today's Date: 2019       Admit Date: 2019    Visit Dx:     ICD-10-CM ICD-9-CM   1. Altered mental status, unspecified altered mental status type R41.82 780.97   2. Severe sepsis (CMS/[facility]) A41.9 038.9    R65.20 995.92   3. Acute UTI (urinary tract infection) N39.0 599.0   4. Chronic kidney disease, unspecified CKD stage N18.9 585.9   5. Metabolic acidosis E87.2 276.2   6. Dysphagia, unspecified type R13.10 787.20   7. Impaired functional mobility and activity tolerance Z74.09 V49.89     Patient Active Problem List   Diagnosis   • Sprain   • Urinary tract infection without hematuria   • Sepsis (CMS/[facility])   • Altered mental status   • CKD (chronic kidney disease) stage 4, GFR 15-29 ml/min (CMS/[facility])   • Dementia without behavioral disturbance (CMS/[facility])   • Metabolic encephalopathy     Past Medical History:   Diagnosis Date   • Chronic kidney disease, stage 4 (severe) (CMS/[facility])    • COPD (chronic obstructive pulmonary disease) (CMS/[facility])    • Coronary artery disease    • Dementia (CMS/[facility])    • Diabetes mellitus (CMS/[facility])    • History of dermatomyositis    • Hypertension    • Peptic ulcer disease    • Pulmonary disease    • Renal insufficiency    • Urinary tract infection    • Venous insufficiency      Past Surgical History:   Procedure Laterality Date   • CHOLECYSTECTOMY     • COLON SURGERY       General Information     Row Name 19 1400          PT Evaluation Time/Intention    Document Type  evaluation metabolic encephalopathy, UTI, AMS  -MS     Mode of Treatment  physical therapy;concurrent therapy  -MS     Row Name 19 1400          General Information    Patient Profile Reviewed?  yes  -MS     Prior Level of Function  -- unknown at this time  -MS     Existing Precautions/Restrictions  fall  -MS     Barriers to Rehab  medically complex;previous functional deficit;cognitive status;physical  barrier;contractures  -MS     Row Name 12/17/19 1400          Cognitive Assessment/Intervention- PT/OT    Orientation Status (Cognition)  oriented to;person;place  -MS     Row Name 12/17/19 1400          Safety Issues, Functional Mobility    Safety Issues Affecting Function (Mobility)  ability to follow commands;friction/shear risk;insight into deficits/self awareness  -MS     Impairments Affecting Function (Mobility)  balance;cognition;endurance/activity tolerance;postural/trunk control;range of motion (ROM);strength  -MS       User Key  (r) = Recorded By, (t) = Taken By, (c) = Cosigned By    Initials Name Provider Type    Melissa Kay, PT, DPT, NCS Physical Therapist        Mobility     Row Name 12/17/19 1523          Bed Mobility Assessment/Treatment    Bed Mobility Assessment/Treatment  supine-sit;sit-supine  -MS     Supine-Sit [place] (Bed Mobility)  moderate assist (50% patient effort);2 person assist;verbal cues  -MS     Sit-Supine [place] (Bed Mobility)  minimum assist (75% patient effort);verbal cues;nonverbal cues (demo/gesture)  -MS     Assistive Device (Bed Mobility)  bed rails;head of bed elevated  -MS     Row Name 12/17/19 1523          Transfer Assessment/Treatment    Comment (Transfers)  not appropriate to attempt at this time.   -MS       User Key  (r) = Recorded By, (t) = Taken By, (c) = Cosigned By    Initials Name Provider Type    Melissa Kay, PT, DPT, NCS Physical Therapist        Obj/Interventions     Row Name 12/17/19 1524          General ROM    GENERAL ROM COMMENTS  AAROM WFL in all extremities except B ankles have plantarflexion contractors  -MS     Row Name 12/17/19 1524          MMT (Manual Muscle Testing)    General MMT Comments  moves against gravity in all extremities, unable to formally test  -MS     Row Name 12/17/19 1524          Static Sitting Balance    Level of [place] (Unsupported Sitting, Static Balance)  moderate assist, 50 to 74% patient  effort;minimal assist, 75% patient effort;contact guard assist  -MS     Sitting Position (Unsupported Sitting, Static Balance)  sitting on edge of bed  -MS     Comment (Unsupported Sitting, Static Balance)  variable need for assistance. Brief moments of CGA, mostly Min A once she got her balance, slight lean to the L  -MS     Row Name 12/17/19 1524          Dynamic Sitting Balance    Level of [place], Reaches Outside Midline (Sitting, Dynamic Balance)  minimal assist, 75% patient effort  -MS     Sitting Position, Reaches Outside Midline (Sitting, Dynamic Balance)  sitting on edge of bed  -MS     Row Name 12/17/19 1524          Sensory Assessment/Intervention    Sensory General Assessment  -- pt reacted to touch on all extremities  -MS       User Key  (r) = Recorded By, (t) = Taken By, (c) = Cosigned By    Initials Name Provider Type    Melissa Kay, PT, DPT, NCS Physical Therapist        Goals/Plan     Row Name 12/17/19 1530          Bed Mobility Goal 1 (PT)    Activity/Assistive Device (Bed Mobility Goal 1, PT)  rolling to left;rolling to right;scooting;sit to supine;supine to sit  -MS     [place] Level/Cues Needed (Bed Mobility Goal 1, PT)  minimum assist (75% or more patient effort);tactile cues required;verbal cues required  -MS     Time Frame (Bed Mobility Goal 1, PT)  long term goal (LTG);by discharge  -MS     Progress/Outcomes (Bed Mobility Goal 1, PT)  goal ongoing  -MS     Row Name 12/17/19 1530          Patient Education Goal (PT)    Activity (Patient Education Goal, PT)  pt will maintain midline sitting EOB for 20 min with CGA while performing activity tasks  -MS     Time Frame (Patient Education Goal, PT)  long term goal (LTG);by discharge  -MS     Progress/Outcome (Patient Education Goal, PT)  goal ongoing  -MS       User Key  (r) = Recorded By, (t) = Taken By, (c) = Cosigned By    Initials Name Provider Type    Melissa Kay, PT, DPT, NCS Physical Therapist        Clinical  Impression     Row Name 12/17/19 1526          Pain Assessment    Additional Documentation  Pain Scale: FACES Pre/Post-Treatment (Group)  -MS     Row Name 12/17/19 1526          Pain Scale: FACES Pre/Post-Treatment    Pain: FACES Scale, Pretreatment  0-->no hurt  -MS     Pain: FACES Scale, Post-Treatment  0-->no hurt  -MS     Row Name 12/17/19 1526          Plan of Care Review    Plan of Care Reviewed With  patient  -MS     Progress  no change  -MS     Outcome Summary  PT evaluation completed. The patient patient's baseline mobility is unknown at this time and the patient is a poor historian as well as not attentive to most questions. She demonstrates active movement of all extremities and was able to sit EOB for 10 min with minimal assist. She is pleasantly confused being orient to self and place only. She does have B plantarflexion contractors which lead me to believe she is nonambulatory and maybe even bed bound at the SNF. PT will continue to work with the patient with bed mobility, sitting balance, and activity tolerance. Recommend discharge back to SNF.  -MS     Row Name 12/17/19 1526          Physical Therapy Clinical Impression    Patient/Family Goals Statement (PT Clinical Impression)  pt unble to state  -MS     Criteria for Skilled Interventions Met (PT Clinical Impression)  yes;treatment indicated  -MS     Rehab Potential (PT Clinical Summary)  fair, will monitor progress closely  -MS     Predicted Duration of Therapy (PT)  until discharge  -MS     Row Name 12/17/19 1526          Positioning and Restraints    Post Treatment Position  bed  -MS     In Bed  fowlers;call light within reach;encouraged to call for assist;with OT;side rails up x2;SCD pump applied;exit alarm on  -MS       User Key  (r) = Recorded By, (t) = Taken By, (c) = Cosigned By    Initials Name Provider Type    MS Jason Melissa R, PT, DPT, NCS Physical Therapist        Outcome Measures     Row Name 12/17/19 1531          How much help from  another person do you currently need...    Turning from your back to your side while in flat bed without using bedrails?  2  -MS     Moving from lying on back to sitting on the side of a flat bed without bedrails?  2  -MS     Moving to and from a bed to a chair (including a wheelchair)?  1  -MS     Standing up from a chair using your arms (e.g., wheelchair, bedside chair)?  1  -MS     Climbing 3-5 steps with a railing?  1  -MS     To walk in hospital room?  1  -MS     AM-PAC 6 Clicks Score (PT)  8  -MS     Row Name 12/17/19 1531          Functional Assessment    Outcome Measure Options  AM-PAC 6 Clicks Basic Mobility (PT)  -MS       User Key  (r) = Recorded By, (t) = Taken By, (c) = Cosigned By    Initials Name Provider Type    MS Gomez Melissa R, PT, DPT, NCS Physical Therapist          PT Recommendation and Plan  Planned Therapy Interventions (PT Eval): balance training, bed mobility training, patient/family education, strengthening  Outcome Summary/Treatment Plan (PT)  Anticipated Discharge Disposition (PT): skilled nursing facility  Plan of Care Reviewed With: patient  Progress: no change  Outcome Summary: PT evaluation completed. The patient patient's baseline mobility is unknown at this time and the patient is a poor historian as well as not attentive to most questions. She demonstrates active movement of all extremities and was able to sit EOB for 10 min with minimal assist. She is pleasantly confused being orient to self and place only. She does have B plantarflexion contractors which lead me to believe she is nonambulatory and maybe even bed bound at the SNF. PT will continue to work with the patient with bed mobility, sitting balance, and activity tolerance. Recommend discharge back to SNF.     Time Calculation:   PT Charges     Row Name 12/17/19 1531             Time Calculation    Start Time  1335  -MS      Stop Time  1430  -MS      Time Calculation (min)  55 min  -MS      PT Received On  12/17/19  -MS       PT Goal Re-Cert Due Date  12/27/19  -MS        User Key  (r) = Recorded By, (t) = Taken By, (c) = Cosigned By    Initials Name Provider Type    MS Melissa Gomez, PT, DPT, NCS Physical Therapist        Therapy Charges for Today     Code Description Service Date Service Provider Modifiers Qty    60758024692 HC PT EVAL MOD COMPLEXITY 4 12/17/2019 Melissa Gomez PT, DPT, NCS GP 1          PT G-Codes  Outcome Measure Options: AM-PAC 6 Clicks Basic Mobility (PT)  AM-PAC 6 Clicks Score (PT): 8    Melissa Gomez, PT, DPT, NCS  12/17/2019

## 2019-12-18 ENCOUNTER — APPOINTMENT (OUTPATIENT)
Dept: GENERAL RADIOLOGY | Facility: HOSPITAL | Age: 74
End: 2019-12-18

## 2019-12-18 LAB
GLUCOSE BLDC GLUCOMTR-MCNC: 178 MG/DL (ref 70–130)
GLUCOSE BLDC GLUCOMTR-MCNC: 209 MG/DL (ref 70–130)
GLUCOSE BLDC GLUCOMTR-MCNC: 215 MG/DL (ref 70–130)
GLUCOSE BLDC GLUCOMTR-MCNC: 266 MG/DL (ref 70–130)

## 2019-12-18 PROCEDURE — 63710000001 INSULIN LISPRO (HUMAN) PER 5 UNITS: Performed by: INTERNAL MEDICINE

## 2019-12-18 PROCEDURE — 99232 SBSQ HOSP IP/OBS MODERATE 35: CPT | Performed by: PSYCHIATRY & NEUROLOGY

## 2019-12-18 PROCEDURE — 25010000002 LEVETIRACETAM IN NACL 0.82% 500 MG/100ML SOLUTION: Performed by: PSYCHIATRY & NEUROLOGY

## 2019-12-18 PROCEDURE — 25010000002 CEFTRIAXONE PER 250 MG: Performed by: FAMILY MEDICINE

## 2019-12-18 PROCEDURE — 97530 THERAPEUTIC ACTIVITIES: CPT

## 2019-12-18 PROCEDURE — 71045 X-RAY EXAM CHEST 1 VIEW: CPT

## 2019-12-18 PROCEDURE — 97110 THERAPEUTIC EXERCISES: CPT

## 2019-12-18 PROCEDURE — 97535 SELF CARE MNGMENT TRAINING: CPT

## 2019-12-18 PROCEDURE — 94640 AIRWAY INHALATION TREATMENT: CPT

## 2019-12-18 PROCEDURE — 94799 UNLISTED PULMONARY SVC/PX: CPT

## 2019-12-18 PROCEDURE — 92526 ORAL FUNCTION THERAPY: CPT

## 2019-12-18 PROCEDURE — 94760 N-INVAS EAR/PLS OXIMETRY 1: CPT

## 2019-12-18 PROCEDURE — 82962 GLUCOSE BLOOD TEST: CPT

## 2019-12-18 RX ORDER — NIFEDIPINE 10 MG/1
10 CAPSULE ORAL EVERY 8 HOURS SCHEDULED
Status: DISCONTINUED | OUTPATIENT
Start: 2019-12-18 | End: 2019-12-18

## 2019-12-18 RX ORDER — AMLODIPINE BESYLATE 5 MG/1
5 TABLET ORAL
Status: DISCONTINUED | OUTPATIENT
Start: 2019-12-18 | End: 2019-12-19

## 2019-12-18 RX ORDER — LEVETIRACETAM 5 MG/ML
500 INJECTION INTRAVASCULAR EVERY 12 HOURS SCHEDULED
Status: DISCONTINUED | OUTPATIENT
Start: 2019-12-18 | End: 2019-12-19

## 2019-12-18 RX ORDER — ATENOLOL 50 MG/1
50 TABLET ORAL
Status: DISCONTINUED | OUTPATIENT
Start: 2019-12-18 | End: 2019-12-22 | Stop reason: HOSPADM

## 2019-12-18 RX ORDER — LEVETIRACETAM 500 MG/1
500 TABLET ORAL EVERY 12 HOURS SCHEDULED
Status: DISCONTINUED | OUTPATIENT
Start: 2019-12-18 | End: 2019-12-19

## 2019-12-18 RX ORDER — ENALAPRILAT 2.5 MG/2ML
0.62 INJECTION INTRAVENOUS ONCE
Status: COMPLETED | OUTPATIENT
Start: 2019-12-18 | End: 2019-12-18

## 2019-12-18 RX ORDER — NIFEDIPINE 30 MG/1
30 TABLET, EXTENDED RELEASE ORAL
Status: DISCONTINUED | OUTPATIENT
Start: 2019-12-18 | End: 2019-12-18

## 2019-12-18 RX ORDER — IPRATROPIUM BROMIDE AND ALBUTEROL SULFATE 2.5; .5 MG/3ML; MG/3ML
3 SOLUTION RESPIRATORY (INHALATION) ONCE
Status: COMPLETED | OUTPATIENT
Start: 2019-12-18 | End: 2019-12-18

## 2019-12-18 RX ADMIN — ENALAPRILAT 0.62 MG: 1.25 INJECTION INTRAVENOUS at 06:45

## 2019-12-18 RX ADMIN — AMLODIPINE BESYLATE 5 MG: 5 TABLET ORAL at 21:02

## 2019-12-18 RX ADMIN — NYSTATIN 1 APPLICATION: 100000 POWDER TOPICAL at 21:14

## 2019-12-18 RX ADMIN — ATENOLOL 50 MG: 50 TABLET ORAL at 13:19

## 2019-12-18 RX ADMIN — INSULIN LISPRO 3 UNITS: 100 INJECTION, SOLUTION INTRAVENOUS; SUBCUTANEOUS at 21:13

## 2019-12-18 RX ADMIN — CEFTRIAXONE SODIUM 1 G: 1 INJECTION, POWDER, FOR SOLUTION INTRAMUSCULAR; INTRAVENOUS at 16:17

## 2019-12-18 RX ADMIN — INSULIN LISPRO 4 UNITS: 100 INJECTION, SOLUTION INTRAVENOUS; SUBCUTANEOUS at 13:18

## 2019-12-18 RX ADMIN — IPRATROPIUM BROMIDE AND ALBUTEROL SULFATE 3 ML: 2.5; .5 SOLUTION RESPIRATORY (INHALATION) at 14:36

## 2019-12-18 RX ADMIN — INSULIN LISPRO 3 UNITS: 100 INJECTION, SOLUTION INTRAVENOUS; SUBCUTANEOUS at 17:54

## 2019-12-18 RX ADMIN — LEVETIRACETAM 500 MG: 5 INJECTION INTRAVENOUS at 21:13

## 2019-12-18 RX ADMIN — LEVETIRACETAM 500 MG: 5 INJECTION INTRAVENOUS at 10:05

## 2019-12-18 RX ADMIN — NYSTATIN 1 APPLICATION: 100000 POWDER TOPICAL at 10:06

## 2019-12-18 RX ADMIN — INSULIN LISPRO 2 UNITS: 100 INJECTION, SOLUTION INTRAVENOUS; SUBCUTANEOUS at 10:05

## 2019-12-18 NOTE — PLAN OF CARE
BP elevated this /67-MD notified and Vasotec administered per order, no neuro changes, pleasantly confused, Purewick in place, turned qh2rs, will continue to monitor.   Problem: Skin Injury Risk (Adult)  Goal: Skin Health and Integrity  Outcome: Ongoing (interventions implemented as appropriate)  Flowsheets (Taken 12/18/2019 0819)  Skin Health and Integrity: making progress toward outcome

## 2019-12-18 NOTE — THERAPY TREATMENT NOTE
Acute Care - Physical Therapy Treatment Note  [facility]     Patient Name: Gregoria Bennett  : 1945  MRN: [id number]  Today's Date: 2019  Onset of Illness/Injury or Date of Surgery: 19     Referring Physician: Dr. Lozano    Admit Date: 2019    Visit Dx:    ICD-10-CM ICD-9-CM   1. Altered mental status, unspecified altered mental status type R41.82 780.97   2. Severe sepsis (CMS/HCC) A41.9 038.9    R65.20 995.92   3. Acute UTI (urinary tract infection) N39.0 599.0   4. Chronic kidney disease, unspecified CKD stage N18.9 585.9   5. Metabolic acidosis E87.2 276.2   6. Dysphagia, unspecified type R13.10 787.20   7. Impaired functional mobility and activity tolerance Z74.09 V49.89   8. Impaired mobility and ADLs Z74.09 799.89     Patient Active Problem List   Diagnosis   • Sprain   • Urinary tract infection without hematuria   • Sepsis (CMS/HCC)   • Altered mental status   • CKD (chronic kidney disease) stage 4, GFR 15-29 ml/min (CMS/HCC)   • Dementia without behavioral disturbance (CMS/HCC)   • Metabolic encephalopathy       Therapy Treatment    Rehabilitation Treatment Summary     Row Name 19 1348 19 0857 19 0824       Treatment Time/Intention    Discipline  physical therapy assistant  -AE  occupational therapy assistant  -AO  speech language pathologist  -MB    Document Type  therapy note (daily note)  -AE  therapy note (daily note)  -AO  therapy note (daily note)  -MB    Subjective Information  complains of;pain  -AE  complains of;pain  -AO  no complaints  -MB    Mode of Treatment  --  occupational therapy  -AO  speech-language pathology  -MB    Patient/Family Observations  --  no family present  -AO  No family present  -MB    Patient Effort  --  fair  -AO  adequate  -MB    Existing Precautions/Restrictions  fall  -AE  fall  -AO  --    Recorded by [AE] Clemencia Shipley, PTA 19 1417 [AO] Tosha Vazquez COTA/JODI 19 0940 [MB] Brianna Hernandez, CCC-SLP 19 0900     Row Name 12/18/19 0857             Cognitive Assessment/Intervention- PT/OT    Affect/Mental Status (Cognitive)  confused  -AO      Orientation Status (Cognition)  oriented to;person;place  -AO      Follows Commands (Cognition)  follows one step commands;verbal cues/prompting required  -AO      Safety Deficit (Cognitive)  mild deficit;moderate deficit  -AO      Personal Safety Interventions  fall prevention program maintained;gait belt;nonskid shoes/slippers when out of bed  -AO      Recorded by [AO] O'RearTosha, MIRANDA/L 12/18/19 0940      Row Name 12/18/19 1348 12/18/19 0857          Bed Mobility Assessment/Treatment    Bed Mobility Assessment/Treatment  --  supine-sit;sit-supine;rolling right;rolling left  -AO     Rolling Left [place] (Bed Mobility)  moderate assist (50% patient effort);minimum assist (75% patient effort);2 person assist x 3 reps  -AE  moderate assist (50% patient effort);verbal cues  -AO     Rolling Right [place] (Bed Mobility)  minimum assist (75% patient effort);moderate assist (50% patient effort);2 person assist x 3 reps  -AE  moderate assist (50% patient effort);verbal cues  -AO     Supine-Sit [place] (Bed Mobility)  moderate assist (50% patient effort);2 person assist;verbal cues assisted with rolling x 3 reps  -AE  maximum assist (25% patient effort);nonverbal cues (demo/gesture);verbal cues  -AO     Sit-Supine [place] (Bed Mobility)  moderate assist (50% patient effort);2 person assist;verbal cues  -AE2  moderate assist (50% patient effort);verbal cues  -AO     Assistive Device (Bed Mobility)  --  bed rails;head of bed elevated  -AO     Recorded by [AE] Clemencia Shipley, PTA 12/18/19 1447  [AE2] Clemencia Shipley, PTA 12/18/19 1417 [AO] O'RearTosha, MIRANDA/L 12/18/19 0940     Row Name 12/18/19 0857             ADL Assessment/Intervention    BADL Assessment/Intervention  feeding;toileting;lower body dressing  -AO      Recorded by [AO] O'Rear Tosha, MIRANDA/L 12/18/19  0940      Row Name 12/18/19 0857             Lower Body Dressing Assessment/Training    Lower Body Dressing [place] Level  doff;don;undergarment;dependent (less than 25% patient effort)  -AO      Lower Body Dressing Position  supine  -AO      Recorded by [AO] O'Tosha, MIRANDA/L 12/18/19 0940      Row Name 12/18/19 0857             Self-Feeding Assessment/Training    [place] Level (Feeding)  moderate assist (50% patient effort);verbal cues  -AO      Position (Self-Feeding)  unsupported sitting  -AO      Recorded by [AO] O'Rear Tosha, MIRANDA/L 12/18/19 0940      Row Name 12/18/19 0857             Toileting Assessment/Training    [place] Level (Toileting)  adjust/manage clothing;change pad/brief;perform perineal hygiene;dependent (less than 25% patient effort)  -AO      Toileting Position  supine  -AO      Recorded by [AO] O'Tosha, MIRANDA/L 12/18/19 0940      Row Name 12/18/19 0857             BADL Safety/Performance    Impairments, BADL Safety/Performance  balance;cognition;endurance/activity tolerance;grasp/prehension;strength;trunk/postural control  -AO      Cognitive Impairments, BADL Safety/Performance  awareness, need for assistance;insight into deficits/self awareness  -AO      Skilled BADL Treatment/Intervention  BADL process/adaptation training  -AO      Progress in BADL Status  effort and initiation  -AO      Recorded by [AO] O'RearTosha, MIRANDA/L 12/18/19 0940      Row Name 12/18/19 1348             Therapeutic Exercise    Lower Extremity (Therapeutic Exercise)  LAQ (long arc quad), bilateral x 3 reps only B LE  -AE      Exercise Type (Therapeutic Exercise)  AROM (active range of motion);PROM (passive range of motion) 5 reps PROM  -AE      Position (Therapeutic Exercise)  seated  -AE      Recorded by [AE] Clemencia Shipley, PTA 12/18/19 1417      Row Name 12/18/19 1348 12/18/19 0857          Static Sitting Balance    Level of [place] (Unsupported Sitting, Static Balance)  contact  guard assist min assist for first 3 min.  -AE  minimal assist, 75% patient effort;moderate assist, 50 to 74% patient effort  -AO     Sitting Position (Unsupported Sitting, Static Balance)  --  sitting on edge of bed  -AO     Recorded by [AE] Clemencia Shipley, PTA 12/18/19 1417 [AO] O'Rear, Tosha, MIRANDA/L 12/18/19 0940     Row Name 12/18/19 0857             Dynamic Sitting Balance    Level of [place], Reaches Outside Midline (Sitting, Dynamic Balance)  moderate assist, 50 to 74% patient effort  -AO      Sitting Position, Reaches Outside Midline (Sitting, Dynamic Balance)  sitting on edge of bed  -AO      Recorded by [AO] O'Rear, Tosha, MIRANDA/L 12/18/19 0940      Row Name 12/18/19 1348 12/18/19 0857          Positioning and Restraints    Pre-Treatment Position  in bed  -AE  in bed  -AO     Post Treatment Position  bed  -AE  bed  -AO     In Bed  fowlers;encouraged to call for assist  -AE  fowlers;call light within reach;exit alarm on;with nsg  -AO     Recorded by [AE] Clemencia Shipley, PTA 12/18/19 1417 [AO] O'Rear, Tosha, MIRANDA/L 12/18/19 0940     Row Name 12/18/19 0857             Pain Assessment    Additional Documentation  Pain Scale: Numbers Pre/Post-Treatment (Group)  -AO      Recorded by [AO] O'Rear, Tosha, MIRANDA/L 12/18/19 0940      Row Name 12/18/19 1348 12/18/19 0857          Pain Scale: Numbers Pre/Post-Treatment    Pain Scale: Numbers, Pretreatment  4/10  -AE  4/10  -AO     Pain Scale: Numbers, Post-Treatment  4/10  -AE  4/10  -AO     Pain Location - Orientation  lower  -AE  lower  -AO     Pain Location  back  -AE  back  -AO     Pain Intervention(s)  Repositioned  -AE  Repositioned  -AO     Recorded by [AE] Clemencia Shipley, PTA 12/18/19 1417 [AO] O'Rear, Tosha, MIRANDA/L 12/18/19 0940     Row Name 12/18/19 0824             Pain Scale: FACES Pre/Post-Treatment    Pain: FACES Scale, Pretreatment  0-->no hurt  -MB      Recorded by [MB] Brianna Hernandez, CCC-SLP 12/18/19 0900      Row Name 12/18/19 0862  12/18/19 0824          Coping    Observed Emotional State  calm confused  -AO  calm confused  -AO     Verbalized Emotional State  acceptance  -AO  --     Recorded by [AO] O'Rear Tosha, MIRANDA/L 12/18/19 0940 [AO] O'Rear, Tosha, MIRANDA/L 12/18/19 0940     Row Name 12/18/19 0857             Plan of Care Review    Plan of Care Reviewed With  patient  -AO      Progress  no change  -AO      Recorded by [AO] O'Rear, Tosha, MIRANDA/L 12/18/19 0940      Row Name 12/18/19 0857             Outcome Summary/Treatment Plan (OT)    Daily Summary of Progress (OT)  progress toward functional goals is gradual  -AO      Anticipated Discharge Disposition (OT)  skilled nursing facility  -AO      Recorded by [AO] O'Rear, Tosha, MIRANDA/L 12/18/19 0940      Row Name 12/18/19 0824             Outcome Summary/Treatment Plan (SLP)    Daily Summary of Progress (SLP)  progress towards functional goals is fair  -MB      Barriers to Overall Progress (SLP)  Cognition  -MB      Plan for Continued Treatment (SLP)  Continue to follow  -MB      Anticipated Dischage Disposition  [facility]  -MB      Recorded by [MB] Brianna Hernandez CCC-SLP 12/18/19 0900        User Key  (r) = Recorded By, (t) = Taken By, (c) = Cosigned By    Initials Name Effective Dates Discipline    Brianna Rosales, CCC-SLP 08/02/16 -  SLP    AE Clemencia Shipley, PTA 06/22/15 -  PT    AO O'Rear Tosha, MIRANDA/L 10/01/19 -  OT               Rehab Goal Summary     Row Name 12/18/19 0824             Oral Nutrition/Hydration Goal 1 (SLP)    Oral Nutrition/Hydration Goal 1, SLP  Pt will tolerate least restrictive diet with no overt s/s of aspiration.  -MB      Time Frame (Oral Nutrition/Hydration Goal 1, SLP)  by discharge  -MB      Barriers (Oral Nutrition/Hydration Goal 1, SLP)  Cognition  -MB      Progress/Outcomes (Oral Nutrition/Hydration Goal 1, SLP)  continuing progress toward goal  -MB         Pharyngeal Strengthening Exercise Goal 1 (SLP)    Activity (Pharyngeal  Strengthening Goal 1, SLP)  increase timing;increase squeeze/positive pressure generation  -MB      Increase Timing  gustatory stimulation (sour/cold)  -MB      Increase Squeeze/Positive Pressure Generation  hard effortful swallow  -MB      [place]/Accuracy (Pharyngeal Strengthening Goal 1, SLP)  independently (over 90% accuracy)  -MB      Time Frame (Pharyngeal Strengthening Goal 1, SLP)  short term goal (STG);by discharge  -MB      Barriers (Pharyngeal Strengthening Goal 1, SLP)  n/a  -MB      Progress/Outcomes (Pharyngeal Strengthening Goal 1, SLP)  goal ongoing  -MB        User Key  (r) = Recorded By, (t) = Taken By, (c) = Cosigned By    Initials Name Provider Type Discipline    Brianna Rosales CCC-SLP Speech and Language Pathologist SLP              PT Recommendation and Plan     Outcome Summary: Pt was mod x 2 for rolling and supine to sit . Pt sat EOB x 10 min x 1 then progressed to cga. and then was positioned on the bed pan  Outcome Measures     Row Name 12/18/19 0868 12/17/19 1600          How much help from another is currently needed...    Putting on and taking off regular lower body clothing?  2  -AO  2  -MM     Bathing (including washing, rinsing, and drying)  2  -AO  2  -MM     Toileting (which includes using toilet bed pan or urinal)  2  -AO  2  -MM     Putting on and taking off regular upper body clothing  2  -AO  2  -MM     Taking care of personal grooming (such as brushing teeth)  2  -AO  2  -MM     Eating meals  2  -AO  2  -MM     AM-PAC 6 Clicks Score (OT)  12  -AO  12  -MM        Functional Assessment    Outcome Measure Options  --  AM-PAC 6 Clicks Daily Activity (OT)  -MM       User Key  (r) = Recorded By, (t) = Taken By, (c) = Cosigned By    Initials Name Provider Type    MM Garrett Garcia, OTR/L Occupational Therapist    AO Tosha Vazquez COTA/L Occupational Therapy Assistant         Time Calculation:   PT Charges     Row Name 12/18/19 9931             Time Calculation     Start Time  1348  -AE      Stop Time  1447  -AE      Time Calculation (min)  59 min  -AE      PT Received On  12/18/19  -AE      PT Goal Re-Cert Due Date  12/27/19  -AE         Time Calculation- PT    Total Timed Code Minutes- PT  59 minute(s)  -AE         Timed Charges    83706 - PT Therapeutic Exercise Minutes  29  -AE      44676 - PT Therapeutic Activity Minutes  30  -AE        User Key  (r) = Recorded By, (t) = Taken By, (c) = Cosigned By    Initials Name Provider Type    AE Clemencia Shipley PTA Physical Therapy Assistant        Therapy Charges for Today     Code Description Service Date Service Provider Modifiers Qty    64229219516 HC PT THERAPEUTIC ACT EA 15 MIN 12/18/2019 Clemencia Shipley PTA GP 2    80603272386 HC PT THER PROC EA 15 MIN 12/18/2019 Clemencia Shipley PTA GP 2          PT G-Codes  Outcome Measure Options: AM-PAC 6 Clicks Daily Activity (OT)  AM-PAC 6 Clicks Score (PT): 8  AM-PAC 6 Clicks Score (OT): 12    Clemencia Shipley PTA  12/18/2019

## 2019-12-18 NOTE — PLAN OF CARE
Problem: Patient Care Overview  Goal: Plan of Care Review  Outcome: Ongoing (interventions implemented as appropriate)  Flowsheets (Taken 12/18/2019 0901)  Progress: no change  Plan of Care Reviewed With: patient  Outcome Summary: Assisted pt with breakfast. Pt completed trials of pureed and nectar consistencies. She appeared short of breath at times during the meal, however her oxygen saturation remained at 99%. She had no immediate coughing or throat clearing. She did exhibit coughing at the end of the meal with expectoration of phlegm, however there was a signficant lapse of time since any PO trials had been presented.  She did have poor attention at times and required cues to swallow occasionally as she would begin talking while she still had food in her mouth. Continue pureed diet and nectar thick liquids. SLP will continue to follow.

## 2019-12-18 NOTE — PROGRESS NOTES
Neurology Progress Note      Date of admission: 12/16/2019 11:51 AM  Date of visit: 12/18/2019    Chief Complaint:  F/u altered mental status     Subjective     Subjective:    No clinical seizures.  She seems to be  tolerating Keppra.   Medications:  Current Facility-Administered Medications   Medication Dose Route Frequency Provider Last Rate Last Dose   • atropine 1 % ophthalmic solution 1 drop  1 drop Sublingual BID PRN Ross Dinero MD   1 drop at 12/16/19 2240   • cefTRIAXone (ROCEPHIN) 1 g/100 mL 0.9% NS (MBP)  1 g Intravenous Q24H Edward Lozano MD   1 g at 12/17/19 1623   • dextrose (D50W) 25 g/ 50mL Intravenous Solution 25 g  25 g Intravenous Q15 Min PRN Ross Dinero MD       • dextrose (GLUTOSE) oral gel 15 g  15 g Oral Q15 Min PRN Ross Dinero MD       • glucagon (human recombinant) (GLUCAGEN DIAGNOSTIC) injection 1 mg  1 mg Subcutaneous Q15 Min PRN Ross Dinero MD       • insulin lispro (humaLOG) injection 2-7 Units  2-7 Units Subcutaneous 4x Daily With Meals & Nightly Ross Dinero MD   2 Units at 12/18/19 1005   • levETIRAcetam in NaCl 0.82% (KEPPRA) IVPB 500 mg  500 mg Intravenous Q12H Patti Espinoza MD   500 mg at 12/18/19 1005   • nystatin (MYCOSTATIN) powder 1 application  1 application Topical Q12H Ross Dinero MD   1 application at 12/18/19 1006   • Scopolamine (TRANSDERM-SCOP) 1.5 MG/3DAYS patch 1 patch  1 patch Transdermal Q72H Ross Dinero MD   1 patch at 12/16/19 2147   • sodium chloride 0.9 % infusion  75 mL/hr Intravenous Continuous Edward Lozano MD 75 mL/hr at 12/17/19 2222 75 mL/hr at 12/17/19 2222       Review of Systems:   -A 14 point review of systems is completed and is negative best as can be determined. She did not always nod to questions.     Objective     Objective      Vital Signs  Temp:  [97.9 °F (36.6 °C)-98.5 °F (36.9 °C)] 98 °F (36.7 °C)  Heart Rate:  [57-64] 63  Resp:  [16-18] 17  BP: (173-187)/(53-72)  185/72    Physical Exam:    HEENT:  Neck supple  CVS:  Regular rate and rhythm.  No murmurs  Carotid Examination:  No bruits  Lungs:  Clear to auscultation  Abdomen:  Non-tender, Non-distended  Extremities:  No signs of peripheral edema    Neurologic Exam:    -Somnolent and wakens easily.  Knows she is at [facility] in [facility]. Knows the month.  Thought the year was 2003  -Follows simple  commands    Cranial nerves II through XII intact.  EOMI  Head turns side to side  Poor smile a ? Hypomimia  Tongue protrudes midline    Motor: (strength out of 5:  1= minimal movement, 2 = movement in plane of gravity, 3 = movement against gravity, 4 = movement against some resistance, 5 = full strength)    She moves her upper extremities against gravity.  Resting tremors.  Does move lower extremities but is doing this barely--she has not walked for a long time         Results Review:    I reviewed the patient's new clinical results.    Lab Results (last 24 hours)     Procedure Component Value Units Date/Time    POC Glucose Once [[id number]]  (Abnormal) Collected:  12/18/19 0805    Specimen:  Blood Updated:  12/18/19 0816     Glucose 178 mg/dL      Comment: : [id number] Adelaida (Maurice) CarolynMeter ID: [id number]       POC Glucose Once [[id number]]  (Abnormal) Collected:  12/17/19 2208    Specimen:  Blood Updated:  12/17/19 2230     Glucose 231 mg/dL      Comment: : [id number] Shola HansonbethMeter ID: [id number]       POC Glucose Once [[id number]]  (Abnormal) Collected:  12/17/19 1659    Specimen:  Blood Updated:  12/17/19 1711     Glucose 189 mg/dL      Comment: : [id number] Adelaida (Maurice) CarolynMeter ID: [id number]       Blood Culture - Blood, Arm, Right [[id number]] Collected:  12/16/19 1246    Specimen:  Blood from Arm, Right Updated:  12/17/19 1330     Blood Culture No growth at 24 hours    Blood Culture - Blood, Arm, Left [[id number]] Collected:  12/16/19 1215    Specimen:  Blood from Arm, Left Updated:   12/17/19 1303     Blood Culture No growth at 24 hours    Magnesium [[id number]]  (Normal) Collected:  12/17/19 1207    Specimen:  Blood Updated:  12/17/19 1233     Magnesium 2.1 mg/dL     Basic Metabolic Panel [[id number]]  (Abnormal) Collected:  12/17/19 1207    Specimen:  Blood Updated:  12/17/19 1232     Glucose 193 mg/dL      BUN 51 mg/dL      Creatinine 2.44 mg/dL      Sodium 146 mmol/L      Potassium 4.4 mmol/L      Chloride 116 mmol/L      CO2 18.0 mmol/L      Calcium 8.3 mg/dL      eGFR Non African Amer 19 mL/min/1.73      BUN/Creatinine Ratio 20.9     Anion Gap 12.0 mmol/L     Narrative:       GFR Normal >60  Chronic Kidney Disease <60  Kidney Failure <15          Imaging Results (Last 24 Hours)     ** No results found for the last 24 hours. **        EEG 12/16/19  Interpretation:  This is an abnormal EEG recording secondary to slowing of the background rhythm along with the presence of triphasic waves and 2 brief potentially seizurogenic episodes of rhythmicity  which may be seen as the result of a diffuse cerebral disturbance such as hypoxic, or  metabolic encephalopathy.  While there was no definitive seizure activity clinically correlating with the rhythmicity this potentially is seizurogenic and would recommend antiepileptic medications.  Clinical correlation is recommended.  Assessment/Plan     Hospital Problem List      Altered mental status    CKD (chronic kidney disease) stage 4, GFR 15-29 ml/min (CMS/[facility])    Dementia without behavioral disturbance (CMS/[facility])    Metabolic encephalopathy    Impression:  1. Metabolic encephalopathy  2. EEG with triphasic waves and a couple of brief runs of rhythmicity that may be seizurogenic. Doing well on Keppra.   3. Parkinsonian features  4. Dementia    Plan:  Change Keppra to oral and see how she does  F/u in Neuro clinic         Patti Jung MD  12/18/19  11:54 AM

## 2019-12-18 NOTE — PROGRESS NOTES
[facility] Medicine Services  INPATIENT PROGRESS NOTE    Patient Name: Gregoria Bennett  Date of Admission: 12/16/2019  Today's Date: 12/18/19  Length of Stay: 2  Primary Care Physician: Chu Isaac MD    Subjective   Chief Complaint: pleasantly confused  HPI   Doing well.  Afebrile.  Pleasantly confused  More alert and responsive  BP up this AM.  Denies headache or blurry vision        ROS:  Not able to be done due to Dementia     All pertinent negatives and positives are as above. All other systems have been reviewed and are negative unless otherwise stated.     Objective    Temp:  [97.9 °F (36.6 °C)-98.5 °F (36.9 °C)] 98 °F (36.7 °C)  Heart Rate:  [57-64] 63  Resp:  [16-18] 17  BP: (173-187)/(53-72) 185/72  Physical Exam   Constitutional: She appears well-developed and well-nourished.   HENT:   Head: Normocephalic and atraumatic.   Right Ear: External ear normal.   Left Ear: External ear normal.   Nose: Nose normal.   Mouth/Throat: Oropharynx is clear and moist.   Eyes: Conjunctivae and EOM are normal.   Neck: Normal range of motion. Neck supple.   Cardiovascular: Normal rate, regular rhythm and normal heart sounds.   Pulmonary/Chest: Effort normal and breath sounds normal.   Abdominal: Soft. Bowel sounds are normal. She exhibits no distension. There is no tenderness.   Musculoskeletal: Normal range of motion.   Neurological: She is alert. She is disoriented.   Skin: Skin is warm and dry.   Psychiatric: She has a normal mood and affect. Her speech is normal and behavior is normal. Cognition and memory are impaired.           Results Review:  I have reviewed the labs, radiology results, and diagnostic studies.    Laboratory Data:   Results from last 7 days   Lab Units 12/16/19  1215 12/14/19  1752   WBC 10*3/mm3 14.32* 6.02   HEMOGLOBIN g/dL 10.3* 9.8*   HEMATOCRIT % 31.6* 30.6*   PLATELETS 10*3/mm3 215 214        Results from last 7 days   Lab Units 12/17/19  1207  12/16/19  1215 12/14/19  1752   SODIUM mmol/L 146* 143 139   POTASSIUM mmol/L 4.4 4.8 5.5*   CHLORIDE mmol/L 116* 110* 108*   CO2 mmol/L 18.0* 19.0* 17.0*   BUN mg/dL 51* 51* 51*   CREATININE mg/dL 2.44* 2.54* 2.56*   CALCIUM mg/dL 8.3* 9.5 9.4   BILIRUBIN mg/dL  --  0.2 <0.2*   ALK PHOS U/L  --  67 62   ALT (SGPT) U/L  --  20 12   AST (SGOT) U/L  --  32 17   GLUCOSE mg/dL 193* 160* 192*       Culture Data:   Urine Culture   Date Value Ref Range Status   12/16/2019 >100,000 CFU/mL Mixed Melissa Isolated  Final       Radiology Data:   Imaging Results (Last 24 Hours)     ** No results found for the last 24 hours. **          I have reviewed the patient's current medications.     Assessment/Plan     Active Hospital Problems    Diagnosis   • CKD (chronic kidney disease) stage 4, GFR 15-29 ml/min (CMS/HCC)   • Dementia without behavioral disturbance (CMS/[facility])   • Metabolic encephalopathy   • Altered mental status       1.  Metabolic Encephalopathy  -IV abx to PO     2.   Sepsis  -IV abx     3.  Acute bacterial Cystitis  -IV abx to PO     4.  ROOPA on CKD 4  -IVF     5.  COPD  -duonebs prn     6.  CAD  -Plavix  -Lipitor     7.  Dementia  -supportive care     8.  T2DM  -SSI     9.  Peptic Ulcer Disease  -protonix     10.  Troponin elevation  -likely type 2 due to sepsis    11.  Myoclonic jerking  -Neuro following  -Keppra    12.  HTN  -Restart Nifedipine  -Restart Atenolol          Discharge Planning: I expect the patient to be discharged to SNF in 1-2 days    Edward Lozano MD   12/18/19   11:58 AM

## 2019-12-18 NOTE — THERAPY TREATMENT NOTE
Acute Care - Occupational Therapy Treatment Note  [facility]     Patient Name: Gregoria Bennett  : 1945  MRN: [id number]  Today's Date: 2019  Onset of Illness/Injury or Date of Surgery: 19  Date of Referral to OT: 19  Referring Physician: Dr. Lozano    Admit Date: 2019       ICD-10-CM ICD-9-CM   1. Altered mental status, unspecified altered mental status type R41.82 780.97   2. Severe sepsis (CMS/HCC) A41.9 038.9    R65.20 995.92   3. Acute UTI (urinary tract infection) N39.0 599.0   4. Chronic kidney disease, unspecified CKD stage N18.9 585.9   5. Metabolic acidosis E87.2 276.2   6. Dysphagia, unspecified type R13.10 787.20   7. Impaired functional mobility and activity tolerance Z74.09 V49.89   8. Impaired mobility and ADLs Z74.09 799.89     Patient Active Problem List   Diagnosis   • Sprain   • Urinary tract infection without hematuria   • Sepsis (CMS/HCC)   • Altered mental status   • CKD (chronic kidney disease) stage 4, GFR 15-29 ml/min (CMS/HCC)   • Dementia without behavioral disturbance (CMS/HCC)   • Metabolic encephalopathy     Past Medical History:   Diagnosis Date   • Chronic kidney disease, stage 4 (severe) (CMS/HCC)    • COPD (chronic obstructive pulmonary disease) (CMS/HCC)    • Coronary artery disease    • Dementia (CMS/HCC)    • Diabetes mellitus (CMS/HCC)    • History of dermatomyositis    • Hypertension    • Peptic ulcer disease    • Pulmonary disease    • Renal insufficiency    • Urinary tract infection    • Venous insufficiency      Past Surgical History:   Procedure Laterality Date   • CHOLECYSTECTOMY     • COLON SURGERY         Therapy Treatment    Rehabilitation Treatment Summary     Row Name 19 0857 19 0824          Treatment Time/Intention    Discipline  occupational therapy assistant  -AO  speech language pathologist  -MB     Document Type  therapy note (daily note)  -AO  therapy note (daily note)  -MB     Subjective Information  complains of;pain   -AO  no complaints  -MB     Mode of Treatment  occupational therapy  -AO  speech-language pathology  -MB     Patient/Family Observations  no family present  -AO  No family present  -MB     Patient Effort  fair  -AO  adequate  -MB     Existing Precautions/Restrictions  fall  -AO  --     Recorded by [AO] DALILA'Tosha Burch MIRANDA/L 12/18/19 0940 [MB] Brianna Hernandez CCC-SLP 12/18/19 0900     Row Name 12/18/19 0857             Cognitive Assessment/Intervention- PT/OT    Affect/Mental Status (Cognitive)  confused  -AO      Orientation Status (Cognition)  oriented to;person;place  -AO      Follows Commands (Cognition)  follows one step commands;verbal cues/prompting required  -AO      Safety Deficit (Cognitive)  mild deficit;moderate deficit  -AO      Personal Safety Interventions  fall prevention program maintained;gait belt;nonskid shoes/slippers when out of bed  -AO      Recorded by [AO] DALILA'Tosha Burch MIRANDA/L 12/18/19 0940      Row Name 12/18/19 0857             Bed Mobility Assessment/Treatment    Bed Mobility Assessment/Treatment  supine-sit;sit-supine;rolling right;rolling left  -AO      Rolling Left Esmeralda (Bed Mobility)  moderate assist (50% patient effort);verbal cues  -AO      Rolling Right Esmeralda (Bed Mobility)  moderate assist (50% patient effort);verbal cues  -AO      Supine-Sit Esmeralda (Bed Mobility)  maximum assist (25% patient effort);nonverbal cues (demo/gesture);verbal cues  -AO      Sit-Supine Esmeralda (Bed Mobility)  moderate assist (50% patient effort);verbal cues  -AO      Assistive Device (Bed Mobility)  bed rails;head of bed elevated  -AO      Recorded by [AO] DALILA'Tosha Burch COTA/L 12/18/19 0940      Row Name 12/18/19 0857             ADL Assessment/Intervention    BADL Assessment/Intervention  feeding;toileting;lower body dressing  -AO      Recorded by [AO] DALILA'Tosha Burch MIRANDA/L 12/18/19 0940      Row Name 12/18/19 0857             Lower Body Dressing Assessment/Training     Lower Body Dressing [place] Level  doff;don;undergarment;dependent (less than 25% patient effort)  -AO      Lower Body Dressing Position  supine  -AO      Recorded by [AO] DALILA'Tosha MIRANDA/L 12/18/19 0940      Row Name 12/18/19 0857             Self-Feeding Assessment/Training    [place] Level (Feeding)  moderate assist (50% patient effort);verbal cues  -AO      Position (Self-Feeding)  unsupported sitting  -AO      Recorded by [AO] O'Tosha MIRANDA/L 12/18/19 0940      Row Name 12/18/19 0857             Toileting Assessment/Training    [place] Level (Toileting)  adjust/manage clothing;change pad/brief;perform perineal hygiene;dependent (less than 25% patient effort)  -AO      Toileting Position  supine  -AO      Recorded by [AO] O'Tosha MIRANDA/L 12/18/19 0940      Row Name 12/18/19 0857             BADL Safety/Performance    Impairments, BADL Safety/Performance  balance;cognition;endurance/activity tolerance;grasp/prehension;strength;trunk/postural control  -AO      Cognitive Impairments, BADL Safety/Performance  awareness, need for assistance;insight into deficits/self awareness  -AO      Skilled BADL Treatment/Intervention  BADL process/adaptation training  -AO      Progress in BADL Status  effort and initiation  -AO      Recorded by [AO] DALILA'Tosha Burch MIRANDA/L 12/18/19 0940      Row Name 12/18/19 0857             Static Sitting Balance    Level of [place] (Unsupported Sitting, Static Balance)  minimal assist, 75% patient effort;moderate assist, 50 to 74% patient effort  -AO      Sitting Position (Unsupported Sitting, Static Balance)  sitting on edge of bed  -AO      Recorded by [AO] O'Tosha Burch MIRANDA/L 12/18/19 0940      Row Name 12/18/19 0857             Dynamic Sitting Balance    Level of [place], Reaches Outside Midline (Sitting, Dynamic Balance)  moderate assist, 50 to 74% patient effort  -AO      Sitting Position, Reaches Outside Midline (Sitting, Dynamic Balance)   sitting on edge of bed  -AO      Recorded by [AO] O'Rear Tosha, MIRANDA/L 12/18/19 0940      Row Name 12/18/19 0857             Positioning and Restraints    Pre-Treatment Position  in bed  -AO      Post Treatment Position  bed  -AO      In Bed  fowlers;call light within reach;exit alarm on;with nsg  -AO      Recorded by [AO] O'Rear, Tosha, MIRANDA/L 12/18/19 0940      Row Name 12/18/19 0857             Pain Assessment    Additional Documentation  Pain Scale: Numbers Pre/Post-Treatment (Group)  -AO      Recorded by [AO] O'Rear, Tosha, MIRANDA/L 12/18/19 0940      Row Name 12/18/19 0857             Pain Scale: Numbers Pre/Post-Treatment    Pain Scale: Numbers, Pretreatment  4/10  -AO      Pain Scale: Numbers, Post-Treatment  4/10  -AO      Pain Location - Orientation  lower  -AO      Pain Location  back  -AO      Pain Intervention(s)  Repositioned  -AO      Recorded by [AO] O'Rear, Tosha, MIRANDA/L 12/18/19 0940      Row Name 12/18/19 0824             Pain Scale: FACES Pre/Post-Treatment    Pain: FACES Scale, Pretreatment  0-->no hurt  -MB      Recorded by [MB] Brianna Hernandez CCC-SLP 12/18/19 0900      Row Name 12/18/19 0857 12/18/19 0824          Coping    Observed Emotional State  calm confused  -AO  calm confused  -AO     Verbalized Emotional State  acceptance  -AO  --     Recorded by [AO] O'Rear Tosha, MIRANDA/L 12/18/19 0940 [AO] O'Rear Tosha, MIRANDA/L 12/18/19 0940     Row Name 12/18/19 0857             Plan of Care Review    Plan of Care Reviewed With  patient  -AO      Progress  no change  -AO      Recorded by [AO] O'Rear, Tosha, MIRANDA/L 12/18/19 0940      Row Name 12/18/19 0857             Outcome Summary/Treatment Plan (OT)    Daily Summary of Progress (OT)  progress toward functional goals is gradual  -AO      Anticipated Discharge Disposition (OT)  skilled nursing facility  -AO      Recorded by [AO] O'Rear, Tosha, MIRANDA/L 12/18/19 0940      Row Name 12/18/19 0824             Outcome Summary/Treatment Plan  (SLP)    Daily Summary of Progress (SLP)  progress towards functional goals is fair  -MB      Barriers to Overall Progress (SLP)  Cognition  -MB      Plan for Continued Treatment (SLP)  Continue to follow  -MB      Anticipated Dischage Disposition  extended care facility  -MB      Recorded by [MB] Brianna Hernandez CCC-SLP 12/18/19 0900        User Key  (r) = Recorded By, (t) = Taken By, (c) = Cosigned By    Initials Name Effective Dates Discipline    Brianna Rosales CCC-SLP 08/02/16 -  SLP    Tosha Chávez, MIRANDA/L 10/01/19 -  OT           Rehab Goal Summary     Row Name 12/18/19 0824             Oral Nutrition/Hydration Goal 1 (SLP)    Oral Nutrition/Hydration Goal 1, SLP  Pt will tolerate least restrictive diet with no overt s/s of aspiration.  -MB      Time Frame (Oral Nutrition/Hydration Goal 1, SLP)  by discharge  -MB      Barriers (Oral Nutrition/Hydration Goal 1, SLP)  Cognition  -MB      Progress/Outcomes (Oral Nutrition/Hydration Goal 1, SLP)  continuing progress toward goal  -MB         Pharyngeal Strengthening Exercise Goal 1 (SLP)    Activity (Pharyngeal Strengthening Goal 1, SLP)  increase timing;increase squeeze/positive pressure generation  -MB      Increase Timing  gustatory stimulation (sour/cold)  -MB      Increase Squeeze/Positive Pressure Generation  hard effortful swallow  -MB      [place]/Accuracy (Pharyngeal Strengthening Goal 1, SLP)  independently (over 90% accuracy)  -MB      Time Frame (Pharyngeal Strengthening Goal 1, SLP)  short term goal (STG);by discharge  -MB      Barriers (Pharyngeal Strengthening Goal 1, SLP)  n/a  -MB      Progress/Outcomes (Pharyngeal Strengthening Goal 1, SLP)  goal ongoing  -MB        User Key  (r) = Recorded By, (t) = Taken By, (c) = Cosigned By    Initials Name Provider Type Discipline    Brianna Rosales CCC-SLP Speech and Language Pathologist SLP            OT Recommendation and Plan  Outcome Summary/Treatment Plan (OT)  Daily Summary  of Progress (OT): progress toward functional goals is gradual  Anticipated Discharge Disposition (OT): skilled nursing facility  Daily Summary of Progress (OT): progress toward functional goals is gradual  Plan of Care Review  Plan of Care Reviewed With: patient  Plan of Care Reviewed With: patient  Outcome Summary: Patient in supine, but agreeable to eob sitting. Patient is confused, and oriented to person and place. Patient requires mod/max a with eob to supine with hob elevated. Patient sat eob for self feeding tasks. Patient required min-mod for sitting balance, and mod a with self feeding d/t poor fmc. Patient requires encouragement to eat. Max a with grooming tasks d/t poor  strength. Mod a with eob to supine, and rolling side to side. Dep for brief mgmt, and perineal hygiene. Patient requires cues throughout to follow commands, and for orientation. Therapist recommends snf at time of d/c.  Outcome Measures     Row Name 12/18/19 0857 12/17/19 1600          How much help from another is currently needed...    Putting on and taking off regular lower body clothing?  2  -AO  2  -MM     Bathing (including washing, rinsing, and drying)  2  -AO  2  -MM     Toileting (which includes using toilet bed pan or urinal)  2  -AO  2  -MM     Putting on and taking off regular upper body clothing  2  -AO  2  -MM     Taking care of personal grooming (such as brushing teeth)  2  -AO  2  -MM     Eating meals  2  -AO  2  -MM     AM-PAC 6 Clicks Score (OT)  12  -AO  12  -MM        Functional Assessment    Outcome Measure Options  --  AM-PAC 6 Clicks Daily Activity (OT)  -MM       User Key  (r) = Recorded By, (t) = Taken By, (c) = Cosigned By    Initials Name Provider Type    Garrett Howell, OTR/L Occupational Therapist    AO Tosha Vazquez COTA/L Occupational Therapy Assistant           Time Calculation:   Time Calculation- OT     Row Name 12/18/19 0943             Time Calculation- OT    OT Start Time  0857  -AO      OT  Stop Time  0941  -AO      OT Time Calculation (min)  44 min  -AO      Total Timed Code Minutes- OT  44 minute(s)  -AO      OT Received On  12/18/19  -AO        User Key  (r) = Recorded By, (t) = Taken By, (c) = Cosigned By    Initials Name Provider Type    AO O'Rear Tosha, MIRANDA/L Occupational Therapy Assistant        Therapy Charges for Today     Code Description Service Date Service Provider Modifiers Qty    07710578498 HC OT SELF CARE/MGMT/TRAIN EA 15 MIN 12/18/2019 O'Rear Tosha, MIRANDA/L GO 2    87843301015 HC OT THERAPEUTIC ACT EA 15 MIN 12/18/2019 O'Rear, Tosha, MIRANDA/L GO 1               Tosha O'Rear, MIRANDA/L  12/18/2019

## 2019-12-18 NOTE — PLAN OF CARE
Problem: Patient Care Overview  Goal: Plan of Care Review  Flowsheets (Taken 12/18/2019 9141)  Outcome Summary: Pt was mod x 2 for rolling and supine to sit . Pt sat EOB x 10 min x 1 then progressed to cga. Pt then was positioned on the bed pan. Pt was assited with clean up with nursing help. Pt would benefit from continued PT.

## 2019-12-18 NOTE — THERAPY TREATMENT NOTE
Acute Care - Speech Language Pathology   Swallow Treatment Note [facility]     Patient Name: Gregoria Bennett  : 1945  MRN: [id number]  Today's Date: 2019  Onset of Illness/Injury or Date of Surgery: 19     Referring Physician: Dr. Lozano      Admit Date: 2019  Assisted pt with breakfast. Pt completed trials of pureed and nectar consistencies. She appeared short of breath at times during the meal, however her oxygen saturation remained at 99%. She had no immediate coughing or throat clearing. She did exhibit coughing at the end of the meal with expectoration of phlegm, however there was a signficant lapse of time since any PO trials had been presented.  She did have poor attention at times and required cues to swallow occasionally as she would begin talking while she still had food in her mouth. Continue pureed diet and nectar thick liquids. SLP will continue to follow.  Brianna Hernandez, CCC-SLP 2019 9:05 AM    Visit Dx:      ICD-10-CM ICD-9-CM   1. Altered mental status, unspecified altered mental status type R41.82 780.97   2. Severe sepsis (CMS/HCC) A41.9 038.9    R65.20 995.92   3. Acute UTI (urinary tract infection) N39.0 599.0   4. Chronic kidney disease, unspecified CKD stage N18.9 585.9   5. Metabolic acidosis E87.2 276.2   6. Dysphagia, unspecified type R13.10 787.20   7. Impaired functional mobility and activity tolerance Z74.09 V49.89   8. Impaired mobility and ADLs Z74.09 799.89     Patient Active Problem List   Diagnosis   • Sprain   • Urinary tract infection without hematuria   • Sepsis (CMS/HCC)   • Altered mental status   • CKD (chronic kidney disease) stage 4, GFR 15-29 ml/min (CMS/HCC)   • Dementia without behavioral disturbance (CMS/HCC)   • Metabolic encephalopathy       Therapy Treatment  Rehabilitation Treatment Summary     Row Name 19 0824             Treatment Time/Intention    Discipline  speech language pathologist  -MB      Document Type  therapy note  (daily note)  -MB      Subjective Information  no complaints  -MB      Mode of Treatment  speech-language pathology  -MB      Patient/Family Observations  No family present  -MB      Patient Effort  adequate  -MB      Recorded by [MB] Brianna Hernandez CCC-SLP 12/18/19 0900      Row Name 12/18/19 0824             Pain Scale: FACES Pre/Post-Treatment    Pain: FACES Scale, Pretreatment  0-->no hurt  -MB      Recorded by [MB] Brianna Hernandez CCC-SLP 12/18/19 0900      Row Name 12/18/19 0824             Outcome Summary/Treatment Plan (SLP)    Daily Summary of Progress (SLP)  progress towards functional goals is fair  -MB      Barriers to Overall Progress (SLP)  Cognition  -MB      Plan for Continued Treatment (SLP)  Continue to follow  -MB      Anticipated Dischage Disposition  extended care facility  -MB      Recorded by [MB] Brianna Hernandez CCC-SLP 12/18/19 0900        User Key  (r) = Recorded By, (t) = Taken By, (c) = Cosigned By    Initials Name Effective Dates Discipline    MB Brianna Hernandez CCCSLP 08/02/16 -  SLP          Outcome Summary  Outcome Summary/Treatment Plan (SLP)  Daily Summary of Progress (SLP): progress towards functional goals is fair (12/18/19 0824 : Brianna Hernandez, CCC-SLP)  Barriers to Overall Progress (SLP): Cognition (12/18/19 0824 : Brianna Hernandez, CCC-SLP)  Plan for Continued Treatment (SLP): Continue to follow (12/18/19 0824 : Brianna Hernandez, CCC-SLP)  Anticipated Dischage Disposition: extended care facility (12/18/19 0824 : Brianna Hernandez CCC-SLP)      SLP GOALS     Row Name 12/18/19 0824 12/17/19 1049          Oral Nutrition/Hydration Goal 1 (SLP)    Oral Nutrition/Hydration Goal 1, SLP  Pt will tolerate least restrictive diet with no overt s/s of aspiration.  -MB  Pt will tolerate least restrictive diet with no overt s/s of aspiration.  -MB     Time Frame (Oral Nutrition/Hydration Goal 1, SLP)  by discharge  -MB  by discharge  -MB     Barriers (Oral  Nutrition/Hydration Goal 1, SLP)  Cognition  -MB  n/a  -MB     Progress/Outcomes (Oral Nutrition/Hydration Goal 1, SLP)  continuing progress toward goal  -MB  goal ongoing  -MB        Pharyngeal Strengthening Exercise Goal 1 (SLP)    Activity (Pharyngeal Strengthening Goal 1, SLP)  increase timing;increase squeeze/positive pressure generation  -MB  increase timing;increase squeeze/positive pressure generation  -MB     Increase Timing  gustatory stimulation (sour/cold)  -MB  gustatory stimulation (sour/cold)  -MB     Increase Squeeze/Positive Pressure Generation  hard effortful swallow  -MB  hard effortful swallow  -MB     [place]/Accuracy (Pharyngeal Strengthening Goal 1, SLP)  independently (over 90% accuracy)  -MB  independently (over 90% accuracy)  -MB     Time Frame (Pharyngeal Strengthening Goal 1, SLP)  short term goal (STG);by discharge  -MB  short term goal (STG);by discharge  -MB     Barriers (Pharyngeal Strengthening Goal 1, SLP)  n/a  -MB  n/a  -MB     Progress/Outcomes (Pharyngeal Strengthening Goal 1, SLP)  goal ongoing  -MB  goal ongoing  -MB       User Key  (r) = Recorded By, (t) = Taken By, (c) = Cosigned By    Initials Name Provider Type    Brianna Rosales, CCC-SLP Speech and Language Pathologist          EDUCATION  The patient has been educated in the following areas:   Dysphagia (Swallowing Impairment).    SLP Recommendation and Plan  Daily Summary of Progress (SLP): progress towards functional goals is fair  Barriers to Overall Progress (SLP): Cognition  Plan for Continued Treatment (SLP): Continue to follow  Anticipated Dischage Disposition: extended care facility                    Time Calculation:   Time Calculation- SLP     Row Name 12/18/19 0905             Time Calculation- SLP    SLP Start Time  0824  -MB      SLP Stop Time  0856  -MB      SLP Time Calculation (min)  32 min  -MB      SLP Received On  12/18/19  -MB        User Key  (r) = Recorded By, (t) = Taken By, (c) =  Cosigned By    Initials Name Provider Type    Brianna Rosales CCC-SLP Speech and Language Pathologist          Therapy Charges for Today     Code Description Service Date Service Provider Modifiers Qty    10388582095  ST EVAL ORAL PHARYNG SWALLOW 4 12/17/2019 Brianna Hernandez CCC-SLP GN 1    [id number]  ST TREATMENT SWALLOW 2 12/18/2019 Brianna Hernandez CCC-SLP GN 1                 XOCHITL Upton  12/18/2019

## 2019-12-18 NOTE — PLAN OF CARE
Problem: Patient Care Overview  Goal: Plan of Care Review  Outcome: Ongoing (interventions implemented as appropriate)  Flowsheets (Taken 12/18/2019 0757)  Outcome Summary: Patient in supine, but agreeable to eob sitting. Patient is confused, and oriented to person and place only. Patient requires mod/max a with eob to supine with hob elevated. Patient sat eob for self feeding tasks. Patient required min-mod for sitting balance, and mod a with self feeding d/t poor fmc. Patient requires encouragement to eat. Max a with grooming tasks d/t poor  strength. Mod a with eob to supine, and rolling side to side. Dep for brief mgmt, and perineal hygiene. Patient requires cues throughout to follow commands, and for orientation. Therapist recommends snf at time of d/c.

## 2019-12-19 LAB
ALBUMIN SERPL-MCNC: 3.5 G/DL (ref 3.5–5.2)
ALBUMIN/GLOB SERPL: 1.2 G/DL
ALP SERPL-CCNC: 50 U/L (ref 39–117)
ALT SERPL W P-5'-P-CCNC: 20 U/L (ref 1–33)
ANION GAP SERPL CALCULATED.3IONS-SCNC: 14 MMOL/L (ref 5–15)
AST SERPL-CCNC: 15 U/L (ref 1–32)
BASOPHILS # BLD AUTO: 0.02 10*3/MM3 (ref 0–0.2)
BASOPHILS NFR BLD AUTO: 0.2 % (ref 0–1.5)
BILIRUB SERPL-MCNC: 0.2 MG/DL (ref 0.2–1.2)
BUN BLD-MCNC: 35 MG/DL (ref 8–23)
BUN/CREAT SERPL: 19.9 (ref 7–25)
CALCIUM SPEC-SCNC: 8.5 MG/DL (ref 8.6–10.5)
CHLORIDE SERPL-SCNC: 114 MMOL/L (ref 98–107)
CO2 SERPL-SCNC: 19 MMOL/L (ref 22–29)
CREAT BLD-MCNC: 1.76 MG/DL (ref 0.57–1)
DEPRECATED RDW RBC AUTO: 43.7 FL (ref 37–54)
EOSINOPHIL # BLD AUTO: 0.29 10*3/MM3 (ref 0–0.4)
EOSINOPHIL NFR BLD AUTO: 3.3 % (ref 0.3–6.2)
ERYTHROCYTE [DISTWIDTH] IN BLOOD BY AUTOMATED COUNT: 13.6 % (ref 12.3–15.4)
GFR SERPL CREATININE-BSD FRML MDRD: 28 ML/MIN/1.73
GLOBULIN UR ELPH-MCNC: 2.9 GM/DL
GLUCOSE BLD-MCNC: 283 MG/DL (ref 65–99)
GLUCOSE BLDC GLUCOMTR-MCNC: 198 MG/DL (ref 70–130)
GLUCOSE BLDC GLUCOMTR-MCNC: 253 MG/DL (ref 70–130)
GLUCOSE BLDC GLUCOMTR-MCNC: 255 MG/DL (ref 70–130)
GLUCOSE BLDC GLUCOMTR-MCNC: 287 MG/DL (ref 70–130)
HCT VFR BLD AUTO: 27.3 % (ref 34–46.6)
HGB BLD-MCNC: 9 G/DL (ref 12–15.9)
IMM GRANULOCYTES # BLD AUTO: 0.05 10*3/MM3 (ref 0–0.05)
IMM GRANULOCYTES NFR BLD AUTO: 0.6 % (ref 0–0.5)
LYMPHOCYTES # BLD AUTO: 0.91 10*3/MM3 (ref 0.7–3.1)
LYMPHOCYTES NFR BLD AUTO: 10.2 % (ref 19.6–45.3)
MCH RBC QN AUTO: 29.1 PG (ref 26.6–33)
MCHC RBC AUTO-ENTMCNC: 33 G/DL (ref 31.5–35.7)
MCV RBC AUTO: 88.3 FL (ref 79–97)
MONOCYTES # BLD AUTO: 0.71 10*3/MM3 (ref 0.1–0.9)
MONOCYTES NFR BLD AUTO: 8 % (ref 5–12)
NEUTROPHILS # BLD AUTO: 6.91 10*3/MM3 (ref 1.7–7)
NEUTROPHILS NFR BLD AUTO: 77.7 % (ref 42.7–76)
NRBC BLD AUTO-RTO: 0 /100 WBC (ref 0–0.2)
PLATELET # BLD AUTO: 208 10*3/MM3 (ref 140–450)
PMV BLD AUTO: 9.9 FL (ref 6–12)
POTASSIUM BLD-SCNC: 3.8 MMOL/L (ref 3.5–5.2)
PROT SERPL-MCNC: 6.4 G/DL (ref 6–8.5)
RBC # BLD AUTO: 3.09 10*6/MM3 (ref 3.77–5.28)
SODIUM BLD-SCNC: 147 MMOL/L (ref 136–145)
WBC NRBC COR # BLD: 8.89 10*3/MM3 (ref 3.4–10.8)

## 2019-12-19 PROCEDURE — 63710000001 INSULIN LISPRO (HUMAN) PER 5 UNITS: Performed by: INTERNAL MEDICINE

## 2019-12-19 PROCEDURE — 82962 GLUCOSE BLOOD TEST: CPT

## 2019-12-19 PROCEDURE — 25010000002 HYDRALAZINE PER 20 MG: Performed by: INTERNAL MEDICINE

## 2019-12-19 PROCEDURE — 25010000002 VANCOMYCIN 10 G RECONSTITUTED SOLUTION: Performed by: FAMILY MEDICINE

## 2019-12-19 PROCEDURE — 97530 THERAPEUTIC ACTIVITIES: CPT

## 2019-12-19 PROCEDURE — 85025 COMPLETE CBC W/AUTO DIFF WBC: CPT | Performed by: FAMILY MEDICINE

## 2019-12-19 PROCEDURE — 99232 SBSQ HOSP IP/OBS MODERATE 35: CPT | Performed by: PSYCHIATRY & NEUROLOGY

## 2019-12-19 PROCEDURE — 63710000001 INSULIN LISPRO (HUMAN) PER 5 UNITS: Performed by: FAMILY MEDICINE

## 2019-12-19 PROCEDURE — 80053 COMPREHEN METABOLIC PANEL: CPT | Performed by: FAMILY MEDICINE

## 2019-12-19 PROCEDURE — 25010000002 LEVETIRACETAM IN NACL 0.82% 500 MG/100ML SOLUTION: Performed by: PSYCHIATRY & NEUROLOGY

## 2019-12-19 PROCEDURE — 25010000002 HYDRALAZINE PER 20 MG: Performed by: FAMILY MEDICINE

## 2019-12-19 RX ORDER — HYDRALAZINE HYDROCHLORIDE 20 MG/ML
20 INJECTION INTRAMUSCULAR; INTRAVENOUS ONCE
Status: COMPLETED | OUTPATIENT
Start: 2019-12-19 | End: 2019-12-19

## 2019-12-19 RX ORDER — CLONIDINE HYDROCHLORIDE 0.1 MG/1
0.1 TABLET ORAL EVERY 12 HOURS SCHEDULED
Status: DISCONTINUED | OUTPATIENT
Start: 2019-12-19 | End: 2019-12-22 | Stop reason: HOSPADM

## 2019-12-19 RX ORDER — ENALAPRILAT 2.5 MG/2ML
0.62 INJECTION INTRAVENOUS ONCE
Status: COMPLETED | OUTPATIENT
Start: 2019-12-19 | End: 2019-12-19

## 2019-12-19 RX ORDER — HYDRALAZINE HYDROCHLORIDE 25 MG/1
25 TABLET, FILM COATED ORAL EVERY 8 HOURS SCHEDULED
Status: DISCONTINUED | OUTPATIENT
Start: 2019-12-19 | End: 2019-12-21

## 2019-12-19 RX ORDER — LEVETIRACETAM 100 MG/ML
500 SOLUTION ORAL EVERY 12 HOURS SCHEDULED
Status: DISCONTINUED | OUTPATIENT
Start: 2019-12-19 | End: 2019-12-22 | Stop reason: HOSPADM

## 2019-12-19 RX ORDER — CEFDINIR 300 MG/1
300 CAPSULE ORAL
Status: DISCONTINUED | OUTPATIENT
Start: 2019-12-19 | End: 2019-12-19

## 2019-12-19 RX ORDER — AMLODIPINE BESYLATE 10 MG/1
10 TABLET ORAL
Status: DISCONTINUED | OUTPATIENT
Start: 2019-12-19 | End: 2019-12-22 | Stop reason: HOSPADM

## 2019-12-19 RX ADMIN — AMLODIPINE BESYLATE 10 MG: 10 TABLET ORAL at 10:44

## 2019-12-19 RX ADMIN — VANCOMYCIN HYDROCHLORIDE 750 MG: 10 INJECTION, POWDER, LYOPHILIZED, FOR SOLUTION INTRAVENOUS at 15:38

## 2019-12-19 RX ADMIN — INSULIN LISPRO 4 UNITS: 100 INJECTION, SOLUTION INTRAVENOUS; SUBCUTANEOUS at 13:06

## 2019-12-19 RX ADMIN — HYDRALAZINE HYDROCHLORIDE 20 MG: 20 INJECTION INTRAMUSCULAR; INTRAVENOUS at 02:10

## 2019-12-19 RX ADMIN — CEFDINIR 300 MG: 300 CAPSULE ORAL at 10:44

## 2019-12-19 RX ADMIN — HYDRALAZINE HYDROCHLORIDE 25 MG: 25 TABLET, FILM COATED ORAL at 13:08

## 2019-12-19 RX ADMIN — ATENOLOL 50 MG: 50 TABLET ORAL at 08:08

## 2019-12-19 RX ADMIN — NYSTATIN 1 APPLICATION: 100000 POWDER TOPICAL at 20:44

## 2019-12-19 RX ADMIN — NYSTATIN 1 APPLICATION: 100000 POWDER TOPICAL at 08:08

## 2019-12-19 RX ADMIN — LEVETIRACETAM 500 MG: 5 INJECTION INTRAVENOUS at 08:57

## 2019-12-19 RX ADMIN — INSULIN LISPRO 2 UNITS: 100 INJECTION, SOLUTION INTRAVENOUS; SUBCUTANEOUS at 08:17

## 2019-12-19 RX ADMIN — LEVETIRACETAM 500 MG: 100 SOLUTION ORAL at 20:47

## 2019-12-19 RX ADMIN — SCOPOLAMINE 1 PATCH: 1 PATCH, EXTENDED RELEASE TRANSDERMAL at 21:03

## 2019-12-19 RX ADMIN — CLONIDINE HYDROCHLORIDE 0.1 MG: 0.1 TABLET ORAL at 20:47

## 2019-12-19 RX ADMIN — ENALAPRILAT 0.62 MG: 1.25 INJECTION INTRAVENOUS at 15:38

## 2019-12-19 RX ADMIN — CLONIDINE HYDROCHLORIDE 0.1 MG: 0.1 TABLET ORAL at 12:03

## 2019-12-19 RX ADMIN — HYDRALAZINE HYDROCHLORIDE 20 MG: 20 INJECTION INTRAMUSCULAR; INTRAVENOUS at 18:28

## 2019-12-19 RX ADMIN — INSULIN LISPRO 3 UNITS: 100 INJECTION, SOLUTION INTRAVENOUS; SUBCUTANEOUS at 21:03

## 2019-12-19 RX ADMIN — HYDRALAZINE HYDROCHLORIDE 25 MG: 25 TABLET, FILM COATED ORAL at 22:00

## 2019-12-19 RX ADMIN — INSULIN LISPRO 4 UNITS: 100 INJECTION, SOLUTION INTRAVENOUS; SUBCUTANEOUS at 17:09

## 2019-12-19 NOTE — PLAN OF CARE
Alert, oriented to self. Giggles at odd times, very pleasant. Contact/droplet precautions initiated today due to MRSA in sputum culture. Hypertensive, meds are being adjusted. Monitoring closely. Ate well at breakfast with assist. Turned frequently to promote skin integrity. Safety maintained.

## 2019-12-19 NOTE — PLAN OF CARE
Problem: Patient Care Overview  Goal: Plan of Care Review  Outcome: Ongoing (interventions implemented as appropriate)  Flowsheets (Taken 12/19/2019 1053)  Outcome Summary: PO intake is fair, 50% avg of 3 meals. Pt is a total feed. Ordered Magic Cup daily with lunch. Will continue to follow.

## 2019-12-19 NOTE — THERAPY TREATMENT NOTE
Acute Care - Physical Therapy Treatment Note  [facility]     Patient Name: Gregoria Bennett  : 1945  MRN: [id number]  Today's Date: 2019  Onset of Illness/Injury or Date of Surgery: 19     Referring Physician: Dr. Lozano    Admit Date: 2019    Visit Dx:    ICD-10-CM ICD-9-CM   1. Altered mental status, unspecified altered mental status type R41.82 780.97   2. Severe sepsis (CMS/HCC) A41.9 038.9    R65.20 995.92   3. Acute UTI (urinary tract infection) N39.0 599.0   4. Chronic kidney disease, unspecified CKD stage N18.9 585.9   5. Metabolic acidosis E87.2 276.2   6. Dysphagia, unspecified type R13.10 787.20   7. Impaired functional mobility and activity tolerance Z74.09 V49.89   8. Impaired mobility and ADLs Z74.09 799.89     Patient Active Problem List   Diagnosis   • Sprain   • Urinary tract infection without hematuria   • Sepsis (CMS/HCC)   • Altered mental status   • CKD (chronic kidney disease) stage 4, GFR 15-29 ml/min (CMS/HCC)   • Dementia without behavioral disturbance (CMS/HCC)   • Metabolic encephalopathy       Therapy Treatment    Rehabilitation Treatment Summary     Row Name 1958             Treatment Time/Intention    Discipline  physical therapy assistant  -AE      Document Type  therapy note (daily note)  -AE      Subjective Information  no complaints  -AE      Existing Precautions/Restrictions  fall  -AE      Recorded by [AE] Clemencia Shipley, PTA 19      Row Name 1958             Bed Mobility Assessment/Treatment    Rolling Left [place] (Bed Mobility)  moderate assist (50% patient effort)  -AE      Sidelying-Sit [place] (Bed Mobility)  moderate assist (50% patient effort);verbal cues  -AE2      Assistive Device (Bed Mobility)  head of bed elevated;bed rails  -AE      Recorded by [AE] Clemencia Shipley, PTA 19 09  [AE2] Clemencia Shipley, PTA 19 0923      Row Name 19             Therapeutic Exercise    Lower Extremity  (Therapeutic Exercise)  LAQ (long arc quad), bilateral  -AE      Exercise Type (Therapeutic Exercise)  AROM (active range of motion)  -AE      Position (Therapeutic Exercise)  seated  -AE      Sets/Reps (Therapeutic Exercise)  10  -AE      Recorded by [AE] Clemencia Shipley, PTA 12/19/19 0923      Row Name 12/19/19 0858             Static Sitting Balance    Level of [place] (Unsupported Sitting, Static Balance)  standby assist  -AE      Recorded by [AE] Clemencia Shipley, PTA 12/19/19 0921      Row Name 12/19/19 0858             Positioning and Restraints    Pre-Treatment Position  in bed  -AE      Post Treatment Position  bed  -AE      In Bed  side lying left;call light within reach  -AE      Recorded by [AE] Clemencia Shipley, PTA 12/19/19 0921      Row Name 12/19/19 0858             Pain Scale: Numbers Pre/Post-Treatment    Pain Scale: Numbers, Pretreatment  0/10 - no pain  -AE      Recorded by [AE] Clemencia Shipley, PTA 12/19/19 0921      Row Name                Wound 12/18/19 1500 Right gluteal Pressure Injury    Wound - Properties Group Date first assessed: 12/18/19 [CW] Time first assessed: 1500 [CW] Present on Hospital Admission: Y [CW] Side: Right [CW] Location: gluteal [CW] Primary Wound Type: Pressure inj [CW] Stage, Pressure Injury: other (see comments) [CW], scar  Recorded by:  [CW] Dalila Richards RN 12/18/19 1520      User Key  (r) = Recorded By, (t) = Taken By, (c) = Cosigned By    Initials Name Effective Dates Discipline    AE Clemencia Shipley, PTA 06/22/15 -  PT    CW Dalila Richards RN 10/16/17 -  Nurse          Wound 12/18/19 1500 Right gluteal Pressure Injury (Active)   Dressing Appearance open to air;no drainage 12/19/2019  8:00 AM   Closure None 12/19/2019  8:00 AM   Base pink 12/19/2019  8:00 AM   Periwound blanchable 12/19/2019  8:00 AM   Periwound Temperature warm 12/19/2019  8:00 AM   Periwound Skin Turgor firm 12/19/2019  8:00 AM   Drainage Amount none 12/19/2019  8:00 AM   Care, Wound  cleansed with;soap and water;barrier applied 12/19/2019  8:00 AM   Dressing Care, Wound open to air 12/19/2019  8:00 AM   Periwound Care, Wound dry periwound area maintained 12/19/2019  8:00 AM               PT Recommendation and Plan     Progress: improving  Outcome Summary: Pt was mod x 2 for rolling and supine to sit . Pt sat EOB x 10 min x 1 then progressed to cga. Pt then was positioned on the bed pan. Pt was assited with clean up with nursing help. Pt would benefit from continued PT.  Outcome Measures     Row Name 12/18/19 0857 12/17/19 1600          How much help from another is currently needed...    Putting on and taking off regular lower body clothing?  2  -AO  2  -MM     Bathing (including washing, rinsing, and drying)  2  -AO  2  -MM     Toileting (which includes using toilet bed pan or urinal)  2  -AO  2  -MM     Putting on and taking off regular upper body clothing  2  -AO  2  -MM     Taking care of personal grooming (such as brushing teeth)  2  -AO  2  -MM     Eating meals  2  -AO  2  -MM     AM-PAC 6 Clicks Score (OT)  12  -AO  12  -MM        Functional Assessment    Outcome Measure Options  --  AM-PAC 6 Clicks Daily Activity (OT)  -MM       User Key  (r) = Recorded By, (t) = Taken By, (c) = Cosigned By    Initials Name Provider Type    MM Garrett Garcia, OTR/L Occupational Therapist    Tosha Chávez COTA/L Occupational Therapy Assistant         Time Calculation:   PT Charges     Row Name 12/19/19 0928             Time Calculation    Start Time  0858  -AE      Stop Time  0922  -AE      Time Calculation (min)  24 min  -AE      PT Received On  12/19/19  -AE      PT Goal Re-Cert Due Date  12/27/19  -AE         Time Calculation- PT    Total Timed Code Minutes- PT  24 minute(s)  -AE         Timed Charges    04063 - PT Therapeutic Activity Minutes  24  -AE        User Key  (r) = Recorded By, (t) = Taken By, (c) = Cosigned By    Initials Name Provider Type    AE Clemencia Shipley, PTA Physical Therapy  Assistant        Therapy Charges for Today     Code Description Service Date Service Provider Modifiers Qty    82281798398 HC PT THERAPEUTIC ACT EA 15 MIN 12/18/2019 Clemencia Shipley, PTA GP 2    68565757475 HC PT THER PROC EA 15 MIN 12/18/2019 Clemencia Shipley, ZEINAB GP 2    28727379508 HC PT THERAPEUTIC ACT EA 15 MIN 12/19/2019 Clemencia Shipley, ZEINAB GP 2          PT G-Codes  Outcome Measure Options: AM-PAC 6 Clicks Daily Activity (OT)  AM-PAC 6 Clicks Score (PT): 8  AM-PAC 6 Clicks Score (OT): 12    Clemencia Shipley PTA  12/19/2019

## 2019-12-19 NOTE — PROGRESS NOTES
Neurology Progress Note      Date of admission: 12/16/2019 11:51 AM  Date of visit: 12/19/2019    Chief Complaint:  F/u altered mental status     Subjective     Subjective:    No seizure activity reported from overnight.  She is pleasantly confused currently.  She tells me she is concerned that her mother has become lost and that something bad may have happened to her after she was caught cheating on her father.  As the patient is 74, I think it would be unlikely that her mother is involved in any of the above story.    Medications:  Current Facility-Administered Medications   Medication Dose Route Frequency Provider Last Rate Last Dose   • amLODIPine (NORVASC) tablet 10 mg  10 mg Oral Q24H Edward Lozano MD   10 mg at 12/19/19 1044   • atenolol (TENORMIN) tablet 50 mg  50 mg Oral Q24H Edward Lozano MD   50 mg at 12/19/19 0808   • atropine 1 % ophthalmic solution 1 drop  1 drop Sublingual BID PRN Ross Dinero MD   1 drop at 12/16/19 2240   • cloNIDine (CATAPRES) tablet 0.1 mg  0.1 mg Oral Q12H Edward Lozano MD   0.1 mg at 12/19/19 1203   • dextrose (D50W) 25 g/ 50mL Intravenous Solution 25 g  25 g Intravenous Q15 Min PRN Ross Dinero MD       • dextrose (GLUTOSE) oral gel 15 g  15 g Oral Q15 Min PRN Ross Dinero MD       • glucagon (human recombinant) (GLUCAGEN DIAGNOSTIC) injection 1 mg  1 mg Subcutaneous Q15 Min PRN Ross Dinero MD       • hydrALAZINE (APRESOLINE) tablet 25 mg  25 mg Oral Q8H Edward Lozano MD   25 mg at 12/19/19 1308   • insulin lispro (humaLOG) injection 2-7 Units  2-7 Units Subcutaneous 4x Daily With Meals & Nightly Ross Dinero MD   4 Units at 12/19/19 1306   • levETIRAcetam (KEPPRA) tablet 500 mg  500 mg Oral Q12H Patti Espinoza MD        Or   • levETIRAcetam in NaCl 0.82% (KEPPRA) IVPB 500 mg  500 mg Intravenous Q12H Patti Espinoza MD   500 mg at 12/19/19 0857   • nystatin (MYCOSTATIN) powder 1 application  1  application Topical Q12H Ross Dinero MD   1 application at 12/19/19 0808   • Scopolamine (TRANSDERM-SCOP) 1.5 MG/3DAYS patch 1 patch  1 patch Transdermal Q72H Ross Dinero MD   1 patch at 12/16/19 2147   • vancomycin 750 mg/250 mL 0.9% NS IVPB ([facility])  750 mg Intravenous Q24H Edward Lozano MD           Review of Systems:   -A 14 point review of systems is completed and is negative best as can be determined. She did not always nod to questions.     Objective     Objective      Vital Signs  Temp:  [97.9 °F (36.6 °C)-99 °F (37.2 °C)] 98.3 °F (36.8 °C)  Heart Rate:  [53-68] 61  Resp:  [16-22] 18  BP: (141-228)/(40-80) 203/56    Physical Exam:    HEENT:  Neck supple  CVS:  Regular rate and rhythm.  No murmurs  Carotid Examination:  No bruits  Lungs:  Clear to auscultation  Abdomen:  Non-tender, Non-distended  Extremities:  No signs of peripheral edema    Neurologic Exam:    -Awaken.  Not somnolent.  Follows commands well.  Pleasantly confused.  No signs of an aphasia nor dysarthria.  -Follows simple  commands    Cranial nerves II through XII intact.  EOMI  Head turns side to side  Poor smile a ? Hypomimia  Tongue protrudes midline    Motor: (strength out of 5:  1= minimal movement, 2 = movement in plane of gravity, 3 = movement against gravity, 4 = movement against some resistance, 5 = full strength)    She moves her upper extremities against gravity.  Resting tremors.  Does move lower extremities but is doing this barely--she has not walked for a long time         Results Review:    I reviewed the patient's new clinical results.    Lab Results (last 24 hours)     Procedure Component Value Units Date/Time    Blood Culture - Blood, Arm, Right [[id number]] Collected:  12/16/19 1246    Specimen:  Blood from Arm, Right Updated:  12/19/19 1330     Blood Culture No growth at 3 days    Respiratory Culture - Sputum, Throat [[id number]]  (Abnormal) Collected:  12/16/19 2200    Specimen:  Sputum from Throat  Updated:  12/19/19 1303     Respiratory Culture Light growth (2+) Staphylococcus aureus, MRSA     Comment:   Methicillin resistant Staphylococcus aureus, Patient may be an isolation risk.        Gram Stain Few (2+) Epithelial cells seen      Greater than 25 WBCs per low power field      Rare (1+) Mixed gram positive elis    Blood Culture - Blood, Arm, Left [[id number]] Collected:  12/16/19 1215    Specimen:  Blood from Arm, Left Updated:  12/19/19 1301     Blood Culture No growth at 3 days    POC Glucose Once [[id number]]  (Abnormal) Collected:  12/19/19 1156    Specimen:  Blood Updated:  12/19/19 1207     Glucose 287 mg/dL      Comment: : [id number] Son (Richards) CarolynMeter ID: [id number]       Comprehensive Metabolic Panel [[id number]]  (Abnormal) Collected:  12/19/19 1026    Specimen:  Blood Updated:  12/19/19 1056     Glucose 283 mg/dL      BUN 35 mg/dL      Creatinine 1.76 mg/dL      Sodium 147 mmol/L      Potassium 3.8 mmol/L      Chloride 114 mmol/L      CO2 19.0 mmol/L      Calcium 8.5 mg/dL      Total Protein 6.4 g/dL      Albumin 3.50 g/dL      ALT (SGPT) 20 U/L      AST (SGOT) 15 U/L      Alkaline Phosphatase 50 U/L      Total Bilirubin 0.2 mg/dL      eGFR Non African Amer 28 mL/min/1.73      Globulin 2.9 gm/dL      A/G Ratio 1.2 g/dL      BUN/Creatinine Ratio 19.9     Anion Gap 14.0 mmol/L     Narrative:       GFR Normal >60  Chronic Kidney Disease <60  Kidney Failure <15      CBC & Differential [[id number]] Collected:  12/19/19 1027    Specimen:  Blood Updated:  12/19/19 1039    Narrative:       The following orders were created for panel order CBC & Differential.  Procedure                               Abnormality         Status                     ---------                               -----------         ------                     CBC Auto Differential[[id number]]        Abnormal            Final result                 Please view results for these tests on the individual orders.    CBC Auto  Differential [[id number]]  (Abnormal) Collected:  12/19/19 1027    Specimen:  Blood Updated:  12/19/19 1039     WBC 8.89 10*3/mm3      RBC 3.09 10*6/mm3      Hemoglobin 9.0 g/dL      Hematocrit 27.3 %      MCV 88.3 fL      MCH 29.1 pg      MCHC 33.0 g/dL      RDW 13.6 %      RDW-SD 43.7 fl      MPV 9.9 fL      Platelets 208 10*3/mm3      Neutrophil % 77.7 %      Lymphocyte % 10.2 %      Monocyte % 8.0 %      Eosinophil % 3.3 %      Basophil % 0.2 %      Immature Grans % 0.6 %      Neutrophils, Absolute 6.91 10*3/mm3      Lymphocytes, Absolute 0.91 10*3/mm3      Monocytes, Absolute 0.71 10*3/mm3      Eosinophils, Absolute 0.29 10*3/mm3      Basophils, Absolute 0.02 10*3/mm3      Immature Grans, Absolute 0.05 10*3/mm3      nRBC 0.0 /100 WBC     POC Glucose Once [[id number]]  (Abnormal) Collected:  12/19/19 0807    Specimen:  Blood Updated:  12/19/19 0818     Glucose 198 mg/dL      Comment: : [id number] Adelaida (Maurice) CarolynMeter ID: [id number]       POC Glucose Once [[id number]]  (Abnormal) Collected:  12/18/19 2101    Specimen:  Blood Updated:  12/18/19 2132     Glucose 209 mg/dL      Comment: : [id number] Shola ConnorzabethMeter ID: [id number]       POC Glucose Once [[id number]]  (Abnormal) Collected:  12/18/19 1704    Specimen:  Blood Updated:  12/18/19 1716     Glucose 215 mg/dL      Comment: : [id number] Adelaida Graham) CarolynMeter ID: [id number]           Imaging Results (Last 24 Hours)     Procedure Component Value Units Date/Time    XR Chest 1 View [[id number]] Collected:  12/18/19 1548     Updated:  12/18/19 1552    Narrative:       Frontal upright radiograph of the chest 12/18/2019 3:29 PM CST     COMPARISON: 12/16/2019.     HISTORY: Short of air.     FINDINGS:   The lungs are clear. The cardiomediastinal silhouette and pulmonary  vascularity are within normal limits.      The osseous structures and surrounding soft tissues demonstrate no acute  abnormality.       Impression:       1. No evidence  of acute cardiopulmonary process.        This report was finalized on 12/18/2019 15:49 by Dr. Gino Richard MD.        EEG 12/16/19  Interpretation:  This is an abnormal EEG recording secondary to slowing of the background rhythm along with the presence of triphasic waves and 2 brief potentially seizurogenic episodes of rhythmicity  which may be seen as the result of a diffuse cerebral disturbance such as hypoxic, or  metabolic encephalopathy.  While there was no definitive seizure activity clinically correlating with the rhythmicity this potentially is seizurogenic and would recommend antiepileptic medications.  Clinical correlation is recommended.  Assessment/Plan     Hospital Problem List      Altered mental status    CKD (chronic kidney disease) stage 4, GFR 15-29 ml/min (CMS/[facility])    Dementia without behavioral disturbance (CMS/[facility])    Metabolic encephalopathy    Impression:  1. Metabolic encephalopathy  2. EEG with triphasic waves and a couple of brief runs of rhythmicity that may be seizurogenic. Doing well on Keppra.   3. Parkinsonian features  4. Dementia    Plan:  Change Keppra to oral and see how she does  F/u in Neuro clinic   Neuro signing off.  Follow-up in 6 weeks.        Hood Reinoso MD  12/19/19  1:46 PM

## 2019-12-19 NOTE — PLAN OF CARE
Problem: Patient Care Overview  Goal: Plan of Care Review  Flowsheets (Taken 12/19/2019 0390)  Progress: improving  Outcome Summary: Pt was mod x 1for rolling and supine to sit with HOB up at 60 degrees usng bed rail. Pt sat EOB x 15 min SBA and completed AROM ex's. Pt would benefit from continued PT.

## 2019-12-19 NOTE — PLAN OF CARE
Wakes to voice, oriented to name. No s/s pain present. Reported being short of air earlier this shift, lung sounds diminished/tight sounding. Sats good on room air. Repositioned with no relief. Physician notified, one time duo neb and chest x-ray ordered. Patient symptoms resolved after duoneb, she is an abdominal breather at times when sleeping. Hypertensive, physician notified, home meds reordered, Atenolol  administered. Nifedipine xl ordered, which cannot be crushed, physician notified, changed to one that can be. Safety maintained.

## 2019-12-19 NOTE — PROGRESS NOTES
[facility] Medicine Services  INPATIENT PROGRESS NOTE    Patient Name: Gregoria Bennett  Date of Admission: 12/16/2019  Today's Date: 12/19/19  Length of Stay: 3  Primary Care Physician: Chu Isaac MD    Subjective   Chief Complaint: pleasantly confused     Doing well.  Afebrile.  Pleasantly confused  BP still very high.  Denies headache or blurry vision  No seizure activity  Sputum culture now + for MRSA      ROS:  Not able to be done due to Dementia     All pertinent negatives and positives are as above. All other systems have been reviewed and are negative unless otherwise stated.     Objective    Temp:  [97.9 °F (36.6 °C)-99 °F (37.2 °C)] 97.9 °F (36.6 °C)  Heart Rate:  [53-68] 68  Resp:  [16-22] 20  BP: (141-228)/(40-80) 228/80  Physical Exam   Constitutional: She appears well-developed and well-nourished.   HENT:   Head: Normocephalic and atraumatic.   Right Ear: External ear normal.   Left Ear: External ear normal.   Nose: Nose normal.   Mouth/Throat: Oropharynx is clear and moist.   Eyes: Conjunctivae and EOM are normal.   Neck: Normal range of motion. Neck supple.   Cardiovascular: Normal rate, regular rhythm and normal heart sounds.   Pulmonary/Chest: Effort normal and breath sounds normal.   Abdominal: Soft. Bowel sounds are normal. She exhibits no distension. There is no tenderness.   Musculoskeletal: Normal range of motion.   Neurological: She is alert. She is disoriented.   Skin: Skin is warm and dry.   Psychiatric: She has a normal mood and affect. Her speech is normal and behavior is normal. Cognition and memory are impaired.           Results Review:  I have reviewed the labs, radiology results, and diagnostic studies.    Laboratory Data:   Results from last 7 days   Lab Units 12/19/19  1027 12/16/19  1215 12/14/19  1752   WBC 10*3/mm3 8.89 14.32* 6.02   HEMOGLOBIN g/dL 9.0* 10.3* 9.8*   HEMATOCRIT % 27.3* 31.6* 30.6*   PLATELETS 10*3/mm3 208 215 214         Results from last 7 days   Lab Units 12/19/19  1026 12/17/19  1207 12/16/19  1215 12/14/19  1752   SODIUM mmol/L 147* 146* 143 139   POTASSIUM mmol/L 3.8 4.4 4.8 5.5*   CHLORIDE mmol/L 114* 116* 110* 108*   CO2 mmol/L 19.0* 18.0* 19.0* 17.0*   BUN mg/dL 35* 51* 51* 51*   CREATININE mg/dL 1.76* 2.44* 2.54* 2.56*   CALCIUM mg/dL 8.5* 8.3* 9.5 9.4   BILIRUBIN mg/dL 0.2  --  0.2 <0.2*   ALK PHOS U/L 50  --  67 62   ALT (SGPT) U/L 20  --  20 12   AST (SGOT) U/L 15  --  32 17   GLUCOSE mg/dL 283* 193* 160* 192*       Culture Data:   Urine Culture   Date Value Ref Range Status   12/16/2019 >100,000 CFU/mL Mixed Melissa Isolated  Final       Radiology Data:   Imaging Results (Last 24 Hours)     Procedure Component Value Units Date/Time    XR Chest 1 View [[id number]] Collected:  12/18/19 1548     Updated:  12/18/19 1552    Narrative:       Frontal upright radiograph of the chest 12/18/2019 3:29 PM CST     COMPARISON: 12/16/2019.     HISTORY: Short of air.     FINDINGS:   The lungs are clear. The cardiomediastinal silhouette and pulmonary  vascularity are within normal limits.      The osseous structures and surrounding soft tissues demonstrate no acute  abnormality.       Impression:       1. No evidence of acute cardiopulmonary process.        This report was finalized on 12/18/2019 15:49 by Dr. Gino Richard MD.          I have reviewed the patient's current medications.     Assessment/Plan     Active Hospital Problems    Diagnosis   • CKD (chronic kidney disease) stage 4, GFR 15-29 ml/min (CMS/HCC)   • Dementia without behavioral disturbance (CMS/HCC)   • Metabolic encephalopathy   • Altered mental status       1.  Metabolic Encephalopathy  -IV abx      2.   Sepsis  -IV abx      3.  Acute bacterial Cystitis  -IV abx      4.  ROOPA on CKD 4  -IVF     5.  COPD  -duonebs prn     6.  CAD  -Plavix  -Lipitor     7.  Dementia  -supportive care     8.  T2DM  -SSI     9.  Peptic Ulcer Disease  -protonix     10.  Troponin  elevation  -likely type 2 due to sepsis    11.  seizures  -Keppra    12.  HTN  -Norvasc  -Atenolol  -Hydralazine  -Clonidine    13.  MRSA Pna  -Add Vanc        Discharge Planning: I expect the patient to be discharged to SNF in 1-2 days    Edward Lozano MD   12/19/19   11:43 AM

## 2019-12-19 NOTE — PLAN OF CARE
BP elevated- (Norvasc ordered by MD due to Procardia capsule unable to be crushed), PRN Hydralazine administered w/ results, large BM noted, turned q2hrs, denies pain, resting tremor bilateral hands.   Problem: Skin Injury Risk (Adult)  Goal: Skin Health and Integrity  Outcome: Ongoing (interventions implemented as appropriate)  Flowsheets (Taken 12/19/2019 1522)  Skin Health and Integrity: making progress toward outcome

## 2019-12-19 NOTE — PROGRESS NOTES
"Pharmacy Dosing Service  Pharmacokinetics  Vancomycin Initial Evaluation    Assessment/Action/Plan:  Initiated Vancomycin 750 mg IVPB every 24 hours. Vancomycin levels not ordered at this time..  Current vancomycin end date: 12/26/19. Pharmacy will monitor renal function and adjust dose accordingly.     Subjective:  Gregoria Bennett is a 74 y.o. female with a Vancomycin \"Pharmacy to Dose\" consult for the treatment of pneumonia .    Objective:  Ht: 160 cm (63\"); Wt: 79.8 kg (176 lb)  Estimated Creatinine Clearance: 27.2 mL/min (A) (by C-G formula based on SCr of 1.76 mg/dL (H)).   Lab Results   Component Value Date    CREATININE 1.76 (H) 12/19/2019    CREATININE 2.44 (H) 12/17/2019    CREATININE 2.54 (H) 12/16/2019    CREATININE 1.6 (H) 02/05/2019    CREATININE 1.7 (H) 02/04/2019    CREATININE 2.1 (H) 02/03/2019      Lab Results   Component Value Date    WBC 8.89 12/19/2019    WBC 14.32 (H) 12/16/2019    WBC 6.02 12/14/2019      Baseline culture results:  Microbiology Results (last 10 days)       Procedure Component Value - Date/Time    Respiratory Culture - Sputum, Throat [[id number]]  (Abnormal) Collected:  12/16/19 2200    Lab Status:  Preliminary result Specimen:  Sputum from Throat Updated:  12/19/19 1303     Respiratory Culture Light growth (2+) Staphylococcus aureus, MRSA     Comment:   Methicillin resistant Staphylococcus aureus, Patient may be an isolation risk.        Gram Stain Few (2+) Epithelial cells seen      Greater than 25 WBCs per low power field      Rare (1+) Mixed gram positive elis    Urine Culture - Urine, Urine, Catheter [[id number]] Collected:  12/16/19 1301    Lab Status:  Final result Specimen:  Urine, Catheter Updated:  12/17/19 0953     Urine Culture >100,000 CFU/mL Mixed Elis Isolated    Narrative:       Specimen contains mixed organisms of questionable pathogenicity which indicates contamination with commensal elis.  Further identification is unlikely to provide clinically useful " information.  Suggest recollection.    Blood Culture - Blood, Arm, Right [[id number]] Collected:  12/16/19 1246    Lab Status:  Preliminary result Specimen:  Blood from Arm, Right Updated:  12/18/19 1330     Blood Culture No growth at 2 days    Blood Culture - Blood, Arm, Left [[id number]] Collected:  12/16/19 1215    Lab Status:  Preliminary result Specimen:  Blood from Arm, Left Updated:  12/19/19 1301     Blood Culture No growth at 3 days            Marcela Callaway, PharmD  12/19/19 1:24 PM

## 2019-12-20 LAB
ALBUMIN SERPL-MCNC: 3.2 G/DL (ref 3.5–5.2)
ALBUMIN/GLOB SERPL: 1.1 G/DL
ALP SERPL-CCNC: 44 U/L (ref 39–117)
ALT SERPL W P-5'-P-CCNC: 14 U/L (ref 1–33)
ANION GAP SERPL CALCULATED.3IONS-SCNC: 10 MMOL/L (ref 5–15)
AST SERPL-CCNC: 11 U/L (ref 1–32)
BACTERIA SPEC RESP CULT: ABNORMAL
BACTERIA SPEC RESP CULT: ABNORMAL
BASOPHILS # BLD AUTO: 0.02 10*3/MM3 (ref 0–0.2)
BASOPHILS NFR BLD AUTO: 0.3 % (ref 0–1.5)
BILIRUB SERPL-MCNC: 0.2 MG/DL (ref 0.2–1.2)
BUN BLD-MCNC: 33 MG/DL (ref 8–23)
BUN/CREAT SERPL: 18.6 (ref 7–25)
CALCIUM SPEC-SCNC: 8.4 MG/DL (ref 8.6–10.5)
CHLORIDE SERPL-SCNC: 117 MMOL/L (ref 98–107)
CO2 SERPL-SCNC: 22 MMOL/L (ref 22–29)
CREAT BLD-MCNC: 1.77 MG/DL (ref 0.57–1)
DEPRECATED RDW RBC AUTO: 44.2 FL (ref 37–54)
EOSINOPHIL # BLD AUTO: 0.32 10*3/MM3 (ref 0–0.4)
EOSINOPHIL NFR BLD AUTO: 4.3 % (ref 0.3–6.2)
ERYTHROCYTE [DISTWIDTH] IN BLOOD BY AUTOMATED COUNT: 13.5 % (ref 12.3–15.4)
GFR SERPL CREATININE-BSD FRML MDRD: 28 ML/MIN/1.73
GLOBULIN UR ELPH-MCNC: 2.8 GM/DL
GLUCOSE BLD-MCNC: 270 MG/DL (ref 65–99)
GLUCOSE BLDC GLUCOMTR-MCNC: 224 MG/DL (ref 70–130)
GLUCOSE BLDC GLUCOMTR-MCNC: 239 MG/DL (ref 70–130)
GLUCOSE BLDC GLUCOMTR-MCNC: 256 MG/DL (ref 70–130)
GLUCOSE BLDC GLUCOMTR-MCNC: 369 MG/DL (ref 70–130)
GRAM STN SPEC: ABNORMAL
HCT VFR BLD AUTO: 26.2 % (ref 34–46.6)
HGB BLD-MCNC: 8.4 G/DL (ref 12–15.9)
IMM GRANULOCYTES # BLD AUTO: 0.06 10*3/MM3 (ref 0–0.05)
IMM GRANULOCYTES NFR BLD AUTO: 0.8 % (ref 0–0.5)
LYMPHOCYTES # BLD AUTO: 0.99 10*3/MM3 (ref 0.7–3.1)
LYMPHOCYTES NFR BLD AUTO: 13.3 % (ref 19.6–45.3)
MCH RBC QN AUTO: 28.8 PG (ref 26.6–33)
MCHC RBC AUTO-ENTMCNC: 32.1 G/DL (ref 31.5–35.7)
MCV RBC AUTO: 89.7 FL (ref 79–97)
MONOCYTES # BLD AUTO: 0.7 10*3/MM3 (ref 0.1–0.9)
MONOCYTES NFR BLD AUTO: 9.4 % (ref 5–12)
NEUTROPHILS # BLD AUTO: 5.37 10*3/MM3 (ref 1.7–7)
NEUTROPHILS NFR BLD AUTO: 71.9 % (ref 42.7–76)
NRBC BLD AUTO-RTO: 0 /100 WBC (ref 0–0.2)
PLATELET # BLD AUTO: 198 10*3/MM3 (ref 140–450)
PMV BLD AUTO: 9.8 FL (ref 6–12)
POTASSIUM BLD-SCNC: 4 MMOL/L (ref 3.5–5.2)
PROT SERPL-MCNC: 6 G/DL (ref 6–8.5)
RBC # BLD AUTO: 2.92 10*6/MM3 (ref 3.77–5.28)
SODIUM BLD-SCNC: 149 MMOL/L (ref 136–145)
WBC NRBC COR # BLD: 7.46 10*3/MM3 (ref 3.4–10.8)

## 2019-12-20 PROCEDURE — 25010000002 VANCOMYCIN 10 G RECONSTITUTED SOLUTION: Performed by: FAMILY MEDICINE

## 2019-12-20 PROCEDURE — 80053 COMPREHEN METABOLIC PANEL: CPT | Performed by: FAMILY MEDICINE

## 2019-12-20 PROCEDURE — 97535 SELF CARE MNGMENT TRAINING: CPT

## 2019-12-20 PROCEDURE — 85025 COMPLETE CBC W/AUTO DIFF WBC: CPT | Performed by: FAMILY MEDICINE

## 2019-12-20 PROCEDURE — 97530 THERAPEUTIC ACTIVITIES: CPT

## 2019-12-20 PROCEDURE — 82962 GLUCOSE BLOOD TEST: CPT

## 2019-12-20 PROCEDURE — 63710000001 INSULIN LISPRO (HUMAN) PER 5 UNITS: Performed by: FAMILY MEDICINE

## 2019-12-20 PROCEDURE — 94799 UNLISTED PULMONARY SVC/PX: CPT

## 2019-12-20 RX ADMIN — HYDRALAZINE HYDROCHLORIDE 25 MG: 25 TABLET, FILM COATED ORAL at 21:38

## 2019-12-20 RX ADMIN — CLONIDINE HYDROCHLORIDE 0.1 MG: 0.1 TABLET ORAL at 08:35

## 2019-12-20 RX ADMIN — ATENOLOL 50 MG: 50 TABLET ORAL at 08:35

## 2019-12-20 RX ADMIN — CLONIDINE HYDROCHLORIDE 0.1 MG: 0.1 TABLET ORAL at 21:38

## 2019-12-20 RX ADMIN — SODIUM CHLORIDE 5 MG/HR: 9 INJECTION, SOLUTION INTRAVENOUS at 07:25

## 2019-12-20 RX ADMIN — LEVETIRACETAM 500 MG: 100 SOLUTION ORAL at 21:38

## 2019-12-20 RX ADMIN — INSULIN LISPRO 4 UNITS: 100 INJECTION, SOLUTION INTRAVENOUS; SUBCUTANEOUS at 08:35

## 2019-12-20 RX ADMIN — SODIUM CHLORIDE 5 MG/HR: 9 INJECTION, SOLUTION INTRAVENOUS at 19:48

## 2019-12-20 RX ADMIN — HYDRALAZINE HYDROCHLORIDE 25 MG: 25 TABLET, FILM COATED ORAL at 16:14

## 2019-12-20 RX ADMIN — HYDRALAZINE HYDROCHLORIDE 25 MG: 25 TABLET, FILM COATED ORAL at 06:21

## 2019-12-20 RX ADMIN — AMLODIPINE BESYLATE 10 MG: 10 TABLET ORAL at 08:35

## 2019-12-20 RX ADMIN — INSULIN LISPRO 5 UNITS: 100 INJECTION, SOLUTION INTRAVENOUS; SUBCUTANEOUS at 17:00

## 2019-12-20 RX ADMIN — NYSTATIN 1 APPLICATION: 100000 POWDER TOPICAL at 23:28

## 2019-12-20 RX ADMIN — NYSTATIN 1 APPLICATION: 100000 POWDER TOPICAL at 17:13

## 2019-12-20 RX ADMIN — INSULIN LISPRO 3 UNITS: 100 INJECTION, SOLUTION INTRAVENOUS; SUBCUTANEOUS at 21:38

## 2019-12-20 RX ADMIN — LEVETIRACETAM 500 MG: 100 SOLUTION ORAL at 08:35

## 2019-12-20 RX ADMIN — VANCOMYCIN HYDROCHLORIDE 750 MG: 10 INJECTION, POWDER, LYOPHILIZED, FOR SOLUTION INTRAVENOUS at 17:00

## 2019-12-20 RX ADMIN — INSULIN LISPRO 4 UNITS: 100 INJECTION, SOLUTION INTRAVENOUS; SUBCUTANEOUS at 12:40

## 2019-12-20 RX ADMIN — SODIUM CHLORIDE 5 MG/HR: 9 INJECTION, SOLUTION INTRAVENOUS at 02:07

## 2019-12-20 NOTE — PROGRESS NOTES
Continued Stay Note  [facility]     Patient Name: Gregoria Bennett  MRN: [id number]  Today's Date: 12/20/2019    Admit Date: 12/16/2019    Discharge Plan     Row Name 12/20/19 0835       Plan    Plan  [facility] [facility]    Patient/Family in Agreement with Plan  yes    Plan Comments  Patient resides at [facility] and plans to return to this facility upon discharge [phone].        Discharge Codes    No documentation.             CARO aGines

## 2019-12-20 NOTE — THERAPY TREATMENT NOTE
Acute Care - Occupational Therapy Treatment Note  [facility]     Patient Name: Gregoria Bennett  : 1945  MRN: [id number]  Today's Date: 2019  Onset of Illness/Injury or Date of Surgery: 19  Date of Referral to OT: 19  Referring Physician: Dr. Lozano    Admit Date: 2019       ICD-10-CM ICD-9-CM   1. Altered mental status, unspecified altered mental status type R41.82 780.97   2. Severe sepsis (CMS/HCC) A41.9 038.9    R65.20 995.92   3. Acute UTI (urinary tract infection) N39.0 599.0   4. Chronic kidney disease, unspecified CKD stage N18.9 585.9   5. Metabolic acidosis E87.2 276.2   6. Dysphagia, unspecified type R13.10 787.20   7. Impaired functional mobility and activity tolerance Z74.09 V49.89   8. Impaired mobility and ADLs Z74.09 799.89     Patient Active Problem List   Diagnosis   • Sprain   • Urinary tract infection without hematuria   • Sepsis (CMS/HCC)   • Altered mental status   • CKD (chronic kidney disease) stage 4, GFR 15-29 ml/min (CMS/HCC)   • Dementia without behavioral disturbance (CMS/HCC)   • Metabolic encephalopathy     Past Medical History:   Diagnosis Date   • Chronic kidney disease, stage 4 (severe) (CMS/HCC)    • COPD (chronic obstructive pulmonary disease) (CMS/HCC)    • Coronary artery disease    • Dementia (CMS/HCC)    • Diabetes mellitus (CMS/HCC)    • History of dermatomyositis    • Hypertension    • Peptic ulcer disease    • Pulmonary disease    • Renal insufficiency    • Urinary tract infection    • Venous insufficiency      Past Surgical History:   Procedure Laterality Date   • CHOLECYSTECTOMY     • COLON SURGERY         Therapy Treatment    Rehabilitation Treatment Summary     Row Name 19 1348 19 0912          Treatment Time/Intention    Discipline  occupational therapy assistant  -TS  physical therapy assistant  -AE     Document Type  therapy note (daily note)  -TS  therapy note (daily note)  -AE     Subjective Information  no complaints  -TS2   complains of;nausea/vomiting  -AE     Patient Effort  adequate  -TS2  --     Existing Precautions/Restrictions  fall  -TS2  fall  -AE     Recorded by [TS] Esther Pineda MIRANDA/L 12/20/19 1348  [TS2] Esther Pineda MIRANDA/L 12/20/19 1432 [AE] Clemencia Shipley, PTA 12/20/19 0944     Row Name 12/20/19 1348             Cognitive Assessment/Intervention- PT/OT    Personal Safety Interventions  fall prevention program maintained  -TS      Recorded by [TS] Esther Pineda MIRANDA/L 12/20/19 1432      Row Name 12/20/19 1348 12/20/19 0912          Bed Mobility Assessment/Treatment    Bed Mobility Assessment/Treatment  scooting/bridging  -TS  --     Rolling Left [place] (Bed Mobility)  minimum assist (75% patient effort);moderate assist (50% patient effort)  -TS  --     Rolling Right [place] (Bed Mobility)  minimum assist (75% patient effort);moderate assist (50% patient effort)  -TS  --     Scooting/Bridging [place] (Bed Mobility)  maximum assist (25% patient effort);2 person assist  -TS  --     Supine-Sit [place] (Bed Mobility)  --  moderate assist (50% patient effort);2 person assist  -AE     Sit-Supine [place] (Bed Mobility)  --  minimum assist (75% patient effort);2 person assist  -AE     Assistive Device (Bed Mobility)  bed rails;draw sheet  -TS  --     Comment (Bed Mobility)  pt was able to maintain sidelying position with assist of bed rails with SBA  -TS  --     Recorded by [TS] Esther Pineda MIRANDA/L 12/20/19 1432 [AE] Clemencia Shipley, PTA 12/20/19 0944     Row Name 12/20/19 1348             ADL Assessment/Intervention    BADL Assessment/Intervention  toileting;grooming  -TS      Recorded by [TS] Esther Pineda MIRANDA/L 12/20/19 1433      Row Name 12/20/19 134             Grooming Assessment/Training    [place] Level (Grooming)  wash face, hands;set up  -TS      Grooming Position  supine  -TS      Recorded by [TS] Esther Pineda MIRANDA/L 12/20/19 1434       Row Name 12/20/19 1348             Toileting Assessment/Training    [place] Level (Toileting)  perform perineal hygiene;maximum assist (25% patient effort);change pad/brief  -TS      Toileting Position  supine  -TS      Recorded by [TS] Esther Pineda COTA/L 12/20/19 1432      Row Name 12/20/19 0912             Therapeutic Exercise    Lower Extremity (Therapeutic Exercise)  LAQ (long arc quad), bilateral  -AE      Lower Extremity Range of Motion (Therapeutic Exercise)  ankle dorsiflexion/plantar flexion, bilateral ankle pumps with limited ROM  -AE      Exercise Type (Therapeutic Exercise)  AROM (active range of motion)  -AE      Position (Therapeutic Exercise)  seated  -AE      Sets/Reps (Therapeutic Exercise)  10-20  -AE2      Recorded by [AE] Clemencia Shipley, PTA 12/20/19 0950  [AE2] Clemencia Shipley, PTA 12/20/19 0952      Row Name 12/20/19 0912             Static Sitting Balance    Level of [place] (Unsupported Sitting, Static Balance)  standby assist  -AE      Recorded by [AE] Clemencia Shipley PTA 12/20/19 0950      Row Name 12/20/19 1348 12/20/19 0912          Positioning and Restraints    Pre-Treatment Position  in bed  -TS  in bed  -AE     Post Treatment Position  bed  -TS  bed  -AE     In Bed  supine;call light within reach;encouraged to call for assist;side rails up x3;legs elevated  -TS  side lying right;call light within reach  -AE     Recorded by [TS] Esther Pineda COTA/JODI 12/20/19 1432 [AE] Clemencia Shipley, PTA 12/20/19 0950     Row Name 12/20/19 0912             Pain Scale: Numbers Pre/Post-Treatment    Pain Scale: Numbers, Pretreatment  0/10 - no pain  -AE      Recorded by [AE] Clemencia Shipley PTA 12/20/19 0950      Row Name 12/20/19 1348             Pain Scale: FACES Pre/Post-Treatment    Pain: FACES Scale, Pretreatment  0-->no hurt  -TS      Pain: FACES Scale, Post-Treatment  0-->no hurt  -TS      Recorded by [TS] Esther Pineda MIRANDA/L 12/20/19 1432      Row Name                 [REMOVED] Wound 12/18/19 1500 Right gluteal Pressure Injury    Wound - Properties Group Date first assessed: 12/18/19 [CW] Time first assessed: 1500 [CW] Present on Hospital Admission: Y [CW] Side: Right [CW] Location: gluteal [CW] Primary Wound Type: Pressure inj [CW] Stage, Pressure Injury: other (see comments) [CW2], MASD  Resolution Date: 12/20/19 [HS] Resolution Time: 1419 [HS] Wound Outcome: Other [HS], scar tissue  Recorded by:  [CW] Dalila Richards RN 12/18/19 1520 [CW2] Dalila Richards RN 12/19/19 1900 [HS] Yee Celeste RN 12/20/19 1419    Row Name 12/20/19 1348             Outcome Summary/Treatment Plan (OT)    Daily Summary of Progress (OT)  progress toward functional goals is good  -TS      Recorded by [TS] Esther Pineda MIRANDA/L 12/20/19 1432        User Key  (r) = Recorded By, (t) = Taken By, (c) = Cosigned By    Initials Name Effective Dates Discipline    AE Clemencia Shipley, PTA 06/22/15 -  PT    TS Esther Pineda MIRANDA/L 08/02/16 -  OT    HS Yee Celeste, RN 12/27/16 -  Nurse    CW Dalila Richards RN 10/16/17 -  Nurse                 OT Recommendation and Plan  Outcome Summary/Treatment Plan (OT)  Daily Summary of Progress (OT): progress toward functional goals is good  Daily Summary of Progress (OT): progress toward functional goals is good  Outcome Measures     Row Name 12/20/19 1400 12/18/19 0857 12/17/19 1600       How much help from another is currently needed...    Putting on and taking off regular lower body clothing?  1  -TS  2  -AO  2  -MM    Bathing (including washing, rinsing, and drying)  2  -TS  2  -AO  2  -MM    Toileting (which includes using toilet bed pan or urinal)  2  -TS  2  -AO  2  -MM    Putting on and taking off regular upper body clothing  2  -TS  2  -AO  2  -MM    Taking care of personal grooming (such as brushing teeth)  3  -TS  2  -AO  2  -MM    Eating meals  3  -TS  2  -AO  2  -MM    AM-PAC 6 Clicks Score (OT)  13  -TS  12  -AO  12   -MM       Functional Assessment    Outcome Measure Options  AM-PAC 6 Clicks Daily Activity (OT)  -TS  --  AM-PAC 6 Clicks Daily Activity (OT)  -MM      User Key  (r) = Recorded By, (t) = Taken By, (c) = Cosigned By    Initials Name Provider Type    TS Esther Pineda MIRANDA/L Occupational Therapy Assistant    MM Garrett Garcia, OTR/L Occupational Therapist    Tosha Chávez MIRANDA/L Occupational Therapy Assistant           Time Calculation:   Time Calculation- OT     Row Name 12/20/19 1433             Time Calculation- OT    OT Start Time  1348  -TS      OT Stop Time  1427  -TS      OT Time Calculation (min)  39 min  -TS      Total Timed Code Minutes- OT  39 minute(s)  -TS      OT Received On  12/20/19  -TS         Timed Charges    79621 - OT Self Care/Mgmt Minutes  39  -TS        User Key  (r) = Recorded By, (t) = Taken By, (c) = Cosigned By    Initials Name Provider Type     Esther Pineda MIRANDA/L Occupational Therapy Assistant        Therapy Charges for Today     Code Description Service Date Service Provider Modifiers Qty    54857071106 HC OT SELF CARE/MGMT/TRAIN EA 15 MIN 12/20/2019 Esther Pineda COTA/L GO 3               Esther DONIS. RUBEN Pineda/JODI  12/20/2019

## 2019-12-20 NOTE — NURSING NOTE
[facility] Note      Patient: Gregoria Bennett  MRN: [id number] : 1945         Problem List:   Patient Active Problem List    Diagnosis   • CKD (chronic kidney disease) stage 4, GFR 15-29 ml/min (CMS/[facility]) [N18.4]   • Dementia without behavioral disturbance (CMS/[facility]) [F03.90]   • Metabolic encephalopathy [G93.41]   • Altered mental status [R41.82]   • Sepsis (CMS/[facility]) [A41.9]   • Urinary tract infection without hematuria [N39.0]   • Sprain [T14.8XXA]         Reason for Visit: Patient is an 74 y.o. female, being seen by [facility] for assessment of wound on right buttocks.    Patient presents with scar tissue on right buttocks.  This is not a pressure injury.  The base and periwound area is pink and blanchable.  It appears that she may have had a wound at one time that has healed.  Her heels are intact and dry with flaky dry skin on bilateral lower extremities.      Patient is very stiff and has foot drop.  She is contracted on the left upper extremity with the ability to open her hand about half way.      Hydroguard was applied to bilateral lower extremities.    Pt had passed BM prior to assessment.  BM was cleaned up with new absorbant pad placed and Zguard was applied to coccyx and bilateral buttocks.            Recommendations:   - Continue the use of Zguard to buttocks  - Turn q2h  - Offload heels  - Continue to apply moisturizer to bilateral lower extremities.     )Yee Celeste RN 2019

## 2019-12-20 NOTE — PLAN OF CARE
Patient alert and oriented to self, confused to place, time, situation. Patient denies pain and discomfort,afebrile throughout shift. Patient remains on droplet and contact precautions. Elevated blood pressures this evening, Cardene drip started at approximately 0220 hours at 5/hr. Pt tolerating well. Productive cough noted.

## 2019-12-20 NOTE — THERAPY TREATMENT NOTE
Acute Care - Physical Therapy Treatment Note  [facility]     Patient Name: Gregoria Bennett  : 1945  MRN: [id number]  Today's Date: 2019  Onset of Illness/Injury or Date of Surgery: 19     Referring Physician: Dr. Lozano    Admit Date: 2019    Visit Dx:    ICD-10-CM ICD-9-CM   1. Altered mental status, unspecified altered mental status type R41.82 780.97   2. Severe sepsis (CMS/HCC) A41.9 038.9    R65.20 995.92   3. Acute UTI (urinary tract infection) N39.0 599.0   4. Chronic kidney disease, unspecified CKD stage N18.9 585.9   5. Metabolic acidosis E87.2 276.2   6. Dysphagia, unspecified type R13.10 787.20   7. Impaired functional mobility and activity tolerance Z74.09 V49.89   8. Impaired mobility and ADLs Z74.09 799.89     Patient Active Problem List   Diagnosis   • Sprain   • Urinary tract infection without hematuria   • Sepsis (CMS/HCC)   • Altered mental status   • CKD (chronic kidney disease) stage 4, GFR 15-29 ml/min (CMS/HCC)   • Dementia without behavioral disturbance (CMS/HCC)   • Metabolic encephalopathy       Therapy Treatment    Rehabilitation Treatment Summary     Row Name 19             Treatment Time/Intention    Discipline  physical therapy assistant  -AE      Document Type  therapy note (daily note)  -AE      Subjective Information  complains of;nausea/vomiting  -AE      Existing Precautions/Restrictions  fall  -AE      Recorded by [AE] Clemencia Shipley, ZEINAB 1946      Row Name 19             Bed Mobility Assessment/Treatment    Supine-Sit [place] (Bed Mobility)  moderate assist (50% patient effort);2 person assist  -AE      Sit-Supine [place] (Bed Mobility)  minimum assist (75% patient effort);2 person assist  -AE      Recorded by [AE] Clemencia Shipley, PTA 1946      Row Name 19             Therapeutic Exercise    Lower Extremity (Therapeutic Exercise)  LAQ (long arc quad), bilateral  -AE      Lower Extremity Range of  Motion (Therapeutic Exercise)  ankle dorsiflexion/plantar flexion, bilateral ankle pumps with limited ROM  -AE      Exercise Type (Therapeutic Exercise)  AROM (active range of motion)  -AE      Position (Therapeutic Exercise)  seated  -AE      Sets/Reps (Therapeutic Exercise)  10-20  -AE2      Recorded by [AE] Clemencia Shipley, PTA 12/20/19 0950  [AE2] Clemencia Shipley, PTA 12/20/19 0952      Row Name 12/20/19 0912             Static Sitting Balance    Level of [place] (Unsupported Sitting, Static Balance)  standby assist  -AE      Recorded by [AE] Clemencia Shipley, PTA 12/20/19 0950      Row Name 12/20/19 0912             Positioning and Restraints    Pre-Treatment Position  in bed  -AE      Post Treatment Position  bed  -AE      In Bed  side lying right;call light within reach  -AE      Recorded by [AE] Clemencia Shipley, PTA 12/20/19 0950      Row Name 12/20/19 0912             Pain Scale: Numbers Pre/Post-Treatment    Pain Scale: Numbers, Pretreatment  0/10 - no pain  -AE      Recorded by [AE] Clemencia Shipley, PTA 12/20/19 0950      Row Name                Wound 12/18/19 1500 Right gluteal Pressure Injury    Wound - Properties Group Date first assessed: 12/18/19 [CW] Time first assessed: 1500 [CW] Present on Hospital Admission: Y [CW] Side: Right [CW] Location: gluteal [CW] Primary Wound Type: Pressure inj [CW] Stage, Pressure Injury: other (see comments) [CW2], MASD  Recorded by:  [CW] Dalila Richards RN 12/18/19 1520 [CW2] Dalila Richards RN 12/19/19 1900      User Key  (r) = Recorded By, (t) = Taken By, (c) = Cosigned By    Initials Name Effective Dates Discipline    AE Clemencia Shipley, PTA 06/22/15 -  PT    CW Dalila Richards RN 10/16/17 -  Nurse          Wound 12/18/19 1500 Right gluteal Pressure Injury (Active)   Dressing Appearance open to air;no drainage 12/20/2019  8:00 AM   Closure None 12/20/2019  8:00 AM   Base pink 12/20/2019  8:00 AM   Periwound blanchable 12/20/2019  8:00 AM   Periwound  Temperature warm 12/20/2019  8:00 AM   Drainage Amount none 12/20/2019  8:00 AM   Care, Wound barrier applied 12/20/2019  4:30 AM   Dressing Care, Wound open to air 12/20/2019  4:30 AM   Periwound Care, Wound dry periwound area maintained 12/19/2019  6:15 PM               PT Recommendation and Plan     Progress: improving  Outcome Summary: Pt was mod x 1for rolling and supine to sit with HOB up at 60 degrees usng bed rail. Pt sat EOB x 15 min SBA and completed AROM ex's. Pt would benefit from continued PT.  Outcome Measures     Row Name 12/18/19 0857 12/17/19 1600          How much help from another is currently needed...    Putting on and taking off regular lower body clothing?  2  -AO  2  -MM     Bathing (including washing, rinsing, and drying)  2  -AO  2  -MM     Toileting (which includes using toilet bed pan or urinal)  2  -AO  2  -MM     Putting on and taking off regular upper body clothing  2  -AO  2  -MM     Taking care of personal grooming (such as brushing teeth)  2  -AO  2  -MM     Eating meals  2  -AO  2  -MM     AM-PAC 6 Clicks Score (OT)  12  -AO  12  -MM        Functional Assessment    Outcome Measure Options  --  AM-PAC 6 Clicks Daily Activity (OT)  -MM       User Key  (r) = Recorded By, (t) = Taken By, (c) = Cosigned By    Initials Name Provider Type    MM Garrett Garcia, OTR/L Occupational Therapist    AO Tosha Vazquez COTA/L Occupational Therapy Assistant         Time Calculation:   PT Charges     Row Name 12/20/19 0953             Time Calculation    Start Time  0912  -AE      Stop Time  0936  -AE      Time Calculation (min)  24 min  -AE      PT Received On  12/20/19  -AE      PT Goal Re-Cert Due Date  12/27/19  -AE         Time Calculation- PT    Total Timed Code Minutes- PT  24 minute(s)  -AE         Timed Charges    21218 - PT Therapeutic Activity Minutes  24  -AE        User Key  (r) = Recorded By, (t) = Taken By, (c) = Cosigned By    Initials Name Provider Type    AE Clemencia Shipley, PTA  Physical Therapy Assistant        Therapy Charges for Today     Code Description Service Date Service Provider Modifiers Qty    46017669530 HC PT THERAPEUTIC ACT EA 15 MIN 12/19/2019 Clemencia Shipley, PTA GP 2    16459902827 HC PT THERAPEUTIC ACT EA 15 MIN 12/20/2019 Clemencia Shipley, PTA GP 2          PT G-Codes  Outcome Measure Options: AM-PAC 6 Clicks Daily Activity (OT)  AM-PAC 6 Clicks Score (PT): 8  AM-PAC 6 Clicks Score (OT): 12    Clemencia Shipley, ZEINAB  12/20/2019

## 2019-12-20 NOTE — PLAN OF CARE
Problem: Patient Care Overview  Goal: Plan of Care Review  Flowsheets (Taken 12/20/2019 5201)  Progress: improving  Outcome Summary: Pt was mod x 1 for rolling and mod x 2 supine to sit . Pt sat EOB x 15 min SBA and completed AROM ex's. Pt would benefit from continued PT.

## 2019-12-20 NOTE — PROGRESS NOTES
[facility] Medicine Services  INPATIENT PROGRESS NOTE    Patient Name: Gregoria Bennett  Date of Admission: 12/16/2019  Today's Date: 12/20/19  Length of Stay: 4  Primary Care Physician: Chu Isaac MD    Subjective   Chief Complaint: pleasantly confused     Feels better.  BP improved.  Says she has a bit of a headache.  She is afebrile.  Eating and drinking ok per nursing.          ROS:  Not able to be done due to Dementia     All pertinent negatives and positives are as above. All other systems have been reviewed and are negative unless otherwise stated.     Objective    Temp:  [97 °F (36.1 °C)-98.3 °F (36.8 °C)] 98 °F (36.7 °C)  Heart Rate:  [51-72] 51  Resp:  [10-23] 10  BP: ()/(41-99) 149/57  Physical Exam   Constitutional: She appears well-developed and well-nourished.   HENT:   Head: Normocephalic and atraumatic.   Right Ear: External ear normal.   Left Ear: External ear normal.   Nose: Nose normal.   Mouth/Throat: Oropharynx is clear and moist.   Eyes: Conjunctivae and EOM are normal.   Neck: Normal range of motion. Neck supple.   Cardiovascular: Normal rate, regular rhythm and normal heart sounds.   Pulmonary/Chest: Effort normal and breath sounds normal.   Abdominal: Soft. Bowel sounds are normal. She exhibits no distension. There is no tenderness.   Musculoskeletal: Normal range of motion.   Neurological: She is alert. She is disoriented.   Skin: Skin is warm and dry.   Psychiatric: She has a normal mood and affect. Her speech is normal and behavior is normal. Cognition and memory are impaired.           Results Review:  I have reviewed the labs, radiology results, and diagnostic studies.    Laboratory Data:   Results from last 7 days   Lab Units 12/19/19  1027 12/16/19  1215 12/14/19  1752   WBC 10*3/mm3 8.89 14.32* 6.02   HEMOGLOBIN g/dL 9.0* 10.3* 9.8*   HEMATOCRIT % 27.3* 31.6* 30.6*   PLATELETS 10*3/mm3 208 215 214        Results from last 7 days    Lab Units 12/19/19  1026 12/17/19  1207 12/16/19  1215 12/14/19  1752   SODIUM mmol/L 147* 146* 143 139   POTASSIUM mmol/L 3.8 4.4 4.8 5.5*   CHLORIDE mmol/L 114* 116* 110* 108*   CO2 mmol/L 19.0* 18.0* 19.0* 17.0*   BUN mg/dL 35* 51* 51* 51*   CREATININE mg/dL 1.76* 2.44* 2.54* 2.56*   CALCIUM mg/dL 8.5* 8.3* 9.5 9.4   BILIRUBIN mg/dL 0.2  --  0.2 <0.2*   ALK PHOS U/L 50  --  67 62   ALT (SGPT) U/L 20  --  20 12   AST (SGOT) U/L 15  --  32 17   GLUCOSE mg/dL 283* 193* 160* 192*       Culture Data:   Urine Culture   Date Value Ref Range Status   12/16/2019 >100,000 CFU/mL Mixed Melissa Isolated  Final       Radiology Data:   Imaging Results (Last 24 Hours)     ** No results found for the last 24 hours. **          I have reviewed the patient's current medications.     Assessment/Plan     Active Hospital Problems    Diagnosis   • CKD (chronic kidney disease) stage 4, GFR 15-29 ml/min (CMS/HCC)   • Dementia without behavioral disturbance (CMS/[facility])   • Metabolic encephalopathy   • Altered mental status       1.  Metabolic Encephalopathy  -IV abx      2.   Sepsis  -IV abx      3.  Acute bacterial Cystitis  -IV abx      4.  ROOPA on CKD 4  -IVF     5.  COPD  -duonebs prn     6.  CAD  -Plavix  -Lipitor     7.  Dementia  -supportive care     8.  T2DM  -SSI     9.  Peptic Ulcer Disease  -protonix     10.  Troponin elevation  -likely type 2 due to sepsis    11.  seizures  -Keppra    12.  HTN  -Norvasc  -Atenolol  -Hydralazine  -Clonidine    13.  MRSA Pna  -Vanc        Discharge Planning: I expect the patient to be discharged to SNF in 1-2 days    Edward Lozano MD   12/20/19   11:02 AM

## 2019-12-21 LAB
ALBUMIN SERPL-MCNC: 3.4 G/DL (ref 3.5–5.2)
ALBUMIN/GLOB SERPL: 1.3 G/DL
ALP SERPL-CCNC: 47 U/L (ref 39–117)
ALT SERPL W P-5'-P-CCNC: 12 U/L (ref 1–33)
ANION GAP SERPL CALCULATED.3IONS-SCNC: 11 MMOL/L (ref 5–15)
AST SERPL-CCNC: 13 U/L (ref 1–32)
BACTERIA SPEC AEROBE CULT: NORMAL
BACTERIA SPEC AEROBE CULT: NORMAL
BASOPHILS # BLD AUTO: 0.03 10*3/MM3 (ref 0–0.2)
BASOPHILS NFR BLD AUTO: 0.4 % (ref 0–1.5)
BILIRUB SERPL-MCNC: 0.2 MG/DL (ref 0.2–1.2)
BUN BLD-MCNC: 32 MG/DL (ref 8–23)
BUN/CREAT SERPL: 20 (ref 7–25)
CALCIUM SPEC-SCNC: 8 MG/DL (ref 8.6–10.5)
CHLORIDE SERPL-SCNC: 116 MMOL/L (ref 98–107)
CO2 SERPL-SCNC: 21 MMOL/L (ref 22–29)
CREAT BLD-MCNC: 1.6 MG/DL (ref 0.57–1)
DEPRECATED RDW RBC AUTO: 43 FL (ref 37–54)
EOSINOPHIL # BLD AUTO: 0.42 10*3/MM3 (ref 0–0.4)
EOSINOPHIL NFR BLD AUTO: 5.3 % (ref 0.3–6.2)
ERYTHROCYTE [DISTWIDTH] IN BLOOD BY AUTOMATED COUNT: 13.3 % (ref 12.3–15.4)
GFR SERPL CREATININE-BSD FRML MDRD: 31 ML/MIN/1.73
GLOBULIN UR ELPH-MCNC: 2.6 GM/DL
GLUCOSE BLD-MCNC: 207 MG/DL (ref 65–99)
GLUCOSE BLDC GLUCOMTR-MCNC: 161 MG/DL (ref 70–130)
GLUCOSE BLDC GLUCOMTR-MCNC: 215 MG/DL (ref 70–130)
GLUCOSE BLDC GLUCOMTR-MCNC: 217 MG/DL (ref 70–130)
GLUCOSE BLDC GLUCOMTR-MCNC: 233 MG/DL (ref 70–130)
GLUCOSE BLDC GLUCOMTR-MCNC: 311 MG/DL (ref 70–130)
HCT VFR BLD AUTO: 25.2 % (ref 34–46.6)
HGB BLD-MCNC: 8.2 G/DL (ref 12–15.9)
IMM GRANULOCYTES # BLD AUTO: 0.06 10*3/MM3 (ref 0–0.05)
IMM GRANULOCYTES NFR BLD AUTO: 0.8 % (ref 0–0.5)
LYMPHOCYTES # BLD AUTO: 1.21 10*3/MM3 (ref 0.7–3.1)
LYMPHOCYTES NFR BLD AUTO: 15.2 % (ref 19.6–45.3)
MCH RBC QN AUTO: 28.9 PG (ref 26.6–33)
MCHC RBC AUTO-ENTMCNC: 32.5 G/DL (ref 31.5–35.7)
MCV RBC AUTO: 88.7 FL (ref 79–97)
MONOCYTES # BLD AUTO: 0.77 10*3/MM3 (ref 0.1–0.9)
MONOCYTES NFR BLD AUTO: 9.7 % (ref 5–12)
NEUTROPHILS # BLD AUTO: 5.47 10*3/MM3 (ref 1.7–7)
NEUTROPHILS NFR BLD AUTO: 68.6 % (ref 42.7–76)
NRBC BLD AUTO-RTO: 0 /100 WBC (ref 0–0.2)
PLATELET # BLD AUTO: 209 10*3/MM3 (ref 140–450)
PMV BLD AUTO: 10 FL (ref 6–12)
POTASSIUM BLD-SCNC: 3.9 MMOL/L (ref 3.5–5.2)
PROT SERPL-MCNC: 6 G/DL (ref 6–8.5)
RBC # BLD AUTO: 2.84 10*6/MM3 (ref 3.77–5.28)
SODIUM BLD-SCNC: 148 MMOL/L (ref 136–145)
WBC NRBC COR # BLD: 7.96 10*3/MM3 (ref 3.4–10.8)

## 2019-12-21 PROCEDURE — 94799 UNLISTED PULMONARY SVC/PX: CPT

## 2019-12-21 PROCEDURE — 97110 THERAPEUTIC EXERCISES: CPT

## 2019-12-21 PROCEDURE — 82962 GLUCOSE BLOOD TEST: CPT

## 2019-12-21 PROCEDURE — 94760 N-INVAS EAR/PLS OXIMETRY 1: CPT

## 2019-12-21 PROCEDURE — 63710000001 INSULIN LISPRO (HUMAN) PER 5 UNITS: Performed by: FAMILY MEDICINE

## 2019-12-21 PROCEDURE — 25010000002 ONDANSETRON PER 1 MG: Performed by: FAMILY MEDICINE

## 2019-12-21 PROCEDURE — 85025 COMPLETE CBC W/AUTO DIFF WBC: CPT | Performed by: FAMILY MEDICINE

## 2019-12-21 PROCEDURE — 97530 THERAPEUTIC ACTIVITIES: CPT

## 2019-12-21 PROCEDURE — 80053 COMPREHEN METABOLIC PANEL: CPT | Performed by: FAMILY MEDICINE

## 2019-12-21 RX ORDER — ONDANSETRON 2 MG/ML
4 INJECTION INTRAMUSCULAR; INTRAVENOUS EVERY 6 HOURS PRN
Status: DISCONTINUED | OUTPATIENT
Start: 2019-12-21 | End: 2019-12-22 | Stop reason: HOSPADM

## 2019-12-21 RX ORDER — CEFDINIR 300 MG/1
300 CAPSULE ORAL EVERY 12 HOURS SCHEDULED
Status: DISCONTINUED | OUTPATIENT
Start: 2019-12-21 | End: 2019-12-22 | Stop reason: HOSPADM

## 2019-12-21 RX ORDER — CLINDAMYCIN HYDROCHLORIDE 150 MG/1
300 CAPSULE ORAL EVERY 6 HOURS SCHEDULED
Status: DISCONTINUED | OUTPATIENT
Start: 2019-12-21 | End: 2019-12-22 | Stop reason: HOSPADM

## 2019-12-21 RX ORDER — HYDRALAZINE HYDROCHLORIDE 50 MG/1
50 TABLET, FILM COATED ORAL EVERY 8 HOURS SCHEDULED
Status: DISCONTINUED | OUTPATIENT
Start: 2019-12-21 | End: 2019-12-22 | Stop reason: HOSPADM

## 2019-12-21 RX ORDER — NICOTINE POLACRILEX 4 MG
15 LOZENGE BUCCAL
Status: DISCONTINUED | OUTPATIENT
Start: 2019-12-21 | End: 2019-12-22 | Stop reason: HOSPADM

## 2019-12-21 RX ORDER — DEXTROSE MONOHYDRATE 25 G/50ML
25 INJECTION, SOLUTION INTRAVENOUS
Status: DISCONTINUED | OUTPATIENT
Start: 2019-12-21 | End: 2019-12-22 | Stop reason: HOSPADM

## 2019-12-21 RX ADMIN — INSULIN LISPRO 3 UNITS: 100 INJECTION, SOLUTION INTRAVENOUS; SUBCUTANEOUS at 08:18

## 2019-12-21 RX ADMIN — HYDRALAZINE HYDROCHLORIDE 25 MG: 25 TABLET, FILM COATED ORAL at 05:53

## 2019-12-21 RX ADMIN — NYSTATIN 1 APPLICATION: 100000 POWDER TOPICAL at 08:18

## 2019-12-21 RX ADMIN — HYDRALAZINE HYDROCHLORIDE 50 MG: 50 TABLET ORAL at 15:05

## 2019-12-21 RX ADMIN — INSULIN LISPRO 3 UNITS: 100 INJECTION, SOLUTION INTRAVENOUS; SUBCUTANEOUS at 17:57

## 2019-12-21 RX ADMIN — HYDRALAZINE HYDROCHLORIDE 50 MG: 50 TABLET ORAL at 22:09

## 2019-12-21 RX ADMIN — ATENOLOL 50 MG: 50 TABLET ORAL at 08:18

## 2019-12-21 RX ADMIN — CLINDAMYCIN HYDROCHLORIDE 300 MG: 150 CAPSULE ORAL at 17:56

## 2019-12-21 RX ADMIN — ONDANSETRON HYDROCHLORIDE 4 MG: 2 SOLUTION INTRAMUSCULAR; INTRAVENOUS at 23:00

## 2019-12-21 RX ADMIN — CLINDAMYCIN HYDROCHLORIDE 300 MG: 150 CAPSULE ORAL at 22:18

## 2019-12-21 RX ADMIN — CEFDINIR 300 MG: 300 CAPSULE ORAL at 12:43

## 2019-12-21 RX ADMIN — NYSTATIN 1 APPLICATION: 100000 POWDER TOPICAL at 22:11

## 2019-12-21 RX ADMIN — CEFDINIR 300 MG: 300 CAPSULE ORAL at 22:09

## 2019-12-21 RX ADMIN — INSULIN LISPRO 5 UNITS: 100 INJECTION, SOLUTION INTRAVENOUS; SUBCUTANEOUS at 22:10

## 2019-12-21 RX ADMIN — INSULIN LISPRO 10 UNITS: 100 INJECTION, SOLUTION INTRAVENOUS; SUBCUTANEOUS at 12:43

## 2019-12-21 RX ADMIN — CLINDAMYCIN HYDROCHLORIDE 300 MG: 150 CAPSULE ORAL at 12:43

## 2019-12-21 RX ADMIN — CLONIDINE HYDROCHLORIDE 0.1 MG: 0.1 TABLET ORAL at 22:09

## 2019-12-21 RX ADMIN — AMLODIPINE BESYLATE 10 MG: 10 TABLET ORAL at 08:18

## 2019-12-21 RX ADMIN — LEVETIRACETAM 500 MG: 100 SOLUTION ORAL at 08:18

## 2019-12-21 RX ADMIN — LEVETIRACETAM 500 MG: 100 SOLUTION ORAL at 22:09

## 2019-12-21 RX ADMIN — CLONIDINE HYDROCHLORIDE 0.1 MG: 0.1 TABLET ORAL at 08:18

## 2019-12-21 NOTE — PLAN OF CARE
Problem: Patient Care Overview  Goal: Plan of Care Review  12/21/2019 1002 by Soraya Dotson, PTA  Outcome: Ongoing (interventions implemented as appropriate)  Flowsheets (Taken 12/21/2019 1002)  Progress: improving  Plan of Care Reviewed With: patient  Outcome Summary: She was min-mod assist to come into sitting, max assist to scoot out. She sat EOB for about 30 minutes and performed sitting exercises. PT will cont. to folow to increase strength and mobility.  12/21/2019 1002 by Soraya Dotson, PTA  Reactivated

## 2019-12-21 NOTE — PLAN OF CARE
No c/o pain, pleasantly confused, cardene gtt held at 1am and currently remains off, slept most of the night, adequate urine output.

## 2019-12-21 NOTE — PROGRESS NOTES
[facility] Medicine Services  INPATIENT PROGRESS NOTE    Patient Name: Gregoria Bennett  Date of Admission: 12/16/2019  Today's Date: 12/21/19  Length of Stay: 5  Primary Care Physician: Chu Isaac MD    Subjective   Chief Complaint: feeling better  HPI   Sitting up in bed eating breakfast  Says she feels better  No headache or vision changes        Review of Systems   Unable to perform ROS: Dementia        All pertinent negatives and positives are as above. All other systems have been reviewed and are negative unless otherwise stated.     Objective    Temp:  [97.1 °F (36.2 °C)-98.8 °F (37.1 °C)] 97.2 °F (36.2 °C)  Heart Rate:  [45-66] 54  Resp:  [17-20] 19  BP: (119-189)/(42-91) 148/55  Physical Exam  Constitutional: She appears well-developed and well-nourished.   HENT:   Head: Normocephalic and atraumatic.   Right Ear: External ear normal.   Left Ear: External ear normal.   Nose: Nose normal.   Mouth/Throat: Oropharynx is clear and moist.   Eyes: Conjunctivae and EOM are normal.   Neck: Normal range of motion. Neck supple.   Cardiovascular: Normal rate, regular rhythm and normal heart sounds.   Pulmonary/Chest: Effort normal and breath sounds normal.   Abdominal: Soft. Bowel sounds are normal. She exhibits no distension. There is no tenderness.   Musculoskeletal: Normal range of motion.   Neurological: She is alert. She is disoriented.   Skin: Skin is warm and dry.   Psychiatric: She has a normal mood and affect. Her speech is normal and behavior is normal. Cognition and memory are impaired.          Results Review:  I have reviewed the labs, radiology results, and diagnostic studies.    Laboratory Data:   Results from last 7 days   Lab Units 12/21/19  0345 12/20/19  1127 12/19/19  1027   WBC 10*3/mm3 7.96 7.46 8.89   HEMOGLOBIN g/dL 8.2* 8.4* 9.0*   HEMATOCRIT % 25.2* 26.2* 27.3*   PLATELETS 10*3/mm3 209 198 208        Results from last 7 days   Lab Units  12/21/19  0345 12/20/19  1127 12/19/19  1026   SODIUM mmol/L 148* 149* 147*   POTASSIUM mmol/L 3.9 4.0 3.8   CHLORIDE mmol/L 116* 117* 114*   CO2 mmol/L 21.0* 22.0 19.0*   BUN mg/dL 32* 33* 35*   CREATININE mg/dL 1.60* 1.77* 1.76*   CALCIUM mg/dL 8.0* 8.4* 8.5*   BILIRUBIN mg/dL 0.2 0.2 0.2   ALK PHOS U/L 47 44 50   ALT (SGPT) U/L 12 14 20   AST (SGOT) U/L 13 11 15   GLUCOSE mg/dL 207* 270* 283*       Culture Data:   Blood Culture   Date Value Ref Range Status   12/16/2019 No growth at 4 days  Preliminary   12/16/2019 No growth at 4 days  Preliminary     Urine Culture   Date Value Ref Range Status   12/16/2019 >100,000 CFU/mL Mixed Melissa Isolated  Final     Respiratory Culture   Date Value Ref Range Status   12/16/2019 Light growth (2+) Staphylococcus aureus, MRSA (A)  Final     Comment:       Methicillin resistant Staphylococcus aureus, Patient may be an isolation risk.   12/16/2019 Scant growth (1+) Normal Respiratory Melissa  Final       Radiology Data:   Imaging Results (Last 24 Hours)     ** No results found for the last 24 hours. **          I have reviewed the patient's current medications.     Assessment/Plan     Active Hospital Problems    Diagnosis   • CKD (chronic kidney disease) stage 4, GFR 15-29 ml/min (CMS/[facility])   • Dementia without behavioral disturbance (CMS/[facility])   • Metabolic encephalopathy   • Altered mental status     1.  Metabolic Encephalopathy  - resolved      2.   Sepsis  -PO abx      3.  Acute bacterial Cystitis  -PO abx      4.  ROOPA on CKD 4  -improved     5.  COPD  -duonebs prn     6.  CAD  -Plavix  -Lipitor     7.  Dementia  -supportive care     8.  T2DM  -SSI     9.  Peptic Ulcer Disease  -protonix     10.  Troponin elevation  -likely type 2 due to sepsis     11.  seizures  -Keppra     12.  HTN  -Norvasc  -Atenolol  -Hydralazine increased  -Clonidine     13.  MRSA Pna  -change abx to PO clinda                 Discharge Planning: I expect the patient to be discharged to NH in 1-2  days    Edward Lozano MD   12/21/19   11:59 AM

## 2019-12-21 NOTE — THERAPY TREATMENT NOTE
Acute Care - Physical Therapy Treatment Note  [facility]     Patient Name: Gregoria Bennett  : 1945  MRN: [id number]  Today's Date: 2019  Onset of Illness/Injury or Date of Surgery: 19     Referring Physician: Dr. Lozano    Admit Date: 2019    Visit Dx:    ICD-10-CM ICD-9-CM   1. Altered mental status, unspecified altered mental status type R41.82 780.97   2. Severe sepsis (CMS/HCC) A41.9 038.9    R65.20 995.92   3. Acute UTI (urinary tract infection) N39.0 599.0   4. Chronic kidney disease, unspecified CKD stage N18.9 585.9   5. Metabolic acidosis E87.2 276.2   6. Dysphagia, unspecified type R13.10 787.20   7. Impaired functional mobility and activity tolerance Z74.09 V49.89   8. Impaired mobility and ADLs Z74.09 799.89     Patient Active Problem List   Diagnosis   • Sprain   • Urinary tract infection without hematuria   • Sepsis (CMS/HCC)   • Altered mental status   • CKD (chronic kidney disease) stage 4, GFR 15-29 ml/min (CMS/HCC)   • Dementia without behavioral disturbance (CMS/HCC)   • Metabolic encephalopathy       Therapy Treatment    Rehabilitation Treatment Summary     Row Name 1915             Treatment Time/Intention    Discipline  physical therapy assistant  -AB      Document Type  therapy note (daily note)  -AB      Subjective Information  complains of fear of falling  -AB2      Mode of Treatment  physical therapy  -AB2      Existing Precautions/Restrictions  fall  -AB3      Recorded by [AB] Soraya Dotson, PTA 19 0915  [AB2] Soraya Dotson, PTA 19 1000  [AB3] Soraya Dotson, PTA 19 0916      Row Name 1915             Bed Mobility Assessment/Treatment    Rolling Left [place] (Bed Mobility)  verbal cues;minimum assist (75% patient effort)  -AB      Scooting/Bridging [place] (Bed Mobility)  verbal cues;maximum assist (25% patient effort)  -AB      Supine-Sit [place] (Bed Mobility)  verbal cues;minimum assist (75% patient  effort);moderate assist (50% patient effort)  -AB      Sit-Supine [place] (Bed Mobility)  verbal cues;minimum assist (75% patient effort);moderate assist (50% patient effort)  -AB      Recorded by [AB] Soraya Dotson, PTA 12/21/19 1000      Row Name 12/21/19 0915             Therapeutic Exercise    Lower Extremity (Therapeutic Exercise)  LAQ (long arc quad), bilateral  -AB      Lower Extremity Range of Motion (Therapeutic Exercise)  ankle dorsiflexion/plantar flexion, left Right ankle limited ROM  -AB      Exercise Type (Therapeutic Exercise)  AROM (active range of motion)  -AB      Position (Therapeutic Exercise)  seated  -AB      Sets/Reps (Therapeutic Exercise)  15  -AB      Recorded by [AB] Soraya Dotson, PTA 12/21/19 1000      Row Name 12/21/19 0915             Positioning and Restraints    Pre-Treatment Position  in bed  -AB      Post Treatment Position  bed  -AB      In Bed  side lying left;call light within reach  -AB      Recorded by [AB] Soraya Dotson, PTA 12/21/19 1000      Row Name 12/21/19 0915             Pain Scale: Numbers Pre/Post-Treatment    Pain Scale: Numbers, Pretreatment  0/10 - no pain  -AB      Pain Scale: Numbers, Post-Treatment  0/10 - no pain  -AB      Recorded by [AB] Soraya Dotson, PTA 12/21/19 1000        User Key  (r) = Recorded By, (t) = Taken By, (c) = Cosigned By    Initials Name Effective Dates Discipline    AB Soraya Dotson, PTA 08/02/16 -  PT                       PT Recommendation and Plan     Plan of Care Reviewed With: patient  Progress: improving  Outcome Summary: She was min-mod assist to come into sitting, max assist to scoot out. She sat EOB for about 30 minutes and performed sitting exercises. PT will cont. to folow to increase strength and mobility.  Outcome Measures     Row Name 12/20/19 1400             How much help from another is currently needed...    Putting on and taking off regular lower body clothing?  1  -TS      Bathing (including washing,  rinsing, and drying)  2  -TS      Toileting (which includes using toilet bed pan or urinal)  2  -TS      Putting on and taking off regular upper body clothing  2  -TS      Taking care of personal grooming (such as brushing teeth)  3  -TS      Eating meals  3  -TS      AM-PAC 6 Clicks Score (OT)  13  -TS         Functional Assessment    Outcome Measure Options  AM-PAC 6 Clicks Daily Activity (OT)  -TS        User Key  (r) = Recorded By, (t) = Taken By, (c) = Cosigned By    Initials Name Provider Type    TS Esther Pineda, RUBEN/L Occupational Therapy Assistant         Time Calculation:   PT Charges     Row Name 12/21/19 0915             Time Calculation    Start Time  0915  -AB      Stop Time  0954  -AB      Time Calculation (min)  39 min  -AB      PT Received On  12/21/19  -AB      PT Goal Re-Cert Due Date  12/27/19  -AB        User Key  (r) = Recorded By, (t) = Taken By, (c) = Cosigned By    Initials Name Provider Type    AB Soraya Dotson PTA Physical Therapy Assistant        Therapy Charges for Today     Code Description Service Date Service Provider Modifiers Qty    81994620737 HC PT THERAPEUTIC ACT EA 15 MIN 12/21/2019 Soraya Dotson PTA GP 2    73176813018 HC PT THER PROC EA 15 MIN 12/21/2019 Soraya Dotson, ZEINAB GP 1          PT G-Codes  Outcome Measure Options: AM-PAC 6 Clicks Daily Activity (OT)  AM-PAC 6 Clicks Score (PT): 8  AM-PAC 6 Clicks Score (OT): 13    Soraya Dotson PTA  12/21/2019

## 2019-12-22 VITALS
DIASTOLIC BLOOD PRESSURE: 78 MMHG | WEIGHT: 179 LBS | HEART RATE: 50 BPM | HEIGHT: 63 IN | TEMPERATURE: 97.1 F | RESPIRATION RATE: 18 BRPM | OXYGEN SATURATION: 97 % | SYSTOLIC BLOOD PRESSURE: 132 MMHG | BODY MASS INDEX: 31.71 KG/M2

## 2019-12-22 LAB
ALBUMIN SERPL-MCNC: 3.2 G/DL (ref 3.5–5.2)
ALBUMIN/GLOB SERPL: 1.1 G/DL
ALP SERPL-CCNC: 46 U/L (ref 39–117)
ALT SERPL W P-5'-P-CCNC: 13 U/L (ref 1–33)
ANION GAP SERPL CALCULATED.3IONS-SCNC: 10 MMOL/L (ref 5–15)
AST SERPL-CCNC: 13 U/L (ref 1–32)
BASOPHILS # BLD AUTO: 0.03 10*3/MM3 (ref 0–0.2)
BASOPHILS NFR BLD AUTO: 0.3 % (ref 0–1.5)
BILIRUB SERPL-MCNC: 0.2 MG/DL (ref 0.2–1.2)
BUN BLD-MCNC: 26 MG/DL (ref 8–23)
BUN/CREAT SERPL: 17.3 (ref 7–25)
CALCIUM SPEC-SCNC: 8.2 MG/DL (ref 8.6–10.5)
CHLORIDE SERPL-SCNC: 112 MMOL/L (ref 98–107)
CO2 SERPL-SCNC: 23 MMOL/L (ref 22–29)
CREAT BLD-MCNC: 1.5 MG/DL (ref 0.57–1)
DEPRECATED RDW RBC AUTO: 42.3 FL (ref 37–54)
EOSINOPHIL # BLD AUTO: 0.42 10*3/MM3 (ref 0–0.4)
EOSINOPHIL NFR BLD AUTO: 4.9 % (ref 0.3–6.2)
ERYTHROCYTE [DISTWIDTH] IN BLOOD BY AUTOMATED COUNT: 13.4 % (ref 12.3–15.4)
GFR SERPL CREATININE-BSD FRML MDRD: 34 ML/MIN/1.73
GLOBULIN UR ELPH-MCNC: 2.9 GM/DL
GLUCOSE BLD-MCNC: 166 MG/DL (ref 65–99)
GLUCOSE BLDC GLUCOMTR-MCNC: 175 MG/DL (ref 70–130)
GLUCOSE BLDC GLUCOMTR-MCNC: 245 MG/DL (ref 70–130)
HCT VFR BLD AUTO: 26.8 % (ref 34–46.6)
HGB BLD-MCNC: 8.8 G/DL (ref 12–15.9)
IMM GRANULOCYTES # BLD AUTO: 0.05 10*3/MM3 (ref 0–0.05)
IMM GRANULOCYTES NFR BLD AUTO: 0.6 % (ref 0–0.5)
LYMPHOCYTES # BLD AUTO: 1.27 10*3/MM3 (ref 0.7–3.1)
LYMPHOCYTES NFR BLD AUTO: 14.8 % (ref 19.6–45.3)
MCH RBC QN AUTO: 28.8 PG (ref 26.6–33)
MCHC RBC AUTO-ENTMCNC: 32.8 G/DL (ref 31.5–35.7)
MCV RBC AUTO: 87.6 FL (ref 79–97)
MONOCYTES # BLD AUTO: 0.89 10*3/MM3 (ref 0.1–0.9)
MONOCYTES NFR BLD AUTO: 10.3 % (ref 5–12)
NEUTROPHILS # BLD AUTO: 5.94 10*3/MM3 (ref 1.7–7)
NEUTROPHILS NFR BLD AUTO: 69.1 % (ref 42.7–76)
NRBC BLD AUTO-RTO: 0 /100 WBC (ref 0–0.2)
PLATELET # BLD AUTO: 215 10*3/MM3 (ref 140–450)
PMV BLD AUTO: 9.2 FL (ref 6–12)
POTASSIUM BLD-SCNC: 3.9 MMOL/L (ref 3.5–5.2)
PROT SERPL-MCNC: 6.1 G/DL (ref 6–8.5)
RBC # BLD AUTO: 3.06 10*6/MM3 (ref 3.77–5.28)
SODIUM BLD-SCNC: 145 MMOL/L (ref 136–145)
WBC NRBC COR # BLD: 8.6 10*3/MM3 (ref 3.4–10.8)

## 2019-12-22 PROCEDURE — 80053 COMPREHEN METABOLIC PANEL: CPT | Performed by: FAMILY MEDICINE

## 2019-12-22 PROCEDURE — 63710000001 INSULIN LISPRO (HUMAN) PER 5 UNITS: Performed by: FAMILY MEDICINE

## 2019-12-22 PROCEDURE — 82962 GLUCOSE BLOOD TEST: CPT

## 2019-12-22 PROCEDURE — 85025 COMPLETE CBC W/AUTO DIFF WBC: CPT | Performed by: FAMILY MEDICINE

## 2019-12-22 RX ORDER — AMLODIPINE BESYLATE 10 MG/1
10 TABLET ORAL
Qty: 30 TABLET | Refills: 0 | Status: SHIPPED | OUTPATIENT
Start: 2019-12-23 | End: 2022-11-11 | Stop reason: HOSPADM

## 2019-12-22 RX ORDER — CLONIDINE HYDROCHLORIDE 0.1 MG/1
0.1 TABLET ORAL EVERY 12 HOURS SCHEDULED
Qty: 60 TABLET | Refills: 0 | Status: SHIPPED | OUTPATIENT
Start: 2019-12-22 | End: 2022-11-11 | Stop reason: HOSPADM

## 2019-12-22 RX ORDER — TRAMADOL HYDROCHLORIDE 50 MG/1
50 TABLET ORAL EVERY 6 HOURS PRN
Qty: 9 TABLET | Refills: 0 | Status: SHIPPED | OUTPATIENT
Start: 2019-12-22 | End: 2022-11-11 | Stop reason: HOSPADM

## 2019-12-22 RX ORDER — HYDRALAZINE HYDROCHLORIDE 50 MG/1
50 TABLET, FILM COATED ORAL EVERY 8 HOURS SCHEDULED
Qty: 90 TABLET | Refills: 0 | Status: SHIPPED | OUTPATIENT
Start: 2019-12-22 | End: 2022-11-11 | Stop reason: HOSPADM

## 2019-12-22 RX ORDER — CLINDAMYCIN HYDROCHLORIDE 300 MG/1
300 CAPSULE ORAL EVERY 6 HOURS SCHEDULED
Qty: 12 CAPSULE | Refills: 0 | Status: SHIPPED | OUTPATIENT
Start: 2019-12-22 | End: 2019-12-25

## 2019-12-22 RX ORDER — CEFDINIR 300 MG/1
300 CAPSULE ORAL EVERY 12 HOURS SCHEDULED
Qty: 6 CAPSULE | Refills: 0 | Status: SHIPPED | OUTPATIENT
Start: 2019-12-22 | End: 2019-12-25

## 2019-12-22 RX ORDER — LEVETIRACETAM 100 MG/ML
500 SOLUTION ORAL EVERY 12 HOURS SCHEDULED
Qty: 473 ML | Refills: 0 | Status: SHIPPED | OUTPATIENT
Start: 2019-12-22 | End: 2022-11-11 | Stop reason: HOSPADM

## 2019-12-22 RX ADMIN — NYSTATIN 1 APPLICATION: 100000 POWDER TOPICAL at 08:28

## 2019-12-22 RX ADMIN — HYDRALAZINE HYDROCHLORIDE 50 MG: 50 TABLET ORAL at 06:15

## 2019-12-22 RX ADMIN — ATENOLOL 50 MG: 50 TABLET ORAL at 08:27

## 2019-12-22 RX ADMIN — INSULIN LISPRO 5 UNITS: 100 INJECTION, SOLUTION INTRAVENOUS; SUBCUTANEOUS at 12:28

## 2019-12-22 RX ADMIN — INSULIN LISPRO 3 UNITS: 100 INJECTION, SOLUTION INTRAVENOUS; SUBCUTANEOUS at 08:26

## 2019-12-22 RX ADMIN — CLINDAMYCIN HYDROCHLORIDE 300 MG: 150 CAPSULE ORAL at 06:15

## 2019-12-22 RX ADMIN — CLINDAMYCIN HYDROCHLORIDE 300 MG: 150 CAPSULE ORAL at 12:28

## 2019-12-22 RX ADMIN — AMLODIPINE BESYLATE 10 MG: 10 TABLET ORAL at 08:27

## 2019-12-22 RX ADMIN — CEFDINIR 300 MG: 300 CAPSULE ORAL at 08:27

## 2019-12-22 RX ADMIN — CLONIDINE HYDROCHLORIDE 0.1 MG: 0.1 TABLET ORAL at 08:27

## 2019-12-22 RX ADMIN — LEVETIRACETAM 500 MG: 100 SOLUTION ORAL at 08:27

## 2019-12-22 NOTE — PROGRESS NOTES
Continued Stay Note  [facility]     Patient Name: Gregoria Bennett  MRN: [id number]  Today's Date: 12/22/2019    Admit Date: 12/16/2019    Discharge Plan     Row Name 12/22/19 1019       Plan    Final Discharge Disposition Code  03 - skilled nursing facility (SNF)    Final Note  Pt is being discharged back to [facility] today. GERSON spoke with Hawa from [facility] who is aware. GERSON will follow and assist with any other discharge needs that may arise. Phone: [phone] Fax: [phone]        Discharge Codes    No documentation.       Expected Discharge Date and Time     Expected Discharge Date Expected Discharge Time    Dec 22, 2019             Claudine Clemons

## 2019-12-22 NOTE — PLAN OF CARE
Problem: Patient Care Overview  Goal: Plan of Care Review  Outcome: Ongoing (interventions implemented as appropriate)  Flowsheets  Taken 12/22/2019 0059  Progress: no change  Outcome Summary: VSS, total care. No c/o pain. Turn q2h. [place] in place and functioning well. Pt alert and oriented during assessment and answered all question correctly. Isolation droplet  precautions in place for MRSA in sputum. No abnormal rhythmic motions seen. Pt right arm and hand wants to draw up but pt still able to move it. SB 47 on tele. Coccyx c/d/i and blanchable but pinkish purple. Pt is aspiration risk on pureed diet with necter thick liquids. SSI coverage given. Cont to monitor  Taken 12/21/2019 1955  Plan of Care Reviewed With: patient

## 2019-12-22 NOTE — DISCHARGE SUMMARY
[facility] Medicine Services  DISCHARGE SUMMARY       Date of Admission: 12/16/2019  Date of Discharge:  12/22/2019  Primary Care Physician: Chu Isaac MD    Presenting Problem/History of Present Illness:  Altered mental status, unspecified altered mental status type [R41.82]     Final Discharge Diagnoses:  Active Hospital Problems    Diagnosis   • CKD (chronic kidney disease) stage 4, GFR 15-29 ml/min (CMS/[facility])   • Dementia without behavioral disturbance (CMS/[facility])   • Metabolic encephalopathy   • Altered mental status   1.  Metabolic Encephalopathy  - resolved      2.   Sepsis  -PO abx      3.  Acute bacterial Cystitis  -PO abx      4.  ROOPA on CKD 4  -improved     5.  COPD  -duonebs prn     6.  CAD  -Plavix  -Lipitor     7.  Dementia  -supportive care     8.  T2DM  -SSI     9.  Peptic Ulcer Disease  -protonix     10.  Troponin elevation  -likely type 2 due to sepsis     11.  seizures  -Keppra     12.  HTN  -Norvasc  -Atenolol  -Hydralazine increased  -Clonidine     13.  MRSA Pna  -change abx to PO clinda    Consults: Neurology    Procedures Performed: EEG    Pertinent Test Results: EEG showed seizure-form activity    Chief Complaint on Day of Discharge: Feeling better    History of Present Illness on Day of Discharge:   Doing well, BP better  No headache or vision changes    Hospital Course:  The patient is a 74 y.o. female who presented to [facility] with confusion and seizures.  Neurology was consulted.  She was started on Keppra.  Seizures have resolved.    She was also found to have a UTI and Pneumonia.  She was treated with IV antibiotics.      Sputum culture was positive for MRSA.  She was treated with IV vancomycin and transitioned to oral Clindamycin.    Her BP has been very elevated.  She had to be placed in CCU on Cardene.  Her oral medications were titrated and is now well-controlled.      Condition on Discharge:  Stable    Physical Exam on  "Discharge:  /78 (BP Location: Right arm, Patient Position: Lying)   Pulse 50   Temp 97.1 °F (36.2 °C) (Axillary)   Resp 18   Ht 160 cm (63\")   Wt 81.2 kg (179 lb)   SpO2 97%   BMI 31.71 kg/m²   Physical Exam   Constitutional: She appears well-developed and well-nourished.   HENT:   Head: Normocephalic and atraumatic.   Right Ear: External ear normal.   Left Ear: External ear normal.   Nose: Nose normal.   Mouth/Throat: Oropharynx is clear and moist.   Eyes: Conjunctivae and EOM are normal.   Neck: Normal range of motion. Neck supple.   Cardiovascular: Normal rate, regular rhythm and normal heart sounds.   Pulmonary/Chest: Effort normal and breath sounds normal.   Abdominal: Soft. Bowel sounds are normal. She exhibits no distension. There is no tenderness.   Musculoskeletal: Normal range of motion.   Neurological: She is alert. She is disoriented.   Skin: Skin is warm and dry.   Psychiatric: She has a normal mood and affect. Her speech is normal and behavior is normal. Cognition and memory are impaired.         Discharge Disposition:  Skilled Nursing Facility (DC - External)    Discharge Medications:     Discharge Medications      New Medications      Instructions Start Date   amLODIPine 10 MG tablet  Commonly known as:  NORVASC   10 mg, Oral, Every 24 Hours Scheduled   Start Date:  December 23, 2019     cefdinir 300 MG capsule  Commonly known as:  OMNICEF   300 mg, Oral, Every 12 Hours Scheduled      clindamycin 300 MG capsule  Commonly known as:  CLEOCIN   300 mg, Oral, Every 6 Hours Scheduled      cloNIDine 0.1 MG tablet  Commonly known as:  CATAPRES   0.1 mg, Oral, Every 12 Hours Scheduled      hydrALAZINE 50 MG tablet  Commonly known as:  APRESOLINE   50 mg, Oral, Every 8 Hours Scheduled      levETIRAcetam 100 MG/ML solution  Commonly known as:  KEPPRA   500 mg, Oral, Every 12 Hours Scheduled         Continue These Medications      Instructions Start Date   acetaminophen 325 MG tablet  Commonly " "known as:  TYLENOL   650 mg, Oral, Every 6 Hours PRN      atenolol 50 MG tablet  Commonly known as:  TENORMIN   50 mg, Oral, Daily      atorvastatin 10 MG tablet  Commonly known as:  LIPITOR   10 mg, Oral, Daily      cetirizine 10 MG tablet  Commonly known as:  zyrTEC   10 mg, Oral, Daily      clopidogrel 75 MG tablet  Commonly known as:  PLAVIX   75 mg, Oral, Daily      Cranberry 200 MG capsule   200 mg, Oral, Every Morning      diclofenac 1 % gel gel  Commonly known as:  VOLTAREN   2 g, Topical, 3 Times Daily, \"Once between 3845-8897, once between 3284-4540 and again between 3427-5912\"      docusate sodium 100 MG capsule  Commonly known as:  COLACE   100 mg, Oral, Every Morning      ferrous sulfate 325 (65 FE) MG tablet   325 mg, Oral, Daily With Breakfast      guaiFENesin 600 MG 12 hr tablet  Commonly known as:  MUCINEX   600 mg, Oral, 2 Times Daily, \"Once between 2351-6537 and again between 2122-1253\"       insulin aspart 100 UNIT/ML injection  Commonly known as:  novoLOG   Inject per sliding scale AC and QHS: 200-219= 5 units 220-239= 6 units 240-259= 7 units 260-279= 9 units 300-319= 10 units 320-339= 11 units 340-359= 12 units 360-379= 13 units 380-399= 14 units >400=       insulin NPH-insulin regular (70-30) 100 UNIT/ML injection  Commonly known as:  humuLIN 70/30,novoLIN 70/30   15 Units, Subcutaneous, \"Once between 5081-8578 and again between 4824-2700\"       ipratropium-albuterol 0.5-2.5 mg/3 ml nebulizer  Commonly known as:  DUO-NEB   3 mL, Nebulization, Daily PRN      nitroglycerin 0.4 MG SL tablet  Commonly known as:  NITROSTAT   0.4 mg, Sublingual, Every 5 Minutes PRN, Take no more than 3 doses in 15 minutes.       nystatin 704912 UNIT/GM powder  Generic drug:  nystatin   1 application, Topical, 2 Times Daily PRN      pantoprazole 40 MG EC tablet  Commonly known as:  PROTONIX   40 mg, Oral, 2 Times Daily, \"Once between 9991-7544 and again between 1208-7884\"       polyethylene glycol packet  Commonly " "known as:  MIRALAX   17 g, Oral, Daily PRN      potassium chloride 10 MEQ CR tablet  Commonly known as:  K-DUR,KLOR-CON   10 mEq, Oral, Every Morning      RESTASIS 0.05 % ophthalmic emulsion  Generic drug:  cycloSPORINE   1 drop, Both Eyes, 2 Times Daily, \"Once between 9617-2379 and again between 8681-3139\"      Selenium 200 MCG tablet   200 mcg, Oral, Every Morning      sertraline 25 MG tablet  Commonly known as:  ZOLOFT   25 mg, Oral, Every Morning      TAB-A-JACKY tablet   1 tablet, Oral, Daily      traMADol 50 MG tablet  Commonly known as:  ULTRAM   50 mg, Oral, Every 6 Hours PRN      triamcinolone 0.1 % cream  Commonly known as:  KENALOG   1 application, Topical, 3 Times Daily PRN      Triamcinolone Acetonide 55 MCG/ACT nasal inhaler  Commonly known as:  NASACORT   1 spray, Nasal, Daily      vitamin C 500 MG tablet  Commonly known as:  ASCORBIC ACID   500 mg, Oral, Daily      vitamin D 1.25 MG (73789 UT) capsule capsule  Commonly known as:  ERGOCALCIFEROL   50,000 Units, Oral, Weekly, Wednesday       vitamin E 1000 UNIT capsule   1,000 Units, Oral, Daily         Stop These Medications    gabapentin 400 MG capsule  Commonly known as:  NEURONTIN     NIFEdipine CC 30 MG 24 hr tablet  Commonly known as:  ADALAT CC            Discharge Diet: Pureed, Syrup thick, cardiac consistent carb    Activity at Discharge: as tolerated    Discharge Care Plan/Instructions: Go to ER for seizures or fever    Follow-up Appointments:   No future appointments.    Test Results Pending at Discharge: LALO Lozano MD  12/22/19  11:16 AM    Time: 45 minutes        "

## 2019-12-23 NOTE — THERAPY DISCHARGE NOTE
Acute Care - Occupational Therapy Discharge Summary  [facility]     Patient Name: Gregoria Bennett  : 1945  MRN: [id number]    Today's Date: 2019  Onset of Illness/Injury or Date of Surgery: 19    Date of Referral to OT: 19  Referring Physician: Dr. Lozano      Admit Date: 2019        OT Recommendation and Plan    Visit Dx:    ICD-10-CM ICD-9-CM   1. Altered mental status, unspecified altered mental status type R41.82 780.97   2. Severe sepsis (CMS/[facility]) A41.9 038.9    R65.20 995.92   3. Acute UTI (urinary tract infection) N39.0 599.0   4. Chronic kidney disease, unspecified CKD stage N18.9 585.9   5. Metabolic acidosis E87.2 276.2   6. Dysphagia, unspecified type R13.10 787.20   7. Impaired functional mobility and activity tolerance Z74.09 V49.89   8. Impaired mobility and ADLs Z74.09 799.89               Rehab Goal Summary     Row Name 19 0700             Bathing Goal 1 (OT)    Activity/Assistive Device (Bathing Goal 1, OT)  upper body bathing  -TS      [place] Level/Cues Needed (Bathing Goal 1, OT)  minimum assist (75% or more patient effort);verbal cues required;tactile cues required;set-up required  -TS      Time Frame (Bathing Goal 1, OT)  10 days  -TS      Progress/Outcomes (Bathing Goal 1, OT)  goal not met  -TS         Dressing Goal 1 (OT)    Activity/Assistive Device (Dressing Goal 1, OT)  upper body dressing  -TS      [place]/Cues Needed (Dressing Goal 1, OT)  contact guard assist;verbal cues required;tactile cues required;set-up required  -TS      Time Frame (Dressing Goal 1, OT)  10 days  -TS      Progress/Outcome (Dressing Goal 1, OT)  goal not met  -TS         Grooming Goal 1 (OT)    Activity/Device (Grooming Goal 1, OT)  grooming skills, all  -TS      [place] (Grooming Goal 1, OT)  supervision required;set-up required;tactile cues required;verbal cues required  -TS      Time Frame (Grooming Goal 1, OT)  10 days  -TS      Progress/Outcome (Grooming Goal  1, OT)  goal not met  -TS        User Key  (r) = Recorded By, (t) = Taken By, (c) = Cosigned By    Initials Name Provider Type Discipline    Esther Lan COTA/L Occupational Therapy Assistant OT          Outcome Measures     Row Name 12/20/19 1400             How much help from another is currently needed...    Putting on and taking off regular lower body clothing?  1  -TS      Bathing (including washing, rinsing, and drying)  2  -TS      Toileting (which includes using toilet bed pan or urinal)  2  -TS      Putting on and taking off regular upper body clothing  2  -TS      Taking care of personal grooming (such as brushing teeth)  3  -TS      Eating meals  3  -TS      AM-PAC 6 Clicks Score (OT)  13  -TS         Functional Assessment    Outcome Measure Options  AM-PAC 6 Clicks Daily Activity (OT)  -TS        User Key  (r) = Recorded By, (t) = Taken By, (c) = Cosigned By    Initials Name Provider Type    Esther Lan COTA/L Occupational Therapy Assistant          Therapy Suggested Charges     Code   Minutes Charges    00664 (CPT®) Hc Ot Neuromusc Re Education Ea 15 Min      75546 (CPT®) Hc Ot Ther Proc Ea 15 Min      80465 (CPT®) Hc Ot Therapeutic Act Ea 15 Min      24258 (CPT®) Hc Ot Manual Therapy Ea 15 Min      06367 (CPT®) Hc Ot Iontophoresis Ea 15 Min      32959 (CPT®) Hc Ot Elec Stim Ea-Per 15 Min      35408 (CPT®) Hc Ot Ultrasound Ea 15 Min      16386 (CPT®) Hc Ot Self Care/Mgmt/Train Ea 15 Min 39 3    Total  39 3              OT Discharge Summary  Reason for Discharge: Discharge from facility  Outcomes Achieved: Refer to plan of care for updates on goals achieved  Discharge Destination: [facility]      MIKAELA Browne  12/23/2019

## 2019-12-23 NOTE — THERAPY DISCHARGE NOTE
Acute Care - Physical Therapy Discharge Summary  [facility]       Patient Name: Gregoria Bennett  : 1945  MRN: [id number]    Today's Date: 2019  Onset of Illness/Injury or Date of Surgery: 19       Referring Physician: Dr. Lozano      Admit Date: 2019      PT Recommendation and Plan    Visit Dx:    ICD-10-CM ICD-9-CM   1. Altered mental status, unspecified altered mental status type R41.82 780.97   2. Severe sepsis (CMS/HCC) A41.9 038.9    R65.20 995.92   3. Acute UTI (urinary tract infection) N39.0 599.0   4. Chronic kidney disease, unspecified CKD stage N18.9 585.9   5. Metabolic acidosis E87.2 276.2   6. Dysphagia, unspecified type R13.10 787.20   7. Impaired functional mobility and activity tolerance Z74.09 V49.89   8. Impaired mobility and ADLs Z74.09 799.89               Rehab Goal Summary     Row Name 19 1555 19 0700          Bed Mobility Goal 1 (PT)    Activity/Assistive Device (Bed Mobility Goal 1, PT)  rolling to left;rolling to right;scooting;sit to supine;supine to sit  -MF  --     [place] Level/Cues Needed (Bed Mobility Goal 1, PT)  minimum assist (75% or more patient effort);tactile cues required;verbal cues required  -  --     Time Frame (Bed Mobility Goal 1, PT)  long term goal (LTG);by discharge  -  --     Progress/Outcomes (Bed Mobility Goal 1, PT)  goal not met  -MF  --        Patient Education Goal (PT)    Activity (Patient Education Goal, PT)  pt will maintain midline sitting EOB for 20 min with CGA while performing activity tasks  -MF  --     Time Frame (Patient Education Goal, PT)  long term goal (LTG);by discharge  -MF  --     Progress/Outcome (Patient Education Goal, PT)  goal met  -MF  --        Bathing Goal 1 (OT)    Activity/Assistive Device (Bathing Goal 1, OT)  --  upper body bathing  -TS     [place] Level/Cues Needed (Bathing Goal 1, OT)  --  minimum assist (75% or more patient effort);verbal cues required;tactile cues required;set-up  required  -TS     Time Frame (Bathing Goal 1, OT)  --  10 days  -TS     Progress/Outcomes (Bathing Goal 1, OT)  --  goal not met  -TS        Dressing Goal 1 (OT)    Activity/Assistive Device (Dressing Goal 1, OT)  --  upper body dressing  -TS     [place]/Cues Needed (Dressing Goal 1, OT)  --  contact guard assist;verbal cues required;tactile cues required;set-up required  -TS     Time Frame (Dressing Goal 1, OT)  --  10 days  -TS     Progress/Outcome (Dressing Goal 1, OT)  --  goal not met  -TS        Grooming Goal 1 (OT)    Activity/Device (Grooming Goal 1, OT)  --  grooming skills, all  -TS     [place] (Grooming Goal 1, OT)  --  supervision required;set-up required;tactile cues required;verbal cues required  -TS     Time Frame (Grooming Goal 1, OT)  --  10 days  -TS     Progress/Outcome (Grooming Goal 1, OT)  --  goal not met  -TS       User Key  (r) = Recorded By, (t) = Taken By, (c) = Cosigned By    Initials Name Provider Type Discipline    Esther Lan, MIRANDA/L Occupational Therapy Assistant OT     Chastity Merchant, PTA Physical Therapy Assistant PT              PT Discharge Summary  Anticipated Discharge Disposition (PT): skilled nursing facility  Reason for Discharge: Discharge from facility  Outcomes Achieved: Patient able to partially acheive established goals  Discharge Destination: SNF      Chastity Merchant PTA   12/23/2019

## 2019-12-23 NOTE — DISCHARGE PLACEMENT REQUEST
"Gregoria Bennett (74 y.o. Female)     Date of Birth Social Security Number Address Home Phone MRN    1945  [address] [facility]  [facility] [address] [phone] [id number]    [employer] Marital Status          Judaism        Admission Date Admission Type Admitting Provider Attending Provider Department, Room/Bed    12/16/19 Emergency Edward Lozano MD  [facility] 4B, 441/1    Discharge Date Discharge Disposition Discharge Destination        12/22/2019 Skilled Nursing Facility (DC - External)              Attending Provider:  (none)   Allergies:  Aspirin, Tetracyclines & Related    Isolation:  Contact Drop   Infection:  MRSA (12/19/19)   Code Status:  Prior    Ht:  160 cm (63\")   Wt:  81.2 kg (179 lb)    Admission Cmt:  None   Principal Problem:  None                Active Insurance as of 12/16/2019     Primary Coverage     Payor Plan Insurance Group Employer/Plan Group    MEDICARE MEDICARE A & B      Payor Plan Address Payor Plan Phone Number Payor Plan Fax Number Effective Dates    [address] [phone]  7/1/1973 - None Entered    [facility] [id number]       Subscriber Name Subscriber Birth Date Member ID       GREGORIA BENNETT 1945 [id number]           Secondary Coverage     Payor Plan Insurance Group Employer/Plan Group    [place]      Payor Plan Address Payor Plan Phone Number Payor Plan Fax Number Effective Dates    [address] [phone]  3/4/2017 - None Entered    [place] [address]       Subscriber Name Subscriber Birth Date Member ID       GREGORIA BENNETT 1945 [id number]                 Emergency Contacts      (Rel.) Home Phone Work Phone Mobile Phone    RONEY ASHLEY (Guardian) -- [phone]    [facility], [facility] (Other) -- [phone] --               Discharge Summary      Edward Lozano MD at 12/22/19 1112              [facility] " Medicine Services  DISCHARGE SUMMARY       Date of Admission: 12/16/2019  Date of Discharge:  12/22/2019  Primary Care Physician: Chu Isaac MD    Presenting Problem/History of Present Illness:  Altered mental status, unspecified altered mental status type [R41.82]     Final Discharge Diagnoses:  Active Hospital Problems    Diagnosis   • CKD (chronic kidney disease) stage 4, GFR 15-29 ml/min (CMS/[facility])   • Dementia without behavioral disturbance (CMS/[facility])   • Metabolic encephalopathy   • Altered mental status   1.  Metabolic Encephalopathy  - resolved      2.   Sepsis  -PO abx      3.  Acute bacterial Cystitis  -PO abx      4.  ROOPA on CKD 4  -improved     5.  COPD  -duonebs prn     6.  CAD  -Plavix  -Lipitor     7.  Dementia  -supportive care     8.  T2DM  -SSI     9.  Peptic Ulcer Disease  -protonix     10.  Troponin elevation  -likely type 2 due to sepsis     11.  seizures  -Keppra     12.  HTN  -Norvasc  -Atenolol  -Hydralazine increased  -Clonidine     13.  MRSA Pna  -change abx to PO clinda    Consults: Neurology    Procedures Performed: EEG    Pertinent Test Results: EEG showed seizure-form activity    Chief Complaint on Day of Discharge: Feeling better    History of Present Illness on Day of Discharge:   Doing well, BP better  No headache or vision changes    Hospital Course:  The patient is a 74 y.o. female who presented to [facility] with confusion and seizures.  Neurology was consulted.  She was started on Keppra.  Seizures have resolved.    She was also found to have a UTI and Pneumonia.  She was treated with IV antibiotics.      Sputum culture was positive for MRSA.  She was treated with IV vancomycin and transitioned to oral Clindamycin.    Her BP has been very elevated.  She had to be placed in CCU on Cardene.  Her oral medications were titrated and is now well-controlled.      Condition on Discharge:  Stable    Physical Exam on Discharge:  /78 (BP Location: Right arm,  "Patient Position: Lying)   Pulse 50   Temp 97.1 °F (36.2 °C) (Axillary)   Resp 18   Ht 160 cm (63\")   Wt 81.2 kg (179 lb)   SpO2 97%   BMI 31.71 kg/m²    Physical Exam   Constitutional: She appears well-developed and well-nourished.   HENT:   Head: Normocephalic and atraumatic.   Right Ear: External ear normal.   Left Ear: External ear normal.   Nose: Nose normal.   Mouth/Throat: Oropharynx is clear and moist.   Eyes: Conjunctivae and EOM are normal.   Neck: Normal range of motion. Neck supple.   Cardiovascular: Normal rate, regular rhythm and normal heart sounds.   Pulmonary/Chest: Effort normal and breath sounds normal.   Abdominal: Soft. Bowel sounds are normal. She exhibits no distension. There is no tenderness.   Musculoskeletal: Normal range of motion.   Neurological: She is alert. She is disoriented.   Skin: Skin is warm and dry.   Psychiatric: She has a normal mood and affect. Her speech is normal and behavior is normal. Cognition and memory are impaired.         Discharge Disposition:  Skilled Nursing Facility (DC - External)    Discharge Medications:     Discharge Medications      New Medications      Instructions Start Date   amLODIPine 10 MG tablet  Commonly known as:  NORVASC   10 mg, Oral, Every 24 Hours Scheduled   Start Date:  December 23, 2019     cefdinir 300 MG capsule  Commonly known as:  OMNICEF   300 mg, Oral, Every 12 Hours Scheduled      clindamycin 300 MG capsule  Commonly known as:  CLEOCIN   300 mg, Oral, Every 6 Hours Scheduled      cloNIDine 0.1 MG tablet  Commonly known as:  CATAPRES   0.1 mg, Oral, Every 12 Hours Scheduled      hydrALAZINE 50 MG tablet  Commonly known as:  APRESOLINE   50 mg, Oral, Every 8 Hours Scheduled      levETIRAcetam 100 MG/ML solution  Commonly known as:  KEPPRA   500 mg, Oral, Every 12 Hours Scheduled         Continue These Medications      Instructions Start Date   acetaminophen 325 MG tablet  Commonly known as:  TYLENOL   650 mg, Oral, Every 6 Hours " "PRN      atenolol 50 MG tablet  Commonly known as:  TENORMIN   50 mg, Oral, Daily      atorvastatin 10 MG tablet  Commonly known as:  LIPITOR   10 mg, Oral, Daily      cetirizine 10 MG tablet  Commonly known as:  zyrTEC   10 mg, Oral, Daily      clopidogrel 75 MG tablet  Commonly known as:  PLAVIX   75 mg, Oral, Daily      Cranberry 200 MG capsule   200 mg, Oral, Every Morning      diclofenac 1 % gel gel  Commonly known as:  VOLTAREN   2 g, Topical, 3 Times Daily, \"Once between 0574-5519, once between 2042-9576 and again between 1154-6434\"      docusate sodium 100 MG capsule  Commonly known as:  COLACE   100 mg, Oral, Every Morning      ferrous sulfate 325 (65 FE) MG tablet   325 mg, Oral, Daily With Breakfast      guaiFENesin 600 MG 12 hr tablet  Commonly known as:  MUCINEX   600 mg, Oral, 2 Times Daily, \"Once between 6330-1756 and again between 8099-6715\"       insulin aspart 100 UNIT/ML injection  Commonly known as:  novoLOG   Inject per sliding scale AC and QHS: 200-219= 5 units 220-239= 6 units 240-259= 7 units 260-279= 9 units 300-319= 10 units 320-339= 11 units 340-359= 12 units 360-379= 13 units 380-399= 14 units >400=       insulin NPH-insulin regular (70-30) 100 UNIT/ML injection  Commonly known as:  humuLIN 70/30,novoLIN 70/30   15 Units, Subcutaneous, \"Once between 8770-0365 and again between 3627-7972\"       ipratropium-albuterol 0.5-2.5 mg/3 ml nebulizer  Commonly known as:  DUO-NEB   3 mL, Nebulization, Daily PRN      nitroglycerin 0.4 MG SL tablet  Commonly known as:  NITROSTAT   0.4 mg, Sublingual, Every 5 Minutes PRN, Take no more than 3 doses in 15 minutes.       nystatin 887695 UNIT/GM powder  Generic drug:  nystatin   1 application, Topical, 2 Times Daily PRN      pantoprazole 40 MG EC tablet  Commonly known as:  PROTONIX   40 mg, Oral, 2 Times Daily, \"Once between 2186-1405 and again between 0059-4654\"       polyethylene glycol packet  Commonly known as:  MIRALAX   17 g, Oral, Daily PRN    " "  potassium chloride 10 MEQ CR tablet  Commonly known as:  K-DUR,KLOR-CON   10 mEq, Oral, Every Morning      RESTASIS 0.05 % ophthalmic emulsion  Generic drug:  cycloSPORINE   1 drop, Both Eyes, 2 Times Daily, \"Once between 3885-5759 and again between 2439-8238\"      Selenium 200 MCG tablet   200 mcg, Oral, Every Morning      sertraline 25 MG tablet  Commonly known as:  ZOLOFT   25 mg, Oral, Every Morning      TAB-A-JACKY tablet   1 tablet, Oral, Daily      traMADol 50 MG tablet  Commonly known as:  ULTRAM   50 mg, Oral, Every 6 Hours PRN      triamcinolone 0.1 % cream  Commonly known as:  KENALOG   1 application, Topical, 3 Times Daily PRN      Triamcinolone Acetonide 55 MCG/ACT nasal inhaler  Commonly known as:  NASACORT   1 spray, Nasal, Daily      vitamin C 500 MG tablet  Commonly known as:  ASCORBIC ACID   500 mg, Oral, Daily      vitamin D 1.25 MG (46763 UT) capsule capsule  Commonly known as:  ERGOCALCIFEROL   50,000 Units, Oral, Weekly, Wednesday       vitamin E 1000 UNIT capsule   1,000 Units, Oral, Daily         Stop These Medications    gabapentin 400 MG capsule  Commonly known as:  NEURONTIN     NIFEdipine CC 30 MG 24 hr tablet  Commonly known as:  ADALAT CC            Discharge Diet: Pureed, Syrup thick, cardiac consistent carb    Activity at Discharge: as tolerated    Discharge Care Plan/Instructions: Go to ER for seizures or fever    Follow-up Appointments:   No future appointments.    Test Results Pending at Discharge: LALO Lozano MD  12/22/19  11:16 AM    Time: 45 minutes          Electronically signed by Edward Lozano MD at 12/22/19 1116       "

## 2019-12-24 NOTE — THERAPY DISCHARGE NOTE
Acute Care - Speech Language Pathology Discharge Summary  [facility]       Patient Name: Gregoria Bennett  : 1945  MRN: [id number]    Today's Date: 2019  Onset of Illness/Injury or Date of Surgery: 19       Referring Physician: Dr. Lozano        Admit Date: 2019      SLP Recommendation and Plan  Pureed diet and nectar thick liquids    Visit Dx:    ICD-10-CM ICD-9-CM   1. Altered mental status, unspecified altered mental status type R41.82 780.97   2. Severe sepsis (CMS/HCC) A41.9 038.9    R65.20 995.92   3. Acute UTI (urinary tract infection) N39.0 599.0   4. Chronic kidney disease, unspecified CKD stage N18.9 585.9   5. Metabolic acidosis E87.2 276.2   6. Dysphagia, unspecified type R13.10 787.20   7. Impaired functional mobility and activity tolerance Z74.09 V49.89   8. Impaired mobility and ADLs Z74.09 799.89               SLP GOALS     Row Name 19 1400             Oral Nutrition/Hydration Goal 1 (SLP)    Oral Nutrition/Hydration Goal 1, SLP  Pt will tolerate least restrictive diet with no overt s/s of aspiration.  -MB      Time Frame (Oral Nutrition/Hydration Goal 1, SLP)  by discharge  -MB      Barriers (Oral Nutrition/Hydration Goal 1, SLP)  Cognition  -MB      Progress/Outcomes (Oral Nutrition/Hydration Goal 1, SLP)  goal partially met  -MB         Pharyngeal Strengthening Exercise Goal 1 (SLP)    Activity (Pharyngeal Strengthening Goal 1, SLP)  increase timing;increase squeeze/positive pressure generation  -MB      Increase Timing  gustatory stimulation (sour/cold)  -MB      Increase Squeeze/Positive Pressure Generation  hard effortful swallow  -MB      [place]/Accuracy (Pharyngeal Strengthening Goal 1, SLP)  independently (over 90% accuracy)  -MB      Time Frame (Pharyngeal Strengthening Goal 1, SLP)  short term goal (STG);by discharge  -MB      Barriers (Pharyngeal Strengthening Goal 1, SLP)  n/a  -MB      Progress/Outcomes (Pharyngeal Strengthening Goal 1, SLP)  goal not  met  -MB        User Key  (r) = Recorded By, (t) = Taken By, (c) = Cosigned By    Initials Name Provider Type    Brianna Rosales CCC-SLP Speech and Language Pathologist                  SLP Discharge Summary  Anticipated Dischage Disposition: extended care facility  Reason for Discharge: discharge from this facility  Progress Toward Achieving Short/long Term Goals: goals partially met within established timelines  Discharge Destination: SNF      Brianna Hernandez CCC-SLP  12/24/2019

## 2020-02-10 ENCOUNTER — OFFICE VISIT (OUTPATIENT)
Dept: NEUROLOGY | Facility: CLINIC | Age: 75
End: 2020-02-10

## 2020-02-10 VITALS
DIASTOLIC BLOOD PRESSURE: 64 MMHG | OXYGEN SATURATION: 97 % | BODY MASS INDEX: 30.12 KG/M2 | HEART RATE: 50 BPM | HEIGHT: 63 IN | WEIGHT: 170 LBS | SYSTOLIC BLOOD PRESSURE: 156 MMHG

## 2020-02-10 DIAGNOSIS — R94.01 ABNORMAL EEG: ICD-10-CM

## 2020-02-10 DIAGNOSIS — F03.90 DEMENTIA WITHOUT BEHAVIORAL DISTURBANCE, UNSPECIFIED DEMENTIA TYPE: Primary | ICD-10-CM

## 2020-02-10 PROCEDURE — 99212 OFFICE O/P EST SF 10 MIN: CPT | Performed by: PHYSICIAN ASSISTANT

## 2020-02-10 NOTE — PROGRESS NOTES
Neurology Progress Note      Chief Complaint:    Abnormal EEG  Metabolic encephalopathy, resolved  Dementia    Subjective     Subjective:  This is a 74-year-old female who is presenting today by transportation staff from [facility].  The patient is routinely cared for by Dr. Norberto Melgoza MD, and was hospitalized back in December 2019 with metabolic encephalopathy, upper respiratory infection and urinary tract infection.  She had some myoclonic jerking and because of this was taken off of gabapentin and underwent EEG.  The EEG did not demonstrate clear epileptiform waveforms, however, had several spiking episodes that were considered to be potentially seizureogenic.  His staff the presents her today has known firsthand knowledge of her medical history.  There is no documentation from the nursing home.  The patient is demented and unable to relate any meaningful history.  As far as we can tell, she is maintained on the Keppra initiated in the hospital.      Past Medical History:   Diagnosis Date   • Chronic kidney disease, stage 4 (severe) (CMS/HCC)    • COPD (chronic obstructive pulmonary disease) (CMS/HCC)    • Coronary artery disease    • Dementia (CMS/HCC)    • Diabetes mellitus (CMS/HCC)    • History of dermatomyositis    • Hypertension    • Peptic ulcer disease    • Pulmonary disease    • Renal insufficiency    • Urinary tract infection    • Venous insufficiency      Past Surgical History:   Procedure Laterality Date   • CHOLECYSTECTOMY     • COLON SURGERY       Family History   Problem Relation Age of Onset   • Breast cancer Neg Hx      Social History     Tobacco Use   • Smoking status: Former Smoker   Substance Use Topics   • Alcohol use: No   • Drug use: No       Medications:  Current Outpatient Medications   Medication Sig Dispense Refill   • acetaminophen (TYLENOL) 325 MG tablet Take 650 mg by mouth Every 6 (Six) Hours As Needed for Mild Pain  or Fever.     • amLODIPine (NORVASC) 10 MG tablet Take 1  "tablet by mouth Daily. 30 tablet 0   • atenolol (TENORMIN) 50 MG tablet Take 1 tablet by mouth Daily.     • atorvastatin (LIPITOR) 10 MG tablet Take 10 mg by mouth daily.     • cetirizine (zyrTEC) 10 MG tablet Take 10 mg by mouth Daily.     • cloNIDine (CATAPRES) 0.1 MG tablet Take 1 tablet by mouth Every 12 (Twelve) Hours. 60 tablet 0   • clopidogrel (PLAVIX) 75 MG tablet Take 75 mg by mouth daily.     • Cranberry 200 MG capsule Take 200 mg by mouth Every Morning.     • cycloSPORINE (RESTASIS) 0.05 % ophthalmic emulsion Administer 1 drop to both eyes 2 (Two) Times a Day. \"Once between 1111-8612 and again between 7469-3910\"     • diclofenac (VOLTAREN) 1 % gel gel Apply 2 g topically to the appropriate area as directed 3 (Three) Times a Day. \"Once between 5887-6165, once between 1974-5972 and again between 2839-1206\"      • docusate sodium (COLACE) 100 MG capsule Take 100 mg by mouth Every Morning.     • guaiFENesin (MUCINEX) 600 MG 12 hr tablet Take 600 mg by mouth 2 (Two) Times a Day. \"Once between 0089-5669 and again between 8898-0604\"     • hydrALAZINE (APRESOLINE) 50 MG tablet Take 1 tablet by mouth Every 8 (Eight) Hours. 90 tablet 0   • insulin aspart (novoLOG) 100 UNIT/ML injection Inject per sliding scale AC and QHS:  200-219= 5 units  220-239= 6 units  240-259= 7 units  260-279= 9 units  300-319= 10 units  320-339= 11 units  340-359= 12 units  360-379= 13 units  380-399= 14 units  >400=     • insulin NPH-insulin regular (humuLIN 70/30,novoLIN 70/30) (70-30) 100 UNIT/ML injection Inject 15 Units under the skin into the appropriate area as directed. \"Once between 3649-6258 and again between 9598-6970\"     • ipratropium-albuterol (DUO-NEB) 0.5-2.5 mg/3 ml nebulizer Take 3 mL by nebulization Daily As Needed for Wheezing.     • levETIRAcetam (KEPPRA) 100 MG/ML solution Take 5 mL by mouth Every 12 (Twelve) Hours. 473 mL 0   • Multiple Vitamin (TAB-A-JACKY) tablet Take 1 tablet by mouth Daily.     • nitroglycerin " "(NITROSTAT) 0.4 MG SL tablet Place 0.4 mg under the tongue Every 5 (Five) Minutes As Needed for Chest Pain. Take no more than 3 doses in 15 minutes.     • nystatin (nystatin) 683300 UNIT/GM powder Apply 1 application topically to the appropriate area as directed 2 (Two) Times a Day As Needed (\"skin picking\").     • pantoprazole (PROTONIX) 40 MG EC tablet Take 40 mg by mouth 2 (Two) Times a Day. \"Once between 5430-6473 and again between 4944-5922\"     • polyethylene glycol (MIRALAX) packet Take 17 g by mouth Daily As Needed (constipation).     • potassium chloride (K-DUR,KLOR-CON) 10 MEQ CR tablet Take 10 mEq by mouth Every Morning.     • Selenium 200 MCG tablet Take 200 mcg by mouth Every Morning.     • sertraline (ZOLOFT) 25 MG tablet Take 25 mg by mouth Every Morning.     • traMADol (ULTRAM) 50 MG tablet Take 1 tablet by mouth Every 6 (Six) Hours As Needed for Moderate Pain . 9 tablet 0   • triamcinolone (KENALOG) 0.1 % cream Apply 1 application topically to the appropriate area as directed 3 (Three) Times a Day As Needed (itching).     • Triamcinolone Acetonide (NASACORT) 55 MCG/ACT nasal inhaler 1 spray into the nostril(s) as directed by provider Daily.     • vitamin C (ASCORBIC ACID) 500 MG tablet Take 500 mg by mouth Daily.     • vitamin D (ERGOCALCIFEROL) 1.25 MG (85751 UT) capsule capsule Take 50,000 Units by mouth 1 (One) Time Per Week. Wednesday     • vitamin E 1000 UNIT capsule Take 1,000 Units by mouth Daily.     • ferrous sulfate 325 (65 FE) MG tablet Take 325 mg by mouth Daily With Breakfast.       No current facility-administered medications for this visit.        Allergies:    Aspirin and Tetracyclines & related    Review of Systems:   -A 14 point review of systems is completed and is negative.      Objective      Vital Signs  Heart Rate:  [50] 50  BP: (156)/(64) 156/64    Physical Exam:    General Exam:  Head:  Normocephalic, atraumatic.  HEENT: PERRLA.  Full EOM.  Neck:  No lymphadenopathy, " thyromegaly or bruit.  Cardiac:  Regular rate and rhythm.  Normal S1, S2.  No murmur, rub or gallop.  Lungs:  Clear to auscultation bilaterally.  No wheeze, rales or rhonchi.  Abdomen:  Non-tender, Non-distended.  Bowel sounds normoactive.  Extremities: Full peripheral pulses.  No clubbing, cyanosis or edema.  Skin: Excoriation bilateral lower extremities.      Neurologic Exam:  Mental Status:    -Awake. Alert. Oriented to person, place & time.  -No word finding difficulties.  -No aphasia.  -No dysarthria.  -Follows simple commands.     CN II:  Full visual fields with confrontation.  Pupils equally reactive to light.  CN III, IV, VI:  Extraocular muscles function intact with no nystagmus.  CN V:  Facial sensory is symmetric.  CN VII:  Facial motor symmetric.  CN VIII:  Gross hearing intact bilaterally.  CN IX/X:  Palate elevates symmetrically.  CN XI:  Shoulder shrug symmetric.  CN XII:  Tongue is midline on protrusion.     Motor: (strength out of 5:  1= minimal movement, 2 = movement in plane of gravity, 3 = movement against gravity, 4 = movement against some resistance, 5 = full strength)     -4-/5 in bilateral biceps, triceps, brachioradialis, wrist extensors and intrinsic muscles of the hand.    -4-/5 in bilateral hip flexors, quadriceps, hamstrings, gastrocsoleus complex, anterior tibialis and extensor hallucis longus.       Deep Tendon Reflexes:  -Right              Biceps: 2+         Triceps: 2+      Brachioradialis: 2+              Patella: 2+       Ankle: 2+           -Left              Biceps: 2+         Triceps: 2+      Brachioradialis: 2+              Patella: 2+       Ankle: 2+             Tone (Modified Alla Scale):  No appreciable increase in tone or rigidity noted.     Sensory:  -Intact to light touch, pinprick BUE (C5-T1) and BLE (L2-S1).     Coordination:  Does not follow commands.  -No ataxia     Gait  Does not ambulate       Results Review:    I reviewed the patient's new clinical results and  findings.      No components found for: A1C  Lab Results   Component Value Date    HDL 28 (L) 03/06/2018    LDL 66 03/06/2018     No components found for: B12  Lab Results   Component Value Date    TSH 3.020 12/16/2019       Assessment/Plan     Impression:  1.  Abnormal EEG possibly secondary to acute metabolic encephalopathy  2.  Treatment with long-term Keppra/AED  3.  Dementia without behavioral disturbance      Plan:  1.  At this time, believe the patient can be maintained with the Keppra 500 mg twice daily.  I would recommend checking a Keppra level in 6 months and likely twice a year, thereafter along with CBC and CMP.  This will likely be done with her routine laboratory testing within the skilled nursing facility.    2.  From a neurology standpoint, she does not need to follow-up with me on a regular basis unless she has any breakthrough seizure activity that is not controllable.  Otherwise, she is on tramadol which potentially lowers seizure threshold.  I would be cautious with this along with any medication such as an SSRI that could also lower seizure threshold.  Otherwise, the patient will follow with neurology on an as-needed basis.              Tod Piña PA-C  02/10/20  8:44 AM

## 2020-06-29 NOTE — DISCHARGE SUMMARY
Physician Discharge Summary     Patient ID:  Brianna Vazquez  [id number]  58 y.o.  1945    Admit date: 2/3/2019    Discharge date and time: 2/5/2018    Admitting Physician: Willian Turpin MD     Discharge Physician: Willian Turpin MD    Discharge Diagnoses:     Metabolic encephalopathy   SILVESTRE on CKD  UTI  Enteritis with diarrhea     Discharged Condition: stable    Indication for Admission: [address] Course:     74 y.o. female presented to the emergency department with fever 104.7 at [facility]. Her last creatine was 1.4 based on labs from [facility] last year. She is chronically on O2. She presented confused with left flank pain on exam. UA showed UTI, cultures grew contamination however she responded well to cipro and azo and will continue on that. She also complained of diarrhea. Stool studies are pending. She was started on flagyl. Today she is pain and diarrhea free and eager to go back to NH. Consults: none    Significant Diagnostic Studies:     CT Kidney WO Contrast [[id number]] Resulted: 02/03/19 0729      Order Status: Completed Updated: 02/03/19 0731     Narrative:       History:  74year-old with left flank abdominal pain. Also right flank abdominal  pain. Reference exam:  None. Technique:  Unenhanced CT abdomen/pelvis performed with coronal and sagittal  reformatted images provided. For this CT exam, one or more of the following dose reduction  techniques was employed:  -automated exposure control  -mA and/or kVp adjustment for patient size  -iterative reconstruction  DLP 1323 mGy-cm  Findings:  Chest  Incidental scanning through the lower chest demonstrates subsegmental  atelectasis at the left lower lobe base. Abdomen  Unenhanced liver and spleen are unremarkable. Numerous upper abdominal  surgical clips. Prior cholecystectomy. Pancreas is unremarkable. No  urinary tract stone. No obstructive uropathy. Moderate aortoiliac  atherosclerotic calcification without aneurysm.   No bowel Reason for Admission:   Patient came to ed for complaint of abdominal pain, nausea and vomiting in the ed times 3. He states he was cutting grass on yesterday. In the er he became bradycardic and hypotensive. RUR Score:    9%                   Plan for utilizing home health:    He has not used home health services in the past. He denies using rehab in the past.          PCP: First and Last name:  Fabián Collins     Name of Practice:  [facility]. Are you a current patient: Yes/No: Yes     Approximate date of last visit:  September 23,2019     Can you participate in a virtual visit with your PCP:  Yes                      Current Advanced Directive/Advance Care Plan: He would like to talk with his family about this. Transition of Care Plan:   Patient lives with his wife. He states he was independent prior to coming to the hospital. He works and does not use dme. He denies using home oxygen. He plans to return home when medically stable and states his wife will transport him home. Observation letter reviewed with patient and telephone consent obtained due to Covid 19 guidelines. Care Management Interventions  PCP Verified by CM: Yes  Transition of Care Consult (CM Consult): Discharge Planning  Current Support Network: Lives with Spouse  Confirm Follow Up Transport: Family  Discharge Location  Discharge Placement: Home        Massiel Ac RN BSN CRM        [phone] obstruction or acute inflammatory process. Normal appendix. Few colonic diverticula. No free air, free fluid, or lymphadenopathy. Pelvis  Nondistended urinary bladder. Uterus is present. No pelvic mass, free  fluid, or lymphadenopathy. Mild/moderate levoscoliosis of the lumbar spine, apex L2-L3. Severe  disc degeneration at L2-L3. Milder degenerative changes at L3-L4. No  compression deformities.     Impression:       No acute findings in the abdomen or pelvis. No urinary tract stones or  obstructive uropathy. Mild/moderate levoscoliosis of the lumbar spine  with associated degenerative changes. Signed by Dr Luis Armando Bird on 2/3/2019 7:29 AM     XR CHEST PORTABLE [[id number]] Resulted: 02/03/19 0720     Order Status: Completed Updated: 02/03/19 9146     Narrative:       History:  74year-old with fever. Reference:  None. Findings:  Frontal chest radiograph performed. The heart and mediastinum appear normal. The lungs are clear. No  pleural fluid or pneumothorax. No acute osseous abnormality. Numerous  upper abdomen surgical clips.     Impression:       No acute findings. Signed by Dr Luis Armando Bird on 2/3/2019 7:20 AM     XR CHEST STANDARD (2 VW) [[id number]]      Order Status: Canceled Specimen: Chest              Discharge Exam:  BP (!) 145/68   Pulse 54   Temp 98.8 °F (37.1 °C) (Temporal)   Resp 18   Ht 5' 7\" (1.702 m)   Wt 176 lb 8 oz (80.1 kg)   SpO2 98%   BMI 27.64 kg/m²     General appearance: alert, appears stated age and cooperative  Skin: Skin color, texture, turgor normal.   HEENT: Head: Normocephalic, no lesions, without obvious abnormality.   Neck: no adenopathy, no carotid bruit, no JVD and supple, symmetrical, trachea midline  Lungs: clear to auscultation bilaterally  Heart: regular rate and rhythm, S1, S2 normal, no murmur, click, rub or gallop  Abdomen: soft, non-tender; bowel sounds normal; no masses,  no organomegaly  Extremities: extremities normal, atraumatic, no cyanosis or edema  Lymphatic: No significant lymph node enlargement papable  Neurologic: Mental status: Alert, oriented, thought content appropriate    Discharge Medications:       Jonathan Fernandez   Home Medication Instructions PXR:[id number]    Printed on:02/05/19 9389   Medication Information                      Albuterol (VENTOLIN IN)  Inhale  into the lungs. Ascorbic Acid (VITAMIN C) 500 MG tablet  Take 500 mg by mouth daily. atenolol (TENORMIN) 50 MG tablet  Take 50 mg by mouth daily             Atorvastatin Calcium (LIPITOR PO)  Take  by mouth. BREWERS YEAST PO  Take  by mouth. ciprofloxacin (CIPRO) 250 MG tablet  Take 1 tablet by mouth 2 times daily for 7 days             clopidogrel (PLAVIX) 75 MG tablet  Take 75 mg by mouth daily             CycloSPORINE (RESTASIS OP)  Apply  to eye.               dicyclomine (BENTYL) 10 MG capsule  Take 10 mg by mouth 3 times daily. Furosemide (LASIX PO)  Take 40 mg by mouth daily              insulin aspart (NOVOLOG) 100 UNIT/ML injection  Inject  into the skin 3 times daily (before meals). Inulin (FIBERCHOICE PO)  Take  by mouth. ipratropium-albuterol (DUONEB) 0.5-2.5 (3) MG/3ML SOLN nebulizer solution  Inhale 1 vial into the lungs every 4 hours. IRON PO  Take  by mouth. lactobacillus (CULTURELLE) CAPS capsule  Take 1 capsule by mouth daily for 7 days             LECITHIN PO  Take  by mouth.               metroNIDAZOLE (FLAGYL) 500 MG tablet  Take 1 tablet by mouth every 8 hours for 7 days             MILK THISTLE PO  Take  by mouth. Multiple Vitamin (MULTI-VITAMIN PO)  Take  by mouth. NIFEdipine (ADALAT CC) 30 MG extended release tablet  Take 30 mg by mouth daily             nitroGLYCERIN (NITROSTAT) 0.4 MG SL tablet  Place 0.4 mg under the tongue every 5 minutes as needed.                pantoprazole (PROTONIX) 40 MG tablet  Take 40 mg by mouth 2 times daily             phenazopyridine (PYRIDIUM) 100 MG tablet  Take 1 tablet by mouth 3 times daily (with meals) for 3 days             potassium chloride (KLOR-CON M) 20 MEQ extended release tablet  Take 0.5 tablets by mouth daily             ranitidine (ZANTAC) 150 MG tablet  Take 150 mg by mouth 2 times daily. SELENIUM PO  Take  by mouth. sertraline (ZOLOFT) 25 MG tablet  Take 25 mg by mouth nightly              sucralfate (CARAFATE) 1 GM tablet  Take 1 g by mouth 3 times daily. traMADol (ULTRAM) 50 MG tablet  Take 1 tablet by mouth every 8 hours as needed for Pain for up to 3 days. .             Triamcinolone Acetonide,Nasal, (NASACORT AQ NA)  by Nasal route. vitamin E 1000 UNIT capsule  Take 1,000 Units by mouth daily. Discharge Instructions: Follow up with Benjamín Ly in 3 days. Take medications as directed. Resume activity as tolerated. Diet: DIET FULL LIQUID; advance as tolerated     Disposition: Patient is medically stable and will be discharged to NH.      Dc time 31 min    Aparna Cortés MD

## 2022-06-08 ENCOUNTER — LAB REQUISITION (OUTPATIENT)
Dept: LAB | Facility: HOSPITAL | Age: 77
End: 2022-06-08

## 2022-06-08 DIAGNOSIS — Z00.00 ENCOUNTER FOR GENERAL ADULT MEDICAL EXAMINATION WITHOUT ABNORMAL FINDINGS: ICD-10-CM

## 2022-06-08 LAB
25(OH)D3 SERPL-MCNC: 79.7 NG/ML (ref 30–100)
ALBUMIN SERPL-MCNC: 3.9 G/DL (ref 3.5–5.2)
ALBUMIN/GLOB SERPL: 1.9 G/DL
ALP SERPL-CCNC: 66 U/L (ref 39–117)
ALT SERPL W P-5'-P-CCNC: 9 U/L (ref 1–33)
ANION GAP SERPL CALCULATED.3IONS-SCNC: 9 MMOL/L (ref 5–15)
AST SERPL-CCNC: 11 U/L (ref 1–32)
BASOPHILS # BLD AUTO: 0.03 10*3/MM3 (ref 0–0.2)
BASOPHILS NFR BLD AUTO: 0.5 % (ref 0–1.5)
BILIRUB SERPL-MCNC: <0.2 MG/DL (ref 0–1.2)
BUN SERPL-MCNC: 66 MG/DL (ref 8–23)
BUN/CREAT SERPL: 19.3 (ref 7–25)
CALCIUM SPEC-SCNC: 9.5 MG/DL (ref 8.6–10.5)
CHLORIDE SERPL-SCNC: 106 MMOL/L (ref 98–107)
CO2 SERPL-SCNC: 27 MMOL/L (ref 22–29)
CREAT SERPL-MCNC: 3.42 MG/DL (ref 0.57–1)
DEPRECATED RDW RBC AUTO: 41.1 FL (ref 37–54)
EGFRCR SERPLBLD CKD-EPI 2021: 13.1 ML/MIN/1.73
EOSINOPHIL # BLD AUTO: 0.21 10*3/MM3 (ref 0–0.4)
EOSINOPHIL NFR BLD AUTO: 3.2 % (ref 0.3–6.2)
ERYTHROCYTE [DISTWIDTH] IN BLOOD BY AUTOMATED COUNT: 12.4 % (ref 12.3–15.4)
GLOBULIN UR ELPH-MCNC: 2.1 GM/DL
GLUCOSE SERPL-MCNC: 216 MG/DL (ref 65–99)
HCT VFR BLD AUTO: 27.4 % (ref 34–46.6)
HGB BLD-MCNC: 9.1 G/DL (ref 12–15.9)
IMM GRANULOCYTES # BLD AUTO: 0.01 10*3/MM3 (ref 0–0.05)
IMM GRANULOCYTES NFR BLD AUTO: 0.2 % (ref 0–0.5)
LYMPHOCYTES # BLD AUTO: 2.23 10*3/MM3 (ref 0.7–3.1)
LYMPHOCYTES NFR BLD AUTO: 34.2 % (ref 19.6–45.3)
MAGNESIUM SERPL-MCNC: 2.3 MG/DL (ref 1.6–2.4)
MCH RBC QN AUTO: 30.4 PG (ref 26.6–33)
MCHC RBC AUTO-ENTMCNC: 33.2 G/DL (ref 31.5–35.7)
MCV RBC AUTO: 91.6 FL (ref 79–97)
MONOCYTES # BLD AUTO: 0.62 10*3/MM3 (ref 0.1–0.9)
MONOCYTES NFR BLD AUTO: 9.5 % (ref 5–12)
NEUTROPHILS NFR BLD AUTO: 3.42 10*3/MM3 (ref 1.7–7)
NEUTROPHILS NFR BLD AUTO: 52.4 % (ref 42.7–76)
NRBC BLD AUTO-RTO: 0 /100 WBC (ref 0–0.2)
PHOSPHATE SERPL-MCNC: 3.9 MG/DL (ref 2.5–4.5)
PLATELET # BLD AUTO: 249 10*3/MM3 (ref 140–450)
PMV BLD AUTO: 9.8 FL (ref 6–12)
POTASSIUM SERPL-SCNC: 5.3 MMOL/L (ref 3.5–5.2)
PROT SERPL-MCNC: 6 G/DL (ref 6–8.5)
PTH-INTACT SERPL-MCNC: 19.6 PG/ML (ref 15–65)
RBC # BLD AUTO: 2.99 10*6/MM3 (ref 3.77–5.28)
SODIUM SERPL-SCNC: 142 MMOL/L (ref 136–145)
URATE SERPL-MCNC: 6.1 MG/DL (ref 2.4–5.7)
WBC NRBC COR # BLD: 6.52 10*3/MM3 (ref 3.4–10.8)

## 2022-06-08 PROCEDURE — 83970 ASSAY OF PARATHORMONE: CPT | Performed by: GENERAL PRACTICE

## 2022-06-08 PROCEDURE — 84550 ASSAY OF BLOOD/URIC ACID: CPT | Performed by: GENERAL PRACTICE

## 2022-06-08 PROCEDURE — 80053 COMPREHEN METABOLIC PANEL: CPT | Performed by: GENERAL PRACTICE

## 2022-06-08 PROCEDURE — 82306 VITAMIN D 25 HYDROXY: CPT | Performed by: GENERAL PRACTICE

## 2022-06-08 PROCEDURE — 85025 COMPLETE CBC W/AUTO DIFF WBC: CPT | Performed by: GENERAL PRACTICE

## 2022-06-08 PROCEDURE — 83735 ASSAY OF MAGNESIUM: CPT | Performed by: GENERAL PRACTICE

## 2022-06-08 PROCEDURE — 84100 ASSAY OF PHOSPHORUS: CPT | Performed by: GENERAL PRACTICE

## 2022-07-01 ENCOUNTER — LAB REQUISITION (OUTPATIENT)
Dept: LAB | Facility: HOSPITAL | Age: 77
End: 2022-07-01

## 2022-07-01 DIAGNOSIS — Z00.00 ENCOUNTER FOR GENERAL ADULT MEDICAL EXAMINATION WITHOUT ABNORMAL FINDINGS: ICD-10-CM

## 2022-07-01 LAB
ALBUMIN SERPL-MCNC: 4.1 G/DL (ref 3.5–5.2)
ALBUMIN/GLOB SERPL: 1.9 G/DL
ALP SERPL-CCNC: 63 U/L (ref 39–117)
ALT SERPL W P-5'-P-CCNC: 10 U/L (ref 1–33)
ANION GAP SERPL CALCULATED.3IONS-SCNC: 11 MMOL/L (ref 5–15)
AST SERPL-CCNC: 13 U/L (ref 1–32)
BASOPHILS # BLD AUTO: 0.03 10*3/MM3 (ref 0–0.2)
BASOPHILS NFR BLD AUTO: 0.5 % (ref 0–1.5)
BILIRUB SERPL-MCNC: <0.2 MG/DL (ref 0–1.2)
BUN SERPL-MCNC: 79 MG/DL (ref 8–23)
BUN/CREAT SERPL: 22.6 (ref 7–25)
CALCIUM SPEC-SCNC: 9.5 MG/DL (ref 8.6–10.5)
CHLORIDE SERPL-SCNC: 108 MMOL/L (ref 98–107)
CO2 SERPL-SCNC: 21 MMOL/L (ref 22–29)
CREAT SERPL-MCNC: 3.49 MG/DL (ref 0.57–1)
DEPRECATED RDW RBC AUTO: 44.2 FL (ref 37–54)
EGFRCR SERPLBLD CKD-EPI 2021: 12.8 ML/MIN/1.73
EOSINOPHIL # BLD AUTO: 0.27 10*3/MM3 (ref 0–0.4)
EOSINOPHIL NFR BLD AUTO: 4.7 % (ref 0.3–6.2)
ERYTHROCYTE [DISTWIDTH] IN BLOOD BY AUTOMATED COUNT: 12.9 % (ref 12.3–15.4)
GLOBULIN UR ELPH-MCNC: 2.2 GM/DL
GLUCOSE SERPL-MCNC: 165 MG/DL (ref 65–99)
HCT VFR BLD AUTO: 26 % (ref 34–46.6)
HGB BLD-MCNC: 8.4 G/DL (ref 12–15.9)
IMM GRANULOCYTES # BLD AUTO: 0.02 10*3/MM3 (ref 0–0.05)
IMM GRANULOCYTES NFR BLD AUTO: 0.3 % (ref 0–0.5)
LYMPHOCYTES # BLD AUTO: 1.96 10*3/MM3 (ref 0.7–3.1)
LYMPHOCYTES NFR BLD AUTO: 33.9 % (ref 19.6–45.3)
MAGNESIUM SERPL-MCNC: 1.9 MG/DL (ref 1.6–2.4)
MCH RBC QN AUTO: 30.2 PG (ref 26.6–33)
MCHC RBC AUTO-ENTMCNC: 32.3 G/DL (ref 31.5–35.7)
MCV RBC AUTO: 93.5 FL (ref 79–97)
MONOCYTES # BLD AUTO: 0.65 10*3/MM3 (ref 0.1–0.9)
MONOCYTES NFR BLD AUTO: 11.2 % (ref 5–12)
NEUTROPHILS NFR BLD AUTO: 2.86 10*3/MM3 (ref 1.7–7)
NEUTROPHILS NFR BLD AUTO: 49.4 % (ref 42.7–76)
NRBC BLD AUTO-RTO: 0 /100 WBC (ref 0–0.2)
PLATELET # BLD AUTO: 220 10*3/MM3 (ref 140–450)
PMV BLD AUTO: 9.6 FL (ref 6–12)
POTASSIUM SERPL-SCNC: 4.9 MMOL/L (ref 3.5–5.2)
PROT SERPL-MCNC: 6.3 G/DL (ref 6–8.5)
RBC # BLD AUTO: 2.78 10*6/MM3 (ref 3.77–5.28)
SODIUM SERPL-SCNC: 140 MMOL/L (ref 136–145)
WBC NRBC COR # BLD: 5.79 10*3/MM3 (ref 3.4–10.8)

## 2022-07-01 PROCEDURE — 80053 COMPREHEN METABOLIC PANEL: CPT | Performed by: GENERAL PRACTICE

## 2022-07-01 PROCEDURE — 87086 URINE CULTURE/COLONY COUNT: CPT | Performed by: GENERAL PRACTICE

## 2022-07-01 PROCEDURE — 83735 ASSAY OF MAGNESIUM: CPT | Performed by: GENERAL PRACTICE

## 2022-07-01 PROCEDURE — 85025 COMPLETE CBC W/AUTO DIFF WBC: CPT | Performed by: GENERAL PRACTICE

## 2022-07-01 PROCEDURE — 87186 SC STD MICRODIL/AGAR DIL: CPT | Performed by: GENERAL PRACTICE

## 2022-07-01 PROCEDURE — 87077 CULTURE AEROBIC IDENTIFY: CPT | Performed by: GENERAL PRACTICE

## 2022-07-03 LAB — BACTERIA SPEC AEROBE CULT: ABNORMAL

## 2022-09-14 ENCOUNTER — OFFICE VISIT (OUTPATIENT)
Dept: GASTROENTEROLOGY | Age: 77
End: 2022-09-14
Payer: MEDICARE

## 2022-09-14 VITALS
HEIGHT: 64 IN | SYSTOLIC BLOOD PRESSURE: 120 MMHG | DIASTOLIC BLOOD PRESSURE: 60 MMHG | BODY MASS INDEX: 30.05 KG/M2 | WEIGHT: 176 LBS | HEART RATE: 96 BPM | OXYGEN SATURATION: 98 %

## 2022-09-14 DIAGNOSIS — R11.0 NAUSEA: ICD-10-CM

## 2022-09-14 DIAGNOSIS — R68.81 EARLY SATIETY: ICD-10-CM

## 2022-09-14 DIAGNOSIS — R10.13 EPIGASTRIC PAIN: Primary | ICD-10-CM

## 2022-09-14 PROCEDURE — 4004F PT TOBACCO SCREEN RCVD TLK: CPT | Performed by: NURSE PRACTITIONER

## 2022-09-14 PROCEDURE — 1123F ACP DISCUSS/DSCN MKR DOCD: CPT | Performed by: NURSE PRACTITIONER

## 2022-09-14 PROCEDURE — 1090F PRES/ABSN URINE INCON ASSESS: CPT | Performed by: NURSE PRACTITIONER

## 2022-09-14 PROCEDURE — G8427 DOCREV CUR MEDS BY ELIG CLIN: HCPCS | Performed by: NURSE PRACTITIONER

## 2022-09-14 PROCEDURE — G8400 PT W/DXA NO RESULTS DOC: HCPCS | Performed by: NURSE PRACTITIONER

## 2022-09-14 PROCEDURE — 99204 OFFICE O/P NEW MOD 45 MIN: CPT | Performed by: NURSE PRACTITIONER

## 2022-09-14 PROCEDURE — G8417 CALC BMI ABV UP PARAM F/U: HCPCS | Performed by: NURSE PRACTITIONER

## 2022-09-14 RX ORDER — ACETAMINOPHEN 325 MG/1
650 TABLET ORAL EVERY 6 HOURS PRN
COMMUNITY

## 2022-09-14 RX ORDER — LANOLIN ALCOHOL/MO/W.PET/CERES
325 CREAM (GRAM) TOPICAL
COMMUNITY

## 2022-09-14 RX ORDER — MULTIVIT-MIN/FOLIC/VIT K/LYCOP 400-300MCG
TABLET ORAL DAILY
COMMUNITY

## 2022-09-14 RX ORDER — NYSTATIN 100000 [USP'U]/G
POWDER TOPICAL PRN
COMMUNITY

## 2022-09-14 RX ORDER — NICOTINE POLACRILEX 4 MG
15 LOZENGE BUCCAL PRN
COMMUNITY

## 2022-09-14 RX ORDER — HYDRALAZINE HYDROCHLORIDE 25 MG/1
25 TABLET, FILM COATED ORAL 3 TIMES DAILY
COMMUNITY

## 2022-09-14 RX ORDER — MAGNESIUM OXIDE 400 MG/1
400 TABLET ORAL DAILY
COMMUNITY

## 2022-09-14 RX ORDER — VITAMIN E (DL,TOCOPHERYL ACET) 22.5 MG/ML
15 DROPS ORAL DAILY
COMMUNITY

## 2022-09-14 RX ORDER — LEVETIRACETAM 500 MG/1
500 TABLET ORAL 2 TIMES DAILY
COMMUNITY

## 2022-09-14 RX ORDER — GUAIFENESIN 600 MG/1
600 TABLET, EXTENDED RELEASE ORAL 2 TIMES DAILY
Status: ON HOLD | COMMUNITY
End: 2022-10-03 | Stop reason: ALTCHOICE

## 2022-09-14 RX ORDER — GLUCAGON INJECTION, SOLUTION 1 MG/.2ML
1 INJECTION, SOLUTION SUBCUTANEOUS 3 TIMES DAILY PRN
COMMUNITY

## 2022-09-14 RX ORDER — POLYETHYLENE GLYCOL 3350 17 G/17G
17 POWDER, FOR SOLUTION ORAL PRN
COMMUNITY

## 2022-09-14 RX ORDER — LORATADINE ORAL 5 MG/5ML
5 SOLUTION ORAL EVERY MORNING
COMMUNITY

## 2022-09-14 RX ORDER — HUMAN INSULIN 100 [USP'U]/ML
5 INJECTION, SUSPENSION SUBCUTANEOUS EVERY MORNING
COMMUNITY
Start: 2022-08-16

## 2022-09-14 RX ORDER — SODIUM BICARBONATE 650 MG/1
650 TABLET ORAL DAILY
COMMUNITY

## 2022-09-14 ASSESSMENT — ENCOUNTER SYMPTOMS
CONSTIPATION: 0
COUGH: 0
NAUSEA: 1
TROUBLE SWALLOWING: 0
BLOOD IN STOOL: 0
VOMITING: 0
ANAL BLEEDING: 0
ABDOMINAL PAIN: 1
DIARRHEA: 0
CHOKING: 0
RECTAL PAIN: 0
SHORTNESS OF BREATH: 0
ABDOMINAL DISTENTION: 0

## 2022-09-14 NOTE — PROGRESS NOTES
Subjective:     Patient ID: Walker Espana is a 76 y.o. female  PCP: Coreen Sepulveda  Referring Provider: No ref. provider found    HPI  Patient presents to the office today with the following complaints: Follow-up      Pt seen today for c/o \"stomach ulcers. \"  Pt reports loss of appetite and early satiety. She reports some weight loss,  pt is unsure of how much. She c/o occasional epigastric pain. She admits to nausea, denies any vomiting. Denies any issues swallowing. Symptoms began a few months ago. She is currently taking Protonix 40 mg BID and Carafate. Last EGD 2011 - Shallow gastric ulcers, gastritis, esophagitis, small HH  Last Colonoscopy 2014 - no colitis, 10 year recall    Assessment:     1. Epigastric pain    2. Nausea    3. Early satiety            Plan:   - Continue current medications  - Clearance to hold Plavix  - Schedule EGD  Nothing to eat or drink after midnight. No driving for 24 hours after procedure. Bring a  to procedure. No aspirin, NSAIDs, fish oil 5 days before procedure. I have discussed the benefits, alternatives, and risks (including bleeding, perforation and death)  for pursuing Endoscopy (EGD/Colonscopy/EUS/ERCP) with the patient and they are willing to continue. We also discussed the need for anesthesia, IV access, proper dietary changes, medication changes if necessary, and need for bowel prep (if ordered) prior to their Endoscopic procedure. They are aware they must have someone accompany them to their scheduled procedure to drive them home - they agree to the above and are willing to continue. Orders  No orders of the defined types were placed in this encounter. Medications  No orders of the defined types were placed in this encounter.         Patient History:     Past Medical History:   Diagnosis Date    Anxiety     CAD (coronary artery disease)     Cataract     Chronic kidney disease     Colon polyp     Constipation     COPD (chronic obstructive pulmonary disease) ([facility] [address]     DDD (degenerative disc disease)     Glaucoma     Hyperlipidemia     Hypertension     Irritable bowel syndrome     Low back pain     Obesity     Palliative care patient 02/05/2019    PUD (peptic ulcer disease)     Type II or unspecified type diabetes mellitus without mention of complication, not stated as uncontrolled     Unspecified sleep apnea     Venous insufficiency        Past Surgical History:   Procedure Laterality Date    CHOLECYSTECTOMY      with Spleen repair    COLONOSCOPY  07/21/2011    Dr Li Walden cecal AVM, otherwise normal    COLONOSCOPY  03/28/2006    Dr Gilils Alpha x 1, 5 yr recall, recommended CT for abd pain    COLONOSCOPY  08/28/2014    Dr Radha Pal colitis, 10 yr recall    DILATION AND CURETTAGE OF UTERUS      x3    UPPER GASTROINTESTINAL ENDOSCOPY  07/28/2011    Dr Cedric Back gastric ulcers, gastritis, esophagitis, small HH    UPPER GASTROINTESTINAL ENDOSCOPY  06/27/2006    Dr Jcarlos Quiñonez, urea neg       Family History   Problem Relation Age of Onset    Cancer Father        Social History     Socioeconomic History    Marital status:     Tobacco Use    Smoking status: Never   Substance and Sexual Activity    Alcohol use: No    Drug use: No       Current Outpatient Medications   Medication Sig Dispense Refill    acetaminophen (TYLENOL) 325 MG tablet Take 650 mg by mouth every 6 hours as needed      vitamin E (AQUASOL E) 15 UNIT/0.3ML SOLN solution Take 15 Units by mouth daily      docusate (COLACE) 50 MG/5ML liquid Take 50 mg by mouth daily      ferrous sulfate (FE TABS 325) 325 (65 Fe) MG EC tablet Take 325 mg by mouth daily (with breakfast)      glucose (GLUTOSE) 40 % GEL Take 15 g by mouth as needed      Cranberry, Vacc oxycoccus, (CRANBERRY EXTRACT) 200 MG CAPS Take by mouth daily      loratadine (CLARITIN) 5 MG/5ML syrup Take 5 mg by mouth every morning      Glucagon (GVOKE HYPOPEN 1-PACK) 1 MG/0.2ML SOAJ Inject 1 mg into the skin 3 times daily as needed      hydrALAZINE (APRESOLINE) 25 MG tablet Take 25 mg by mouth 3 times daily      levETIRAcetam (KEPPRA) 500 MG tablet Take 500 mg by mouth 2 times daily      magnesium oxide (MAG-OX) 400 MG tablet Take 400 mg by mouth daily      guaiFENesin (MUCINEX) 600 MG extended release tablet Take 600 mg by mouth 2 times daily      NOVOLIN 70/30 (70-30) 100 UNIT/ML injection vial Inject 5 Units into the skin nightly      nystatin (MYCOSTATIN) 200364 UNIT/GM powder Apply topically as needed Apply topically 4 times daily. Calcium Carbonate-Vitamin D (OS-JO  + D PO) Take by mouth      polyethylene glycol (MIRALAX) 17 g PACK packet Take 17 g by mouth as needed      sodium bicarbonate 650 MG tablet Take 650 mg by mouth daily      clopidogrel (PLAVIX) 75 MG tablet Take 75 mg by mouth daily      sertraline (ZOLOFT) 50 MG tablet Take 50 mg by mouth daily      NIFEdipine (ADALAT CC) 60 MG extended release tablet Take 60 mg by mouth daily      pantoprazole (PROTONIX) 40 MG tablet Take 40 mg by mouth 2 times daily      sucralfate (CARAFATE) 1 GM tablet Take 1 g by mouth 3 times daily. Multiple Vitamin (MULTI-VITAMIN PO) Take  by mouth. Ascorbic Acid (VITAMIN C) 500 MG tablet Take 500 mg by mouth daily. insulin aspart (NOVOLOG) 100 UNIT/ML injection Inject  into the skin 3 times daily (before meals). atorvastatin (LIPITOR) 40 MG tablet Take 40 mg by mouth nightly      nitroGLYCERIN (NITROSTAT) 0.4 MG SL tablet Place 0.4 mg under the tongue every 5 minutes as needed. CycloSPORINE (RESTASIS OP) Apply  to eye. SELENIUM PO Take 200 mcg by mouth daily      lactobacillus (CULTURELLE) CAPS capsule Take 1 capsule by mouth daily for 7 days 7 capsule 0     No current facility-administered medications for this visit.        Allergies   Allergen Reactions    Asa Buff (Mag [Buffered Aspirin]     Tetracyclines & Related        Review of Systems   Constitutional:  Positive for appetite change and unexpected weight change. Negative for activity change, fatigue and fever. HENT:  Negative for trouble swallowing. Respiratory:  Negative for cough, choking and shortness of breath. Cardiovascular:  Negative for chest pain. Gastrointestinal:  Positive for abdominal pain and nausea. Negative for abdominal distention, anal bleeding, blood in stool, constipation, diarrhea, rectal pain and vomiting. Musculoskeletal:  Positive for gait problem. Allergic/Immunologic: Negative for food allergies. All other systems reviewed and are negative. Objective:     /60 (Site: Left Upper Arm)   Pulse 96   Ht 5' 4\" (1.626 m)   Wt 176 lb (79.8 kg)   SpO2 98%   BMI 30.21 kg/m²     Physical Exam  Vitals reviewed. Constitutional:       General: She is not in acute distress. Appearance: She is well-developed. She is ill-appearing (chronic). HENT:      Head: Normocephalic and atraumatic. Right Ear: External ear normal.      Left Ear: External ear normal.      Nose: Nose normal.      Comments: Mask on     Mouth/Throat:      Comments: Mask on  Eyes:      Conjunctiva/sclera: Conjunctivae normal.      Pupils: Pupils are equal, round, and reactive to light. Cardiovascular:      Rate and Rhythm: Normal rate and regular rhythm. Heart sounds: Normal heart sounds. No murmur heard. No friction rub. No gallop. Pulmonary:      Effort: Pulmonary effort is normal. No respiratory distress. Breath sounds: Normal breath sounds. Abdominal:      General: Bowel sounds are normal. There is no distension. Palpations: Abdomen is soft. There is no mass. Tenderness: There is no abdominal tenderness. There is no guarding or rebound. Musculoskeletal:         General: Normal range of motion. Cervical back: Normal range of motion and neck supple. Skin:     General: Skin is warm and dry. Coloration: Skin is pale. Findings: No rash. Nails:  There is no clubbing. Neurological:      Mental Status: She is alert and oriented to person, place, and time. Gait: Gait abnormal (in wheelchair). Psychiatric:         Behavior: Behavior normal.         Thought Content:  Thought content normal.

## 2022-09-26 ENCOUNTER — TELEPHONE (OUTPATIENT)
Dept: GASTROENTEROLOGY | Age: 77
End: 2022-09-26

## 2022-09-26 NOTE — TELEPHONE ENCOUNTER
Patient: Shavon Betancourt    YOB: 1945      Clearance was received on September 26, 2022. for Endoscopy  scheduled for: 10/3/22    Patient may discontinue the use of PLAVIX for 5  days prior to the procedure.     IS Lovenox required:  KENRICK BARAJAS NOTIFIED ON:  9/26/22      Clearance scanned into media

## 2022-10-02 ENCOUNTER — ANESTHESIA EVENT (OUTPATIENT)
Dept: ENDOSCOPY | Age: 77
End: 2022-10-02
Payer: MEDICARE

## 2022-10-03 ENCOUNTER — HOSPITAL ENCOUNTER (OUTPATIENT)
Age: 77
Setting detail: OUTPATIENT SURGERY
Discharge: NURSING, REHAB OR LONG-TERM CARE | End: 2022-10-03
Attending: INTERNAL MEDICINE | Admitting: INTERNAL MEDICINE
Payer: MEDICARE

## 2022-10-03 ENCOUNTER — ANESTHESIA (OUTPATIENT)
Dept: ENDOSCOPY | Age: 77
End: 2022-10-03
Payer: MEDICARE

## 2022-10-03 VITALS
BODY MASS INDEX: 26.68 KG/M2 | DIASTOLIC BLOOD PRESSURE: 61 MMHG | SYSTOLIC BLOOD PRESSURE: 176 MMHG | HEART RATE: 64 BPM | RESPIRATION RATE: 18 BRPM | HEIGHT: 63 IN | OXYGEN SATURATION: 100 % | WEIGHT: 150.6 LBS | TEMPERATURE: 97.2 F

## 2022-10-03 LAB
GLUCOSE BLD-MCNC: 143 MG/DL (ref 70–99)
PERFORMED ON: ABNORMAL

## 2022-10-03 PROCEDURE — 2580000003 HC RX 258: Performed by: INTERNAL MEDICINE

## 2022-10-03 PROCEDURE — 3700000000 HC ANESTHESIA ATTENDED CARE: Performed by: INTERNAL MEDICINE

## 2022-10-03 PROCEDURE — 7100000011 HC PHASE II RECOVERY - ADDTL 15 MIN: Performed by: INTERNAL MEDICINE

## 2022-10-03 PROCEDURE — 6360000002 HC RX W HCPCS: Performed by: NURSE ANESTHETIST, CERTIFIED REGISTERED

## 2022-10-03 PROCEDURE — 7100000010 HC PHASE II RECOVERY - FIRST 15 MIN: Performed by: INTERNAL MEDICINE

## 2022-10-03 PROCEDURE — 88342 IMHCHEM/IMCYTCHM 1ST ANTB: CPT

## 2022-10-03 PROCEDURE — 82947 ASSAY GLUCOSE BLOOD QUANT: CPT

## 2022-10-03 PROCEDURE — 3609012400 HC EGD TRANSORAL BIOPSY SINGLE/MULTIPLE: Performed by: INTERNAL MEDICINE

## 2022-10-03 PROCEDURE — 2709999900 HC NON-CHARGEABLE SUPPLY: Performed by: INTERNAL MEDICINE

## 2022-10-03 PROCEDURE — 3700000001 HC ADD 15 MINUTES (ANESTHESIA): Performed by: INTERNAL MEDICINE

## 2022-10-03 PROCEDURE — 88305 TISSUE EXAM BY PATHOLOGIST: CPT

## 2022-10-03 PROCEDURE — 43239 EGD BIOPSY SINGLE/MULTIPLE: CPT | Performed by: INTERNAL MEDICINE

## 2022-10-03 PROCEDURE — 2500000003 HC RX 250 WO HCPCS: Performed by: NURSE ANESTHETIST, CERTIFIED REGISTERED

## 2022-10-03 RX ORDER — SODIUM CHLORIDE, SODIUM LACTATE, POTASSIUM CHLORIDE, CALCIUM CHLORIDE 600; 310; 30; 20 MG/100ML; MG/100ML; MG/100ML; MG/100ML
INJECTION, SOLUTION INTRAVENOUS CONTINUOUS
Status: DISCONTINUED | OUTPATIENT
Start: 2022-10-03 | End: 2022-10-03 | Stop reason: HOSPADM

## 2022-10-03 RX ORDER — PROPOFOL 10 MG/ML
INJECTION, EMULSION INTRAVENOUS PRN
Status: DISCONTINUED | OUTPATIENT
Start: 2022-10-03 | End: 2022-10-03 | Stop reason: SDUPTHER

## 2022-10-03 RX ORDER — LIDOCAINE HYDROCHLORIDE 10 MG/ML
INJECTION, SOLUTION EPIDURAL; INFILTRATION; INTRACAUDAL; PERINEURAL PRN
Status: DISCONTINUED | OUTPATIENT
Start: 2022-10-03 | End: 2022-10-03 | Stop reason: SDUPTHER

## 2022-10-03 RX ADMIN — PROPOFOL 100 MG: 10 INJECTION, EMULSION INTRAVENOUS at 10:35

## 2022-10-03 RX ADMIN — SODIUM CHLORIDE, POTASSIUM CHLORIDE, SODIUM LACTATE AND CALCIUM CHLORIDE: 600; 310; 30; 20 INJECTION, SOLUTION INTRAVENOUS at 09:10

## 2022-10-03 RX ADMIN — LIDOCAINE HYDROCHLORIDE 30 MG: 10 INJECTION, SOLUTION EPIDURAL; INFILTRATION; INTRACAUDAL; PERINEURAL at 10:35

## 2022-10-03 ASSESSMENT — PAIN - FUNCTIONAL ASSESSMENT: PAIN_FUNCTIONAL_ASSESSMENT: 0-10

## 2022-10-03 NOTE — DISCHARGE INSTRUCTIONS
1. Await path results, the patient will be contacted in 7-10 days with biopsy results. 2.  If biopsies return as essentially normal, and if the patient's symptoms persist, consider a nuclear medicine gastric emptying scan study to check for gastroparesis as a potential cause of her upper GI symptoms  3. If patient were to develop any dysphagia in the near future, she may benefit from an EGD exam with esophageal dilation given the findings on endoscopy today. - Resume previous meds and diet  - GI clinic f/u 4 weeks with Ms. Georgi Brandon scheduled f/u appts with other MDs     - NO ASA/NSAIDs x 2 weeks     NSAIDS Non-steroidal Anti-Inflammatory  You have been directed by your physician to avoid any NSAID's; the following medications are a list of those to avoid. If you think that you are taking any NSAID's notify your physician. Over The Counter  Advil                      Motrin  Nuprin                   Ibuprofen  Midol                     Aleve  Naproxen              Orudis  Aspirin                   [place]    Prescriptions and Generics    Cataflam              Relafen  Voltaren               Clinoril  Indocin                 Naproxen  Arthrotec              Lodine  Daypro                 Nalfon  Toradol                Ansaid  Feldene               Meclofenamate  Fenoprofen          Ponstel  Mobic                   Celebrex  Vioxx     POST-OP ORDERS: ENDOSCOPY & COLONOSCOPY:  1. Rest today. 2. DO NOT eat or drink until wide awake; eat your usual diet today in moderate amount only. 3. DO NOT drive today. 4. Call physician if you have severe pain, vomiting, fever, rectal bleeding or black bowel movements. 5.  If a biopsy was taken or a polyp removed, you should expect to hear results in about 21 days. If you have heard nothing from your physician by then, call the office for results. 6.  Discharge home when patient awake, vitals signs stable and tolerating liquids.

## 2022-10-03 NOTE — ANESTHESIA PRE PROCEDURE
Department of Anesthesiology  Preprocedure Note       Name:  Darwin Gotti   Age:  76 y.o.  :  1945                                          MRN:  [id number]         Date:  10/3/2022      Surgeon: Channing Singh):  Tyler Fontana MD    Procedure: Procedure(s):  EGD BIOPSY    Medications prior to admission:   Prior to Admission medications    Medication Sig Start Date End Date Taking? Authorizing Provider   acetaminophen (TYLENOL) 325 MG tablet Take 650 mg by mouth every 6 hours as needed    Historical Provider, MD   vitamin E (AQUASOL E) 15 UNIT/0.3ML SOLN solution Take 15 Units by mouth daily    Historical Provider, MD   docusate (COLACE) 50 MG/5ML liquid Take 50 mg by mouth daily    Historical Provider, MD   ferrous sulfate (FE TABS 325) 325 (65 Fe) MG EC tablet Take 325 mg by mouth daily (with breakfast)    Historical Provider, MD   glucose (GLUTOSE) 40 % GEL Take 15 g by mouth as needed    Historical Provider, MD   Cranberry, Vacc oxycoccus, (CRANBERRY EXTRACT) 200 MG CAPS Take by mouth daily    Historical Provider, MD   loratadine (CLARITIN) 5 MG/5ML syrup Take 5 mg by mouth every morning    Historical Provider, MD   Glucagon (Lendell Schillings 1-PACK) 1 MG/0.2ML SOAJ Inject 1 mg into the skin 3 times daily as needed    Historical Provider, MD   hydrALAZINE (APRESOLINE) 25 MG tablet Take 25 mg by mouth 3 times daily    Historical Provider, MD   levETIRAcetam (KEPPRA) 500 MG tablet Take 500 mg by mouth 2 times daily    Historical Provider, MD   magnesium oxide (MAG-OX) 400 MG tablet Take 400 mg by mouth daily    Historical Provider, MD   guaiFENesin (MUCINEX) 600 MG extended release tablet Take 600 mg by mouth 2 times daily    Historical Provider, MD   NOVOLIN 70/30 (70-30) 100 UNIT/ML injection vial Inject 5 Units into the skin nightly 22   Historical Provider, MD   nystatin (MYCOSTATIN) 970326 UNIT/GM powder Apply topically as needed Apply topically 4 times daily.     Historical Provider, MD   Calcium Carbonate-Vitamin D (OS-JO  + D PO) Take by mouth    Historical Provider, MD   polyethylene glycol (MIRALAX) 17 g PACK packet Take 17 g by mouth as needed    Historical Provider, MD   sodium bicarbonate 650 MG tablet Take 650 mg by mouth daily    Historical Provider, MD   lactobacillus (CULTURELLE) CAPS capsule Take 1 capsule by mouth daily for 7 days 2/6/19 2/13/19  Danish Garcia MD   clopidogrel (PLAVIX) 75 MG tablet Take 75 mg by mouth daily    Historical Provider, MD   sertraline (ZOLOFT) 50 MG tablet Take 50 mg by mouth daily    Historical Provider, MD   NIFEdipine (ADALAT CC) 60 MG extended release tablet Take 60 mg by mouth daily    Historical Provider, MD   pantoprazole (PROTONIX) 40 MG tablet Take 40 mg by mouth 2 times daily    Historical Provider, MD   sucralfate (CARAFATE) 1 GM tablet Take 1 g by mouth 3 times daily. Historical Provider, MD   Multiple Vitamin (MULTI-VITAMIN PO) Take  by mouth. Historical Provider, MD   Ascorbic Acid (VITAMIN C) 500 MG tablet Take 500 mg by mouth daily. Historical Provider, MD   insulin aspart (NOVOLOG) 100 UNIT/ML injection Inject  into the skin 3 times daily (before meals). Historical Provider, MD   atorvastatin (LIPITOR) 40 MG tablet Take 40 mg by mouth nightly    Historical Provider, MD   nitroGLYCERIN (NITROSTAT) 0.4 MG SL tablet Place 0.4 mg under the tongue every 5 minutes as needed. Historical Provider, MD   CycloSPORINE (RESTASIS OP) Apply  to eye. Historical Provider, MD   SELENIUM PO Take 200 mcg by mouth daily    Historical Provider, MD       Current medications:    Current Facility-Administered Medications   Medication Dose Route Frequency Provider Last Rate Last Admin    lactated ringers infusion   IntraVENous Continuous Elma Sam  mL/hr at 10/03/22 0910 New Bag at 10/03/22 0910       Allergies:     Allergies   Allergen Reactions    Asa Buff (Mag [Buffered Aspirin]     Tetracyclines & Related Problem List:    Patient Active Problem List   Diagnosis Code    Upper abdominal pain R10.10    Diarrhea R19.7    Change in bowel habits R19.4    History of colon polyps Z86.010    Sepsis ([facility] [address] A41.9    Acute cystitis with hematuria N30.01    Acute metabolic encephalopathy Z52.58    Palliative care patient Z51.5       Past Medical History:        Diagnosis Date    Anxiety     CAD (coronary artery disease)     Cataract     Chronic kidney disease     Colon polyp     Constipation     COPD (chronic obstructive pulmonary disease) ([facility] [address]     DDD (degenerative disc disease)     Glaucoma     Hyperlipidemia     Hypertension     Irritable bowel syndrome     Low back pain     Obesity     Palliative care patient 02/05/2019    PUD (peptic ulcer disease)     Type II or unspecified type diabetes mellitus without mention of complication, not stated as uncontrolled     Unspecified sleep apnea     Venous insufficiency        Past Surgical History:        Procedure Laterality Date    CHOLECYSTECTOMY      with Spleen repair    COLONOSCOPY  07/21/2011    Dr Regi Garzon cecal AVM, otherwise normal    COLONOSCOPY  03/28/2006    Dr Vivar Arts x 1, 5 yr recall, recommended CT for abd pain    COLONOSCOPY  08/28/2014    Dr Julia Wolfe colitis, 10 yr recall    DILATION AND CURETTAGE OF UTERUS      x3    UPPER GASTROINTESTINAL ENDOSCOPY  07/28/2011    Dr Val Gomes gastric ulcers, gastritis, esophagitis, small HH    UPPER GASTROINTESTINAL ENDOSCOPY  06/27/2006    Dr Eleanor Thibodeaux, urea neg       Social History:    Social History     Tobacco Use    Smoking status: Never    Smokeless tobacco: Never   Substance Use Topics    Alcohol use:  No                                Counseling given: Not Answered      Vital Signs (Current):   Vitals:    10/03/22 0919   BP: (!) 154/69   Pulse: 73   Resp: 18   Temp: 98.1 °F (36.7 °C)   TempSrc: Temporal   SpO2: 100%   Weight: 150 lb 9.6 oz (68.3 kg) Height: 5' 3\" (1.6 m)                                              BP Readings from Last 3 Encounters:   10/03/22 (!) 154/69   09/14/22 120/60   02/05/19 (!) 145/68       NPO Status: Time of last liquid consumption: 2200                        Time of last solid consumption: 1800                        Date of last liquid consumption: 10/02/22                        Date of last solid food consumption: 10/02/22    BMI:   Wt Readings from Last 3 Encounters:   10/03/22 150 lb 9.6 oz (68.3 kg)   09/14/22 176 lb (79.8 kg)   02/03/19 176 lb 8 oz (80.1 kg)     Body mass index is 26.68 kg/m².     CBC:   Lab Results   Component Value Date/Time    WBC 9.2 02/05/2019 04:07 AM    RBC 3.79 02/05/2019 04:07 AM    HGB 11.0 02/05/2019 04:07 AM    HCT 35.6 02/05/2019 04:07 AM    MCV 93.9 02/05/2019 04:07 AM    RDW 13.1 02/05/2019 04:07 AM     02/05/2019 04:07 AM       CMP:   Lab Results   Component Value Date/Time     02/05/2019 04:07 AM    K 3.4 02/05/2019 04:07 AM     02/05/2019 04:07 AM    CO2 21 02/05/2019 04:07 AM    BUN 30 02/05/2019 04:07 AM    CREATININE 1.6 02/05/2019 04:07 AM    LABGLOM 31 02/05/2019 04:07 AM    GLUCOSE 257 02/05/2019 04:07 AM    PROT 7.8 02/03/2019 06:20 AM    CALCIUM 8.5 02/05/2019 04:07 AM    BILITOT <0.2 02/03/2019 06:20 AM    ALKPHOS 71 02/03/2019 06:20 AM    AST 20 02/03/2019 06:20 AM    ALT 9 02/03/2019 06:20 AM       POC Tests:   Recent Labs     10/03/22  0908   POCGLU 143*       Coags: No results found for: PROTIME, INR, APTT    HCG (If Applicable): No results found for: PREGTESTUR, PREGSERUM, HCG, HCGQUANT     ABGs: No results found for: PHART, PO2ART, HLB6WTU, YRN9HVT, BEART, K6OYIEMT     Type & Screen (If Applicable):  No results found for: LABABO, LABRH    Drug/Infectious Status (If Applicable):  No results found for: HIV, HEPCAB    COVID-19 Screening (If Applicable): No results found for: COVID19        Anesthesia Evaluation  Patient summary reviewed and Nursing notes reviewed  Airway: Mallampati: II  TM distance: >3 FB   Neck ROM: full  Mouth opening: > = 3 FB   Dental: normal exam         Pulmonary:Negative Pulmonary ROS and normal exam    (+) COPD:  sleep apnea:                             Cardiovascular:  Exercise tolerance: poor (<4 METS),   (+) hypertension:, CAD:,          Beta Blocker:  Not on Beta Blocker         Neuro/Psych:   Negative Neuro/Psych ROS              GI/Hepatic/Renal:   (+) PUD,           Endo/Other:    (+) DiabetesType II DM, , .                 Abdominal:             Vascular: negative vascular ROS. Other Findings:           Anesthesia Plan      general and TIVA     ASA 3       Induction: intravenous. Anesthetic plan and risks discussed with patient. Plan discussed with CRNA.                     KYAW Everett - JACKY   10/3/2022

## 2022-10-03 NOTE — OP NOTE
Endoscopic Procedure Note    Patient: Gee Renteria: [birth year]  [facility] Rec#: [id number] Acc#: [id number]     Primary Care Provider Alonso Chatman    Endoscopist: Eriberto Whitfield MD, MD    Date of Procedure:  10/3/2022    Procedure:   1. EGD with cold biopsies    Indications:     1. Epigastric pain    2. Nausea    3. Early satiety    Last EGD 2011 - Shallow gastric ulcers, gastritis, esophagitis, small HH  Last Colonoscopy 2014 - no colitis, 10 year recall    Anesthesia:  Sedation was administered by anesthesia who monitored the patient during the procedure. Estimated Blood Loss: minimal    Procedure:   After reviewing the patient's chart and obtaining informed consent, the patient was placed in the left lateral decubitus position. A forward-viewing Olympus endoscope was lubricated and inserted through the mouth into the oropharynx. Under direct visualization, the upper esophagus was intubated. The scope was advanced to the level of the third portion of duodenum. Scope was slowly withdrawn with careful inspection of the mucosal surfaces. The scope was retroflexed for inspection of the gastric fundus and incisura. Findings and maneuvers are listed in impression below. The patient tolerated the procedure well. The scope was removed. There were no immediate complications. Findings/IMPRESSION:  Esophagus: abnormal: Normal upper two thirds; in the distal esophagus near the EG junction at 37 cm and esophageal ring was noted. Since the patient has not complained of any dysphagia, no attempts were made at dilation at this time. There is a 2 to 3 cm in length small sliding hiatal hernia present.       Stomach:  abnormal: Portions of the gastric mucosa in the mid to distal body of the stomach were covered with yellowish-white sediment likely medication related obscuring the underlying mucosa and rendering this exam somewhat suboptimal; otherwise the mucosa in the remainder of the examined stomach appeared essentially normal-due to her history of gastric ulcers in the past, random gastric biopsies were taken from the antrum and body to rule out Helicobacter pylori infection. NO ulcers or masses or gastric outlet obstruction or retained food or fluid. Rugae were normal and lumen distended well with insufflation. Retroflexed views otherwise revealed a normal GE junction, fundus and cardia as well. Duodenum: Normal. Random biopsies were taken to check for Celiac disease and other causes of villous atrophy. RECOMMENDATIONS:    1. Await path results, the patient will be contacted in 7-10 days with biopsy results. 2.  If biopsies return as essentially normal, and if the patient's symptoms persist, consider a nuclear medicine gastric emptying scan study to check for gastroparesis as a potential cause of her upper GI symptoms  3. If patient were to develop any dysphagia in the near future, she may benefit from an EGD exam with esophageal dilation given the findings on endoscopy today. - Resume previous meds and diet  - GI clinic f/u 4 weeks with MsMoy Garland Montano scheduled f/u appts with other MDs     - NO ASA/NSAIDs x 2 weeks     The results were discussed with the patient and family. A copy of the images obtained were given to the patient.      (Please note that portions of this note were completed with a voice recognition program. Efforts were made to edit the dictations but occasionally words may be mis-transcribed.)     Brian Garcia MD, MD  10/3/2022  10:32 AM

## 2022-10-03 NOTE — ANESTHESIA POSTPROCEDURE EVALUATION
Department of Anesthesiology  Postprocedure Note    Patient: Louie Childress  MRN: [id number]  YOB: 1945  Date of evaluation: 10/3/2022      Procedure Summary     Date: 10/03/22 Room / Location: [address]    Anesthesia Start: 1029 Anesthesia Stop:     Procedure: EGD BIOPSY (Abdomen) Diagnosis:       Early satiety      Epigastric pain      Nausea      (Early satiety [R68.81])      (Epigastric pain [R10.13])      (Nausea [R11.0])    Surgeons: Danny Guevara MD Responsible Provider: KYAW Torres CRNA    Anesthesia Type: general, TIVA ASA Status: 3          Anesthesia Type: No value filed.     Emmie Phase I: Emmie Score: 10    Emmie Phase II:        Anesthesia Post Evaluation    Patient location during evaluation: bedside  Patient participation: complete - patient participated  Level of consciousness: sleepy but conscious  Pain score: 0  Airway patency: patent  Nausea & Vomiting: no nausea and no vomiting  Complications: no  Cardiovascular status: blood pressure returned to baseline  Respiratory status: acceptable, room air and spontaneous ventilation  Hydration status: euvolemic

## 2022-10-03 NOTE — H&P
Patient Name: Boo Mclaughlin  : [birth year]9  MRN: [id number]  DATE: 10/03/22    Allergies: Allergies   Allergen Reactions    Asa Buff (Mag [Buffered Aspirin]     Tetracyclines & Related         ENDOSCOPY  History and Physical    Procedure:    [] Diagnostic Colonoscopy       [] Screening Colonoscopy  [x] EGD      [] ERCP      [] EUS       [] Other    [x] Previous office notes/History and Physical reviewed from the patients chart. Please see EMR for further details of HPI. I have examined the patient's status immediately prior to the procedure and:      Indications/HPI:    1. Epigastric pain    2. Nausea    3. Early satiety    Last EGD  - Shallow gastric ulcers, gastritis, esophagitis, small HH  Last Colonoscopy  - no colitis, 10 year recall    []Abdominal Pain   []Cancer- GI/Lung     []Fhx of colon CA/polyps  []History of Polyps  []Barretts            []Melena  []Abnormal Imaging              []Dysphagia              []Persistent Pneumonia   []Anemia                            []Food Impaction        []History of Polyps  [] GI Bleed             []Pulmonary nodule/Mass   []Change in bowel habits []Heartburn/Reflux  []Rectal Bleed (BRBPR)  []Chest Pain - Non Cardiac []Heme (+) Stool []Ulcers  []Constipation  []Hemoptysis  []Varices  []Diarrhea  []Hypoxemia    []Nausea/Vomiting   []Screening   []Crohns/Colitis  []Other:     Anesthesia:   [x] MAC [] Moderate Sedation   [] General   [] None     ROS: 12 pt Review of Symptoms was negative unless mentioned above    Medications:   Prior to Admission medications    Medication Sig Start Date End Date Taking?  Authorizing Provider   acetaminophen (TYLENOL) 325 MG tablet Take 650 mg by mouth every 6 hours as needed    Historical Provider, MD   vitamin E (AQUASOL E) 15 UNIT/0.3ML SOLN solution Take 15 Units by mouth daily  Patient not taking: Reported on 10/3/2022    Historical Provider, MD   docusate (COLACE) 50 MG/5ML liquid Take 50 mg by mouth daily    Historical Provider, MD   ferrous sulfate (FE TABS 325) 325 (65 Fe) MG EC tablet Take 325 mg by mouth daily (with breakfast)    Historical Provider, MD   glucose (GLUTOSE) 40 % GEL Take 15 g by mouth as needed    Historical Provider, MD   Cranberry, Vacc oxycoccus, (CRANBERRY EXTRACT) 200 MG CAPS Take by mouth daily    Historical Provider, MD   loratadine (CLARITIN) 5 MG/5ML syrup Take 5 mg by mouth every morning    Historical Provider, MD   Glucagon (June Gang 1-PACK) 1 MG/0.2ML SOAJ Inject 1 mg into the skin 3 times daily as needed    Historical Provider, MD   hydrALAZINE (APRESOLINE) 25 MG tablet Take 25 mg by mouth 3 times daily    Historical Provider, MD   levETIRAcetam (KEPPRA) 500 MG tablet Take 500 mg by mouth 2 times daily    Historical Provider, MD   magnesium oxide (MAG-OX) 400 MG tablet Take 400 mg by mouth daily    Historical Provider, MD   NOVOLIN 70/30 (70-30) 100 UNIT/ML injection vial Inject 5 Units into the skin every morning 8/16/22   Historical Provider, MD   nystatin (MYCOSTATIN) 777085 UNIT/GM powder Apply topically as needed Apply topically 4 times daily. Historical Provider, MD   Calcium Carbonate-Vitamin D (OS-JO  + D PO) Take by mouth    Historical Provider, MD   polyethylene glycol (MIRALAX) 17 g PACK packet Take 17 g by mouth as needed    Historical Provider, MD   sodium bicarbonate 650 MG tablet Take 650 mg by mouth daily    Historical Provider, MD   clopidogrel (PLAVIX) 75 MG tablet Take 75 mg by mouth daily    Historical Provider, MD   sertraline (ZOLOFT) 50 MG tablet Take 50 mg by mouth daily    Historical Provider, MD   NIFEdipine (ADALAT CC) 60 MG extended release tablet Take 60 mg by mouth daily    Historical Provider, MD   pantoprazole (PROTONIX) 40 MG tablet Take 40 mg by mouth 2 times daily    Historical Provider, MD   sucralfate (CARAFATE) 1 GM tablet Take 1 g by mouth 3 times daily. Historical Provider, MD   Multiple Vitamin (MULTI-VITAMIN PO) Take  by mouth. Historical Provider, MD   Ascorbic Acid (VITAMIN C) 500 MG tablet Take 500 mg by mouth daily. Historical Provider, MD   insulin aspart (NOVOLOG) 100 UNIT/ML injection Inject  into the skin 3 times daily (before meals). Historical Provider, MD   atorvastatin (LIPITOR) 40 MG tablet Take 40 mg by mouth nightly    Historical Provider, MD   nitroGLYCERIN (NITROSTAT) 0.4 MG SL tablet Place 0.4 mg under the tongue every 5 minutes as needed. Historical Provider, MD   CycloSPORINE (RESTASIS OP) Apply  to eye.       Historical Provider, MD   SELENIUM PO Take 200 mcg by mouth daily    Historical Provider, MD       Past Medical History:  Past Medical History:   Diagnosis Date    Anxiety     CAD (coronary artery disease)     Cataract     Chronic kidney disease     Colon polyp     Constipation     COPD (chronic obstructive pulmonary disease) (HCC)     DDD (degenerative disc disease)     Glaucoma     Hyperlipidemia     Hypertension     Irritable bowel syndrome     Low back pain     Obesity     Palliative care patient 02/05/2019    PUD (peptic ulcer disease)     Type II or unspecified type diabetes mellitus without mention of complication, not stated as uncontrolled     Unspecified sleep apnea     Venous insufficiency        Past Surgical History:  Past Surgical History:   Procedure Laterality Date    CHOLECYSTECTOMY      with Spleen repair    COLONOSCOPY  07/21/2011    Dr Jas Dozier cecal AVM, otherwise normal    COLONOSCOPY  03/28/2006    Dr Damian Sanders x 1, 5 yr recall, recommended CT for abd pain    COLONOSCOPY  08/28/2014    Dr Roula Dominguez colitis, 10 yr recall    DILATION AND CURETTAGE OF UTERUS      x3    UPPER GASTROINTESTINAL ENDOSCOPY  07/28/2011    Dr Loulou Schultz gastric ulcers, gastritis, esophagitis, small HH    UPPER GASTROINTESTINAL ENDOSCOPY  06/27/2006    Dr June Alfred, urea neg       Social History:  Social History     Tobacco Use    Smoking status: Never    Smokeless tobacco: Never   Substance Use Topics    Alcohol use: No    Drug use: No       Vital Signs:   Vitals:    10/03/22 0919   BP: (!) 154/69   Pulse: 73   Resp: 18   Temp: 98.1 °F (36.7 °C)   SpO2: 100%        Physical Exam:  Cardiac:  [x]WNL  []Comments:  Pulmonary:  [x]WNL   []Comments:  Neuro/Mental Status:  [x]WNL  []Comments:  Abdominal:  [x]WNL    []Comments:  Other:   []WNL  []Comments:    Informed Consent:  The risks and benefits of the procedure have been discussed with either the patient or if they cannot consent, their representative. Assessment:  Patient examined and appropriate for planned sedation and procedure. Plan:  Proceed with planned sedation and procedure as above.          Storm Trujillo MD 0

## 2022-11-08 ENCOUNTER — APPOINTMENT (OUTPATIENT)
Dept: CT IMAGING | Facility: HOSPITAL | Age: 77
End: 2022-11-08

## 2022-11-08 ENCOUNTER — HOSPITAL ENCOUNTER (OUTPATIENT)
Facility: HOSPITAL | Age: 77
Setting detail: OBSERVATION
LOS: 1 days | Discharge: NURSING, REHAB OR LONG-TERM CARE | End: 2022-11-11
Attending: STUDENT IN AN ORGANIZED HEALTH CARE EDUCATION/TRAINING PROGRAM | Admitting: INTERNAL MEDICINE

## 2022-11-08 DIAGNOSIS — N12 PYELONEPHRITIS: ICD-10-CM

## 2022-11-08 DIAGNOSIS — R53.1 GENERALIZED WEAKNESS: ICD-10-CM

## 2022-11-08 DIAGNOSIS — I21.4 NSTEMI (NON-ST ELEVATED MYOCARDIAL INFARCTION): Primary | ICD-10-CM

## 2022-11-08 DIAGNOSIS — N18.4 STAGE 4 CHRONIC KIDNEY DISEASE: ICD-10-CM

## 2022-11-08 DIAGNOSIS — E11.65 TYPE 2 DIABETES MELLITUS WITH HYPERGLYCEMIA, UNSPECIFIED WHETHER LONG TERM INSULIN USE: ICD-10-CM

## 2022-11-08 DIAGNOSIS — R11.2 NAUSEA AND VOMITING, UNSPECIFIED VOMITING TYPE: ICD-10-CM

## 2022-11-08 DIAGNOSIS — Z74.09 IMPAIRED MOBILITY: ICD-10-CM

## 2022-11-08 PROBLEM — R10.9 ABDOMINAL PAIN: Status: ACTIVE | Noted: 2022-11-08

## 2022-11-08 PROBLEM — R41.82 ALTERED MENTAL STATUS: Status: RESOLVED | Noted: 2019-12-16 | Resolved: 2022-11-08

## 2022-11-08 LAB
ALBUMIN SERPL-MCNC: 4.4 G/DL (ref 3.5–5.2)
ALBUMIN/GLOB SERPL: 1.5 G/DL
ALP SERPL-CCNC: 77 U/L (ref 39–117)
ALT SERPL W P-5'-P-CCNC: 10 U/L (ref 1–33)
ANION GAP SERPL CALCULATED.3IONS-SCNC: 9 MMOL/L (ref 5–15)
AST SERPL-CCNC: 17 U/L (ref 1–32)
BACTERIA UR QL AUTO: ABNORMAL /HPF
BASOPHILS # BLD AUTO: 0.04 10*3/MM3 (ref 0–0.2)
BASOPHILS NFR BLD AUTO: 0.6 % (ref 0–1.5)
BILIRUB SERPL-MCNC: <0.2 MG/DL (ref 0–1.2)
BILIRUB UR QL STRIP: NEGATIVE
BUN SERPL-MCNC: 55 MG/DL (ref 8–23)
BUN/CREAT SERPL: 16.1 (ref 7–25)
CALCIUM SPEC-SCNC: 9.5 MG/DL (ref 8.6–10.5)
CHLORIDE SERPL-SCNC: 99 MMOL/L (ref 98–107)
CLARITY UR: CLEAR
CO2 SERPL-SCNC: 29 MMOL/L (ref 22–29)
COLOR UR: YELLOW
CREAT SERPL-MCNC: 3.41 MG/DL (ref 0.57–1)
D-LACTATE SERPL-SCNC: 1.4 MMOL/L (ref 0.5–2)
D-LACTATE SERPL-SCNC: 2.2 MMOL/L (ref 0.5–2)
D-LACTATE SERPL-SCNC: 2.4 MMOL/L (ref 0.5–2)
DEPRECATED RDW RBC AUTO: 40.4 FL (ref 37–54)
EGFRCR SERPLBLD CKD-EPI 2021: 13.2 ML/MIN/1.73
EOSINOPHIL # BLD AUTO: 0.32 10*3/MM3 (ref 0–0.4)
EOSINOPHIL NFR BLD AUTO: 4.7 % (ref 0.3–6.2)
ERYTHROCYTE [DISTWIDTH] IN BLOOD BY AUTOMATED COUNT: 12.3 % (ref 12.3–15.4)
GLOBULIN UR ELPH-MCNC: 2.9 GM/DL
GLUCOSE SERPL-MCNC: 287 MG/DL (ref 65–99)
GLUCOSE UR STRIP-MCNC: ABNORMAL MG/DL
HCT VFR BLD AUTO: 29 % (ref 34–46.6)
HGB BLD-MCNC: 9.6 G/DL (ref 12–15.9)
HGB UR QL STRIP.AUTO: NEGATIVE
HOLD SPECIMEN: NORMAL
HYALINE CASTS UR QL AUTO: ABNORMAL /LPF
IMM GRANULOCYTES # BLD AUTO: 0.02 10*3/MM3 (ref 0–0.05)
IMM GRANULOCYTES NFR BLD AUTO: 0.3 % (ref 0–0.5)
KETONES UR QL STRIP: NEGATIVE
LEUKOCYTE ESTERASE UR QL STRIP.AUTO: ABNORMAL
LIPASE SERPL-CCNC: 31 U/L (ref 13–60)
LYMPHOCYTES # BLD AUTO: 1.52 10*3/MM3 (ref 0.7–3.1)
LYMPHOCYTES NFR BLD AUTO: 22.2 % (ref 19.6–45.3)
MCH RBC QN AUTO: 30 PG (ref 26.6–33)
MCHC RBC AUTO-ENTMCNC: 33.1 G/DL (ref 31.5–35.7)
MCV RBC AUTO: 90.6 FL (ref 79–97)
MONOCYTES # BLD AUTO: 0.67 10*3/MM3 (ref 0.1–0.9)
MONOCYTES NFR BLD AUTO: 9.8 % (ref 5–12)
NEUTROPHILS NFR BLD AUTO: 4.28 10*3/MM3 (ref 1.7–7)
NEUTROPHILS NFR BLD AUTO: 62.4 % (ref 42.7–76)
NITRITE UR QL STRIP: POSITIVE
NRBC BLD AUTO-RTO: 0 /100 WBC (ref 0–0.2)
PH UR STRIP.AUTO: 7 [PH] (ref 5–8)
PLATELET # BLD AUTO: 241 10*3/MM3 (ref 140–450)
PMV BLD AUTO: 9.3 FL (ref 6–12)
POTASSIUM SERPL-SCNC: 4.9 MMOL/L (ref 3.5–5.2)
PROT SERPL-MCNC: 7.3 G/DL (ref 6–8.5)
PROT UR QL STRIP: ABNORMAL
RBC # BLD AUTO: 3.2 10*6/MM3 (ref 3.77–5.28)
RBC # UR STRIP: ABNORMAL /HPF
REF LAB TEST METHOD: ABNORMAL
SODIUM SERPL-SCNC: 137 MMOL/L (ref 136–145)
SP GR UR STRIP: 1.02 (ref 1–1.03)
SQUAMOUS #/AREA URNS HPF: ABNORMAL /HPF
TROPONIN T SERPL-MCNC: 0.04 NG/ML (ref 0–0.03)
TROPONIN T SERPL-MCNC: 0.05 NG/ML (ref 0–0.03)
UROBILINOGEN UR QL STRIP: ABNORMAL
WBC # UR STRIP: ABNORMAL /HPF
WBC NRBC COR # BLD: 6.85 10*3/MM3 (ref 3.4–10.8)
WHOLE BLOOD HOLD COAG: NORMAL
WHOLE BLOOD HOLD SPECIMEN: NORMAL
YEAST URNS QL MICRO: ABNORMAL /HPF

## 2022-11-08 PROCEDURE — 96375 TX/PRO/DX INJ NEW DRUG ADDON: CPT

## 2022-11-08 PROCEDURE — 36415 COLL VENOUS BLD VENIPUNCTURE: CPT

## 2022-11-08 PROCEDURE — 85025 COMPLETE CBC W/AUTO DIFF WBC: CPT | Performed by: STUDENT IN AN ORGANIZED HEALTH CARE EDUCATION/TRAINING PROGRAM

## 2022-11-08 PROCEDURE — 96376 TX/PRO/DX INJ SAME DRUG ADON: CPT

## 2022-11-08 PROCEDURE — 96372 THER/PROPH/DIAG INJ SC/IM: CPT

## 2022-11-08 PROCEDURE — 87147 CULTURE TYPE IMMUNOLOGIC: CPT | Performed by: STUDENT IN AN ORGANIZED HEALTH CARE EDUCATION/TRAINING PROGRAM

## 2022-11-08 PROCEDURE — 99285 EMERGENCY DEPT VISIT HI MDM: CPT

## 2022-11-08 PROCEDURE — 80053 COMPREHEN METABOLIC PANEL: CPT | Performed by: STUDENT IN AN ORGANIZED HEALTH CARE EDUCATION/TRAINING PROGRAM

## 2022-11-08 PROCEDURE — G0378 HOSPITAL OBSERVATION PER HR: HCPCS

## 2022-11-08 PROCEDURE — 96365 THER/PROPH/DIAG IV INF INIT: CPT

## 2022-11-08 PROCEDURE — 84484 ASSAY OF TROPONIN QUANT: CPT | Performed by: STUDENT IN AN ORGANIZED HEALTH CARE EDUCATION/TRAINING PROGRAM

## 2022-11-08 PROCEDURE — 25010000002 CEFTRIAXONE PER 250 MG: Performed by: STUDENT IN AN ORGANIZED HEALTH CARE EDUCATION/TRAINING PROGRAM

## 2022-11-08 PROCEDURE — 63710000001 INSULIN ISOPHANE & REGULAR PER 5 UNITS: Performed by: INTERNAL MEDICINE

## 2022-11-08 PROCEDURE — 83605 ASSAY OF LACTIC ACID: CPT | Performed by: STUDENT IN AN ORGANIZED HEALTH CARE EDUCATION/TRAINING PROGRAM

## 2022-11-08 PROCEDURE — 87186 SC STD MICRODIL/AGAR DIL: CPT | Performed by: STUDENT IN AN ORGANIZED HEALTH CARE EDUCATION/TRAINING PROGRAM

## 2022-11-08 PROCEDURE — 87040 BLOOD CULTURE FOR BACTERIA: CPT | Performed by: STUDENT IN AN ORGANIZED HEALTH CARE EDUCATION/TRAINING PROGRAM

## 2022-11-08 PROCEDURE — 93005 ELECTROCARDIOGRAM TRACING: CPT | Performed by: STUDENT IN AN ORGANIZED HEALTH CARE EDUCATION/TRAINING PROGRAM

## 2022-11-08 PROCEDURE — 81001 URINALYSIS AUTO W/SCOPE: CPT | Performed by: STUDENT IN AN ORGANIZED HEALTH CARE EDUCATION/TRAINING PROGRAM

## 2022-11-08 PROCEDURE — P9612 CATHETERIZE FOR URINE SPEC: HCPCS

## 2022-11-08 PROCEDURE — 25010000002 ONDANSETRON PER 1 MG: Performed by: STUDENT IN AN ORGANIZED HEALTH CARE EDUCATION/TRAINING PROGRAM

## 2022-11-08 PROCEDURE — 74176 CT ABD & PELVIS W/O CONTRAST: CPT

## 2022-11-08 PROCEDURE — 93010 ELECTROCARDIOGRAM REPORT: CPT | Performed by: INTERNAL MEDICINE

## 2022-11-08 PROCEDURE — 25010000002 HEPARIN (PORCINE) PER 1000 UNITS: Performed by: INTERNAL MEDICINE

## 2022-11-08 PROCEDURE — 87086 URINE CULTURE/COLONY COUNT: CPT | Performed by: STUDENT IN AN ORGANIZED HEALTH CARE EDUCATION/TRAINING PROGRAM

## 2022-11-08 PROCEDURE — 83690 ASSAY OF LIPASE: CPT | Performed by: STUDENT IN AN ORGANIZED HEALTH CARE EDUCATION/TRAINING PROGRAM

## 2022-11-08 RX ORDER — NIFEDIPINE 60 MG/1
60 TABLET, EXTENDED RELEASE ORAL EVERY MORNING
COMMUNITY
End: 2022-11-11 | Stop reason: HOSPADM

## 2022-11-08 RX ORDER — ATENOLOL 50 MG/1
50 TABLET ORAL DAILY
Status: DISCONTINUED | OUTPATIENT
Start: 2022-11-09 | End: 2022-11-09

## 2022-11-08 RX ORDER — POLYETHYLENE GLYCOL 3350 17 G/17G
17 POWDER, FOR SOLUTION ORAL DAILY
Status: DISCONTINUED | OUTPATIENT
Start: 2022-11-09 | End: 2022-11-11 | Stop reason: HOSPADM

## 2022-11-08 RX ORDER — HYDRALAZINE HYDROCHLORIDE 25 MG/1
25 TABLET, FILM COATED ORAL 4 TIMES DAILY
COMMUNITY
End: 2022-11-11 | Stop reason: HOSPADM

## 2022-11-08 RX ORDER — ONDANSETRON 2 MG/ML
4 INJECTION INTRAMUSCULAR; INTRAVENOUS ONCE
Status: COMPLETED | OUTPATIENT
Start: 2022-11-08 | End: 2022-11-08

## 2022-11-08 RX ORDER — INSULIN ASPART 100 [IU]/ML
5 INJECTION, SOLUTION INTRAVENOUS; SUBCUTANEOUS
COMMUNITY
End: 2022-11-11 | Stop reason: HOSPADM

## 2022-11-08 RX ORDER — GLUCAGON INJECTION, SOLUTION 1 MG/.2ML
1 INJECTION, SOLUTION SUBCUTANEOUS 3 TIMES DAILY PRN
COMMUNITY

## 2022-11-08 RX ORDER — FERROUS SULFATE 325(65) MG
325 TABLET ORAL
Status: DISCONTINUED | OUTPATIENT
Start: 2022-11-09 | End: 2022-11-11 | Stop reason: HOSPADM

## 2022-11-08 RX ORDER — HEPARIN SODIUM 5000 [USP'U]/ML
5000 INJECTION, SOLUTION INTRAVENOUS; SUBCUTANEOUS EVERY 12 HOURS SCHEDULED
Status: DISCONTINUED | OUTPATIENT
Start: 2022-11-08 | End: 2022-11-11 | Stop reason: HOSPADM

## 2022-11-08 RX ORDER — MAGNESIUM OXIDE 400 MG/1
400 TABLET ORAL 3 TIMES DAILY
COMMUNITY
End: 2022-11-11 | Stop reason: HOSPADM

## 2022-11-08 RX ORDER — LEVETIRACETAM 500 MG/1
500 TABLET ORAL 2 TIMES DAILY
COMMUNITY

## 2022-11-08 RX ORDER — CLOPIDOGREL BISULFATE 75 MG/1
75 TABLET ORAL DAILY
Status: DISCONTINUED | OUTPATIENT
Start: 2022-11-09 | End: 2022-11-11 | Stop reason: HOSPADM

## 2022-11-08 RX ORDER — CYCLOSPORINE 0.5 MG/ML
1 EMULSION OPHTHALMIC 2 TIMES DAILY
Status: DISCONTINUED | OUTPATIENT
Start: 2022-11-08 | End: 2022-11-11 | Stop reason: HOSPADM

## 2022-11-08 RX ORDER — IPRATROPIUM BROMIDE AND ALBUTEROL SULFATE 2.5; .5 MG/3ML; MG/3ML
3 SOLUTION RESPIRATORY (INHALATION) DAILY PRN
Status: DISCONTINUED | OUTPATIENT
Start: 2022-11-08 | End: 2022-11-11 | Stop reason: HOSPADM

## 2022-11-08 RX ORDER — SODIUM BICARBONATE 650 MG/1
650 TABLET ORAL 2 TIMES DAILY
COMMUNITY
End: 2022-11-11 | Stop reason: HOSPADM

## 2022-11-08 RX ORDER — SODIUM CHLORIDE 0.9 % (FLUSH) 0.9 %
10 SYRINGE (ML) INJECTION AS NEEDED
Status: DISCONTINUED | OUTPATIENT
Start: 2022-11-08 | End: 2022-11-11 | Stop reason: HOSPADM

## 2022-11-08 RX ORDER — SUCRALFATE 1 G/1
1 TABLET ORAL
COMMUNITY
End: 2022-11-11 | Stop reason: HOSPADM

## 2022-11-08 RX ORDER — ATORVASTATIN CALCIUM 40 MG/1
40 TABLET, FILM COATED ORAL NIGHTLY
COMMUNITY

## 2022-11-08 RX ORDER — LORATADINE ORAL 5 MG/5ML
10 SOLUTION ORAL EVERY MORNING
COMMUNITY
End: 2022-11-11 | Stop reason: HOSPADM

## 2022-11-08 RX ORDER — NICOTINE POLACRILEX 4 MG
15 LOZENGE BUCCAL
COMMUNITY

## 2022-11-08 RX ORDER — ATORVASTATIN CALCIUM 10 MG/1
10 TABLET, FILM COATED ORAL DAILY
Status: DISCONTINUED | OUTPATIENT
Start: 2022-11-09 | End: 2022-11-11 | Stop reason: HOSPADM

## 2022-11-08 RX ORDER — MULTIPLE VITAMINS W/ MINERALS TAB 9MG-400MCG
1 TAB ORAL DAILY
Status: DISCONTINUED | OUTPATIENT
Start: 2022-11-09 | End: 2022-11-11 | Stop reason: HOSPADM

## 2022-11-08 RX ORDER — DOCUSATE SODIUM LIQUID 100 MG/10ML
100 LIQUID ORAL EVERY MORNING
COMMUNITY

## 2022-11-08 RX ORDER — SODIUM CHLORIDE 0.9 % (FLUSH) 0.9 %
10 SYRINGE (ML) INJECTION EVERY 12 HOURS SCHEDULED
Status: DISCONTINUED | OUTPATIENT
Start: 2022-11-08 | End: 2022-11-11 | Stop reason: HOSPADM

## 2022-11-08 RX ORDER — HOMOSALATE, MERADIMATE, OCTINOXATE, ZINC OXIDE, AND OCTOCRYLENE 100; 50; 50; 20; 63 MG/ML; MG/ML; MG/ML; MG/ML; MG/ML
1 LOTION TOPICAL DAILY
COMMUNITY

## 2022-11-08 RX ORDER — HYDRALAZINE HYDROCHLORIDE 50 MG/1
50 TABLET, FILM COATED ORAL EVERY 8 HOURS SCHEDULED
Status: DISCONTINUED | OUTPATIENT
Start: 2022-11-08 | End: 2022-11-09

## 2022-11-08 RX ORDER — PANTOPRAZOLE SODIUM 40 MG/1
40 TABLET, DELAYED RELEASE ORAL
Status: DISCONTINUED | OUTPATIENT
Start: 2022-11-08 | End: 2022-11-11 | Stop reason: HOSPADM

## 2022-11-08 RX ORDER — AMOXICILLIN 250 MG
2 CAPSULE ORAL 2 TIMES DAILY
Status: DISCONTINUED | OUTPATIENT
Start: 2022-11-08 | End: 2022-11-11 | Stop reason: HOSPADM

## 2022-11-08 RX ORDER — NITROGLYCERIN 0.4 MG/1
0.4 TABLET SUBLINGUAL
Status: DISCONTINUED | OUTPATIENT
Start: 2022-11-08 | End: 2022-11-11 | Stop reason: HOSPADM

## 2022-11-08 RX ORDER — ONDANSETRON 2 MG/ML
4 INJECTION INTRAMUSCULAR; INTRAVENOUS EVERY 6 HOURS PRN
Status: DISCONTINUED | OUTPATIENT
Start: 2022-11-08 | End: 2022-11-11 | Stop reason: HOSPADM

## 2022-11-08 RX ORDER — LEVETIRACETAM 100 MG/ML
500 SOLUTION ORAL EVERY 12 HOURS SCHEDULED
Status: DISCONTINUED | OUTPATIENT
Start: 2022-11-08 | End: 2022-11-11 | Stop reason: HOSPADM

## 2022-11-08 RX ORDER — BISACODYL 10 MG
10 SUPPOSITORY, RECTAL RECTAL DAILY
Status: DISCONTINUED | OUTPATIENT
Start: 2022-11-08 | End: 2022-11-11 | Stop reason: HOSPADM

## 2022-11-08 RX ORDER — TRAMADOL HYDROCHLORIDE 50 MG/1
50 TABLET ORAL EVERY 6 HOURS PRN
Status: DISCONTINUED | OUTPATIENT
Start: 2022-11-08 | End: 2022-11-11 | Stop reason: HOSPADM

## 2022-11-08 RX ORDER — AMLODIPINE BESYLATE 10 MG/1
10 TABLET ORAL
Status: DISCONTINUED | OUTPATIENT
Start: 2022-11-09 | End: 2022-11-09

## 2022-11-08 RX ORDER — CLONIDINE HYDROCHLORIDE 0.1 MG/1
0.1 TABLET ORAL EVERY 12 HOURS SCHEDULED
Status: DISCONTINUED | OUTPATIENT
Start: 2022-11-08 | End: 2022-11-09

## 2022-11-08 RX ORDER — CETIRIZINE HYDROCHLORIDE 10 MG/1
10 TABLET ORAL DAILY
Status: DISCONTINUED | OUTPATIENT
Start: 2022-11-09 | End: 2022-11-11 | Stop reason: HOSPADM

## 2022-11-08 RX ADMIN — SODIUM CHLORIDE 500 ML: 9 INJECTION, SOLUTION INTRAVENOUS at 06:48

## 2022-11-08 RX ADMIN — CEFTRIAXONE SODIUM 2 G: 2 INJECTION, POWDER, FOR SOLUTION INTRAMUSCULAR; INTRAVENOUS at 04:51

## 2022-11-08 RX ADMIN — PANTOPRAZOLE SODIUM 40 MG: 40 TABLET, DELAYED RELEASE ORAL at 18:11

## 2022-11-08 RX ADMIN — ONDANSETRON 4 MG: 2 INJECTION INTRAMUSCULAR; INTRAVENOUS at 06:48

## 2022-11-08 RX ADMIN — CYCLOSPORINE 1 DROP: 0.5 EMULSION OPHTHALMIC at 20:44

## 2022-11-08 RX ADMIN — ONDANSETRON 4 MG: 2 INJECTION INTRAMUSCULAR; INTRAVENOUS at 02:17

## 2022-11-08 RX ADMIN — HUMAN INSULIN 15 UNITS: 100 INJECTION, SUSPENSION SUBCUTANEOUS at 19:02

## 2022-11-08 RX ADMIN — HYDRALAZINE HYDROCHLORIDE 50 MG: 50 TABLET, FILM COATED ORAL at 22:00

## 2022-11-08 RX ADMIN — Medication 10 ML: at 20:43

## 2022-11-08 RX ADMIN — DICLOFENAC 2 G: 10 GEL TOPICAL at 20:45

## 2022-11-08 RX ADMIN — DOCUSATE SODIUM 50 MG AND SENNOSIDES 8.6 MG 2 TABLET: 8.6; 5 TABLET, FILM COATED ORAL at 20:43

## 2022-11-08 RX ADMIN — SODIUM CHLORIDE 500 ML: 9 INJECTION, SOLUTION INTRAVENOUS at 02:17

## 2022-11-08 RX ADMIN — CLONIDINE HYDROCHLORIDE 0.1 MG: 0.1 TABLET ORAL at 20:43

## 2022-11-08 RX ADMIN — HEPARIN SODIUM 5000 UNITS: 5000 INJECTION, SOLUTION INTRAVENOUS; SUBCUTANEOUS at 16:28

## 2022-11-08 RX ADMIN — HEPARIN SODIUM 5000 UNITS: 5000 INJECTION, SOLUTION INTRAVENOUS; SUBCUTANEOUS at 22:00

## 2022-11-08 RX ADMIN — LEVETIRACETAM 500 MG: 100 SOLUTION ORAL at 20:43

## 2022-11-08 NOTE — PLAN OF CARE
Goal Outcome Evaluation:  Plan of Care Reviewed With: patient        Progress: no change   VSS, pt denies uncontrolled pain or nausea, external catheter in place, skin issues charted, tolerating diet, no family at the bedside, pt has no further questions or concerns at this time, safety maintained, will continue to monitor.

## 2022-11-08 NOTE — ED PROVIDER NOTES
EMERGENCY DEPARTMENT HISTORY AND PHYSICAL EXAM    Patient Name: Gregoria Bennett    Chief Complaint   Patient presents with   • Abdominal Pain   • Vomiting       History of Presenting Illness:  Gregoria Bennett is a 77 y.o. female with history of prior cholecystectomy presents emerged department due to generalized abdominal pain and vomiting.    Patient brought in by EMS from nursing facility.  Patient been complaining of abdominal pain through the day with intermittent episodes of vomiting with.  Reportedly some brown discoloration of vomit.  Last bowel movement was reportedly 4 days ago.  On review patient complains of generalized abdominal pain.  Denies any fever chills or chest pain or shortness of breath.  Describes as moderate in nature.      Past Medical History:   Past Medical History:   Diagnosis Date   • Chronic kidney disease, stage 4 (severe) (CMS/HCC)    • COPD (chronic obstructive pulmonary disease) (CMS/HCC)    • Coronary artery disease    • Dementia (CMS/HCC)    • Diabetes mellitus (CMS/HCC)    • History of dermatomyositis    • Hypertension    • Peptic ulcer disease    • Pulmonary disease    • Renal insufficiency    • Urinary tract infection    • Venous insufficiency        Past Surgical History:   Past Surgical History:   Procedure Laterality Date   • CHOLECYSTECTOMY     • COLON SURGERY         Family History:   Family History   Problem Relation Age of Onset   • Breast cancer Neg Hx        Social History:   Denies tobacco  Denies EtOH  Denies marijuana, cocaine, or IV drugs  Lives in nursing home    Allergies:   Allergies   Allergen Reactions   • Aspirin Rash   • Tetracyclines & Related Rash       Medications:     Current Facility-Administered Medications:   •  ondansetron (ZOFRAN) injection 4 mg, 4 mg, Intravenous, Once, Miguel Angel Vega MD  •  sodium chloride 0.9 % bolus 500 mL, 500 mL, Intravenous, Once, Miguel Angel Vega MD  •  [COMPLETED] Insert peripheral IV, , , Once **AND** sodium chloride 0.9  "% flush 10 mL, 10 mL, Intravenous, PRN, Miguel Angel Vega MD    Current Outpatient Medications:   •  acetaminophen (TYLENOL) 325 MG tablet, Take 650 mg by mouth Every 6 (Six) Hours As Needed for Mild Pain  or Fever., Disp: , Rfl:   •  amLODIPine (NORVASC) 10 MG tablet, Take 1 tablet by mouth Daily., Disp: 30 tablet, Rfl: 0  •  atenolol (TENORMIN) 50 MG tablet, Take 1 tablet by mouth Daily., Disp: , Rfl:   •  atorvastatin (LIPITOR) 10 MG tablet, Take 10 mg by mouth daily., Disp: , Rfl:   •  cetirizine (zyrTEC) 10 MG tablet, Take 10 mg by mouth Daily., Disp: , Rfl:   •  cloNIDine (CATAPRES) 0.1 MG tablet, Take 1 tablet by mouth Every 12 (Twelve) Hours., Disp: 60 tablet, Rfl: 0  •  clopidogrel (PLAVIX) 75 MG tablet, Take 75 mg by mouth daily., Disp: , Rfl:   •  Cranberry 200 MG capsule, Take 200 mg by mouth Every Morning., Disp: , Rfl:   •  cycloSPORINE (RESTASIS) 0.05 % ophthalmic emulsion, Administer 1 drop to both eyes 2 (Two) Times a Day. \"Once between 9823-9896 and again between 4331-7217\", Disp: , Rfl:   •  diclofenac (VOLTAREN) 1 % gel gel, Apply 2 g topically to the appropriate area as directed 3 (Three) Times a Day. \"Once between 3917-1555, once between 2516-8431 and again between 9825-7386\" , Disp: , Rfl:   •  docusate sodium (COLACE) 100 MG capsule, Take 100 mg by mouth Every Morning., Disp: , Rfl:   •  ferrous sulfate 325 (65 FE) MG tablet, Take 325 mg by mouth Daily With Breakfast., Disp: , Rfl:   •  guaiFENesin (MUCINEX) 600 MG 12 hr tablet, Take 600 mg by mouth 2 (Two) Times a Day. \"Once between 2806-9964 and again between 7115-5047\", Disp: , Rfl:   •  hydrALAZINE (APRESOLINE) 50 MG tablet, Take 1 tablet by mouth Every 8 (Eight) Hours., Disp: 90 tablet, Rfl: 0  •  insulin aspart (novoLOG) 100 UNIT/ML injection, Inject per sliding scale AC and QHS: 200-219= 5 units 220-239= 6 units 240-259= 7 units 260-279= 9 units 300-319= 10 units 320-339= 11 units 340-359= 12 units 360-379= 13 units 380-399= 14 units " ">400=, Disp: , Rfl:   •  insulin NPH-insulin regular (humuLIN 70/30,novoLIN 70/30) (70-30) 100 UNIT/ML injection, Inject 15 Units under the skin into the appropriate area as directed. \"Once between 9398-3448 and again between 2324-6679\", Disp: , Rfl:   •  ipratropium-albuterol (DUO-NEB) 0.5-2.5 mg/3 ml nebulizer, Take 3 mL by nebulization Daily As Needed for Wheezing., Disp: , Rfl:   •  levETIRAcetam (KEPPRA) 100 MG/ML solution, Take 5 mL by mouth Every 12 (Twelve) Hours., Disp: 473 mL, Rfl: 0  •  Multiple Vitamin (TAB-A-JACKY) tablet, Take 1 tablet by mouth Daily., Disp: , Rfl:   •  nitroglycerin (NITROSTAT) 0.4 MG SL tablet, Place 0.4 mg under the tongue Every 5 (Five) Minutes As Needed for Chest Pain. Take no more than 3 doses in 15 minutes., Disp: , Rfl:   •  nystatin (nystatin) 162074 UNIT/GM powder, Apply 1 application topically to the appropriate area as directed 2 (Two) Times a Day As Needed (\"skin picking\")., Disp: , Rfl:   •  pantoprazole (PROTONIX) 40 MG EC tablet, Take 40 mg by mouth 2 (Two) Times a Day. \"Once between 8056-1688 and again between 8963-1151\", Disp: , Rfl:   •  polyethylene glycol (MIRALAX) packet, Take 17 g by mouth Daily As Needed (constipation)., Disp: , Rfl:   •  potassium chloride (K-DUR,KLOR-CON) 10 MEQ CR tablet, Take 10 mEq by mouth Every Morning., Disp: , Rfl:   •  Selenium 200 MCG tablet, Take 200 mcg by mouth Every Morning., Disp: , Rfl:   •  sertraline (ZOLOFT) 25 MG tablet, Take 25 mg by mouth Every Morning., Disp: , Rfl:   •  traMADol (ULTRAM) 50 MG tablet, Take 1 tablet by mouth Every 6 (Six) Hours As Needed for Moderate Pain ., Disp: 9 tablet, Rfl: 0  •  triamcinolone (KENALOG) 0.1 % cream, Apply 1 application topically to the appropriate area as directed 3 (Three) Times a Day As Needed (itching)., Disp: , Rfl:   •  Triamcinolone Acetonide (NASACORT) 55 MCG/ACT nasal inhaler, 1 spray into the nostril(s) as directed by provider Daily., Disp: , Rfl:   •  vitamin C (ASCORBIC " "ACID) 500 MG tablet, Take 500 mg by mouth Daily., Disp: , Rfl:   •  vitamin D (ERGOCALCIFEROL) 1.25 MG (68701 UT) capsule capsule, Take 50,000 Units by mouth 1 (One) Time Per Week. Wednesday, Disp: , Rfl:   •  vitamin E 1000 UNIT capsule, Take 1,000 Units by mouth Daily., Disp: , Rfl:     Review of Systems:  A full review of systems was obtained and is negative unless otherwise stated in HPI.    Physical Exam:  VS: /52   Pulse 67   Temp 98.1 °F (36.7 °C) (Oral)   Resp 20   Ht 152.4 cm (60\")   Wt 72.6 kg (160 lb)   SpO2 96%   BMI 31.25 kg/m²   GENERAL: [place] ill-appearing early woman sitting up in stretcher no acute distress; well nourished, well developed, awake, alert, no acute distress, nontoxic appearing, comfortable  EYES: PERRL, sclera anicteric, extra-occular movements grossly intact, symmetric lids  EARS, NOSE, MOUTH, THROAT: atraumatic external nose and ears, moist mucous membranes  NECK: Symmetric, trachea midline, no thyromegaly, no adenopathy, no meningismus  RESPIRATORY: Unlabored respiratory effort, clear to auscultation bilaterally, good air movement  CARDIOVASCULAR: No murmurs or gallops, peripheral pulses 2+ and equal in all extremities  GI: Soft, moderate generalized abdominal pain, nondistended, bowel sounds present, no hepatosplenomegaly  LYMPHATIC: no lymphadenopathy  MUSCULOSKELETAL/EXTREMITIES: Extremities without obvious deformity, no cyanosis or clubbing  SKIN: warm and dry with no obvious rashes  NEUROLOGIC: moving all 4 extremities symmetrically, CN II-XII grossly intact  PSYCHIATRIC: alert, pleasant and cooperative. Appropriate mood and affect.      Labs:  Labs Reviewed   COMPREHENSIVE METABOLIC PANEL - Abnormal; Notable for the following components:       Result Value    Glucose 287 (*)     BUN 55 (*)     Creatinine 3.41 (*)     eGFR 13.2 (*)     All other components within normal limits    Narrative:     GFR Normal >60  Chronic Kidney Disease <60  Kidney Failure <15    The " GFR formula is only valid for adults with stable renal function between ages 18 and 70.   URINALYSIS W/ CULTURE IF INDICATED - Abnormal; Notable for the following components:    Glucose,  mg/dL (1+) (*)     Protein, UA 30 mg/dL (1+) (*)     Leuk Esterase, UA Large (3+) (*)     Nitrite, UA Positive (*)     All other components within normal limits    Narrative:     In absence of clinical symptoms, the presence of pyuria, bacteria, and/or nitrites on the urinalysis result does not correlate with infection.   CBC WITH AUTO DIFFERENTIAL - Abnormal; Notable for the following components:    RBC 3.20 (*)     Hemoglobin 9.6 (*)     Hematocrit 29.0 (*)     All other components within normal limits   TROPONIN (IN-HOUSE) - Abnormal; Notable for the following components:    Troponin T 0.054 (*)     All other components within normal limits    Narrative:     Troponin T Reference Range:  <= 0.03 ng/mL-   Negative for AMI  >0.03 ng/mL-     Abnormal for myocardial necrosis.  Clinicians would have to utilize clinical acumen, EKG, Troponin and serial changes to determine if it is an Acute Myocardial Infarction or myocardial injury due to an underlying chronic condition.       Results may be falsely decreased if patient taking Biotin.     URINALYSIS, MICROSCOPIC ONLY - Abnormal; Notable for the following components:    RBC, UA 0-2 (*)     WBC, UA 21-30 (*)     Bacteria, UA 3+ (*)     Squamous Epithelial Cells, UA 3-6 (*)     All other components within normal limits   TROPONIN (IN-HOUSE) - Abnormal; Notable for the following components:    Troponin T 0.043 (*)     All other components within normal limits    Narrative:     Troponin T Reference Range:  <= 0.03 ng/mL-   Negative for AMI  >0.03 ng/mL-     Abnormal for myocardial necrosis.  Clinicians would have to utilize clinical acumen, EKG, Troponin and serial changes to determine if it is an Acute Myocardial Infarction or myocardial injury due to an underlying chronic  condition.       Results may be falsely decreased if patient taking Biotin.     LACTIC ACID, PLASMA - Abnormal; Notable for the following components:    Lactate 2.4 (*)     All other components within normal limits   LACTIC ACID, REFLEX - Abnormal; Notable for the following components:    Lactate 2.2 (*)     All other components within normal limits   LIPASE - Normal   URINE CULTURE   BLOOD CULTURE   BLOOD CULTURE   RAINBOW DRAW    Narrative:     The following orders were created for panel order [place] Draw.  Procedure                               Abnormality         Status                     ---------                               -----------         ------                     Green Top (Gel)[[id number]]                                  Final result               Lavender Top[[id number]]                                     Final result               Red Top[[id number]]                                          Final result               Gray Top[[id number]]                                         In process                 Light Blue Top[[id number]]                                   Final result                 Please view results for these tests on the individual orders.   LACTIC ACID, REFLEX   CBC AND DIFFERENTIAL    Narrative:     The following orders were created for panel order CBC & Differential.  Procedure                               Abnormality         Status                     ---------                               -----------         ------                     CBC Auto Differential[[id number]]        Abnormal            Final result                 Please view results for these tests on the individual orders.   GREEN TOP   LAVENDER TOP   RED TOP   LIGHT BLUE TOP   GRAY TOP         Radiology:  CT Abdomen Pelvis Without Contrast    (Results Pending)         Medical Decision Making:  Gregoria Bennett is a 77 y.o. female who presents emerged department brought in by EMS due to abdominal pain with nausea and  vomiting.    Reassuring initial vital signs.    I have reviewed and interpreted the EKG and it shows: Normal sinus rhythm with rate of 67.  Right bundle branch block pattern most and will need V1.. Normal axis and intervals. No signs of acute ischemia such as ST elevation, ST depression, or T wave inversion. No signs of Hyperkalemia, WPW, PE, Pericarditis, LV aneurysm, Brugada, Heart Block, Atrial fibrillation or A flutter.  Consistent with prior EKG from 12/16/2019.    HEART score of 5.     Labs were ordered and reviewed.  Elevated troponin 0.054.  Prior elevated troponin persistent 0.056. Repeat troponin 0.043. Urinalysis with gross evidence of infection 3+ bacteria with 21-30 white blood cells.  Otherwise normal white blood cell count.  Chronic kidney disease presents with priors with creatinine 3.41.  Hemoglobin of 9.6.  Elevated glucose of 287.    Imaging was ordered and reviewed.  CT abdomen pelvis without contrast per overnight stat read read with no acute findings.    Nursing notes were reviewed.    The patient was given IV fluids IV Zofran for nausea and IV ceftriaxone.    Patient's presentation is most consistent with likely nausea and vomiting secondary to a significant urinary tract infection.  We will consider pyelonephritis given her systemic symptoms.  Patient's hyperglycemia likely contributing.  Patient with chronic kidney disease but creatinine is consistent with priors.  Onset of the lactate is 2.4.  Hemoglobin chronically low consistent with priors.  Normal blood count.  Normal lipase.  No elevated liver enzymes.    Patient was admitted to the hospitalist for further management.       ED Diagnosis:  Generalized weakness  Nausea and vomiting, unspecified vomiting type  Pyelonephritis  Chronic kidney disease  Type 2 diabetes mellitus with hyperglycemia      Disposition: admit to hospitalist        Signed:  Miguel Angel Vega MD  Emergency Medicine Physician    Please note that portions of this  note were completed with a voice recognition program.      Miguel Angel Vega MD  11/08/22 1111

## 2022-11-08 NOTE — H&P
"    [facility] Medicine Services  HISTORY AND PHYSICAL    Date of Admission: 11/8/2022  Primary Care Physician: Chu Isaac MD    Subjective     Chief Complaint: Epigastric pain  History of Present Illness  Patient is 77-year-old woman who I am asked to admit.  ER record indicates generalized weakness, nausea vomiting, pyelonephritis, chronic kidney disease and type 2 diabetes mellitus  Current admitting diagnosis is NSTEMI.  Patient has troponin of 0.043.  From reviewing record, her troponin has not been normal.  From 2 years ago it was 0.056.  Currently it is 0.043 (0.054)  She has not complained of any chest pain however mentioned that she has epigastric pain.  Looking through record from outside hospital, on October 3 she had EGD with biopsy.  She has normal duodenal mucosa and negative for H. pylori.  From symptoms she described she claims to have chronic epigastric pain.  In her words she said \"hurting in my stomach, my gallbladder for several years.  It these deeper, worser.\"  She was told \"you do not need a doctor\".  She further went on telling me \"if I faced anything that has pork, cheese and corn it makes me feel the discomfort\"  She has not really mention of any relieving factor.  Record indicates she had cholecystectomy.  Abdominopelvic CT scan read by radiologist as no acute abnormality of the abdomen or pelvis.  No significant change since prior study.  Patient has osteopenia, moderate levoscoliosis, chronic degenerative changes of the lumbar spine.  Appendix is surgically absent.  Patient has large volume of stool throughout the colon no suggestive of obstructive process.  Review of Systems   She does not complain of any urinary or bowel symptoms  She has not complained of any nausea or vomiting    Otherwise complete ROS reviewed and negative except as mentioned in the HPI.    Past Medical History:   Past Medical History:   Diagnosis Date   • Chronic kidney " "disease, stage 4 (severe) ([facility])    • COPD (chronic obstructive pulmonary disease) ([facility])    • Coronary artery disease    • Dementia ([facility])    • Diabetes mellitus ([facility])    • History of dermatomyositis    • Hypertension    • Peptic ulcer disease    • Pulmonary disease    • Renal insufficiency    • Urinary tract infection    • Venous insufficiency      Past Surgical History:  Past Surgical History:   Procedure Laterality Date   • CHOLECYSTECTOMY     • COLON SURGERY       Social History:  reports that she has quit smoking. She does not have any smokeless tobacco history on file. She reports that she does not drink alcohol and does not use drugs.    Family History:  Due to patient's dementia  Could not give info on this    Allergies:  Allergies   Allergen Reactions   • Aspirin Rash   • Tetracyclines & Related Rash       Medications:  Prior to Admission medications    Medication Sig Start Date End Date Taking? Authorizing Provider   acetaminophen (TYLENOL) 325 MG tablet Take 650 mg by mouth Every 6 (Six) Hours As Needed for Mild Pain  or Fever.    ProviderTammi MD   amLODIPine (NORVASC) 10 MG tablet Take 1 tablet by mouth Daily. 12/23/19   Edward Lozano MD   atenolol (TENORMIN) 50 MG tablet Take 1 tablet by mouth Daily. 11/23/17   Susanna Gordillo MD   atorvastatin (LIPITOR) 10 MG tablet Take 10 mg by mouth daily.    ProviderTammi MD   cetirizine (zyrTEC) 10 MG tablet Take 10 mg by mouth Daily.    Tammi Mart MD   cloNIDine (CATAPRES) 0.1 MG tablet Take 1 tablet by mouth Every 12 (Twelve) Hours. 12/22/19   Edward Lozano MD   clopidogrel (PLAVIX) 75 MG tablet Take 75 mg by mouth daily.    ProviderTammi MD   Cranberry 200 MG capsule Take 200 mg by mouth Every Morning.    ProviderTammi MD   cycloSPORINE (RESTASIS) 0.05 % ophthalmic emulsion Administer 1 drop to both eyes 2 (Two) Times a Day. \"Once between 1397-5997 and again between 1264-8492\"    Provider, " "MD Tammi   diclofenac (VOLTAREN) 1 % gel gel Apply 2 g topically to the appropriate area as directed 3 (Three) Times a Day. \"Once between 6702-3544, once between 7991-5830 and again between 6828-8514\"     Tammi Mart MD   docusate sodium (COLACE) 100 MG capsule Take 100 mg by mouth Every Morning.    Tammi Mart MD   ferrous sulfate 325 (65 FE) MG tablet Take 325 mg by mouth Daily With Breakfast.    Tammi Mart MD   guaiFENesin (MUCINEX) 600 MG 12 hr tablet Take 600 mg by mouth 2 (Two) Times a Day. \"Once between 8630-1072 and again between 9717-3946\"    Tammi Mart MD   hydrALAZINE (APRESOLINE) 50 MG tablet Take 1 tablet by mouth Every 8 (Eight) Hours. 12/22/19   Edward Lozano MD   insulin aspart (novoLOG) 100 UNIT/ML injection Inject per sliding scale AC and QHS:  200-219= 5 units  220-239= 6 units  240-259= 7 units  260-279= 9 units  300-319= 10 units  320-339= 11 units  340-359= 12 units  360-379= 13 units  380-399= 14 units  >400=    Tammi Mart MD   insulin NPH-insulin regular (humuLIN 70/30,novoLIN 70/30) (70-30) 100 UNIT/ML injection Inject 15 Units under the skin into the appropriate area as directed. \"Once between 4982-9182 and again between 8486-9809\"    Tammi Mart MD   ipratropium-albuterol (DUO-NEB) 0.5-2.5 mg/3 ml nebulizer Take 3 mL by nebulization Daily As Needed for Wheezing.    Tammi Mart MD   levETIRAcetam (KEPPRA) 100 MG/ML solution Take 5 mL by mouth Every 12 (Twelve) Hours. 12/22/19   Edward Lozano MD   Multiple Vitamin (TAB-A-JACKY) tablet Take 1 tablet by mouth Daily.    Tammi Mart MD   nitroglycerin (NITROSTAT) 0.4 MG SL tablet Place 0.4 mg under the tongue Every 5 (Five) Minutes As Needed for Chest Pain. Take no more than 3 doses in 15 minutes.    Tammi Mart MD   nystatin (nystatin) 029862 UNIT/GM powder Apply 1 application topically to the appropriate area as directed 2 (Two) " "Times a Day As Needed (\"skin picking\").    Tammi Mart MD   pantoprazole (PROTONIX) 40 MG EC tablet Take 40 mg by mouth 2 (Two) Times a Day. \"Once between 4473-5776 and again between 0345-6183\"    Tammi Mart MD   polyethylene glycol (MIRALAX) packet Take 17 g by mouth Daily As Needed (constipation).    Tammi Mart MD   potassium chloride (K-DUR,KLOR-CON) 10 MEQ CR tablet Take 10 mEq by mouth Every Morning.    Tammi Mart MD   Selenium 200 MCG tablet Take 200 mcg by mouth Every Morning.    Tammi Mart MD   sertraline (ZOLOFT) 25 MG tablet Take 25 mg by mouth Every Morning.    Tammi Mart MD   traMADol (ULTRAM) 50 MG tablet Take 1 tablet by mouth Every 6 (Six) Hours As Needed for Moderate Pain . 12/22/19   Edward Lozano MD   triamcinolone (KENALOG) 0.1 % cream Apply 1 application topically to the appropriate area as directed 3 (Three) Times a Day As Needed (itching).    Tammi Mart MD   Triamcinolone Acetonide (NASACORT) 55 MCG/ACT nasal inhaler 1 spray into the nostril(s) as directed by provider Daily.    Tammi Mart MD   vitamin C (ASCORBIC ACID) 500 MG tablet Take 500 mg by mouth Daily.    Tammi Mart MD   vitamin D (ERGOCALCIFEROL) 1.25 MG (91953 UT) capsule capsule Take 50,000 Units by mouth 1 (One) Time Per Week. Wednesday    Tammi Mart MD   vitamin E 1000 UNIT capsule Take 1,000 Units by mouth Daily.    Tammi Mart MD     I have utilized all available immediate resources to obtain, update, and review the patient's current medications.    Objective     Vital Signs: /52   Pulse 67   Temp 98.1 °F (36.7 °C) (Oral)   Resp 20   Ht 152.4 cm (60\")   Wt 72.6 kg (160 lb)   SpO2 96%   BMI 31.25 kg/m²   Physical Exam   Patient is well wrapped with blanket including her head with face will be exposed skin  No distress  Not requiring any oxygen  GEN: Awake, alert, interactive, in NAD  HEENT: " Atraumatic, PERRLA, EOMI, Anicteric, Trachea midline  Lungs: CTAB, no wheezing/rales/rhonchi  Heart: RRR, +S1/s2, no rub  ABD: soft, mild tenderness left lower quadrant.  No guarding, no rebound  Extremities: atraumatic, no cyanosis, no edema, it appears that he has a right foot drop  Skin: no rashes or lesions  Neuro: AAOx3, no focal deficits.  She was able to tell her birthdate correctly.  She said to me that she is 51 years old.  Otherwise she knows she is in the hospital.  She seemed to be appropriate for the most part except for her age.  Psych: normal mood & affect      Results Reviewed:  Lab Results (last 24 hours)     Procedure Component Value Units Date/Time    [place] Draw [[id number]] Collected: 11/08/22 0216    Specimen: Blood Updated: 11/08/22 0630    Narrative:      The following orders were created for panel order [place] Draw.  Procedure                               Abnormality         Status                     ---------                               -----------         ------                     Green Top (Gel)[[id number]]                                  Final result               Lavender Top[[id number]]                                     Final result               Red Top[[id number]]                                          Final result               Gray Top[[id number]]                                         Final result               Light Blue Top[[id number]]                                   Final result                 Please view results for these tests on the individual orders.    Gray Top [[id number]] Collected: 11/08/22 0216    Specimen: Blood Updated: 11/08/22 0630     Extra Tube Hold for add-ons.     Comment: Auto resulted.       STAT Lactic Acid, Reflex [[id number]]  (Abnormal) Collected: 11/08/22 0437    Specimen: Blood Updated: 11/08/22 0519     Lactate 2.2 mmol/L     Troponin [[id number]]  (Abnormal) Collected: 11/08/22 0437    Specimen: Blood Updated: 11/08/22 0515     Troponin T 0.043  ng/mL     Narrative:      Troponin T Reference Range:  <= 0.03 ng/mL-   Negative for AMI  >0.03 ng/mL-     Abnormal for myocardial necrosis.  Clinicians would have to utilize clinical acumen, EKG, Troponin and serial changes to determine if it is an Acute Myocardial Infarction or myocardial injury due to an underlying chronic condition.       Results may be falsely decreased if patient taking Biotin.      Blood Culture - Blood, Hand, Digit Left [[id number]] Collected: 11/08/22 0437    Specimen: Blood from Hand, Digit Left Updated: 11/08/22 0503    Blood Culture - Blood, Hand, Right [[id number]] Collected: 11/08/22 0437    Specimen: Blood from Hand, Right Updated: 11/08/22 0503    Lactic Acid, Plasma [[id number]]  (Abnormal) Collected: 11/08/22 0216    Specimen: Blood Updated: 11/08/22 0357     Lactate 2.4 mmol/L     Green Top (Gel) [[id number]] Collected: 11/08/22 0216    Specimen: Blood Updated: 11/08/22 0330     Extra Tube Hold for add-ons.     Comment: Auto resulted.       Red Top [[id number]] Collected: 11/08/22 0216    Specimen: Blood Updated: 11/08/22 0330     Extra Tube Hold for add-ons.     Comment: Auto resulted.       Light Blue Top [[id number]] Collected: 11/08/22 0216    Specimen: Blood Updated: 11/08/22 0330     Extra Tube Hold for add-ons.     Comment: Auto resulted       Lavender Top [[id number]] Collected: 11/08/22 0216    Specimen: Blood Updated: 11/08/22 0330     Extra Tube hold for add-on     Comment: Auto resulted       Urinalysis With Culture If Indicated - Urine, Catheter [[id number]]  (Abnormal) Collected: 11/08/22 0225    Specimen: Urine, Catheter Updated: 11/08/22 0317     Color, UA Yellow     Appearance, UA Clear     pH, UA 7.0     Specific Gravity, UA 1.016     Glucose,  mg/dL (1+)     Ketones, UA Negative     Bilirubin, UA Negative     Blood, UA Negative     Protein, UA 30 mg/dL (1+)     Leuk Esterase, UA Large (3+)     Nitrite, UA Positive     Urobilinogen, UA 0.2 E.U./dL    Narrative:       In absence of clinical symptoms, the presence of pyuria, bacteria, and/or nitrites on the urinalysis result does not correlate with infection.    Urinalysis, Microscopic Only - Urine, Catheter [[id number]]  (Abnormal) Collected: 11/08/22 0225    Specimen: Urine, Catheter Updated: 11/08/22 0317     RBC, UA 0-2 /HPF      WBC, UA 21-30 /HPF      Bacteria, UA 3+ /HPF      Squamous Epithelial Cells, UA 3-6 /HPF      Yeast, UA Large/3+ Budding Yeast /HPF      Hyaline Casts, UA 0-2 /LPF      Methodology Manual Light Microscopy    Urine Culture - Urine, Urine, Catheter [[id number]] Collected: 11/08/22 0225    Specimen: Urine, Catheter Updated: 11/08/22 0317    Troponin [[id number]]  (Abnormal) Collected: 11/08/22 0216    Specimen: Blood Updated: 11/08/22 0259     Troponin T 0.054 ng/mL     Narrative:      Troponin T Reference Range:  <= 0.03 ng/mL-   Negative for AMI  >0.03 ng/mL-     Abnormal for myocardial necrosis.  Clinicians would have to utilize clinical acumen, EKG, Troponin and serial changes to determine if it is an Acute Myocardial Infarction or myocardial injury due to an underlying chronic condition.       Results may be falsely decreased if patient taking Biotin.      Comprehensive Metabolic Panel [[id number]]  (Abnormal) Collected: 11/08/22 0216    Specimen: Blood Updated: 11/08/22 0248     Glucose 287 mg/dL      BUN 55 mg/dL      Creatinine 3.41 mg/dL      Sodium 137 mmol/L      Potassium 4.9 mmol/L      Chloride 99 mmol/L      CO2 29.0 mmol/L      Calcium 9.5 mg/dL      Total Protein 7.3 g/dL      Albumin 4.40 g/dL      ALT (SGPT) 10 U/L      AST (SGOT) 17 U/L      Alkaline Phosphatase 77 U/L      Total Bilirubin <0.2 mg/dL      Globulin 2.9 gm/dL      A/G Ratio 1.5 g/dL      BUN/Creatinine Ratio 16.1     Anion Gap 9.0 mmol/L      eGFR 13.2 mL/min/1.73      Comment: <15 Indicative of kidney failure       Narrative:      GFR Normal >60  Chronic Kidney Disease <60  Kidney Failure <15    The GFR formula is only  valid for adults with stable renal function between ages 18 and 70.    Lipase [[id number]]  (Normal) Collected: 11/08/22 0216    Specimen: Blood Updated: 11/08/22 0244     Lipase 31 U/L     CBC & Differential [[id number]]  (Abnormal) Collected: 11/08/22 0216    Specimen: Blood Updated: 11/08/22 0227    Narrative:      The following orders were created for panel order CBC & Differential.  Procedure                               Abnormality         Status                     ---------                               -----------         ------                     CBC Auto Differential[[id number]]        Abnormal            Final result                 Please view results for these tests on the individual orders.    CBC Auto Differential [[id number]]  (Abnormal) Collected: 11/08/22 0216    Specimen: Blood Updated: 11/08/22 0227     WBC 6.85 10*3/mm3      RBC 3.20 10*6/mm3      Hemoglobin 9.6 g/dL      Hematocrit 29.0 %      MCV 90.6 fL      MCH 30.0 pg      MCHC 33.1 g/dL      RDW 12.3 %      RDW-SD 40.4 fl      MPV 9.3 fL      Platelets 241 10*3/mm3      Neutrophil % 62.4 %      Lymphocyte % 22.2 %      Monocyte % 9.8 %      Eosinophil % 4.7 %      Basophil % 0.6 %      Immature Grans % 0.3 %      Neutrophils, Absolute 4.28 10*3/mm3      Lymphocytes, Absolute 1.52 10*3/mm3      Monocytes, Absolute 0.67 10*3/mm3      Eosinophils, Absolute 0.32 10*3/mm3      Basophils, Absolute 0.04 10*3/mm3      Immature Grans, Absolute 0.02 10*3/mm3      nRBC 0.0 /100 WBC         Imaging Results (Last 24 Hours)     Procedure Component Value Units Date/Time    CT Abdomen Pelvis Without Contrast [[id number]] Collected: 11/08/22 0729     Updated: 11/08/22 0740    Narrative:      EXAMINATION: CT ABDOMEN PELVIS WO CONTRAST-      11/8/2022 2:29 AM CST     HISTORY: Abdominal pain, acute, nonlocalized     In order to have a CT radiation dose as low as reasonably achievable  Automated Exposure Control was utilized for adjustment of the mA and/or  KV  according to patient size.     DLP in mGycm= 312     The CT scan of the abdomen and pelvis is performed without intravenous  contrast enhancement.     The images are acquired in axial plane and subsequent reconstruction in  coronal and sagittal planes.     Comparison is made with the previous study dated 3/4/2018.     The lung bases included in the study are unremarkable.     The limited visualized cardiomediastinal structures show atheromatous  changes of coronary arteries.     There is a small sliding-type hiatal hernia.     Unenhanced liver and spleen are unremarkable.     The gallbladder is surgically absent.     Unenhanced pancreas is unremarkable.     The adrenal glands bilaterally are normal.     There is moderate lobulation and renal contour bilaterally. No calculi.  No hydronephrosis. The ureters bilaterally are normal. The urinary  bladder is decompressed and not optimally evaluated. Significant  thickening of the walls and probably due to incomplete distention.     Normal uterus is seen. No adnexal masses.     Extensive postsurgical changes in the left upper abdomen are noted. This  is similar to the previous study.     The stomach is decompressed. No focal abnormality or a mass. The  duodenum is normal. Small bowel is nondistended. The appendix is  surgically absent. Large volume stool is seen throughout the colon. No  finding to suggest obstruction.     Atheromatous changes of the abdominal aorta and iliac arteries. No  aneurysmal dilatation.     There is no evidence of abdominal or pelvic lymphadenopathy.     Images are reviewed in bone window show chronic degenerative changes of  the lumbar spine with moderate levoscoliosis. Focal areas of osteopenia  in the vertebral body L3 is similar to the previous study. Severe  degeneration of the intervertebral disc L5-S1 is stable. No acute bony  abnormality.       Impression:      1. No acute abnormality of the abdomen or pelvis. No significant change  since  the previous study in 2018.     The above study was initially reviewed and reported by [facility]. I do not  find any discrepancies.                             This report was finalized on 11/08/2022 07:37 by Dr. Saeed Tracey MD.        I have personally reviewed and interpreted the radiology studies and ECG obtained at time of admission.     Assessment / Plan     Assessment:   There are no active hospital problems to display for this patient.      Problem list  · Epigastric pain-chronic; had EGD in the recent past (October).  Normal duodenal mucosa by biopsy and negative for Helicobacter pylori.     · CKD stage IV  · Elevated troponin in the setting of chronic kidney disease.  Trending down.  No suggestive chest pain.  Epigastric pain has been worked up.EKG showed right bundle branch block, normal sinus rhythm, isolated Q-wave in lead III.  No ischemic change.  No further evaluation needed at this time.  · Mild elevation in lactic acid of unclear significance.  No gross evidence of infection.  No gross liver disease dysfunction.  No evidence of hypotension  · Abnormal urinalysis with pyuria bacteriuria however presence of epithelial cells.  This was a catheterized urine.  Apparently patient has not complained of any symptoms related to this  · Constipation by CT imaging. Bowel regimen.  I think this could be an issue causing the abdominal pain.  Will observe on any other systemic inflammatory signs.  Patient received Rocephin from the emergency room for an abnormal urinalysis I suppose.  · Anemia possibly from chronic kidney disease  · Hypertension  · Diabetes mellitus type 2 sliding insulin. Cont current regimen  · Dementia    Plan:   Plan as outlined above  Medications reviewed  Case discussed with .  Patient is from [facility].    Code Status/Advanced Care Plan: Full code    The patient's surrogate decision maker is yet to be determined    I discussed my findings and recommendations with the  patient and     Estimated length of stay is to be determined by clinical course  The patient was seen and examined by me on   Electronically signed by Kevin Tsai MD, 11/08/22, 08:10 CST.

## 2022-11-09 LAB
ALBUMIN SERPL-MCNC: 3.3 G/DL (ref 3.5–5.2)
ALBUMIN/GLOB SERPL: 1.5 G/DL
ALP SERPL-CCNC: 53 U/L (ref 39–117)
ALT SERPL W P-5'-P-CCNC: 8 U/L (ref 1–33)
ANION GAP SERPL CALCULATED.3IONS-SCNC: 5 MMOL/L (ref 5–15)
AST SERPL-CCNC: 12 U/L (ref 1–32)
BILIRUB SERPL-MCNC: <0.2 MG/DL (ref 0–1.2)
BUN SERPL-MCNC: 45 MG/DL (ref 8–23)
BUN/CREAT SERPL: 14.9 (ref 7–25)
CALCIUM SPEC-SCNC: 8.3 MG/DL (ref 8.6–10.5)
CHLORIDE SERPL-SCNC: 110 MMOL/L (ref 98–107)
CO2 SERPL-SCNC: 28 MMOL/L (ref 22–29)
CREAT SERPL-MCNC: 3.02 MG/DL (ref 0.57–1)
DEPRECATED RDW RBC AUTO: 42.1 FL (ref 37–54)
EGFRCR SERPLBLD CKD-EPI 2021: 15.2 ML/MIN/1.73
ERYTHROCYTE [DISTWIDTH] IN BLOOD BY AUTOMATED COUNT: 12.3 % (ref 12.3–15.4)
FERRITIN SERPL-MCNC: 144.5 NG/ML (ref 13–150)
GLOBULIN UR ELPH-MCNC: 2.2 GM/DL
GLUCOSE SERPL-MCNC: 61 MG/DL (ref 65–99)
HCT VFR BLD AUTO: 24.3 % (ref 34–46.6)
HCT VFR BLD AUTO: 25.2 % (ref 34–46.6)
HGB BLD-MCNC: 7.5 G/DL (ref 12–15.9)
HGB BLD-MCNC: 8.1 G/DL (ref 12–15.9)
IRON 24H UR-MRATE: 41 MCG/DL (ref 37–145)
IRON SATN MFR SERPL: 15 % (ref 20–50)
MCH RBC QN AUTO: 29.5 PG (ref 26.6–33)
MCHC RBC AUTO-ENTMCNC: 30.9 G/DL (ref 31.5–35.7)
MCV RBC AUTO: 95.7 FL (ref 79–97)
PLATELET # BLD AUTO: 181 10*3/MM3 (ref 140–450)
PMV BLD AUTO: 9.1 FL (ref 6–12)
POTASSIUM SERPL-SCNC: 4.6 MMOL/L (ref 3.5–5.2)
PROT SERPL-MCNC: 5.5 G/DL (ref 6–8.5)
QT INTERVAL: 476 MS
QTC INTERVAL: 502 MS
RBC # BLD AUTO: 2.54 10*6/MM3 (ref 3.77–5.28)
SODIUM SERPL-SCNC: 143 MMOL/L (ref 136–145)
TIBC SERPL-MCNC: 276 MCG/DL (ref 298–536)
TRANSFERRIN SERPL-MCNC: 185 MG/DL (ref 200–360)
TROPONIN T SERPL-MCNC: 0.05 NG/ML (ref 0–0.03)
WBC NRBC COR # BLD: 3.86 10*3/MM3 (ref 3.4–10.8)

## 2022-11-09 PROCEDURE — 85027 COMPLETE CBC AUTOMATED: CPT | Performed by: INTERNAL MEDICINE

## 2022-11-09 PROCEDURE — 85018 HEMOGLOBIN: CPT | Performed by: INTERNAL MEDICINE

## 2022-11-09 PROCEDURE — 84484 ASSAY OF TROPONIN QUANT: CPT | Performed by: INTERNAL MEDICINE

## 2022-11-09 PROCEDURE — 80053 COMPREHEN METABOLIC PANEL: CPT | Performed by: INTERNAL MEDICINE

## 2022-11-09 PROCEDURE — G0378 HOSPITAL OBSERVATION PER HR: HCPCS

## 2022-11-09 PROCEDURE — 84466 ASSAY OF TRANSFERRIN: CPT | Performed by: INTERNAL MEDICINE

## 2022-11-09 PROCEDURE — 82728 ASSAY OF FERRITIN: CPT | Performed by: INTERNAL MEDICINE

## 2022-11-09 PROCEDURE — 25010000002 HEPARIN (PORCINE) PER 1000 UNITS: Performed by: INTERNAL MEDICINE

## 2022-11-09 PROCEDURE — 96361 HYDRATE IV INFUSION ADD-ON: CPT

## 2022-11-09 PROCEDURE — 25010000002 EPOETIN ALFA-EPBX 20000 UNIT/ML SOLUTION: Performed by: INTERNAL MEDICINE

## 2022-11-09 PROCEDURE — 83540 ASSAY OF IRON: CPT | Performed by: INTERNAL MEDICINE

## 2022-11-09 PROCEDURE — 96372 THER/PROPH/DIAG INJ SC/IM: CPT

## 2022-11-09 PROCEDURE — 85014 HEMATOCRIT: CPT | Performed by: INTERNAL MEDICINE

## 2022-11-09 PROCEDURE — 87040 BLOOD CULTURE FOR BACTERIA: CPT | Performed by: INTERNAL MEDICINE

## 2022-11-09 RX ORDER — ATENOLOL 25 MG/1
25 TABLET ORAL DAILY
Status: DISCONTINUED | OUTPATIENT
Start: 2022-11-10 | End: 2022-11-11 | Stop reason: HOSPADM

## 2022-11-09 RX ORDER — VITAMIN E 268 MG
800 CAPSULE ORAL DAILY
Status: DISCONTINUED | OUTPATIENT
Start: 2022-11-09 | End: 2022-11-11 | Stop reason: HOSPADM

## 2022-11-09 RX ORDER — SODIUM CHLORIDE, SODIUM LACTATE, POTASSIUM CHLORIDE, CALCIUM CHLORIDE 600; 310; 30; 20 MG/100ML; MG/100ML; MG/100ML; MG/100ML
75 INJECTION, SOLUTION INTRAVENOUS CONTINUOUS
Status: DISCONTINUED | OUTPATIENT
Start: 2022-11-09 | End: 2022-11-09

## 2022-11-09 RX ADMIN — CETIRIZINE HYDROCHLORIDE 10 MG: 10 TABLET ORAL at 09:34

## 2022-11-09 RX ADMIN — DOCUSATE SODIUM 50 MG AND SENNOSIDES 8.6 MG 2 TABLET: 8.6; 5 TABLET, FILM COATED ORAL at 09:34

## 2022-11-09 RX ADMIN — LEVETIRACETAM 500 MG: 100 SOLUTION ORAL at 21:07

## 2022-11-09 RX ADMIN — SODIUM CHLORIDE, POTASSIUM CHLORIDE, SODIUM LACTATE AND CALCIUM CHLORIDE 500 ML: 600; 310; 30; 20 INJECTION, SOLUTION INTRAVENOUS at 00:31

## 2022-11-09 RX ADMIN — Medication 10 ML: at 21:08

## 2022-11-09 RX ADMIN — POLYETHYLENE GLYCOL 3350 17 G: 17 POWDER, FOR SOLUTION ORAL at 09:35

## 2022-11-09 RX ADMIN — LEVETIRACETAM 500 MG: 100 SOLUTION ORAL at 09:34

## 2022-11-09 RX ADMIN — EPOETIN ALFA-EPBX 20000 UNITS: 20000 INJECTION, SOLUTION INTRAVENOUS; SUBCUTANEOUS at 14:58

## 2022-11-09 RX ADMIN — DOCUSATE SODIUM 50 MG AND SENNOSIDES 8.6 MG 2 TABLET: 8.6; 5 TABLET, FILM COATED ORAL at 21:07

## 2022-11-09 RX ADMIN — PANTOPRAZOLE SODIUM 40 MG: 40 TABLET, DELAYED RELEASE ORAL at 17:38

## 2022-11-09 RX ADMIN — HEPARIN SODIUM 5000 UNITS: 5000 INJECTION, SOLUTION INTRAVENOUS; SUBCUTANEOUS at 21:07

## 2022-11-09 RX ADMIN — PANTOPRAZOLE SODIUM 40 MG: 40 TABLET, DELAYED RELEASE ORAL at 09:34

## 2022-11-09 RX ADMIN — ATORVASTATIN CALCIUM 10 MG: 10 TABLET, FILM COATED ORAL at 09:41

## 2022-11-09 RX ADMIN — DICLOFENAC 2 G: 10 GEL TOPICAL at 09:38

## 2022-11-09 RX ADMIN — CYCLOSPORINE 1 DROP: 0.5 EMULSION OPHTHALMIC at 21:07

## 2022-11-09 RX ADMIN — CYCLOSPORINE 1 DROP: 0.5 EMULSION OPHTHALMIC at 09:34

## 2022-11-09 RX ADMIN — CLOPIDOGREL 75 MG: 75 TABLET, FILM COATED ORAL at 09:34

## 2022-11-09 RX ADMIN — SODIUM CHLORIDE, POTASSIUM CHLORIDE, SODIUM LACTATE AND CALCIUM CHLORIDE 75 ML/HR: 600; 310; 30; 20 INJECTION, SOLUTION INTRAVENOUS at 01:08

## 2022-11-09 RX ADMIN — FERROUS SULFATE TAB 325 MG (65 MG ELEMENTAL FE) 325 MG: 325 (65 FE) TAB at 09:34

## 2022-11-09 RX ADMIN — Medication 10 ML: at 09:40

## 2022-11-09 RX ADMIN — HEPARIN SODIUM 5000 UNITS: 5000 INJECTION, SOLUTION INTRAVENOUS; SUBCUTANEOUS at 09:35

## 2022-11-09 RX ADMIN — SERTRALINE HYDROCHLORIDE 25 MG: 50 TABLET, FILM COATED ORAL at 09:34

## 2022-11-09 NOTE — CASE MANAGEMENT/SOCIAL WORK
Continued Stay Note  [facility]     Patient Name: Gregoria Bennett  MRN: [id number]  Today's Date: 11/9/2022    Admit Date: 11/8/2022    Plan: [facility]   Discharge Plan     Row Name 11/09/22 1521       Plan    Plan [facility]    Patient/Family in Agreement with Plan yes    Plan Comments SW verified with admissions that pt has a bed hold at [facility] and can return upon dc (no precert. needed).  They will need a covid test prior to dc.  [phone]               Discharge Codes    No documentation.               Expected Discharge Date and Time     Expected Discharge Date Expected Discharge Time    Nov 10, 2022             CARO Murillo

## 2022-11-09 NOTE — PLAN OF CARE
Goal Outcome Evaluation:  Plan of Care Reviewed With: patient           Outcome Evaluation: iid patent. external cath in place. no bm so far today, refused dulcolax supp. requires much and repeated encouragement to complete simple tasks. observed that she is able to feed herself without difficulty. shifts weight/ position in bed independantly at times. replaced o2 per nc while eaing lunch. no acute distress noted.  cont to monitor.

## 2022-11-09 NOTE — NURSING NOTE
Received order to dc indwelling mcknight. Pt does not have an indwelling mcknight, there is an external female cath in place. Per documentation urine spec from 11/8 was collected in er and lab indicates it is a catheterized specimen.

## 2022-11-09 NOTE — PROGRESS NOTES
"    [facility]  INPATIENT PROGRESS NOTE    Patient Name: Gregoria Bennett  Date of Admission: 11/8/2022  Today's Date: 11/09/22  Length of Stay: 1  Primary Care Physician: Chu Isaac MD    Subjective   Chief Complaint: f/u   HPI   \"skilled nursing ok\". Still concern about abd discomfort.  Discussed with nurse Geraldine.  Last bowel movement November 7 according to her.  Expressed she is not ready to go home    No chest pain, shortness of breath, difficulty breathing, nausea or vomiting    No bleeding episode.  Noted hemoglobin at 7.5  Received IV fluid bolus yesterday because of low BP overnight.  Nurse Geraldine told me that medications are not in her med rec's sheet (amlodipine, atenolol-labeled as not taking.)  We will DC and monitor off BP meds.  Lowest reading was 88/40.  Most recent reading is 99/48.  Heart rate is 55-58  Review of Systems     All pertinent negatives and positives are as above. All other systems have been reviewed and are negative unless otherwise stated.     Objective    Temp:  [97 °F (36.1 °C)-98.3 °F (36.8 °C)] 97.6 °F (36.4 °C)  Heart Rate:  [54-65] 55  Resp:  [12-16] 14  BP: ()/(37-55) 99/48  Physical Exam  Mouth breathing asleep when I first came in .   I revisited her 1 hour later. She said she is skilled nursing ok   Patient is well wrapped with blanket including her head with face will be exposed skin  No distress  She is on oxygen at 2 L.  Her O2 saturation 100%.  States she uses oxygen on as needed at home.  Past medical history indicates COPD although not in exacerbation during this admission.  GEN: Awake, alert, interactive, in NAD  HEENT: Atraumatic, PERRLA, EOMI, Anicteric, Trachea midline  Lungs: CTAB, no wheezing/rales/rhonchi  Heart: RRR, +S1/s2, no rub  ABD: soft, mild tenderness left lower quadrant.  No guarding, no rebound  Extremities: atraumatic, no cyanosis, no edema, it appears that he has a right foot drop  Skin: no rashes " or lesions  Neuro: AAOx3, no focal deficits.    Psych: normal mood & affect       Results Review:  I have reviewed the labs, radiology results, and diagnostic studies.    Laboratory Data:   Results from last 7 days   Lab Units 11/09/22  0629 11/08/22 0216   WBC 10*3/mm3 3.86 6.85   HEMOGLOBIN g/dL 7.5* 9.6*   HEMATOCRIT % 24.3* 29.0*   PLATELETS 10*3/mm3 181 241        Results from last 7 days   Lab Units 11/09/22  0629 11/08/22 0216   SODIUM mmol/L 143 137   POTASSIUM mmol/L 4.6 4.9   CHLORIDE mmol/L 110* 99   CO2 mmol/L 28.0 29.0   BUN mg/dL 45* 55*   CREATININE mg/dL 3.02* 3.41*   CALCIUM mg/dL 8.3* 9.5   BILIRUBIN mg/dL <0.2 <0.2   ALK PHOS U/L 53 77   ALT (SGPT) U/L 8 10   AST (SGOT) U/L 12 17   GLUCOSE mg/dL 61* 287*       Culture Data:   Blood Culture   Date Value Ref Range Status   11/08/2022 No growth at 24 hours  Preliminary   11/08/2022 No growth at 24 hours  Preliminary       Radiology Data:   Imaging Results (Last 24 Hours)     ** No results found for the last 24 hours. **          I have reviewed the patient's current medications.     Assessment/Plan     Active Hospital Problems    Diagnosis    • Abdominal pain      Problem list  • Epigastric pain-chronic; had EGD in the recent past (October).  Normal duodenal mucosa by biopsy and negative for Helicobacter pylori.     • CKD stage IV-creatinine improved at 3.  • Elevated troponin in the setting of chronic kidney disease.  Flat trend. No chest pain.  Epigastric pain has been worked up.EKG showed right bundle branch block, normal sinus rhythm, isolated Q-wave in lead III.  No ischemic change.  No further evaluation needed at this time.  • Mild elevation in lactic acid of unclear significance.  No gross evidence of infection.  No gross liver disease dysfunction.  No evidence of hypotension  • Abnormal urinalysis with pyuria bacteriuria however presence of epithelial cells.  This was a catheterized urine.  Apparently patient has not complained of any  symptoms related to this  • Constipation by CT imaging. Bowel regimen.  I think this could be an issue causing the abdominal pain.  Will observe on any other systemic inflammatory signs.  Patient received Rocephin from the emergency room for an abnormal urinalysis I suppose.  • Anemia possibly from chronic kidney disease  • Hypertension -with low blood pressure reading; DC Norvasc and place parameters for use of atenolol  • Diabetes mellitus type 2 sliding insulin. Cont current regimen  • Dementia-stable  • ?  Seizure on Keppra     Plan:  Bowel regimen.  If no bowel movement with this, add lactulose x1  Work with therapist  Consider DAMON for anemia of chronic disease; check iron profile and routine  Continue ferrous sulfate      [START ON 11/10/2022] atenolol, 25 mg, Oral, Daily  atorvastatin, 10 mg, Oral, Daily  bisacodyl, 10 mg, Rectal, Daily  cetirizine, 10 mg, Oral, Daily  clopidogrel, 75 mg, Oral, Daily  cycloSPORINE, 1 drop, Both Eyes, BID  Diclofenac Sodium, 2 g, Topical, TID  ferrous sulfate, 325 mg, Oral, Daily With Breakfast  heparin (porcine), 5,000 Units, Subcutaneous, Q12H  insulin NPH-insulin regular, 15 Units, Subcutaneous, BID With Meals  levETIRAcetam, 500 mg, Oral, Q12H  multivitamin with minerals, 1 tablet, Oral, Daily  pantoprazole, 40 mg, Oral, BID AC  polyethylene glycol, 17 g, Oral, Daily  senna-docusate sodium, 2 tablet, Oral, BID  sertraline, 25 mg, Oral, Daily  sodium chloride, 10 mL, Intravenous, Q12H  vitamin E, 1,000 Units, Oral, Daily                  Discharge Planning: Possibly in a day or 2  Electronically signed by Kevin Tsai MD, 11/09/22, 09:01 CST.  Noted an updated urine culture that showed MRSA.  Patient afebrile  I do not remember that she had any instrumentation on her urinary tract now just has a Meza catheters placed yesterday.  I will DC this  Monitor for urinary retention  ID consult for opinion regarding MRSA  I checked the blood culture.  At this point I  will continue to be off antibiotic and monitor until ID sees the patient for their recommendation.    Electronically signed by Kevin Tsai MD, 11/09/22, 12:41 PM CST.

## 2022-11-09 NOTE — PLAN OF CARE
Goal Outcome Evaluation:           Progress: no change  Outcome Evaluation: Patient admitted to  with abdominal pain and elevated troponin. Patient with some c/o abdominal pain that is intermittent. At approximately midnight, patient's BP dropped to 90/37, confirmed with manual reading. MD notified and patient placed in reverse trendelenburg position and given 500 mL LR bolus. Following bolus BP was improved, see mar for changes to meds. Continuous LR infusing at 75 mL/hr. 2L o2 applied to patient overnight due to multiple episodes of apnea. VSS, safety maintained. SB/S 52-65 on tele.

## 2022-11-09 NOTE — PLAN OF CARE
Goal Outcome Evaluation:              Outcome Evaluation: NTN assessment completed. Iron panel noted; FeSO4 on MAR. Pericolace noted. Ate 100% breakfast. Glu 61 this morning. Discontinue renal MNT. NTN following per protocol.

## 2022-11-09 NOTE — CONSULTS
"INFECTIOUS DISEASES CONSULT NOTE    Patient:  Gregoria Bennett 77 y.o. female  ROOM # 463/1  YOB: 1945  MRN: [id number]  [facility]:  [id number]  Admit date: 11/8/2022   Admitting Physician: Kevin Tsai*  Primary Care Physician: Chu Isaac MD  REFERRING PROVIDER: Provider, No Known    Inpatient Infectious Diseases Consult  Consult performed by: Keli Coleman MD  Consult ordered by: Kevin Tsai MD           REASON FOR CONSULTATION : recommendation on evala nd manageent of mrsa urine sample.      HISTORY OF PRESENT ILLNESS: Patient is a 77-year-old female who was brought by EMS to the emergency department from the nursing home where she resides.  She does not recall the name of it.  She told me she thought she was brought here \"because of ulcers\".  Emergency department records reveal she came with abdominal pain and vomiting.    Urinalysis revealed 3-6 squamous epithelial cells and 21-30 WBCs.  I tried to look to the emergency department timeline.  It says the urine was obtained from a catheter.  The patient has an external urinary catheter in place..  The patient was admitted with nausea and vomiting secondary to significant urinary tract infection/pyelonephritis however no concern for pyelonephritis on CT scan and urine does not appear to been in and out catheterization.  Significant amount of stool noted on CT scan.    The patient only focuses on her stomach.  She said she is better.  She left several things she cannot eat but she can eat flores as long as she \"blots all the grease off.  Auscultation        Past Medical History:   Diagnosis Date   • Chronic kidney disease, stage 4 (severe) ([facility])    • COPD (chronic obstructive pulmonary disease) ([facility])    • Coronary artery disease    • Dementia ([facility])    • Diabetes mellitus ([facility])    • History of dermatomyositis    • Hypertension    • Peptic ulcer disease    • Pulmonary disease    • Renal insufficiency    • Urinary " "tract infection    • Venous insufficiency      Past Surgical History:   Procedure Laterality Date   • CHOLECYSTECTOMY     • COLON SURGERY       Family History   Problem Relation Age of Onset   • Breast cancer Neg Hx      Social History     Socioeconomic History   • Marital status:    Tobacco Use   • Smoking status: Former   Substance and Sexual Activity   • Alcohol use: No   • Drug use: No       Current Scheduled Medications:   [START ON 11/10/2022] atenolol, 25 mg, Oral, Daily  atorvastatin, 10 mg, Oral, Daily  bisacodyl, 10 mg, Rectal, Daily  cetirizine, 10 mg, Oral, Daily  clopidogrel, 75 mg, Oral, Daily  cycloSPORINE, 1 drop, Both Eyes, BID  Diclofenac Sodium, 2 g, Topical, TID  ferrous sulfate, 325 mg, Oral, Daily With Breakfast  heparin (porcine), 5,000 Units, Subcutaneous, Q12H  levETIRAcetam, 500 mg, Oral, Q12H  multivitamin with minerals, 1 tablet, Oral, Daily  pantoprazole, 40 mg, Oral, BID AC  polyethylene glycol, 17 g, Oral, Daily  senna-docusate sodium, 2 tablet, Oral, BID  sertraline, 25 mg, Oral, Daily  sodium chloride, 10 mL, Intravenous, Q12H  Vitamin E, 800 Units, Oral, Daily      Current PRN Medications:  •  ipratropium-albuterol  •  nitroglycerin  •  ondansetron  •  [COMPLETED] Insert peripheral IV **AND** sodium chloride  •  sodium chloride  •  traMADol       Allergies   Allergen Reactions   • Aspirin Rash   • Tetracyclines & Related Rash       Review of Systems   Constitutional: Negative for fever.   HENT: Negative for congestion.    Eyes: Positive for visual disturbance (\"Left eye is good eye\"). Negative for photophobia.   Gastrointestinal: Positive for vomiting.   Genitourinary: Negative for dysuria.   Musculoskeletal: Positive for arthralgias.   Skin: Negative for rash.   Allergic/Immunologic: Negative for immunocompromised state.   Neurological: Positive for weakness (Generalized).   Psychiatric/Behavioral: Positive for dysphoric mood.         Vital Signs:  /46 (BP Location: " "Left arm, Patient Position: Lying)   Pulse 59   Temp 98 °F (36.7 °C) (Oral)   Resp 18   Ht 152.4 cm (60\")   Wt 71.8 kg (158 lb 4.8 oz)   SpO2 94%   BMI 30.92 kg/m²   Temp (24hrs), Av.9 °F (36.6 °C), Min:97 °F (36.1 °C), Max:98.3 °F (36.8 °C)      Physical Exam   General: Patient is an elderly female lying in bed sleeping and in no acute distress  HEENT: O2 per nasal cannula was in place.  When I looked at the liter flow.  It was turned off.  Patient saturations were 97%  Heart: S1-S2, rhythm is regular, no murmur appreciated  Respiratory: Effort even and unlabored.  He is not conversationally dyspneic  Abdomen: Obese, soft, no focal tenderness appreciated  : External catheter in place with clear yellow urine  Neuro: Patient alert.  She was alert to place.  Knew she was from a nursing home but said \"give me a minute\" and cannot come up with a name.  Psych: She was pleasant and cooperative      Results Review:    I reviewed the patient's new clinical results.    Lab Results:  CBC:   Lab Results   Lab 22  0629   WBC 6.85 3.86   HEMOGLOBIN 9.6* 7.5*   HEMATOCRIT 29.0* 24.3*   PLATELETS 241 181       CMP:   Lab Results   Lab 22  0629   SODIUM 137 143   POTASSIUM 4.9 4.6   CHLORIDE 99 110*   CO2 29.0 28.0   BUN 55* 45*   CREATININE 3.41* 3.02*   CALCIUM 9.5 8.3*   BILIRUBIN <0.2 <0.2   ALK PHOS 77 53   ALT (SGPT) 10 8   AST (SGOT) 17 12   GLUCOSE 287* 61*       Lab Results (last 72 hours)     Procedure Component Value Units Date/Time    Blood Culture With JOSE ALFREDO - Blood, Arm, Right [[id number]] Collected: 22 1359    Specimen: Blood from Arm, Right Updated: 22 1438    Urine Culture - Urine, Urine, Catheter [[id number]]  (Abnormal) Collected: 22 0225    Specimen: Urine, Catheter Updated: 22 1212     Urine Culture >100,000 CFU/mL Staphylococcus aureus, MRSA     Comment:   Methicillin resistant Staph aureus, patient may be an isolation " risk.  Staphylococcus aureus is not typically regarded as a uropathogen, except in cases with genitourinary instrumentation present.  It's presence suggests an alternate source (e.g. bloodstream, abscess, SSTI, etc.).  Please evaluate accordingly.       Narrative:      Colonization of the urinary tract without infection is common. Treatment is discouraged unless the patient is symptomatic, pregnant, or undergoing an invasive urologic procedure.    Ferritin [[id number]]  (Normal) Collected: 11/09/22 0629    Specimen: Blood Updated: 11/09/22 1008     Ferritin 144.50 ng/mL     Narrative:      Results may be falsely decreased if patient taking Biotin.      Iron Profile [[id number]]  (Abnormal) Collected: 11/09/22 0629    Specimen: Blood Updated: 11/09/22 1007     Iron 41 mcg/dL      Iron Saturation 15 %      Transferrin 185 mg/dL      TIBC 276 mcg/dL     Comprehensive Metabolic Panel [[id number]]  (Abnormal) Collected: 11/09/22 0629    Specimen: Blood Updated: 11/09/22 0726     Glucose 61 mg/dL      BUN 45 mg/dL      Creatinine 3.02 mg/dL      Sodium 143 mmol/L      Potassium 4.6 mmol/L      Chloride 110 mmol/L      CO2 28.0 mmol/L      Calcium 8.3 mg/dL      Total Protein 5.5 g/dL      Albumin 3.30 g/dL      ALT (SGPT) 8 U/L      AST (SGOT) 12 U/L      Alkaline Phosphatase 53 U/L      Total Bilirubin <0.2 mg/dL      Globulin 2.2 gm/dL      A/G Ratio 1.5 g/dL      BUN/Creatinine Ratio 14.9     Anion Gap 5.0 mmol/L      eGFR 15.2 mL/min/1.73      Comment: National Kidney Foundation and American Society of Nephrology (ASN) Task Force recommended calculation based on the Chronic Kidney Disease Epidemiology Collaboration (CKD-EPI) equation refit without adjustment for race.       Narrative:      GFR Normal >60  Chronic Kidney Disease <60  Kidney Failure <15    The GFR formula is only valid for adults with stable renal function between ages 18 and 70.    Troponin [[id number]]  (Abnormal) Collected: 11/09/22 0629    Specimen:  Blood Updated: 11/09/22 0723     Troponin T 0.049 ng/mL     Narrative:      Troponin T Reference Range:  <= 0.03 ng/mL-   Negative for AMI  >0.03 ng/mL-     Abnormal for myocardial necrosis.  Clinicians would have to utilize clinical acumen, EKG, Troponin and serial changes to determine if it is an Acute Myocardial Infarction or myocardial injury due to an underlying chronic condition.       Results may be falsely decreased if patient taking Biotin.      CBC (No Diff) [[id number]]  (Abnormal) Collected: 11/09/22 0629    Specimen: Blood Updated: 11/09/22 0652     WBC 3.86 10*3/mm3      RBC 2.54 10*6/mm3      Hemoglobin 7.5 g/dL      Hematocrit 24.3 %      MCV 95.7 fL      MCH 29.5 pg      MCHC 30.9 g/dL      RDW 12.3 %      RDW-SD 42.1 fl      MPV 9.1 fL      Platelets 181 10*3/mm3     Blood Culture - Blood, Hand, Right [[id number]]  (Normal) Collected: 11/08/22 0437    Specimen: Blood from Hand, Right Updated: 11/09/22 0515     Blood Culture No growth at 24 hours    Blood Culture - Blood, Hand, Digit Left [[id number]]  (Normal) Collected: 11/08/22 0437    Specimen: Blood from Hand, Digit Left Updated: 11/09/22 0515     Blood Culture No growth at 24 hours    STAT Lactic Acid, Reflex [[id number]]  (Normal) Collected: 11/08/22 0920    Specimen: Blood Updated: 11/08/22 0947     Lactate 1.4 mmol/L     [place] Draw [[id number]] Collected: 11/08/22 0216    Specimen: Blood Updated: 11/08/22 0630    Narrative:      The following orders were created for panel order [place] Draw.  Procedure                               Abnormality         Status                     ---------                               -----------         ------                     Green Top (Gel)[[id number]]                                  Final result               Lavender Top[[id number]]                                     Final result               Red Top[[id number]]                                          Final result               Gray Top[[id number]]                                          Final result               Light Blue Top[[id number]]                                   Final result                 Please view results for these tests on the individual orders.    Gray Top [[id number]] Collected: 11/08/22 0216    Specimen: Blood Updated: 11/08/22 0630     Extra Tube Hold for add-ons.     Comment: Auto resulted.       STAT Lactic Acid, Reflex [[id number]]  (Abnormal) Collected: 11/08/22 0437    Specimen: Blood Updated: 11/08/22 0519     Lactate 2.2 mmol/L     Troponin [[id number]]  (Abnormal) Collected: 11/08/22 0437    Specimen: Blood Updated: 11/08/22 0515     Troponin T 0.043 ng/mL     Narrative:      Troponin T Reference Range:  <= 0.03 ng/mL-   Negative for AMI  >0.03 ng/mL-     Abnormal for myocardial necrosis.  Clinicians would have to utilize clinical acumen, EKG, Troponin and serial changes to determine if it is an Acute Myocardial Infarction or myocardial injury due to an underlying chronic condition.       Results may be falsely decreased if patient taking Biotin.      Lactic Acid, Plasma [[id number]]  (Abnormal) Collected: 11/08/22 0216    Specimen: Blood Updated: 11/08/22 0357     Lactate 2.4 mmol/L     Green Top (Gel) [[id number]] Collected: 11/08/22 0216    Specimen: Blood Updated: 11/08/22 0330     Extra Tube Hold for add-ons.     Comment: Auto resulted.       Red Top [[id number]] Collected: 11/08/22 0216    Specimen: Blood Updated: 11/08/22 0330     Extra Tube Hold for add-ons.     Comment: Auto resulted.       Light Blue Top [[id number]] Collected: 11/08/22 0216    Specimen: Blood Updated: 11/08/22 0330     Extra Tube Hold for add-ons.     Comment: Auto resulted       Lavender Top [[id number]] Collected: 11/08/22 0216    Specimen: Blood Updated: 11/08/22 0330     Extra Tube hold for add-on     Comment: Auto resulted       Urinalysis With Culture If Indicated - Urine, Catheter [[id number]]  (Abnormal) Collected: 11/08/22 0225    Specimen: Urine, Catheter  Updated: 11/08/22 0317     Color, UA Yellow     Appearance, UA Clear     pH, UA 7.0     Specific Gravity, UA 1.016     Glucose,  mg/dL (1+)     Ketones, UA Negative     Bilirubin, UA Negative     Blood, UA Negative     Protein, UA 30 mg/dL (1+)     Leuk Esterase, UA Large (3+)     Nitrite, UA Positive     Urobilinogen, UA 0.2 E.U./dL    Narrative:      In absence of clinical symptoms, the presence of pyuria, bacteria, and/or nitrites on the urinalysis result does not correlate with infection.    Urinalysis, Microscopic Only - Urine, Catheter [[id number]]  (Abnormal) Collected: 11/08/22 0225    Specimen: Urine, Catheter Updated: 11/08/22 0317     RBC, UA 0-2 /HPF      WBC, UA 21-30 /HPF      Bacteria, UA 3+ /HPF      Squamous Epithelial Cells, UA 3-6 /HPF      Yeast, UA Large/3+ Budding Yeast /HPF      Hyaline Casts, UA 0-2 /LPF      Methodology Manual Light Microscopy    Troponin [[id number]]  (Abnormal) Collected: 11/08/22 0216    Specimen: Blood Updated: 11/08/22 0259     Troponin T 0.054 ng/mL     Narrative:      Troponin T Reference Range:  <= 0.03 ng/mL-   Negative for AMI  >0.03 ng/mL-     Abnormal for myocardial necrosis.  Clinicians would have to utilize clinical acumen, EKG, Troponin and serial changes to determine if it is an Acute Myocardial Infarction or myocardial injury due to an underlying chronic condition.       Results may be falsely decreased if patient taking Biotin.      Comprehensive Metabolic Panel [[id number]]  (Abnormal) Collected: 11/08/22 0216    Specimen: Blood Updated: 11/08/22 0248     Glucose 287 mg/dL      BUN 55 mg/dL      Creatinine 3.41 mg/dL      Sodium 137 mmol/L      Potassium 4.9 mmol/L      Chloride 99 mmol/L      CO2 29.0 mmol/L      Calcium 9.5 mg/dL      Total Protein 7.3 g/dL      Albumin 4.40 g/dL      ALT (SGPT) 10 U/L      AST (SGOT) 17 U/L      Alkaline Phosphatase 77 U/L      Total Bilirubin <0.2 mg/dL      Globulin 2.9 gm/dL      A/G Ratio 1.5 g/dL       BUN/Creatinine Ratio 16.1     Anion Gap 9.0 mmol/L      eGFR 13.2 mL/min/1.73      Comment: <15 Indicative of kidney failure       Narrative:      GFR Normal >60  Chronic Kidney Disease <60  Kidney Failure <15    The GFR formula is only valid for adults with stable renal function between ages 18 and 70.    Lipase [[id number]]  (Normal) Collected: 11/08/22 0216    Specimen: Blood Updated: 11/08/22 0244     Lipase 31 U/L     CBC & Differential [[id number]]  (Abnormal) Collected: 11/08/22 0216    Specimen: Blood Updated: 11/08/22 0227    Narrative:      The following orders were created for panel order CBC & Differential.  Procedure                               Abnormality         Status                     ---------                               -----------         ------                     CBC Auto Differential[[id number]]        Abnormal            Final result                 Please view results for these tests on the individual orders.    CBC Auto Differential [[id number]]  (Abnormal) Collected: 11/08/22 0216    Specimen: Blood Updated: 11/08/22 0227     WBC 6.85 10*3/mm3      RBC 3.20 10*6/mm3      Hemoglobin 9.6 g/dL      Hematocrit 29.0 %      MCV 90.6 fL      MCH 30.0 pg      MCHC 33.1 g/dL      RDW 12.3 %      RDW-SD 40.4 fl      MPV 9.3 fL      Platelets 241 10*3/mm3      Neutrophil % 62.4 %      Lymphocyte % 22.2 %      Monocyte % 9.8 %      Eosinophil % 4.7 %      Basophil % 0.6 %      Immature Grans % 0.3 %      Neutrophils, Absolute 4.28 10*3/mm3      Lymphocytes, Absolute 1.52 10*3/mm3      Monocytes, Absolute 0.67 10*3/mm3      Eosinophils, Absolute 0.32 10*3/mm3      Basophils, Absolute 0.04 10*3/mm3      Immature Grans, Absolute 0.02 10*3/mm3      nRBC 0.0 /100 WBC           Estimated Creatinine Clearance: 13.4 mL/min (A) (by C-G formula based on SCr of 3.02 mg/dL (H)).    Culture Results:    Microbiology Results (last 10 days)     Procedure Component Value - Date/Time    Blood Culture - Blood, Hand,  Right [[id number]]  (Normal) Collected: 11/08/22 0437    Lab Status: Preliminary result Specimen: Blood from Hand, Right Updated: 11/09/22 0515     Blood Culture No growth at 24 hours    Blood Culture - Blood, Hand, Digit Left [[id number]]  (Normal) Collected: 11/08/22 0437    Lab Status: Preliminary result Specimen: Blood from Hand, Digit Left Updated: 11/09/22 0515     Blood Culture No growth at 24 hours    Urine Culture - Urine, Urine, Catheter [[id number]]  (Abnormal) Collected: 11/08/22 0225    Lab Status: Preliminary result Specimen: Urine, Catheter Updated: 11/09/22 1212     Urine Culture >100,000 CFU/mL Staphylococcus aureus, MRSA     Comment:   Methicillin resistant Staph aureus, patient may be an isolation risk.  Staphylococcus aureus is not typically regarded as a uropathogen, except in cases with genitourinary instrumentation present.  It's presence suggests an alternate source (e.g. bloodstream, abscess, SSTI, etc.).  Please evaluate accordingly.       Narrative:      Colonization of the urinary tract without infection is common. Treatment is discouraged unless the patient is symptomatic, pregnant, or undergoing an invasive urologic procedure.             Radiology:   Imaging Results (Last 72 Hours)     Procedure Component Value Units Date/Time    CT Abdomen Pelvis Without Contrast [[id number]] Collected: 11/08/22 0729     Updated: 11/08/22 0740    Narrative:      EXAMINATION: CT ABDOMEN PELVIS WO CONTRAST-      11/8/2022 2:29 AM CST     HISTORY: Abdominal pain, acute, nonlocalized     In order to have a CT radiation dose as low as reasonably achievable  Automated Exposure Control was utilized for adjustment of the mA and/or  KV according to patient size.     DLP in mGycm= 312     The CT scan of the abdomen and pelvis is performed without intravenous  contrast enhancement.     The images are acquired in axial plane and subsequent reconstruction in  coronal and sagittal planes.     Comparison is made with  the previous study dated 3/4/2018.     The lung bases included in the study are unremarkable.     The limited visualized cardiomediastinal structures show atheromatous  changes of coronary arteries.     There is a small sliding-type hiatal hernia.     Unenhanced liver and spleen are unremarkable.     The gallbladder is surgically absent.     Unenhanced pancreas is unremarkable.     The adrenal glands bilaterally are normal.     There is moderate lobulation and renal contour bilaterally. No calculi.  No hydronephrosis. The ureters bilaterally are normal. The urinary  bladder is decompressed and not optimally evaluated. Significant  thickening of the walls and probably due to incomplete distention.     Normal uterus is seen. No adnexal masses.     Extensive postsurgical changes in the left upper abdomen are noted. This  is similar to the previous study.     The stomach is decompressed. No focal abnormality or a mass. The  duodenum is normal. Small bowel is nondistended. The appendix is  surgically absent. Large volume stool is seen throughout the colon. No  finding to suggest obstruction.     Atheromatous changes of the abdominal aorta and iliac arteries. No  aneurysmal dilatation.     There is no evidence of abdominal or pelvic lymphadenopathy.     Images are reviewed in bone window show chronic degenerative changes of  the lumbar spine with moderate levoscoliosis. Focal areas of osteopenia  in the vertebral body L3 is similar to the previous study. Severe  degeneration of the intervertebral disc L5-S1 is stable. No acute bony  abnormality.       Impression:      1. No acute abnormality of the abdomen or pelvis. No significant change  since the previous study in 2018.     The above study was initially reviewed and reported by [facility]. I do not  find any discrepancies.                             This report was finalized on 11/08/2022 07:37 by Dr. Saeed Tracey MD.            Assessment & Plan       Abdominal  pain      IMPRESSION:  1. MRSA in urine in patient with external catheter currently.  Source for urine just says catheter.  Unclear if it was in and out catheter or was obtained after external catheter placed.  Blood cultures negative.  No fever or elevated white count.  Patient denies any stents or other urinary instrumentation in place.  No history of kidney stones.  She does have a coccyx wound which I was unable to evaluate.  Generally MRSA in the urine without instrumentation suggest 1 needs to look elsewhere and exclude bacteremia as well.  Fortunately, blood cultures have been obtained.  We will need to evaluate perinium area and coccyx wound with nursing  2. Chronic kidney disease  3. Nausea and vomiting-improved  4. Large volume stool noted throughout the colon on CT scan-may have contributed to her nausea and vomiting    RECOMMENDATION:   · Hold antibiotic treatment directed MRSA in urine at this time  · Will ask staff to place a photo of coccyx wound in epic  · We will try to see if there is documentation of this patient normally having Meza catheter at nursing home.      Keli Coleman MD  11/09/22  16:29 CST

## 2022-11-10 LAB — BACTERIA SPEC AEROBE CULT: ABNORMAL

## 2022-11-10 PROCEDURE — G0378 HOSPITAL OBSERVATION PER HR: HCPCS

## 2022-11-10 PROCEDURE — 25010000002 HEPARIN (PORCINE) PER 1000 UNITS: Performed by: INTERNAL MEDICINE

## 2022-11-10 PROCEDURE — 96372 THER/PROPH/DIAG INJ SC/IM: CPT

## 2022-11-10 PROCEDURE — 97162 PT EVAL MOD COMPLEX 30 MIN: CPT | Performed by: PHYSICAL THERAPIST

## 2022-11-10 RX ORDER — AMLODIPINE BESYLATE 5 MG/1
2.5 TABLET ORAL
Status: DISCONTINUED | OUTPATIENT
Start: 2022-11-10 | End: 2022-11-11 | Stop reason: HOSPADM

## 2022-11-10 RX ORDER — MAGNESIUM CARB/ALUMINUM HYDROX 105-160MG
296 TABLET,CHEWABLE ORAL ONCE
Status: DISCONTINUED | OUTPATIENT
Start: 2022-11-10 | End: 2022-11-11 | Stop reason: HOSPADM

## 2022-11-10 RX ADMIN — LEVETIRACETAM 500 MG: 100 SOLUTION ORAL at 08:36

## 2022-11-10 RX ADMIN — DOCUSATE SODIUM 50 MG AND SENNOSIDES 8.6 MG 2 TABLET: 8.6; 5 TABLET, FILM COATED ORAL at 21:39

## 2022-11-10 RX ADMIN — AMLODIPINE BESYLATE 2.5 MG: 5 TABLET ORAL at 11:43

## 2022-11-10 RX ADMIN — CETIRIZINE HYDROCHLORIDE 10 MG: 10 TABLET ORAL at 08:35

## 2022-11-10 RX ADMIN — HEPARIN SODIUM 5000 UNITS: 5000 INJECTION, SOLUTION INTRAVENOUS; SUBCUTANEOUS at 21:39

## 2022-11-10 RX ADMIN — PANTOPRAZOLE SODIUM 40 MG: 40 TABLET, DELAYED RELEASE ORAL at 17:42

## 2022-11-10 RX ADMIN — DICLOFENAC 2 G: 10 GEL TOPICAL at 08:35

## 2022-11-10 RX ADMIN — CLOPIDOGREL 75 MG: 75 TABLET, FILM COATED ORAL at 08:35

## 2022-11-10 RX ADMIN — LEVETIRACETAM 500 MG: 100 SOLUTION ORAL at 21:39

## 2022-11-10 RX ADMIN — Medication 10 ML: at 08:37

## 2022-11-10 RX ADMIN — DOCUSATE SODIUM 50 MG AND SENNOSIDES 8.6 MG 2 TABLET: 8.6; 5 TABLET, FILM COATED ORAL at 08:35

## 2022-11-10 RX ADMIN — FERROUS SULFATE TAB 325 MG (65 MG ELEMENTAL FE) 325 MG: 325 (65 FE) TAB at 08:35

## 2022-11-10 RX ADMIN — Medication 1 TABLET: at 13:50

## 2022-11-10 RX ADMIN — Medication 800 UNITS: at 08:33

## 2022-11-10 RX ADMIN — HEPARIN SODIUM 5000 UNITS: 5000 INJECTION, SOLUTION INTRAVENOUS; SUBCUTANEOUS at 08:33

## 2022-11-10 RX ADMIN — ATORVASTATIN CALCIUM 10 MG: 10 TABLET, FILM COATED ORAL at 08:35

## 2022-11-10 RX ADMIN — CYCLOSPORINE 1 DROP: 0.5 EMULSION OPHTHALMIC at 21:39

## 2022-11-10 RX ADMIN — SERTRALINE HYDROCHLORIDE 25 MG: 50 TABLET, FILM COATED ORAL at 08:35

## 2022-11-10 RX ADMIN — DICLOFENAC 2 G: 10 GEL TOPICAL at 21:38

## 2022-11-10 RX ADMIN — POLYETHYLENE GLYCOL 3350 17 G: 17 POWDER, FOR SOLUTION ORAL at 08:31

## 2022-11-10 RX ADMIN — BISACODYL 10 MG: 10 SUPPOSITORY RECTAL at 08:33

## 2022-11-10 RX ADMIN — PANTOPRAZOLE SODIUM 40 MG: 40 TABLET, DELAYED RELEASE ORAL at 08:33

## 2022-11-10 RX ADMIN — CYCLOSPORINE 1 DROP: 0.5 EMULSION OPHTHALMIC at 08:33

## 2022-11-10 NOTE — THERAPY EVALUATION
Patient Name: Gregoria Bennett  : 1945    MRN: [id number]                              Today's Date: 11/10/2022       Admit Date: 2022    Visit Dx:     ICD-10-CM ICD-9-CM   1. NSTEMI (non-ST elevated myocardial infarction) ([facility])  I21.4 410.70   2. Generalized weakness  R53.1 780.79   3. Nausea and vomiting, unspecified vomiting type  R11.2 787.01   4. Pyelonephritis  N12 590.80   5. Stage 4 chronic kidney disease ([facility])  N18.4 585.4   6. Type 2 diabetes mellitus with hyperglycemia, unspecified whether long term insulin use ([facility])  E11.65 250.00   7. Impaired mobility  Z74.09 799.89     Patient Active Problem List   Diagnosis   • Sprain   • Urinary tract infection without hematuria   • Sepsis ([facility])   • CKD (chronic kidney disease) stage 4, GFR 15-29 ml/min ([facility])   • Dementia without behavioral disturbance ([facility])   • Metabolic encephalopathy   • Abdominal pain     Past Medical History:   Diagnosis Date   • Chronic kidney disease, stage 4 (severe) ([facility])    • COPD (chronic obstructive pulmonary disease) ([facility])    • Coronary artery disease    • Dementia ([facility])    • Diabetes mellitus ([facility])    • History of dermatomyositis    • Hypertension    • Peptic ulcer disease    • Pulmonary disease    • Renal insufficiency    • Urinary tract infection    • Venous insufficiency      Past Surgical History:   Procedure Laterality Date   • CHOLECYSTECTOMY     • COLON SURGERY        General Information     Row Name 11/10/22 1040          Physical Therapy Time and Intention    Document Type evaluation  presents with epigastric pain; dx elevated troponin  -SB     Mode of Treatment physical therapy  -SB     Row Name 11/10/22 1049          General Information    Patient Profile Reviewed yes  -SB     Prior Level of Function mod assist:;bed mobility;transfer;max assist:;dressing;bathing;independent:;feeding;dependent:;w/c or scooter  -SB     Existing Precautions/Restrictions fall  -SB     Barriers to Rehab medically complex;previous  functional deficit;cognitive status  -SB     Row Name 11/10/22 1040          Living Environment    People in Home facility resident  -SB     Row Name 11/10/22 1040          Home Main Entrance    Number of Stairs, Main Entrance none  -SB     Row Name 11/10/22 1040          Stairs Within Home, Primary    Number of Stairs, Within Home, Primary none  -SB     Row Name 11/10/22 1040          Cognition    Orientation Status (Cognition) oriented x 4  -SB     Row Name 11/10/22 1040          Safety Issues, Functional Mobility    Safety Issues Affecting Function (Mobility) friction/shear risk;insight into deficits/self-awareness  -SB     Impairments Affecting Function (Mobility) endurance/activity tolerance;strength;shortness of breath;range of motion (ROM);pain;cognition  -SB     Cognitive Impairments, Mobility Safety/Performance insight into deficits/self-awareness  -SB           User Key  (r) = Recorded By, (t) = Taken By, (c) = Cosigned By    Initials Name Provider Type    Melisa Sarabia PT DPT Physical Therapist               Mobility     Row Name 11/10/22 1040          Bed Mobility    Bed Mobility rolling left;rolling right;scooting/bridging;supine-sit;sit-supine  -SB     Rolling Left [place] (Bed Mobility) verbal cues;minimum assist (75% patient effort)  -SB     Rolling Right [place] (Bed Mobility) verbal cues;contact guard  -SB     Scooting/Bridging [place] (Bed Mobility) maximum assist (25% patient effort);verbal cues;2 person assist  -SB     Supine-Sit [place] (Bed Mobility) minimum assist (75% patient effort);verbal cues  -SB     Sit-Supine [place] (Bed Mobility) contact guard;verbal cues  -SB     Assistive Device (Bed Mobility) head of bed elevated;draw sheet;bed rails  -SB     Comment, (Bed Mobility) did not attempt further mobility due to safety at this time  -SB           User Key  (r) = Recorded By, (t) = Taken By, (c) = Cosigned By    Initials Name Provider Type    RADHIKA Shahid  Melisa, PT DPT Physical Therapist               Obj/Interventions     Row Name 11/10/22 1040          Range of Motion Comprehensive    General Range of Motion lower extremity range of motion deficits identified  -SB     Comment, General Range of Motion BLE WFL except R ankle imp 25%- rests in PF  -SB     Row Name 11/10/22 1040          Strength Comprehensive (MMT)    General Manual Muscle Testing (MMT) Assessment lower extremity strength deficits identified  -SB     Comment, General Manual Muscle Testing (MMT) Assessment LLE 4-/5; RLE hip flex and knee ext 4-/5, DF 2-/5  -SB     Row Name 11/10/22 1040          Balance    Balance Assessment sitting static balance  -SB     Static Sitting Balance standby assist  -SB     Position, Sitting Balance sitting edge of bed;unsupported  -SB     Row Name 11/10/22 1040          Sensory Assessment (Somatosensory)    Sensory Assessment (Somatosensory) LE sensation intact  -SB           User Key  (r) = Recorded By, (t) = Taken By, (c) = Cosigned By    Initials Name Provider Type    SB Melisa Shahid, PT DPT Physical Therapist               Goals/Plan     Row Name 11/10/22 1040          Bed Mobility Goal 1 (PT)    Activity/Assistive Device (Bed Mobility Goal 1, PT) rolling to right;rolling to left;sit to supine;supine to sit;bridging  -SB     [place] Level/Cues Needed (Bed Mobility Goal 1, PT) standby assist  -SB     Time Frame (Bed Mobility Goal 1, PT) long term goal (LTG)  -SB     Progress/Outcomes (Bed Mobility Goal 1, PT) new goal  -SB     Row Name 11/10/22 1040          Transfer Goal 1 (PT)    Activity/Assistive Device (Transfer Goal 1, PT) bed-to-chair/chair-to-bed;wheelchair transfer  -SB     [place] Level/Cues Needed (Transfer Goal 1, PT) moderate assist (50-74% patient effort)  -SB     Time Frame (Transfer Goal 1, PT) long term goal (LTG)  -SB     Progress/Outcome (Transfer Goal 1, PT) new goal  -SB     Row Name 11/10/22 1040          Patient Education Goal (PT)     Activity (Patient Education Goal, PT) Pt will sit EOB performing dynamic reaching with SBA and no LOB  -SB     [place]/Cues/Accuracy (Memory Goal 2, PT) independent  -SB     Time Frame (Patient Education Goal, PT) long term goal (LTG)  -SB     Progress/Outcome (Patient Education Goal, PT) new goal  -SB     Row Name 11/10/22 1040          Therapy Assessment/Plan (PT)    Planned Therapy Interventions (PT) balance training;bed mobility training;patient/family education;transfer training;postural re-education;strengthening;ROM (range of motion)  -SB           User Key  (r) = Recorded By, (t) = Taken By, (c) = Cosigned By    Initials Name Provider Type    SB Melisa Shahid, PT DPT Physical Therapist               Clinical Impression     Row Name 11/10/22 1040          Pain    Pretreatment Pain Rating 3/10  -SB     Posttreatment Pain Rating 0/10 - no pain  -SB     Pain Location - abdomen  -SB     Pre/Posttreatment Pain Comment c/o fatigue  -SB     Pain Intervention(s) Medication (See MAR);Repositioned;Ambulation/increased activity  -SB     Additional Documentation Pain Scale: Numbers Pre/Post-Treatment (Group)  -SB     Row Name 11/10/22 1040          Plan of Care Review    Plan of Care Reviewed With patient  -SB     Progress no change  -SB     Outcome Evaluation PT eval completed. Pt alert and oriented x3 with c/o abdominal pain and fatigue. Pt reports PLOF as being able to get to EOB and to a w/c with assistance. Pt demos decreased strength in BLE, with more deficits present in RLE. R foot rests in PF and is unable to actively dorsiflex to neutral. Pt able to roll L and R with CGA-min A for hygiene; able to use the bedrails and pull with BUE to assist with rolling. Pt performs sup to sit with min A, with HOB elev and therapist's hand as bed rail. Pt sits EOB unasssisted with SBA and demos forward flexed posture. Further mobility not attempted at this time due to safety. Pt will benefit from skilled PT to improve fxl  mob, maintain strength and function during hospital stay. Recommend d/c back to SNF.  -SB     Row Name 11/10/22 1040          Therapy Assessment/Plan (PT)    Patient/Family Therapy Goals Statement (PT) have a bowel movement  -SB     Rehab Potential (PT) good, to achieve stated therapy goals  -SB     Criteria for Skilled Interventions Met (PT) yes;meets criteria;skilled treatment is necessary  -SB     Therapy Frequency (PT) daily  -SB     Predicted Duration of Therapy Intervention (PT) until d/c or goals met  -SB     Row Name 11/10/22 1040          Vital Signs    O2 Delivery Pre Treatment room air  oxygen donned but not on  -SB     O2 Delivery Intra Treatment room air  -SB     Post SpO2 (%) 98  -SB     O2 Delivery Post Treatment room air  -SB     Pre Patient Position Supine  -SB     Intra Patient Position Sitting  -SB     Post Patient Position Supine  -SB     Row Name 11/10/22 1040          Positioning and Restraints    Pre-Treatment Position in bed  -SB     Post Treatment Position bed  -SB     In Bed notified nsg;fowlers;call light within reach;encouraged to call for assist;side rails up x3;R heel elevated;L heel elevated  -SB           User Key  (r) = Recorded By, (t) = Taken By, (c) = Cosigned By    Initials Name Provider Type    Melisa Sarabia, PT DPT Physical Therapist               Outcome Measures     Row Name 11/10/22 1040 11/10/22 0833       How much help from another person do you currently need...    Turning from your back to your side while in flat bed without using bedrails? 3  -SB 3  -MJ    Moving from lying on back to sitting on the side of a flat bed without bedrails? 3  -SB 3  -MJ    Moving to and from a bed to a chair (including a wheelchair)? 2  -SB 2  -MJ    Standing up from a chair using your arms (e.g., wheelchair, bedside chair)? 2  -SB 2  -MJ    Climbing 3-5 steps with a railing? 1  -SB 2  -MJ    To walk in hospital room? 1  -SB 2  -MJ    AM-PAC 6 Clicks Score (PT) 12  -SB 14  -MJ     Highest level of mobility 4 --> Transferred to chair/commode  -SB 4 --> Transferred to chair/commode  -MJ    Row Name 11/10/22 1040          Functional Assessment    Outcome Measure Options AM-PAC 6 Clicks Basic Mobility (PT)  -SB           User Key  (r) = Recorded By, (t) = Taken By, (c) = Cosigned By    Initials Name Provider Type    SB Melisa Shahid, PT DPT Physical Therapist    Rema Moore, RN Registered Nurse                             Physical Therapy Education     Title: PT OT SLP Therapies (In Progress)     Topic: Physical Therapy (In Progress)     Point: Mobility training (Done)     Learning Progress Summary           Patient Acceptance, E, VU by SB at 11/10/2022 1059    Comment: pt edu on POC, benefits of act and dc plans                   Point: Home exercise program (Not Started)     Learner Progress:  Not documented in this visit.          Point: Body mechanics (Not Started)     Learner Progress:  Not documented in this visit.          Point: Precautions (Done)     Learning Progress Summary           Patient Acceptance, E, VU by SB at 11/10/2022 1059    Comment: pt edu on POC, benefits of act and dc plans                               User Key     Initials Effective Dates Name Provider Type Discipline     06/16/21 -  Melisa Shahid, PT DPT Physical Therapist PT              PT Recommendation and Plan  Planned Therapy Interventions (PT): balance training, bed mobility training, patient/family education, transfer training, postural re-education, strengthening, ROM (range of motion)  Plan of Care Reviewed With: patient  Progress: no change  Outcome Evaluation: PT eval completed. Pt alert and oriented x3 with c/o abdominal pain and fatigue. Pt reports PLOF as being able to get to EOB and to a w/c with assistance. Pt demos decreased strength in BLE, with more deficits present in RLE. R foot rests in PF and is unable to actively dorsiflex to neutral. Pt able to roll L and R with CGA-min A for  hygiene; able to use the bedrails and pull with BUE to assist with rolling. Pt performs sup to sit with min A, with HOB elev and therapist's hand as bed rail. Pt sits EOB unasssisted with SBA and demos forward flexed posture. Further mobility not attempted at this time due to safety. Pt will benefit from skilled PT to improve fxl mob, maintain strength and function during hospital stay. Recommend d/c back to SNF.     Time Calculation:    PT Charges     Row Name 11/10/22 1232             Time Calculation    Start Time 1040  -SB      Stop Time 1135  -SB      Time Calculation (min) 55 min  -SB      PT Received On 11/10/22  -SB      PT Goal Re-Cert Due Date 11/20/22  -SB            User Key  (r) = Recorded By, (t) = Taken By, (c) = Cosigned By    Initials Name Provider Type    Melisa Sarabia, PT DPT Physical Therapist              Therapy Charges for Today     Code Description Service Date Service Provider Modifiers Qty    97970912344 HC PT EVAL MOD COMPLEXITY 4 11/10/2022 Melisa Shahid PT DPT GP 1          PT G-Codes  Outcome Measure Options: AM-PAC 6 Clicks Basic Mobility (PT)  AM-PAC 6 Clicks Score (PT): 12    SHRUTHI SueT  11/10/2022

## 2022-11-10 NOTE — PLAN OF CARE
Goal Outcome Evaluation:  Plan of Care Reviewed With: patient        Progress: no change  Outcome Evaluation: PT eval completed. Pt alert and oriented x3 with c/o abdominal pain and fatigue. Pt reports PLOF as being able to get to EOB and to a w/c with assistance. Pt demos decreased strength in BLE, with more deficits present in RLE. R foot rests in PF and is unable to actively dorsiflex to neutral. Pt able to roll L and R with CGA-min A for hygiene; able to use the bedrails and pull with BUE to assist with rolling. Pt performs sup to sit with min A, with HOB elev and therapist's hand as bed rail. Pt sits EOB unasssisted with SBA and demos forward flexed posture. Further mobility not attempted at this time due to safety. Pt will benefit from skilled PT to improve fxl mob, maintain strength and function during hospital stay. Recommend d/c back to SNF.

## 2022-11-10 NOTE — PLAN OF CARE
Problem: Adult Inpatient Plan of Care  Goal: Plan of Care Review  Outcome: Ongoing, Progressing  Flowsheets (Taken 11/10/2022 4487)  Progress: no change  Plan of Care Reviewed With: patient  Outcome Evaluation: Pt has had no complaints of pain this shift. Large BM this AM. VSS, NSR/SB on tele. Safety maintained. Will update MD as needed

## 2022-11-10 NOTE — PROGRESS NOTES
"INFECTIOUS DISEASES PROGRESS NOTE    Patient:  Gregoria Bennett  YOB: 1945  MRN: [id number]   Admit date: 2022   Admitting Physician: Kevin Tsai*  Primary Care Physician: Chu Isaac MD    Chief Complaint: Waiting for supper        Interval History: Patient offers no complaints.  She said she is just waiting for supper.  She notes that she does not have a catheter at the nursing home.  She reports she is just\" diapers\".  She said she knows when she needs to go however she would have to have somebody put her on a bedpan and there is not enough time.    Allergies:   Allergies   Allergen Reactions   • Aspirin Rash   • Tetracyclines & Related Rash       Current Scheduled Medications:   amLODIPine, 2.5 mg, Oral, Q24H  atenolol, 25 mg, Oral, Daily  atorvastatin, 10 mg, Oral, Daily  bisacodyl, 10 mg, Rectal, Daily  cetirizine, 10 mg, Oral, Daily  clopidogrel, 75 mg, Oral, Daily  cycloSPORINE, 1 drop, Both Eyes, BID  Diclofenac Sodium, 2 g, Topical, TID  ferrous sulfate, 325 mg, Oral, Daily With Breakfast  heparin (porcine), 5,000 Units, Subcutaneous, Q12H  levETIRAcetam, 500 mg, Oral, Q12H  magnesium citrate, 296 mL, Oral, Once  multivitamin with minerals, 1 tablet, Oral, Daily  pantoprazole, 40 mg, Oral, BID AC  polyethylene glycol, 17 g, Oral, Daily  senna-docusate sodium, 2 tablet, Oral, BID  sertraline, 25 mg, Oral, Daily  sodium chloride, 10 mL, Intravenous, Q12H  Vitamin E, 800 Units, Oral, Daily      Current PRN Medications:  •  ipratropium-albuterol  •  nitroglycerin  •  ondansetron  •  [COMPLETED] Insert peripheral IV **AND** sodium chloride  •  sodium chloride  •  traMADol    Review of Systems  General: No fevers  GI: No vomiting    Objective     Vital Signs:  Temp (24hrs), Av.7 °F (36.5 °C), Min:97 °F (36.1 °C), Max:98.3 °F (36.8 °C)      /52 (BP Location: Left arm, Patient Position: Lying)   Pulse 62   Temp 98.3 °F (36.8 °C) (Oral)   Resp 20   Ht 152.4 cm " "(60\")   Wt 68.9 kg (152 lb)   SpO2 97%   BMI 29.69 kg/m²         Physical Exam  General: The patient is an elderly female lying in bed in no acute distress  HEENT: Sclera anicteric and noninjected.  Respiratory: Effort even and unlabored, she was not conversationally dyspneic  Abdomen: Soft, nontender, nondistended  : Patient has pure wick catheter in place.  Inguinal area and vaginal area evaluated and no obvious skin lesions noted.  Coccyx: See photo.  Neuro: Alert, speech clear  Psych: Pleasant cooperative            Results Review:    I reviewed the patient's new clinical results.    Lab Results:  CBC:   Lab Results   Lab 11/08/22 0216 11/09/22 0629 11/09/22  1755   WBC 6.85 3.86  --    HEMOGLOBIN 9.6* 7.5* 8.1*   HEMATOCRIT 29.0* 24.3* 25.2*   PLATELETS 241 181  --          CMP:   Lab Results   Lab 11/08/22 0216 11/09/22  0629   SODIUM 137 143   POTASSIUM 4.9 4.6   CHLORIDE 99 110*   CO2 29.0 28.0   BUN 55* 45*   CREATININE 3.41* 3.02*   CALCIUM 9.5 8.3*   BILIRUBIN <0.2 <0.2   ALK PHOS 77 53   ALT (SGPT) 10 8   AST (SGOT) 17 12   GLUCOSE 287* 61*       Estimated Creatinine Clearance: 13.1 mL/min (A) (by C-G formula based on SCr of 3.02 mg/dL (H)).    Culture Results:    Microbiology Results (last 10 days)     Procedure Component Value - Date/Time    Blood Culture With JOSE ALFREDO - Blood, Arm, Right [[id number]]  (Normal) Collected: 11/09/22 1359    Lab Status: Preliminary result Specimen: Blood from Arm, Right Updated: 11/10/22 1445     Blood Culture No growth at 24 hours    Blood Culture - Blood, Hand, Right [[id number]]  (Normal) Collected: 11/08/22 0437    Lab Status: Preliminary result Specimen: Blood from Hand, Right Updated: 11/10/22 0515     Blood Culture No growth at 2 days    Blood Culture - Blood, Hand, Digit Left [[id number]]  (Normal) Collected: 11/08/22 0437    Lab Status: Preliminary result Specimen: Blood from Hand, Digit Left Updated: 11/10/22 0515     Blood Culture No growth at 2 days    Urine " Culture - Urine, Urine, Catheter [[id number]]  (Abnormal)  (Susceptibility) Collected: 11/08/22 0225    Lab Status: Final result Specimen: Urine, Catheter Updated: 11/10/22 1003     Urine Culture >100,000 CFU/mL Staphylococcus aureus, MRSA     Comment:   Methicillin resistant Staph aureus, patient may be an isolation risk.  Staphylococcus aureus is not typically regarded as a uropathogen, except in cases with genitourinary instrumentation present.  It's presence suggests an alternate source (e.g. bloodstream, abscess, SSTI, etc.).  Please evaluate accordingly.       Narrative:      Colonization of the urinary tract without infection is common. Treatment is discouraged unless the patient is symptomatic, pregnant, or undergoing an invasive urologic procedure.    Susceptibility      Staphylococcus aureus, MRSA      JOHNNY      Nitrofurantoin Susceptible      Oxacillin Resistant     Tetracycline Susceptible      Trimethoprim + Sulfamethoxazole Susceptible      Vancomycin Susceptible                                      Radiology:   Imaging Results (Last 72 Hours)     Procedure Component Value Units Date/Time    CT Abdomen Pelvis Without Contrast [[id number]] Collected: 11/08/22 0729     Updated: 11/08/22 0740    Narrative:      EXAMINATION: CT ABDOMEN PELVIS WO CONTRAST-      11/8/2022 2:29 AM CST     HISTORY: Abdominal pain, acute, nonlocalized     In order to have a CT radiation dose as low as reasonably achievable  Automated Exposure Control was utilized for adjustment of the mA and/or  KV according to patient size.     DLP in mGycm= 312     The CT scan of the abdomen and pelvis is performed without intravenous  contrast enhancement.     The images are acquired in axial plane and subsequent reconstruction in  coronal and sagittal planes.     Comparison is made with the previous study dated 3/4/2018.     The lung bases included in the study are unremarkable.     The limited visualized cardiomediastinal structures show  atheromatous  changes of coronary arteries.     There is a small sliding-type hiatal hernia.     Unenhanced liver and spleen are unremarkable.     The gallbladder is surgically absent.     Unenhanced pancreas is unremarkable.     The adrenal glands bilaterally are normal.     There is moderate lobulation and renal contour bilaterally. No calculi.  No hydronephrosis. The ureters bilaterally are normal. The urinary  bladder is decompressed and not optimally evaluated. Significant  thickening of the walls and probably due to incomplete distention.     Normal uterus is seen. No adnexal masses.     Extensive postsurgical changes in the left upper abdomen are noted. This  is similar to the previous study.     The stomach is decompressed. No focal abnormality or a mass. The  duodenum is normal. Small bowel is nondistended. The appendix is  surgically absent. Large volume stool is seen throughout the colon. No  finding to suggest obstruction.     Atheromatous changes of the abdominal aorta and iliac arteries. No  aneurysmal dilatation.     There is no evidence of abdominal or pelvic lymphadenopathy.     Images are reviewed in bone window show chronic degenerative changes of  the lumbar spine with moderate levoscoliosis. Focal areas of osteopenia  in the vertebral body L3 is similar to the previous study. Severe  degeneration of the intervertebral disc L5-S1 is stable. No acute bony  abnormality.       Impression:      1. No acute abnormality of the abdomen or pelvis. No significant change  since the previous study in 2018.     The above study was initially reviewed and reported by [employer]. I do not  find any discrepancies.                             This report was finalized on 11/08/2022 07:37 by Dr. Saeed Tracey MD.          Assessment & Plan     Active Hospital Problems    Diagnosis    • Abdominal pain        IMPRESSION:  1. MRSA in urine.  No associated bacteremia.  Patient without fever, hypothermia, leukocytosis.   "Source\" catheter\" and cannot find where she had an in and out specimen obtained.  Coccyx wound does not look superinfected.  Did not find any skin lesions consistent with soft tissue staph infection in the perineal area genital area.  No history of indwelling instrumentation such as a stent or kidney stones.  2. Chronic kidney disease  3. Nausea and vomiting resolved  4. Large volume stool noted throughout the colon on CT scan      RECOMMENDATION:   · Continue to hold antibiotics  · Would not plan on treating the MRSA unless a blood culture is positive given her clinical status not being consistent with active infection  · If patient has any fever, hypothermia, leukocytosis, hemodynamic instability, etc. would obtain a urinalysis via in and out catheterization to be sent for UA with reflex to culture.        Keli Coleman MD  11/10/22  17:30 CST      "

## 2022-11-10 NOTE — PROGRESS NOTES
[facility]  INPATIENT PROGRESS NOTE    Patient Name: Gregoria Bennett  Date of Admission: 11/8/2022  Today's Date: 11/10/22  Length of Stay: 1  Primary Care Physician: Chu Isaac MD    Subjective   Chief Complaint: Follow-up  HPI   Patient is more awake today.  She is having breakfast  She is not in any distress  She has not complained of any abdominal pain nor nausea or vomiting  She claims that she has not had any bowel movement despite bowel regimen.  I thought I gave her a dose of lactulose yesterday but I may have missed to order it.  I had nurse Rema put in an order for mag citrate    Yesterday she also had MRSA in her urine.  I consulted ID for their opinion.  She recommends monitor off antibiotic.  I ordered a blood culture JOSE ALFREDO yesterday.  Blood culture today has no growth.  Patient remains afebrile    Yesterday we held off the blood pressure medicine as it was stated no longer taking.  Blood pressure today ranges from 140-151 systolic.    Review of Systems     All pertinent negatives and positives are as above. All other systems have been reviewed and are negative unless otherwise stated.     Objective    Temp:  [97 °F (36.1 °C)-98 °F (36.7 °C)] 97.6 °F (36.4 °C)  Heart Rate:  [54-72] 59  Resp:  [16-18] 18  BP: (120-151)/(43-76) 148/69  Physical Exam  Seated comfortably on the bed eating breakfast  Nontoxic-appearing  She is on oxygen at 2 L.  Her O2 saturation 100%.  States she uses oxygen on as needed at home.      GEN: Awake, alert, interactive, in NAD  HEENT: Atraumatic, PERRLA, EOMI, Anicteric, Trachea midline  Lungs: CTAB, no wheezing/rales/rhonchi  Heart: RRR, +S1/s2, no rub  ABD: soft, mild tenderness left lower quadrant.  No guarding, no rebound  Extremities: atraumatic, no cyanosis, no edema, it appears that he has a right foot drop  Skin: no rashes or lesions  Neuro: AAOx3, no focal deficits.    Psych: normal mood &  affect          Results Review:  I have reviewed the labs, radiology results, and diagnostic studies.    Laboratory Data:   Results from last 7 days   Lab Units 11/09/22  1755 11/09/22  0629 11/08/22  0216   WBC 10*3/mm3  --  3.86 6.85   HEMOGLOBIN g/dL 8.1* 7.5* 9.6*   HEMATOCRIT % 25.2* 24.3* 29.0*   PLATELETS 10*3/mm3  --  181 241        Results from last 7 days   Lab Units 11/09/22  0629 11/08/22  0216   SODIUM mmol/L 143 137   POTASSIUM mmol/L 4.6 4.9   CHLORIDE mmol/L 110* 99   CO2 mmol/L 28.0 29.0   BUN mg/dL 45* 55*   CREATININE mg/dL 3.02* 3.41*   CALCIUM mg/dL 8.3* 9.5   BILIRUBIN mg/dL <0.2 <0.2   ALK PHOS U/L 53 77   ALT (SGPT) U/L 8 10   AST (SGOT) U/L 12 17   GLUCOSE mg/dL 61* 287*       Culture Data:   Blood Culture   Date Value Ref Range Status   11/09/2022 No growth at less than 24 hours  Preliminary   11/08/2022 No growth at 2 days  Preliminary   11/08/2022 No growth at 2 days  Preliminary     Urine Culture   Date Value Ref Range Status   11/08/2022 >100,000 CFU/mL Staphylococcus aureus, MRSA (A)  Preliminary     Comment:       Methicillin resistant Staph aureus, patient may be an isolation risk.  Staphylococcus aureus is not typically regarded as a uropathogen, except in cases with genitourinary instrumentation present.  It's presence suggests an alternate source (e.g. bloodstream, abscess, SSTI, etc.).  Please evaluate accordingly.       Radiology Data:   Imaging Results (Last 24 Hours)     ** No results found for the last 24 hours. **          I have reviewed the patient's current medications.     Assessment/Plan     Active Hospital Problems    Diagnosis    • Abdominal pain      Problem list  • Epigastric pain-chronic; had EGD in the recent past (October).  Normal duodenal mucosa by biopsy and negative for Helicobacter pylori.     • CKD stage IV-creatinine improved at 3.  • Elevated troponin in the setting of chronic kidney disease.  Flat trend. No chest pain.  Epigastric pain has been worked  up.EKG showed right bundle branch block, normal sinus rhythm, isolated Q-wave in lead III.  No ischemic change.  No further evaluation needed at this time.  • Mild elevation in lactic acid of unclear significance.  No gross evidence of infection.  Patient though has MRSA in the urine.  Its significance is not clear.  ID consulted no bacteremia.  No gross liver disease dysfunction.  No evidence of hypotension  • Abnormal urinalysis with pyuria bacteriuria (MRSA) however presence of epithelial cells.  This was a catheterized urine.  Apparently patient has not complained of any symptoms related to this  • Constipation by CT imaging. Bowel regimen.  I think this could be an issue causing the abdominal pain.  Will observe on any other systemic inflammatory signs.  Patient received Rocephin from the emergency room for an abnormal urinalysis I suppose.  • Anemia possibly from chronic kidney disease  • Hypertension -with low blood pressure reading; DC Norvasc and place parameters for use of atenolol  • Diabetes mellitus type 2 sliding insulin. Cont current regimen  • Dementia-stable  • ?  Seizure on Keppra  Plan:  I gave a one-time dose of DAMON yesterday  Will start on low-dose Norvasc (I think it is a good antihypertensive medication but has side effect of constipation).  Monitor blood pressure  Will add mag citrate if available as discussed with nurse Geraldine  Continue other regimen for bowel  Increase activities as tolerated  Awaiting PT evaluation              Discharge Planning:  This will depend on  1. bowel movement  2.  Further recommendation from ID regarding the bacteriuria (MRSA).  Cultures so far no growth to date and blood  3.  PT evaluation      Electronically signed by Kevin Tsai MD, 11/10/22, 09:45 CST.

## 2022-11-10 NOTE — PLAN OF CARE
Goal Outcome Evaluation:      VSS. Patient rested well overnight. Picture of coccyx wound obtained. No complaints of pain or SOA. Safety maintained.

## 2022-11-11 VITALS
SYSTOLIC BLOOD PRESSURE: 124 MMHG | HEART RATE: 53 BPM | TEMPERATURE: 97.5 F | WEIGHT: 152 LBS | RESPIRATION RATE: 16 BRPM | DIASTOLIC BLOOD PRESSURE: 71 MMHG | BODY MASS INDEX: 29.84 KG/M2 | HEIGHT: 60 IN | OXYGEN SATURATION: 98 %

## 2022-11-11 PROBLEM — K59.00 CONSTIPATION: Status: ACTIVE | Noted: 2022-11-11

## 2022-11-11 LAB — SARS-COV-2 AG RESP QL IA.RAPID: NORMAL

## 2022-11-11 PROCEDURE — 87426 SARSCOV CORONAVIRUS AG IA: CPT | Performed by: INTERNAL MEDICINE

## 2022-11-11 PROCEDURE — G0378 HOSPITAL OBSERVATION PER HR: HCPCS

## 2022-11-11 RX ORDER — ATENOLOL 25 MG/1
25 TABLET ORAL DAILY
Start: 2022-11-12

## 2022-11-11 RX ORDER — AMLODIPINE BESYLATE 2.5 MG/1
2.5 TABLET ORAL
Start: 2022-11-12

## 2022-11-11 RX ADMIN — FERROUS SULFATE TAB 325 MG (65 MG ELEMENTAL FE) 325 MG: 325 (65 FE) TAB at 08:12

## 2022-11-11 RX ADMIN — LEVETIRACETAM 500 MG: 100 SOLUTION ORAL at 08:13

## 2022-11-11 RX ADMIN — AMLODIPINE BESYLATE 2.5 MG: 5 TABLET ORAL at 08:12

## 2022-11-11 RX ADMIN — DICLOFENAC 2 G: 10 GEL TOPICAL at 08:15

## 2022-11-11 RX ADMIN — PANTOPRAZOLE SODIUM 40 MG: 40 TABLET, DELAYED RELEASE ORAL at 08:13

## 2022-11-11 RX ADMIN — ATENOLOL 25 MG: 25 TABLET ORAL at 08:12

## 2022-11-11 RX ADMIN — CYCLOSPORINE 1 DROP: 0.5 EMULSION OPHTHALMIC at 08:13

## 2022-11-11 RX ADMIN — Medication 800 UNITS: at 08:12

## 2022-11-11 RX ADMIN — PANTOPRAZOLE SODIUM 40 MG: 40 TABLET, DELAYED RELEASE ORAL at 17:55

## 2022-11-11 RX ADMIN — CETIRIZINE HYDROCHLORIDE 10 MG: 10 TABLET ORAL at 08:12

## 2022-11-11 RX ADMIN — Medication 10 ML: at 08:13

## 2022-11-11 RX ADMIN — SERTRALINE HYDROCHLORIDE 25 MG: 50 TABLET, FILM COATED ORAL at 08:12

## 2022-11-11 RX ADMIN — CLOPIDOGREL 75 MG: 75 TABLET, FILM COATED ORAL at 08:12

## 2022-11-11 RX ADMIN — ATORVASTATIN CALCIUM 10 MG: 10 TABLET, FILM COATED ORAL at 08:12

## 2022-11-11 NOTE — DISCHARGE SUMMARY
[facility] Medicine Services  DISCHARGE SUMMARY       Date of Admission: 11/8/2022  Date of Discharge:  11/11/2022  Primary Care Physician: Chu Isaac MD    Discharge Diagnoses:  Active Hospital Problems    Diagnosis    • Constipation    • Abdominal pain      Problem list  • Epigastric pain-chronic; had EGD in the recent past (October).  Normal duodenal mucosa by biopsy and negative for Helicobacter pylori.     • CKD stage IV-creatinine improved at 3.  • Elevated troponin in the setting of chronic kidney disease.   • Mild elevation in lactic acid of unclear significance.  No gross evidence of infection.  Patient though has MRSA in the urine.  Its significance is not clear.  ID consulted no bacteremia.  No gross liver disease dysfunction.  No evidence of hypotension  • Abnormal urinalysis with pyuria bacteriuria (MRSA) however presence of epithelial cells.  This was a catheterized urine.  Apparently patient has not complained of any symptoms related to this (asymptomatic bacteriuria)  • Constipation by CT imaging.  Resolved  • Anemia possibly from chronic kidney disease  • Hypertension -cont of current regimen  • Diabetes mellitus type 2 sliding insulin.  • Dementia-stable  • ?  Seizure on Keppra        Presenting Problem/History of Present Illness:  NSTEMI (non-ST elevated myocardial infarction) (HCC) [I21.4]  Abdominal pain [R10.9]         Hospital Course  This is a 77-year-old woman who I admitted through the emergency room for same thing with epigastric pain  ER requested admission for generalized weakness, nausea vomiting, pyelonephritis, chronic kidney disease and type 2 diabetes mellitus.  Admitting diagnosis by ER was NSTEMI on the count of troponin of 0.043.  Patient has not had any normal troponin.  2 years prior to this admission had a troponin of 0.056.  She has not complained of any chest pain.  Respiratory as there is referred to my admitting H&P for  details surrounding admission.  After reviewing the record and evaluating her circumstances, my medical opinion on her situation includes    Epigastric pain -patient had EGD as outlined below    October 3,2022: EGD at [facility].  Indication was early satiety/epigastric pain, nausea    Impression includes: No ulcers or masses or gastric outlet obstruction or retained fluid or food in the stomach.  Normal duodenum.  Normal esophagus              Reviewing through above, patient has multiple medications which I think no longer appropriate (sucralfate and Protonix).  Patient has been advised to follow as directed by endoscopist.  PCP to facilitate care    Elevated troponin in the setting of chronic kidney disease.  No suggestive of chest pain.  EKG showed right bundle branch block, normal sinus rhythm, isolated Q-wave in lead III no ischemic change.  No further evaluation needed      Mild lactic acid elevation which is unclear significance.    Abnormal urinalysis which on culture showed MRSA.  Patient is asymptomatic.  Blood culture no growth.  ID consulted.  Recommendations as outlined below.  She has not received any antibiotic directed to this as patient is asymptomatic and has no bacteremia.    Constipation by CT imaging this is primarily I thought an issue causing her abdominal pain.  Patient had bowel regimen and had effective bowel movement    Anemia of chronic kidney disease.  Patient received DAMON x1 dose during this hospitalization.  She is on supplemental iron prior to this admit.  Continue current regimen    Hypertension - Patient had issue with low blood pressure reading.  She has polypharmacy.  There are medications listed which has been updated and reportedly no longer taking.  Currently on regimen of beta-blocker and low-dose Norvasc.  Recommend monitoring care of PCP    Diabetes mellitus type 2 with hypoglycemia.  Her A1c has not been checked during this hospitalization.  She had low glucose reading.   Previously on insulin 70/30 and sliding scale insulin.  Will defer to primary care provider rechecking A1c level.  Medications reportedly no longer taking.    Overall, patient is doing well and felt medically stable and appropriate for discharge.  I reviewed the home medications to the best of my abilities.  I correlated all her medications based on current indications.  I strongly recommend for PCP through skilled nursing facility to monitor and revisit the current medication for its continued indication.          Procedures Performed:    None    Consults:   none    Pertinent Test Results:   Lab Results (last 7 days)     Procedure Component Value Units Date/Time    Blood Culture - Blood, Hand, Right [[id number]]  (Normal) Collected: 11/08/22 0437    Specimen: Blood from Hand, Right Updated: 11/11/22 0515     Blood Culture No growth at 3 days    Blood Culture - Blood, Hand, Digit Left [[id number]]  (Normal) Collected: 11/08/22 0437    Specimen: Blood from Hand, Digit Left Updated: 11/11/22 0515     Blood Culture No growth at 3 days    Blood Culture With JOSE ALFREDO - Blood, Arm, Right [[id number]]  (Normal) Collected: 11/09/22 1359    Specimen: Blood from Arm, Right Updated: 11/10/22 1445     Blood Culture No growth at 24 hours    Urine Culture - Urine, Urine, Catheter [[id number]]  (Abnormal)  (Susceptibility) Collected: 11/08/22 0225    Specimen: Urine, Catheter Updated: 11/10/22 1003     Urine Culture >100,000 CFU/mL Staphylococcus aureus, MRSA     Comment:   Methicillin resistant Staph aureus, patient may be an isolation risk.  Staphylococcus aureus is not typically regarded as a uropathogen, except in cases with genitourinary instrumentation present.  It's presence suggests an alternate source (e.g. bloodstream, abscess, SSTI, etc.).  Please evaluate accordingly.       Narrative:      Colonization of the urinary tract without infection is common. Treatment is discouraged unless the patient is symptomatic, pregnant, or  undergoing an invasive urologic procedure.    Susceptibility      Staphylococcus aureus, MRSA      JOHNNY      Nitrofurantoin Susceptible      Oxacillin Resistant     Tetracycline Susceptible      Trimethoprim + Sulfamethoxazole Susceptible      Vancomycin Susceptible                           Hemoglobin & Hematocrit, Blood [[id number]]  (Abnormal) Collected: 11/09/22 1755    Specimen: Blood Updated: 11/09/22 1805     Hemoglobin 8.1 g/dL      Hematocrit 25.2 %     Ferritin [[id number]]  (Normal) Collected: 11/09/22 0629    Specimen: Blood Updated: 11/09/22 1008     Ferritin 144.50 ng/mL     Narrative:      Results may be falsely decreased if patient taking Biotin.      Iron Profile [[id number]]  (Abnormal) Collected: 11/09/22 0629    Specimen: Blood Updated: 11/09/22 1007     Iron 41 mcg/dL      Iron Saturation 15 %      Transferrin 185 mg/dL      TIBC 276 mcg/dL     Comprehensive Metabolic Panel [[id number]]  (Abnormal) Collected: 11/09/22 0629    Specimen: Blood Updated: 11/09/22 0726     Glucose 61 mg/dL      BUN 45 mg/dL      Creatinine 3.02 mg/dL      Sodium 143 mmol/L      Potassium 4.6 mmol/L      Chloride 110 mmol/L      CO2 28.0 mmol/L      Calcium 8.3 mg/dL      Total Protein 5.5 g/dL      Albumin 3.30 g/dL      ALT (SGPT) 8 U/L      AST (SGOT) 12 U/L      Alkaline Phosphatase 53 U/L      Total Bilirubin <0.2 mg/dL      Globulin 2.2 gm/dL      A/G Ratio 1.5 g/dL      BUN/Creatinine Ratio 14.9     Anion Gap 5.0 mmol/L      eGFR 15.2 mL/min/1.73      Comment: National Kidney Foundation and American Society of Nephrology (ASN) Task Force recommended calculation based on the Chronic Kidney Disease Epidemiology Collaboration (CKD-EPI) equation refit without adjustment for race.       Narrative:      GFR Normal >60  Chronic Kidney Disease <60  Kidney Failure <15    The GFR formula is only valid for adults with stable renal function between ages 18 and 70.    Troponin [[id number]]  (Abnormal) Collected: 11/09/22 0629     Specimen: Blood Updated: 11/09/22 0723     Troponin T 0.049 ng/mL     Narrative:      Troponin T Reference Range:  <= 0.03 ng/mL-   Negative for AMI  >0.03 ng/mL-     Abnormal for myocardial necrosis.  Clinicians would have to utilize clinical acumen, EKG, Troponin and serial changes to determine if it is an Acute Myocardial Infarction or myocardial injury due to an underlying chronic condition.       Results may be falsely decreased if patient taking Biotin.      CBC (No Diff) [[id number]]  (Abnormal) Collected: 11/09/22 0629    Specimen: Blood Updated: 11/09/22 0652     WBC 3.86 10*3/mm3      RBC 2.54 10*6/mm3      Hemoglobin 7.5 g/dL      Hematocrit 24.3 %      MCV 95.7 fL      MCH 29.5 pg      MCHC 30.9 g/dL      RDW 12.3 %      RDW-SD 42.1 fl      MPV 9.1 fL      Platelets 181 10*3/mm3     STAT Lactic Acid, Reflex [[id number]]  (Normal) Collected: 11/08/22 0920    Specimen: Blood Updated: 11/08/22 0947     Lactate 1.4 mmol/L     [place] Draw [[id number]] Collected: 11/08/22 0216    Specimen: Blood Updated: 11/08/22 0630    Narrative:      The following orders were created for panel order [place] Draw.  Procedure                               Abnormality         Status                     ---------                               -----------         ------                     Green Top (Gel)[[id number]]                                  Final result               Lavender Top[[id number]]                                     Final result               Red Top[[id number]]                                          Final result               Gray Top[[id number]]                                         Final result               Light Blue Top[[id number]]                                   Final result                 Please view results for these tests on the individual orders.    Gray Top [[id number]] Collected: 11/08/22 0216    Specimen: Blood Updated: 11/08/22 0630     Extra Tube Hold for add-ons.     Comment: Auto resulted.        STAT Lactic Acid, Reflex [[id number]]  (Abnormal) Collected: 11/08/22 0437    Specimen: Blood Updated: 11/08/22 0519     Lactate 2.2 mmol/L     Troponin [[id number]]  (Abnormal) Collected: 11/08/22 0437    Specimen: Blood Updated: 11/08/22 0515     Troponin T 0.043 ng/mL     Narrative:      Troponin T Reference Range:  <= 0.03 ng/mL-   Negative for AMI  >0.03 ng/mL-     Abnormal for myocardial necrosis.  Clinicians would have to utilize clinical acumen, EKG, Troponin and serial changes to determine if it is an Acute Myocardial Infarction or myocardial injury due to an underlying chronic condition.       Results may be falsely decreased if patient taking Biotin.      Lactic Acid, Plasma [[id number]]  (Abnormal) Collected: 11/08/22 0216    Specimen: Blood Updated: 11/08/22 0357     Lactate 2.4 mmol/L     Green Top (Gel) [[id number]] Collected: 11/08/22 0216    Specimen: Blood Updated: 11/08/22 0330     Extra Tube Hold for add-ons.     Comment: Auto resulted.       Red Top [[id number]] Collected: 11/08/22 0216    Specimen: Blood Updated: 11/08/22 0330     Extra Tube Hold for add-ons.     Comment: Auto resulted.       Light Blue Top [[id number]] Collected: 11/08/22 0216    Specimen: Blood Updated: 11/08/22 0330     Extra Tube Hold for add-ons.     Comment: Auto resulted       Lavender Top [[id number]] Collected: 11/08/22 0216    Specimen: Blood Updated: 11/08/22 0330     Extra Tube hold for add-on     Comment: Auto resulted       Urinalysis With Culture If Indicated - Urine, Catheter [[id number]]  (Abnormal) Collected: 11/08/22 0225    Specimen: Urine, Catheter Updated: 11/08/22 0317     Color, UA Yellow     Appearance, UA Clear     pH, UA 7.0     Specific Gravity, UA 1.016     Glucose,  mg/dL (1+)     Ketones, UA Negative     Bilirubin, UA Negative     Blood, UA Negative     Protein, UA 30 mg/dL (1+)     Leuk Esterase, UA Large (3+)     Nitrite, UA Positive     Urobilinogen, UA 0.2 E.U./dL    Narrative:      In  absence of clinical symptoms, the presence of pyuria, bacteria, and/or nitrites on the urinalysis result does not correlate with infection.    Urinalysis, Microscopic Only - Urine, Catheter [[id number]]  (Abnormal) Collected: 11/08/22 0225    Specimen: Urine, Catheter Updated: 11/08/22 0317     RBC, UA 0-2 /HPF      WBC, UA 21-30 /HPF      Bacteria, UA 3+ /HPF      Squamous Epithelial Cells, UA 3-6 /HPF      Yeast, UA Large/3+ Budding Yeast /HPF      Hyaline Casts, UA 0-2 /LPF      Methodology Manual Light Microscopy    Troponin [[id number]]  (Abnormal) Collected: 11/08/22 0216    Specimen: Blood Updated: 11/08/22 0259     Troponin T 0.054 ng/mL     Narrative:      Troponin T Reference Range:  <= 0.03 ng/mL-   Negative for AMI  >0.03 ng/mL-     Abnormal for myocardial necrosis.  Clinicians would have to utilize clinical acumen, EKG, Troponin and serial changes to determine if it is an Acute Myocardial Infarction or myocardial injury due to an underlying chronic condition.       Results may be falsely decreased if patient taking Biotin.      Comprehensive Metabolic Panel [[id number]]  (Abnormal) Collected: 11/08/22 0216    Specimen: Blood Updated: 11/08/22 0248     Glucose 287 mg/dL      BUN 55 mg/dL      Creatinine 3.41 mg/dL      Sodium 137 mmol/L      Potassium 4.9 mmol/L      Chloride 99 mmol/L      CO2 29.0 mmol/L      Calcium 9.5 mg/dL      Total Protein 7.3 g/dL      Albumin 4.40 g/dL      ALT (SGPT) 10 U/L      AST (SGOT) 17 U/L      Alkaline Phosphatase 77 U/L      Total Bilirubin <0.2 mg/dL      Globulin 2.9 gm/dL      A/G Ratio 1.5 g/dL      BUN/Creatinine Ratio 16.1     Anion Gap 9.0 mmol/L      eGFR 13.2 mL/min/1.73      Comment: <15 Indicative of kidney failure       Narrative:      GFR Normal >60  Chronic Kidney Disease <60  Kidney Failure <15    The GFR formula is only valid for adults with stable renal function between ages 18 and 70.    Lipase [[id number]]  (Normal) Collected: 11/08/22 0216    Specimen:  "Blood Updated: 11/08/22 0244     Lipase 31 U/L     CBC & Differential [[id number]]  (Abnormal) Collected: 11/08/22 0216    Specimen: Blood Updated: 11/08/22 0227    Narrative:      The following orders were created for panel order CBC & Differential.  Procedure                               Abnormality         Status                     ---------                               -----------         ------                     CBC Auto Differential[[id number]]        Abnormal            Final result                 Please view results for these tests on the individual orders.    CBC Auto Differential [[id number]]  (Abnormal) Collected: 11/08/22 0216    Specimen: Blood Updated: 11/08/22 0227     WBC 6.85 10*3/mm3      RBC 3.20 10*6/mm3      Hemoglobin 9.6 g/dL      Hematocrit 29.0 %      MCV 90.6 fL      MCH 30.0 pg      MCHC 33.1 g/dL      RDW 12.3 %      RDW-SD 40.4 fl      MPV 9.3 fL      Platelets 241 10*3/mm3      Neutrophil % 62.4 %      Lymphocyte % 22.2 %      Monocyte % 9.8 %      Eosinophil % 4.7 %      Basophil % 0.6 %      Immature Grans % 0.3 %      Neutrophils, Absolute 4.28 10*3/mm3      Lymphocytes, Absolute 1.52 10*3/mm3      Monocytes, Absolute 0.67 10*3/mm3      Eosinophils, Absolute 0.32 10*3/mm3      Basophils, Absolute 0.04 10*3/mm3      Immature Grans, Absolute 0.02 10*3/mm3      nRBC 0.0 /100 WBC           Condition on Discharge:   stable  Physical Exam on Discharge:  /74 (BP Location: Right arm, Patient Position: Lying)   Pulse 62   Temp 97.9 °F (36.6 °C) (Oral)   Resp 16   Ht 152.4 cm (60\")   Wt 68.9 kg (152 lb)   SpO2 97%   BMI 29.69 kg/m²   Physical Exam   Seated comfortably on the bed eating breakfast - ate all her breakfast except for 1 sausage link  Nontoxic-appearing  She is on oxygen at 2 L.  Her O2 saturation 100%.  States she uses oxygen on as needed at home.      GEN: Awake, alert, interactive, in NAD  HEENT: Atraumatic, PERRLA, EOMI, Anicteric, Trachea midline  Lungs: CTAB, " no wheezing/rales/rhonchi  Heart: RRR, +S1/s2, no rub  ABD: soft, mild tenderness left lower quadrant.  No guarding, no rebound  Extremities: atraumatic, no cyanosis, no edema, it appears that he has a right foot drop  Skin: no rashes or lesions  Neuro: AAOx3, no focal deficits.    Psych: normal mood & affect             Discharge Disposition:  Skilled Nursing Facility (DC - External)    Discharge Medications:     Discharge Medications      Changes to Medications      Instructions Start Date   amLODIPine 2.5 MG tablet  Commonly known as: NORVASC  What changed:   · medication strength  · how much to take   2.5 mg, Oral, Every 24 Hours Scheduled   Start Date: November 12, 2022     atenolol 25 MG tablet  Commonly known as: TENORMIN  What changed:   · medication strength  · how much to take   25 mg, Oral, Daily   Start Date: November 12, 2022     atorvastatin 40 MG tablet  Commonly known as: LIPITOR  What changed: Another medication with the same name was removed. Continue taking this medication, and follow the directions you see here.   40 mg, Oral, Nightly      Docusate Sodium 100 MG/10ML  Commonly known as: COLACE  What changed: Another medication with the same name was removed. Continue taking this medication, and follow the directions you see here.   100 mg, Oral, Every Morning      levETIRAcetam 500 MG tablet  Commonly known as: KEPPRA  What changed: Another medication with the same name was removed. Continue taking this medication, and follow the directions you see here.   500 mg, Oral, 2 Times Daily      sertraline 50 MG tablet  Commonly known as: ZOLOFT  What changed: Another medication with the same name was removed. Continue taking this medication, and follow the directions you see here.   50 mg, Oral, Every Morning         Continue These Medications      Instructions Start Date   acetaminophen 325 MG tablet  Commonly known as: TYLENOL   650 mg, Oral, Every 6 Hours PRN      CALCIUM CARBONATE-VITAMIN D PO   1  "tablet, Oral, 2 Times Daily      CeraVe AM SPF 30 lotion   1 application, Apply externally, Daily      clopidogrel 75 MG tablet  Commonly known as: PLAVIX   75 mg, Oral, Daily      cycloSPORINE 0.05 % ophthalmic emulsion  Commonly known as: RESTASIS   1 drop, Both Eyes, 2 Times Daily, \"Once between 7963-6161 and again between 0977-3922\"      dextrose 40 % gel  Commonly known as: GLUTOSE   15 g, Oral, Every 1 Hour PRN      ferrous sulfate 325 (65 FE) MG tablet   325 mg, Oral, Daily With Breakfast      Gvoke HypoPen 1-Pack 1 MG/0.2ML solution auto-injector  Generic drug: Glucagon   1 mg, Subcutaneous, 3 Times Daily PRN      multivitamin with minerals tablet tablet  Generic drug: multivitamin with minerals   1 tablet, Oral, Every Morning      nitroglycerin 0.4 MG SL tablet  Commonly known as: NITROSTAT   0.4 mg, Sublingual, Every 5 Minutes PRN, Take no more than 3 doses in 15 minutes.       polyethylene glycol packet  Commonly known as: MIRALAX   17 g, Oral, Daily PRN         Stop These Medications    camphor-menthol 0.5-0.5 % lotion  Commonly known as: SARNA     cetirizine 10 MG tablet  Commonly known as: zyrTEC     cloNIDine 0.1 MG tablet  Commonly known as: CATAPRES     Cranberry 200 MG capsule     diclofenac 1 % gel gel  Commonly known as: VOLTAREN     guaiFENesin 600 MG 12 hr tablet  Commonly known as: MUCINEX     hydrALAZINE 25 MG tablet  Commonly known as: APRESOLINE     hydrALAZINE 50 MG tablet  Commonly known as: APRESOLINE     Insulin Aspart 100 UNIT/ML injection  Commonly known as: novoLOG     insulin NPH-insulin regular (70-30) 100 UNIT/ML injection  Commonly known as: humuLIN 70/30,novoLIN 70/30     ipratropium-albuterol 0.5-2.5 mg/3 ml nebulizer  Commonly known as: DUO-NEB     loratadine 5 MG/5ML syrup  Commonly known as: CLARITIN     magnesium oxide 400 MG tablet  Commonly known as: MAG-OX     NIFEdipine XL 60 MG 24 hr tablet  Commonly known as: PROCARDIA XL     nystatin 592337 UNIT/GM powder  Commonly " known as: MYCOSTATIN     pantoprazole 40 MG EC tablet  Commonly known as: PROTONIX     potassium chloride 10 MEQ CR tablet  Commonly known as: K-DUR,KLOR-CON     Selenium 200 MCG tablet     sodium bicarbonate 650 MG tablet     sucralfate 1 g tablet  Commonly known as: CARAFATE     traMADol 50 MG tablet  Commonly known as: ULTRAM     triamcinolone 0.1 % cream  Commonly known as: KENALOG     Triamcinolone Acetonide 55 MCG/ACT nasal inhaler  Commonly known as: NASACORT     vitamin C 500 MG tablet  Commonly known as: ASCORBIC ACID     vitamin D 1.25 MG (39026 UT) capsule capsule  Commonly known as: ERGOCALCIFEROL     vitamin E 1000 UNIT capsule            Discharge Diet:   Diet Instructions     Diet: Consistent Carbohydrate      Discharge Diet: Consistent Carbohydrate          Discharge Care Plan / Instructions:   Monitor for any signs of fever, hypothermia, leukocytosis or hemodynamic instability.  Recommends in and out catheterization for urinalysis with reflex to culture if present (MRSA in urine during this hospitalization however asymptomatic)    Activity at Discharge:   Activity Instructions     Gradually Increase Activity Until at Pre-Hospitalization Level            Follow-up Appointments:  PCP through snf within 24-48H       Test Results Pending at Discharge:   Pending Labs     Order Current Status    Blood Culture - Blood, Hand, Digit Left Preliminary result    Blood Culture - Blood, Hand, Right Preliminary result    Blood Culture With JOSE ALFREDO - Blood, Arm, Right Preliminary result           Electronically signed by Kevin Tsai MD, 11/11/2022, 10:06 CST.    Time: 45 mins        Part of this note may be an electronic transcription/translation of spoken language to printed text using the Dragon Dictation System.

## 2022-11-11 NOTE — CASE MANAGEMENT/SOCIAL WORK
Continued Stay Note  [facility]     Patient Name: Gregoria Bennett  MRN: [id number]  Today's Date: 11/11/2022    Admit Date: 11/8/2022    Plan: [facility]   Discharge Plan     Row Name 11/11/22 0952       Plan    Final Discharge Disposition Code 03 - skilled nursing facility (SNF)    Final Note SW informed admissions at [facility] that pt will be dc today. They will need a covid test prior to dc. [phone]               Discharge Codes    No documentation.               Expected Discharge Date and Time     Expected Discharge Date Expected Discharge Time    Nov 11, 2022             CARO Murillo

## 2022-11-12 NOTE — THERAPY DISCHARGE NOTE
Acute Care - Physical Therapy Discharge Summary  Psychiatric       Patient Name: Gregoria Bennett  : 1945  MRN: [id number]    Today's Date: 2022                 Admit Date: 2022      PT Recommendation and Plan    Visit Dx:    ICD-10-CM ICD-9-CM   1. NSTEMI (non-ST elevated myocardial infarction) (Formerly [facility])  I21.4 410.70   2. Generalized weakness  R53.1 780.79   3. Nausea and vomiting, unspecified vomiting type  R11.2 787.01   4. Pyelonephritis  N12 590.80   5. Stage 4 chronic kidney disease (Formerly [facility])  N18.4 585.4   6. Type 2 diabetes mellitus with hyperglycemia, unspecified whether long term insulin use (Formerly [facility])  E11.65 250.00   7. Impaired mobility  Z74.09 799.89                PT Rehab Goals     Row Name 22 1227             Bed Mobility Goal 1 (PT)    Activity/Assistive Device (Bed Mobility Goal 1, PT) rolling to right;rolling to left;sit to supine;supine to sit;bridging  -      Campbell Level/Cues Needed (Bed Mobility Goal 1, PT) standby assist  -      Time Frame (Bed Mobility Goal 1, PT) long term goal (LTG)  -MF      Progress/Outcomes (Bed Mobility Goal 1, PT) goal not met  -MF         Transfer Goal 1 (PT)    Activity/Assistive Device (Transfer Goal 1, PT) bed-to-chair/chair-to-bed;wheelchair transfer  -MF      Campbell Level/Cues Needed (Transfer Goal 1, PT) moderate assist (50-74% patient effort)  -MF      Time Frame (Transfer Goal 1, PT) long term goal (LTG)  -MF      Progress/Outcome (Transfer Goal 1, PT) goal not met  -         Patient Education Goal (PT)    Activity (Patient Education Goal, PT) Pt will sit EOB performing dynamic reaching with SBA and no LOB  -MF      Campbell/Cues/Accuracy (Memory Goal 2, PT) independent  -MF      Time Frame (Patient Education Goal, PT) long term goal (LTG)  -MF      Progress/Outcome (Patient Education Goal, PT) goal not met  -MF            User Key  (r) = Recorded By, (t) = Taken By, (c) = Cosigned By    Initials Name Provider Type Discipline     Chastity Sexton, ZEINAB Physical Therapist Assistant PT                    PT Discharge Summary  Anticipated Discharge Disposition (PT): skilled nursing facility  Reason for Discharge: Discharge from facility  Outcomes Achieved: Unable to make functional progress toward goals at this time  Discharge Destination: SNF      Chastity Merchant PTA   11/12/2022

## 2022-11-13 LAB
BACTERIA SPEC AEROBE CULT: NORMAL
BACTERIA SPEC AEROBE CULT: NORMAL

## 2022-11-14 LAB — BACTERIA SPEC AEROBE CULT: NORMAL

## 2023-04-08 ENCOUNTER — APPOINTMENT (OUTPATIENT)
Dept: GENERAL RADIOLOGY | Age: 78
End: 2023-04-08
Payer: MEDICARE

## 2023-04-08 ENCOUNTER — APPOINTMENT (OUTPATIENT)
Dept: CT IMAGING | Age: 78
End: 2023-04-08
Payer: MEDICARE

## 2023-04-08 ENCOUNTER — HOSPITAL ENCOUNTER (EMERGENCY)
Age: 78
Discharge: HOME | End: 2023-04-08
Attending: EMERGENCY MEDICINE
Payer: MEDICARE

## 2023-04-08 VITALS
WEIGHT: 150 LBS | OXYGEN SATURATION: 97 % | RESPIRATION RATE: 17 BRPM | BODY MASS INDEX: 26.57 KG/M2 | HEART RATE: 71 BPM | SYSTOLIC BLOOD PRESSURE: 147 MMHG | DIASTOLIC BLOOD PRESSURE: 62 MMHG | TEMPERATURE: 97.9 F

## 2023-04-08 DIAGNOSIS — N18.9 ACUTE ON CHRONIC RENAL INSUFFICIENCY: ICD-10-CM

## 2023-04-08 DIAGNOSIS — N30.00 ACUTE CYSTITIS WITHOUT HEMATURIA: ICD-10-CM

## 2023-04-08 DIAGNOSIS — R11.2 NAUSEA AND VOMITING, UNSPECIFIED VOMITING TYPE: Primary | ICD-10-CM

## 2023-04-08 DIAGNOSIS — N28.9 ACUTE ON CHRONIC RENAL INSUFFICIENCY: ICD-10-CM

## 2023-04-08 LAB
ALBUMIN SERPL-MCNC: 3.7 G/DL (ref 3.5–5.2)
ALP SERPL-CCNC: 76 U/L (ref 35–104)
ALT SERPL-CCNC: 7 U/L (ref 5–33)
ANION GAP SERPL CALCULATED.3IONS-SCNC: 14 MMOL/L (ref 7–19)
ANION GAP SERPL CALCULATED.3IONS-SCNC: 8 MMOL/L (ref 7–19)
AST SERPL-CCNC: 11 U/L (ref 5–32)
BACTERIA #/AREA URNS HPF: ABNORMAL /HPF
BASOPHILS # BLD: 0 K/UL (ref 0–0.2)
BASOPHILS NFR BLD: 0.5 % (ref 0–1)
BILIRUB SERPL-MCNC: <0.2 MG/DL (ref 0.2–1.2)
BILIRUB UR QL STRIP: NEGATIVE
BNP BLD-MCNC: 1063 PG/ML (ref 0–1800)
BUN SERPL-MCNC: 53 MG/DL (ref 8–23)
BUN SERPL-MCNC: 54 MG/DL (ref 8–23)
CALCIUM SERPL-MCNC: 9 MG/DL (ref 8.8–10.2)
CALCIUM SERPL-MCNC: 9.5 MG/DL (ref 8.8–10.2)
CHLORIDE SERPL-SCNC: 105 MMOL/L (ref 98–111)
CHLORIDE SERPL-SCNC: 110 MMOL/L (ref 98–111)
CLARITY UR: ABNORMAL
CO2 SERPL-SCNC: 21 MMOL/L (ref 22–29)
CO2 SERPL-SCNC: 22 MMOL/L (ref 22–29)
COLOR UR: YELLOW
CREAT SERPL-MCNC: 2.3 MG/DL (ref 0.5–0.9)
CREAT SERPL-MCNC: 2.5 MG/DL (ref 0.5–0.9)
EOSINOPHIL # BLD: 0.4 K/UL (ref 0–0.6)
EOSINOPHIL NFR BLD: 5.5 % (ref 0–5)
ERYTHROCYTE [DISTWIDTH] IN BLOOD BY AUTOMATED COUNT: 12.9 % (ref 11.5–14.5)
GLUCOSE SERPL-MCNC: 219 MG/DL (ref 74–109)
GLUCOSE SERPL-MCNC: 278 MG/DL (ref 74–109)
GLUCOSE UR STRIP.AUTO-MCNC: 250 MG/DL
HCT VFR BLD AUTO: 30.9 % (ref 37–47)
HGB BLD-MCNC: 10 G/DL (ref 12–16)
HGB UR STRIP.AUTO-MCNC: ABNORMAL MG/L
IMM GRANULOCYTES # BLD: 0 K/UL
KETONES UR STRIP.AUTO-MCNC: NEGATIVE MG/DL
LACTATE BLDV-SCNC: 1.4 MMOL/L (ref 0.5–1.9)
LEUKOCYTE ESTERASE UR QL STRIP.AUTO: ABNORMAL
LIPASE SERPL-CCNC: 43 U/L (ref 13–60)
LYMPHOCYTES # BLD: 1.6 K/UL (ref 1.1–4.5)
LYMPHOCYTES NFR BLD: 21.5 % (ref 20–40)
MCH RBC QN AUTO: 29.5 PG (ref 27–31)
MCHC RBC AUTO-ENTMCNC: 32.4 G/DL (ref 33–37)
MCV RBC AUTO: 91.2 FL (ref 81–99)
MONOCYTES # BLD: 0.8 K/UL (ref 0–0.9)
MONOCYTES NFR BLD: 10 % (ref 0–10)
NEUTROPHILS # BLD: 4.7 K/UL (ref 1.5–7.5)
NEUTS SEG NFR BLD: 62 % (ref 50–65)
NITRITE UR QL STRIP.AUTO: NEGATIVE
PH UR STRIP.AUTO: 5 [PH] (ref 5–8)
PLATELET # BLD AUTO: 245 K/UL (ref 130–400)
PMV BLD AUTO: 9.3 FL (ref 9.4–12.3)
POTASSIUM SERPL-SCNC: 4.1 MMOL/L (ref 3.5–5)
POTASSIUM SERPL-SCNC: 5 MMOL/L (ref 3.5–5)
PROT SERPL-MCNC: 6.4 G/DL (ref 6.6–8.7)
PROT UR STRIP.AUTO-MCNC: 30 MG/DL
RBC # BLD AUTO: 3.39 M/UL (ref 4.2–5.4)
RBC #/AREA URNS HPF: ABNORMAL /HPF (ref 0–2)
SARS-COV-2 RDRP RESP QL NAA+PROBE: NOT DETECTED
SODIUM SERPL-SCNC: 140 MMOL/L (ref 136–145)
SODIUM SERPL-SCNC: 140 MMOL/L (ref 136–145)
SP GR UR STRIP.AUTO: 1.01 (ref 1–1.03)
SQUAMOUS #/AREA URNS HPF: ABNORMAL /HPF
TROPONIN T SERPL-MCNC: 0.04 NG/ML (ref 0–0.03)
UROBILINOGEN UR STRIP.AUTO-MCNC: 0.2 E.U./DL
WBC # BLD AUTO: 7.5 K/UL (ref 4.8–10.8)
WBC #/AREA URNS HPF: ABNORMAL /HPF (ref 0–5)

## 2023-04-08 PROCEDURE — 83605 ASSAY OF LACTIC ACID: CPT

## 2023-04-08 PROCEDURE — 71045 X-RAY EXAM CHEST 1 VIEW: CPT

## 2023-04-08 PROCEDURE — 87186 SC STD MICRODIL/AGAR DIL: CPT

## 2023-04-08 PROCEDURE — 6360000002 HC RX W HCPCS: Performed by: EMERGENCY MEDICINE

## 2023-04-08 PROCEDURE — 87040 BLOOD CULTURE FOR BACTERIA: CPT

## 2023-04-08 PROCEDURE — 2580000003 HC RX 258: Performed by: EMERGENCY MEDICINE

## 2023-04-08 PROCEDURE — 85025 COMPLETE CBC W/AUTO DIFF WBC: CPT

## 2023-04-08 PROCEDURE — 36415 COLL VENOUS BLD VENIPUNCTURE: CPT

## 2023-04-08 PROCEDURE — 83880 ASSAY OF NATRIURETIC PEPTIDE: CPT

## 2023-04-08 PROCEDURE — 83690 ASSAY OF LIPASE: CPT

## 2023-04-08 PROCEDURE — 84484 ASSAY OF TROPONIN QUANT: CPT

## 2023-04-08 PROCEDURE — 74176 CT ABD & PELVIS W/O CONTRAST: CPT

## 2023-04-08 PROCEDURE — 80053 COMPREHEN METABOLIC PANEL: CPT

## 2023-04-08 PROCEDURE — 87635 SARS-COV-2 COVID-19 AMP PRB: CPT

## 2023-04-08 PROCEDURE — 87086 URINE CULTURE/COLONY COUNT: CPT

## 2023-04-08 PROCEDURE — 81001 URINALYSIS AUTO W/SCOPE: CPT

## 2023-04-08 RX ORDER — AMLODIPINE BESYLATE 2.5 MG/1
2.5 TABLET ORAL DAILY
COMMUNITY

## 2023-04-08 RX ORDER — ONDANSETRON 2 MG/ML
4 INJECTION INTRAMUSCULAR; INTRAVENOUS ONCE
Status: COMPLETED | OUTPATIENT
Start: 2023-04-08 | End: 2023-04-08

## 2023-04-08 RX ORDER — 0.9 % SODIUM CHLORIDE 0.9 %
1000 INTRAVENOUS SOLUTION INTRAVENOUS ONCE
Status: COMPLETED | OUTPATIENT
Start: 2023-04-08 | End: 2023-04-08

## 2023-04-08 RX ORDER — CEPHALEXIN 500 MG/1
500 CAPSULE ORAL 3 TIMES DAILY
Qty: 21 CAPSULE | Refills: 0 | Status: SHIPPED | OUTPATIENT
Start: 2023-04-08 | End: 2023-04-15

## 2023-04-08 RX ORDER — ONDANSETRON 4 MG/1
4 TABLET, ORALLY DISINTEGRATING ORAL 3 TIMES DAILY PRN
Qty: 30 TABLET | Refills: 0 | Status: SHIPPED | OUTPATIENT
Start: 2023-04-08

## 2023-04-08 RX ORDER — ATENOLOL 25 MG/1
25 TABLET ORAL DAILY
COMMUNITY

## 2023-04-08 RX ADMIN — CEFTRIAXONE 1000 MG: 1 INJECTION, POWDER, FOR SOLUTION INTRAMUSCULAR; INTRAVENOUS at 07:07

## 2023-04-08 RX ADMIN — SODIUM CHLORIDE 1000 ML: 9 INJECTION, SOLUTION INTRAVENOUS at 03:26

## 2023-04-08 RX ADMIN — ONDANSETRON 4 MG: 2 INJECTION INTRAMUSCULAR; INTRAVENOUS at 03:26

## 2023-04-08 ASSESSMENT — ENCOUNTER SYMPTOMS
SHORTNESS OF BREATH: 0
NAUSEA: 1
VOMITING: 1
SINUS PRESSURE: 0
SORE THROAT: 0
ABDOMINAL PAIN: 0
DIARRHEA: 0
RHINORRHEA: 0

## 2023-04-08 NOTE — ED PROVIDER NOTES
evidence of cystitis with no other concerning acute intra-abdominal pathology. Repeat history performed shows slight improvement of renal function of the left back to recent baseline. Patient not had any further episodes of vomiting here. Given antibiotics for UTI. Plan for discharge back to nursing home for continued treatment and recommended repeat renal function in 48 to 72 hours along with oral rehydration [WILL]      ED Course User Index  [WILL] Ed Alanis MD       CONSULTS:  None    PROCEDURES:  Unless otherwise noted below, none     Procedures        FINAL IMPRESSION     1. Nausea and vomiting, unspecified vomiting type    2. Acute on chronic renal insufficiency    3. Acute cystitis without hematuria          DISPOSITION/PLAN   DISPOSITION Decision To Discharge    PATIENT REFERRED TO:  [facility] EMERGENCY DEPT  [facility]  [phone]    If symptoms worsen    Carey Martinez  [phone] [facility]      As needed      DISCHARGE MEDICATIONS:  Discharge Medication List as of 4/8/2023 11:02 AM        START taking these medications    Details   ondansetron (ZOFRAN-ODT) 4 MG disintegrating tablet Take 1 tablet by mouth 3 times daily as needed for Nausea or Vomiting, Disp-30 tablet, R-0Normal      cephALEXin (KEFLEX) 500 MG capsule Take 1 capsule by mouth 3 times daily for 7 days, Disp-21 capsule, R-0Normal                (Please note that portions of this note were completed with a voice recognition program.  Efforts were made to edit the dictations but occasionally words aremis-transcribed.)    Ed Chu MD (electronically signed)  Emergency Physician          Ed Alanis MD  04/08/23 [id number]
UNIT/0.3ML SOLN SOLUTION    Take 15 Units by mouth daily       ALLERGIES     Asa buff (mag [buffered aspirin] and Tetracyclines & related    FAMILY HISTORY       Family History   Problem Relation Age of Onset    Cancer Father           SOCIAL HISTORY       Social History     Socioeconomic History    Marital status:      Spouse name: None    Number of children: None    Years of education: None    Highest education level: None   Tobacco Use    Smoking status: Never    Smokeless tobacco: Never   Substance and Sexual Activity    Alcohol use: No    Drug use: No       SCREENINGS    Karthikeyan Coma Scale  Eye Opening: Spontaneous  Best Verbal Response: Oriented  Best Motor Response: Obeys commands  Karthikeyan Coma Scale Score: 15        PHYSICAL EXAM    (up to 7 for level 4, 8 or more for level 5)     ED Triage Vitals [04/08/23 0239]   BP Temp Temp Source Heart Rate Resp SpO2 Height Weight   (!) 175/68 97.9 °F (36.6 °C) Oral 71 17 96 % -- 150 lb (68 kg)       Physical Exam  Vitals and nursing note reviewed. Constitutional:       Appearance: She is well-developed. HENT:      Head: Normocephalic and atraumatic. Nose: Nose normal.      Mouth/Throat:      Mouth: Mucous membranes are moist.   Eyes:      General:         Right eye: No discharge. Left eye: No discharge. Conjunctiva/sclera: Conjunctivae normal.      Pupils: Pupils are equal, round, and reactive to light. Cardiovascular:      Rate and Rhythm: Normal rate and regular rhythm. Heart sounds: Normal heart sounds. Pulmonary:      Effort: Pulmonary effort is normal. No respiratory distress. Breath sounds: Normal breath sounds. No wheezing or rales. Abdominal:      General: Bowel sounds are normal.      Palpations: Abdomen is soft. There is no mass. Tenderness: There is no abdominal tenderness. There is no guarding or rebound. Musculoskeletal:         General: No tenderness. Normal range of motion.       Cervical back: Normal

## 2023-04-09 LAB
BACTERIA BLD CULT ORG #2: NORMAL
BACTERIA BLD CULT: NORMAL
BACTERIA UR CULT: ABNORMAL
BACTERIA UR CULT: ABNORMAL
ORGANISM: ABNORMAL

## 2023-07-25 LAB
ALBUMIN SERPL-MCNC: 4 G/DL (ref 3.5–5.2)
ALP SERPL-CCNC: 55 U/L (ref 35–104)
ALT SERPL-CCNC: 12 U/L (ref 5–33)
ANION GAP SERPL CALCULATED.3IONS-SCNC: 12 MMOL/L (ref 7–19)
AST SERPL-CCNC: 14 U/L (ref 5–32)
BILIRUB SERPL-MCNC: <0.2 MG/DL (ref 0.2–1.2)
BUN SERPL-MCNC: 73 MG/DL (ref 8–23)
CALCIUM SERPL-MCNC: 11.2 MG/DL (ref 8.8–10.2)
CHLORIDE SERPL-SCNC: 104 MMOL/L (ref 98–111)
CO2 SERPL-SCNC: 23 MMOL/L (ref 22–29)
CREAT SERPL-MCNC: 2.9 MG/DL (ref 0.5–0.9)
GLUCOSE SERPL-MCNC: 230 MG/DL (ref 74–109)
POTASSIUM SERPL-SCNC: 4.6 MMOL/L (ref 3.5–5)
PROT SERPL-MCNC: 6.5 G/DL (ref 6.6–8.7)
SODIUM SERPL-SCNC: 139 MMOL/L (ref 136–145)

## 2023-09-16 ENCOUNTER — APPOINTMENT (OUTPATIENT)
Dept: CT IMAGING | Facility: HOSPITAL | Age: 78
End: 2023-09-16
Payer: MEDICARE

## 2023-09-16 ENCOUNTER — HOSPITAL ENCOUNTER (EMERGENCY)
Facility: HOSPITAL | Age: 78
Discharge: HOME | End: 2023-09-16
Payer: MEDICARE

## 2023-09-16 VITALS
HEIGHT: 60 IN | BODY MASS INDEX: 29.84 KG/M2 | TEMPERATURE: 98.4 F | HEART RATE: 79 BPM | RESPIRATION RATE: 18 BRPM | SYSTOLIC BLOOD PRESSURE: 104 MMHG | DIASTOLIC BLOOD PRESSURE: 64 MMHG | OXYGEN SATURATION: 95 % | WEIGHT: 152 LBS

## 2023-09-16 DIAGNOSIS — K56.41 FECAL IMPACTION: Primary | ICD-10-CM

## 2023-09-16 DIAGNOSIS — K59.00 CONSTIPATION, UNSPECIFIED CONSTIPATION TYPE: ICD-10-CM

## 2023-09-16 DIAGNOSIS — R11.10 VOMITING, UNSPECIFIED VOMITING TYPE, UNSPECIFIED WHETHER NAUSEA PRESENT: ICD-10-CM

## 2023-09-16 LAB
ALBUMIN SERPL-MCNC: 3.8 G/DL (ref 3.5–5.2)
ALBUMIN/GLOB SERPL: 1.4 G/DL
ALP SERPL-CCNC: 69 U/L (ref 39–117)
ALT SERPL W P-5'-P-CCNC: 12 U/L (ref 1–33)
AMORPH URATE CRY URNS QL MICRO: ABNORMAL /HPF
ANION GAP SERPL CALCULATED.3IONS-SCNC: 14 MMOL/L (ref 5–15)
APTT PPP: <20 SECONDS (ref 24.1–35)
AST SERPL-CCNC: 13 U/L (ref 1–32)
BACTERIA UR QL AUTO: ABNORMAL /HPF
BASOPHILS # BLD AUTO: 0.02 10*3/MM3 (ref 0–0.2)
BASOPHILS NFR BLD AUTO: 0.2 % (ref 0–1.5)
BILIRUB SERPL-MCNC: 0.2 MG/DL (ref 0–1.2)
BILIRUB UR QL STRIP: NEGATIVE
BUN SERPL-MCNC: 63 MG/DL (ref 8–23)
BUN/CREAT SERPL: 28.8 (ref 7–25)
CALCIUM SPEC-SCNC: 11.1 MG/DL (ref 8.6–10.5)
CHLORIDE SERPL-SCNC: 104 MMOL/L (ref 98–107)
CLARITY UR: CLEAR
CO2 SERPL-SCNC: 21 MMOL/L (ref 22–29)
COLOR UR: YELLOW
CREAT SERPL-MCNC: 2.19 MG/DL (ref 0.57–1)
DEPRECATED RDW RBC AUTO: 40 FL (ref 37–54)
EGFRCR SERPLBLD CKD-EPI 2021: 22.3 ML/MIN/1.73
EOSINOPHIL # BLD AUTO: 0.27 10*3/MM3 (ref 0–0.4)
EOSINOPHIL NFR BLD AUTO: 3.3 % (ref 0.3–6.2)
ERYTHROCYTE [DISTWIDTH] IN BLOOD BY AUTOMATED COUNT: 12.6 % (ref 12.3–15.4)
GLOBULIN UR ELPH-MCNC: 2.8 GM/DL
GLUCOSE SERPL-MCNC: 269 MG/DL (ref 65–99)
GLUCOSE UR STRIP-MCNC: NEGATIVE MG/DL
HCT VFR BLD AUTO: 33.1 % (ref 34–46.6)
HGB BLD-MCNC: 10.7 G/DL (ref 12–15.9)
HGB UR QL STRIP.AUTO: NEGATIVE
IMM GRANULOCYTES # BLD AUTO: 0.02 10*3/MM3 (ref 0–0.05)
IMM GRANULOCYTES NFR BLD AUTO: 0.2 % (ref 0–0.5)
INR PPP: 0.95 (ref 0.91–1.09)
KETONES UR QL STRIP: NEGATIVE
LEUKOCYTE ESTERASE UR QL STRIP.AUTO: ABNORMAL
LIPASE SERPL-CCNC: 24 U/L (ref 13–60)
LYMPHOCYTES # BLD AUTO: 1.13 10*3/MM3 (ref 0.7–3.1)
LYMPHOCYTES NFR BLD AUTO: 13.8 % (ref 19.6–45.3)
MCH RBC QN AUTO: 28.5 PG (ref 26.6–33)
MCHC RBC AUTO-ENTMCNC: 32.3 G/DL (ref 31.5–35.7)
MCV RBC AUTO: 88 FL (ref 79–97)
MONOCYTES # BLD AUTO: 0.56 10*3/MM3 (ref 0.1–0.9)
MONOCYTES NFR BLD AUTO: 6.8 % (ref 5–12)
NEUTROPHILS NFR BLD AUTO: 6.21 10*3/MM3 (ref 1.7–7)
NEUTROPHILS NFR BLD AUTO: 75.7 % (ref 42.7–76)
NITRITE UR QL STRIP: NEGATIVE
NRBC BLD AUTO-RTO: 0 /100 WBC (ref 0–0.2)
PH UR STRIP.AUTO: <=5 [PH] (ref 5–8)
PLATELET # BLD AUTO: 267 10*3/MM3 (ref 140–450)
PMV BLD AUTO: 9.7 FL (ref 6–12)
POTASSIUM SERPL-SCNC: 4.3 MMOL/L (ref 3.5–5.2)
PROT SERPL-MCNC: 6.6 G/DL (ref 6–8.5)
PROT UR QL STRIP: ABNORMAL
PROTHROMBIN TIME: 12.8 SECONDS (ref 11.8–14.8)
RBC # BLD AUTO: 3.76 10*6/MM3 (ref 3.77–5.28)
RBC # UR STRIP: ABNORMAL /HPF
REF LAB TEST METHOD: ABNORMAL
SODIUM SERPL-SCNC: 139 MMOL/L (ref 136–145)
SP GR UR STRIP: 1.01 (ref 1–1.03)
SQUAMOUS #/AREA URNS HPF: ABNORMAL /HPF
UROBILINOGEN UR QL STRIP: ABNORMAL
WBC # UR STRIP: ABNORMAL /HPF
WBC NRBC COR # BLD: 8.21 10*3/MM3 (ref 3.4–10.8)

## 2023-09-16 PROCEDURE — 81001 URINALYSIS AUTO W/SCOPE: CPT | Performed by: NURSE PRACTITIONER

## 2023-09-16 PROCEDURE — 99284 EMERGENCY DEPT VISIT MOD MDM: CPT

## 2023-09-16 PROCEDURE — 83690 ASSAY OF LIPASE: CPT | Performed by: NURSE PRACTITIONER

## 2023-09-16 PROCEDURE — 80053 COMPREHEN METABOLIC PANEL: CPT | Performed by: NURSE PRACTITIONER

## 2023-09-16 PROCEDURE — 96375 TX/PRO/DX INJ NEW DRUG ADDON: CPT

## 2023-09-16 PROCEDURE — 85730 THROMBOPLASTIN TIME PARTIAL: CPT | Performed by: NURSE PRACTITIONER

## 2023-09-16 PROCEDURE — 96374 THER/PROPH/DIAG INJ IV PUSH: CPT

## 2023-09-16 PROCEDURE — 36415 COLL VENOUS BLD VENIPUNCTURE: CPT

## 2023-09-16 PROCEDURE — 85610 PROTHROMBIN TIME: CPT | Performed by: NURSE PRACTITIONER

## 2023-09-16 PROCEDURE — 25010000002 ONDANSETRON PER 1 MG: Performed by: NURSE PRACTITIONER

## 2023-09-16 PROCEDURE — 85025 COMPLETE CBC W/AUTO DIFF WBC: CPT | Performed by: NURSE PRACTITIONER

## 2023-09-16 PROCEDURE — 80177 DRUG SCRN QUAN LEVETIRACETAM: CPT | Performed by: NURSE PRACTITIONER

## 2023-09-16 PROCEDURE — 74176 CT ABD & PELVIS W/O CONTRAST: CPT

## 2023-09-16 RX ORDER — ONDANSETRON 2 MG/ML
4 INJECTION INTRAMUSCULAR; INTRAVENOUS ONCE
Status: COMPLETED | OUTPATIENT
Start: 2023-09-16 | End: 2023-09-16

## 2023-09-16 RX ORDER — FAMOTIDINE 10 MG/ML
20 INJECTION, SOLUTION INTRAVENOUS ONCE
Status: COMPLETED | OUTPATIENT
Start: 2023-09-16 | End: 2023-09-16

## 2023-09-16 RX ORDER — SODIUM CHLORIDE 0.9 % (FLUSH) 0.9 %
10 SYRINGE (ML) INJECTION AS NEEDED
Status: DISCONTINUED | OUTPATIENT
Start: 2023-09-16 | End: 2023-09-16 | Stop reason: HOSPADM

## 2023-09-16 RX ADMIN — FAMOTIDINE 20 MG: 10 INJECTION INTRAVENOUS at 09:37

## 2023-09-16 RX ADMIN — ONDANSETRON 4 MG: 2 INJECTION INTRAMUSCULAR; INTRAVENOUS at 09:37

## 2023-09-16 RX ADMIN — SODIUM CHLORIDE 500 ML: 9 INJECTION, SOLUTION INTRAVENOUS at 09:37

## 2023-09-16 NOTE — ED PROVIDER NOTES
Subjective   History of Present Illness  Patient is an 78-year-old female who presents to the ER with chief complaints of nausea with vomiting.  She states that she has had vomiting for the past several weeks.  She complains of sharp pain to her mid abdomen.  She denies any diarrhea.  She denies any known fevers.  She is a patient at [facility] and a lau of the [facility].  Patient denies any chest pain.  Past medical history significant for chronic kidney disease, COPD, coronary artery disease, dementia, diabetes, dermatomyositis, hypertension, peptic ulcer disease, pulmonary disease UTIs.      Review of Systems   Constitutional:  Negative for fever.   HENT:  Positive for congestion.    Respiratory: Negative.  Negative for cough and shortness of breath.    Cardiovascular: Negative.  Negative for chest pain.   Gastrointestinal:  Positive for abdominal pain, nausea and vomiting. Negative for constipation and diarrhea.   Genitourinary: Negative.  Negative for dysuria.   Musculoskeletal: Negative.    Skin: Negative.  Negative for rash.   All other systems reviewed and are negative.    Past Medical History:   Diagnosis Date    Chronic kidney disease, stage 4 (severe)     COPD (chronic obstructive pulmonary disease)     Coronary artery disease     Dementia     Diabetes mellitus     History of dermatomyositis     Hypertension     Peptic ulcer disease     Pulmonary disease     Renal insufficiency     Urinary tract infection     Venous insufficiency        Allergies   Allergen Reactions    Aspirin Rash    Tetracyclines & Related Rash       Past Surgical History:   Procedure Laterality Date    CHOLECYSTECTOMY      COLON SURGERY         Family History   Problem Relation Age of Onset    Breast cancer Neg Hx        Social History     Socioeconomic History    Marital status:    Tobacco Use    Smoking status: Former   Substance and Sexual Activity    Alcohol use: No    Drug use: No           Objective   Physical  Exam  Vitals and nursing note reviewed.   Constitutional:       Appearance: She is well-developed. She is ill-appearing.      Comments: Chronically ill-appearing   HENT:      Head: Normocephalic and atraumatic.      Nose: Nose normal.   Eyes:      Conjunctiva/sclera: Conjunctivae normal.      Pupils: Pupils are equal, round, and reactive to light.   Cardiovascular:      Rate and Rhythm: Normal rate and regular rhythm.      Heart sounds: Normal heart sounds.   Pulmonary:      Effort: Pulmonary effort is normal.      Breath sounds: Normal breath sounds.   Abdominal:      General: Bowel sounds are normal.      Palpations: Abdomen is soft.      Tenderness:  in the epigastric area and periumbilical area   Musculoskeletal:         General: Normal range of motion.      Cervical back: Normal range of motion and neck supple.   Skin:     General: Skin is warm and dry.      Capillary Refill: Capillary refill takes less than 2 seconds.   Neurological:      General: No focal deficit present.      Mental Status: She is alert.   Psychiatric:         Behavior: Behavior normal.       Procedures           ED Course                                           Medical Decision Making  Patient is an 78-year-old female who presents to the ER with chief complaints of nausea with vomiting.  She states that she has had vomiting for the past several weeks.  She complains of sharp pain to her mid abdomen.  She denies any diarrhea.  She denies any known fevers.  She is a patient at [facility] and a lau of the [facility].  Patient denies any chest pain.  Past medical history significant for chronic kidney disease, COPD, coronary artery disease, dementia, diabetes, dermatomyositis, hypertension, peptic ulcer disease, pulmonary disease UTIs.  Differential diagnosis: Gastroparesis, colitis, viral illness, UTI, and other    Labs Reviewed  COMPREHENSIVE METABOLIC PANEL - Abnormal; Notable for the following components:     Glucose                        269 (*)                BUN                           63 (*)                 Creatinine                    2.19 (*)               CO2                           21.0 (*)               Calcium                       11.1 (*)               BUN/Creatinine Ratio          28.8 (*)               eGFR                          22.3 (*)            All other components within normal limits         Narrative: GFR Normal >60                  Chronic Kidney Disease <60                  Kidney Failure <15                                    The GFR formula is only valid for adults with stable renal function between ages 18 and 70.  URINALYSIS W/ MICROSCOPIC IF INDICATED (NO CULTURE) - Abnormal; Notable for the following components:     Protein, UA                   Trace (*)               Leuk Esterase, UA             Trace (*)            All other components within normal limits  APTT - Abnormal; Notable for the following components:     PTT                           <20.0 (*)            All other components within normal limits  CBC WITH AUTO DIFFERENTIAL - Abnormal; Notable for the following components:     RBC                           3.76 (*)               Hemoglobin                    10.7 (*)               Hematocrit                    33.1 (*)               Lymphocyte %                  13.8 (*)            All other components within normal limits  URINALYSIS, MICROSCOPIC ONLY - Abnormal; Notable for the following components:     RBC, UA                       0-2 (*)                WBC, UA                       0-2 (*)                Squamous Epithelial Cells, UA   3-6 (*)             All other components within normal limits  LIPASE - Normal  PROTIME-INR - Normal  LEVETIRACETAM LEVEL  CBC AND DIFFERENTIAL   CT Abdomen Pelvis Without Contrast   Final Result    1. Atheromatous disease of the aortoiliac vessels and coronary arteries.    2. Prior cholecystectomy.    3. No definite solid organ abnormality is seen. The  lack of IV contrast    makes evaluation incomplete.    4. Minimal colon diverticulosis. No CT findings of diverticulitis. No    evidence of bowel obstruction. Underdistention of stomach and portions    of the colon. Moderate stool in the left colon. Fecal impaction of the    rectum measuring 6.9 cm.    5. Other nonacute findings, as discussed.              The full report of this exam was immediately signed and available to the    emergency room. The patient is currently in the emergency room.    This report was finalized on 09/16/2023 12:35 by Dr. Javy Walker MD.        Patient's labs are essentially at her baseline.  She has a normal white blood count, stable H&H at 10.7 and 33.1.  Chronic renal insufficiency with a BUN of 63 and creatinine 2.13.  CT scan reveals constipation/fecal impaction.  No bowel obstruction.  Patient was disimpacted in the emergency department.  She has MiraLAX that she takes at the nursing home.  Recommend to monitor for any constipation/fecal impaction.  She will be discharged back to the nursing home in stable condition.    Problems Addressed:  Constipation, unspecified constipation type: acute illness or injury     Details: Acute on chronic  Fecal impaction: acute illness or injury  Vomiting, unspecified vomiting type, unspecified whether nausea present: chronic illness or injury     Details: Chronic    Amount and/or Complexity of Data Reviewed  Labs: ordered. Decision-making details documented in ED Course.  Radiology: ordered. Decision-making details documented in ED Course.  ECG/medicine tests:  Decision-making details documented in ED Course.    Risk  Prescription drug management.        Final diagnoses:   Fecal impaction   Constipation, unspecified constipation type   Vomiting, unspecified vomiting type, unspecified whether nausea present       ED Disposition  ED Disposition       ED Disposition   Discharge    Condition   Good    Comment   --               No follow-up provider  specified.       Medication List      No changes were made to your prescriptions during this visit.            Esther Pineda, APRN  09/16/23 [id number]

## 2023-09-16 NOTE — DISCHARGE INSTRUCTIONS
Continue miralax as directed for constipation  Assess for signs of fecal impaction  Patient was partially disimpacted in the ER however she will likely need further treatment for constipation/impaction  No bowel obstruction noted on ct scan  Labs at patient's baseline

## 2023-09-18 LAB — LEVETIRACETAM SERPL-MCNC: 31 UG/ML (ref 10–40)

## 2024-01-01 ENCOUNTER — APPOINTMENT (OUTPATIENT)
Dept: CT IMAGING | Facility: HOSPITAL | Age: 79
End: 2024-01-01
Payer: MEDICARE

## 2024-01-01 ENCOUNTER — APPOINTMENT (OUTPATIENT)
Dept: GENERAL RADIOLOGY | Facility: HOSPITAL | Age: 79
End: 2024-01-01
Payer: MEDICARE

## 2024-01-01 ENCOUNTER — HOSPITAL ENCOUNTER (INPATIENT)
Facility: HOSPITAL | Age: 79
LOS: 2 days | End: 2024-01-25
Attending: FAMILY MEDICINE | Admitting: HOSPITALIST
Payer: MEDICARE

## 2024-01-01 ENCOUNTER — APPOINTMENT (OUTPATIENT)
Dept: ULTRASOUND IMAGING | Facility: HOSPITAL | Age: 79
End: 2024-01-01
Payer: MEDICARE

## 2024-01-01 VITALS
HEART RATE: 79 BPM | OXYGEN SATURATION: 93 % | HEIGHT: 60 IN | DIASTOLIC BLOOD PRESSURE: 35 MMHG | WEIGHT: 152 LBS | SYSTOLIC BLOOD PRESSURE: 87 MMHG | TEMPERATURE: 98.1 F | RESPIRATION RATE: 7 BRPM | BODY MASS INDEX: 29.84 KG/M2

## 2024-01-01 DIAGNOSIS — E87.20 ACIDOSIS: ICD-10-CM

## 2024-01-01 DIAGNOSIS — Z86.2 HISTORY OF ANEMIA DUE TO CHRONIC KIDNEY DISEASE: ICD-10-CM

## 2024-01-01 DIAGNOSIS — T68.XXXA HYPOTHERMIA, INITIAL ENCOUNTER: Primary | ICD-10-CM

## 2024-01-01 DIAGNOSIS — N18.9 HISTORY OF ANEMIA DUE TO CHRONIC KIDNEY DISEASE: ICD-10-CM

## 2024-01-01 DIAGNOSIS — N17.0 ACUTE RENAL FAILURE WITH ACUTE TUBULAR NECROSIS SUPERIMPOSED ON STAGE 4 CHRONIC KIDNEY DISEASE: ICD-10-CM

## 2024-01-01 DIAGNOSIS — N18.4 ACUTE RENAL FAILURE WITH ACUTE TUBULAR NECROSIS SUPERIMPOSED ON STAGE 4 CHRONIC KIDNEY DISEASE: ICD-10-CM

## 2024-01-01 LAB
A-A DO2: 18.9 MMHG
A-A DO2: 28.5 MMHG
ACETONE BLD QL: NEGATIVE
ALBUMIN SERPL-MCNC: 3 G/DL (ref 3.5–5.2)
ALBUMIN SERPL-MCNC: 3.8 G/DL (ref 3.5–5.2)
ALBUMIN/GLOB SERPL: 1.4 G/DL
ALBUMIN/GLOB SERPL: 1.4 G/DL
ALP SERPL-CCNC: 101 U/L (ref 39–117)
ALP SERPL-CCNC: 72 U/L (ref 39–117)
ALT SERPL W P-5'-P-CCNC: 47 U/L (ref 1–33)
ALT SERPL W P-5'-P-CCNC: 68 U/L (ref 1–33)
AMMONIA BLD-SCNC: 47 UMOL/L (ref 11–51)
AMORPH URATE CRY URNS QL MICRO: ABNORMAL /HPF
ANION GAP SERPL CALCULATED.3IONS-SCNC: 17 MMOL/L (ref 5–15)
ANION GAP SERPL CALCULATED.3IONS-SCNC: 17 MMOL/L (ref 5–15)
ANION GAP SERPL CALCULATED.3IONS-SCNC: 18 MMOL/L (ref 5–15)
ANISOCYTOSIS BLD QL: ABNORMAL
APTT PPP: 67.4 SECONDS (ref 24.5–36)
ARTERIAL PATENCY WRIST A: ABNORMAL
ARTERIAL PATENCY WRIST A: POSITIVE
ARTERIAL PATENCY WRIST A: POSITIVE
AST SERPL-CCNC: 30 U/L (ref 1–32)
AST SERPL-CCNC: 36 U/L (ref 1–32)
ATMOSPHERIC PRESS: 752 MMHG
ATMOSPHERIC PRESS: 754 MMHG
ATMOSPHERIC PRESS: 756 MMHG
B PARAPERT DNA SPEC QL NAA+PROBE: NOT DETECTED
B PERT DNA SPEC QL NAA+PROBE: NOT DETECTED
BACTERIA SPEC AEROBE CULT: ABNORMAL
BACTERIA UR QL AUTO: ABNORMAL /HPF
BASE EXCESS BLDA CALC-SCNC: -11.4 MMOL/L (ref 0–2)
BASE EXCESS BLDA CALC-SCNC: -16.7 MMOL/L (ref 0–2)
BASE EXCESS BLDA CALC-SCNC: -18.8 MMOL/L (ref 0–2)
BASO STIPL COARSE BLD QL SMEAR: ABNORMAL
BASOPHILS # BLD AUTO: 0 10*3/MM3 (ref 0–0.2)
BASOPHILS # BLD AUTO: 0.02 10*3/MM3 (ref 0–0.2)
BASOPHILS NFR BLD AUTO: 0 % (ref 0–1.5)
BASOPHILS NFR BLD AUTO: 0.2 % (ref 0–1.5)
BDY SITE: ABNORMAL
BILIRUB SERPL-MCNC: <0.2 MG/DL (ref 0–1.2)
BILIRUB SERPL-MCNC: <0.2 MG/DL (ref 0–1.2)
BILIRUB UR QL STRIP: NEGATIVE
BODY TEMPERATURE: 37
BUN SERPL-MCNC: 130 MG/DL (ref 8–23)
BUN SERPL-MCNC: 165 MG/DL (ref 8–23)
BUN SERPL-MCNC: 170 MG/DL (ref 8–23)
BUN/CREAT SERPL: 32.3 (ref 7–25)
BUN/CREAT SERPL: 37.6 (ref 7–25)
BUN/CREAT SERPL: 37.8 (ref 7–25)
C PNEUM DNA NPH QL NAA+NON-PROBE: NOT DETECTED
CALCIUM SPEC-SCNC: 7.5 MG/DL (ref 8.6–10.5)
CALCIUM SPEC-SCNC: 8.2 MG/DL (ref 8.6–10.5)
CALCIUM SPEC-SCNC: 9.2 MG/DL (ref 8.6–10.5)
CHLORIDE SERPL-SCNC: 110 MMOL/L (ref 98–107)
CHLORIDE SERPL-SCNC: 111 MMOL/L (ref 98–107)
CHLORIDE SERPL-SCNC: 113 MMOL/L (ref 98–107)
CLARITY UR: ABNORMAL
CLUMPED PLATELETS: PRESENT
CO2 SERPL-SCNC: 11 MMOL/L (ref 22–29)
CO2 SERPL-SCNC: 12 MMOL/L (ref 22–29)
CO2 SERPL-SCNC: 18 MMOL/L (ref 22–29)
COHGB MFR BLD: 1.2 % (ref 0–5)
COHGB MFR BLD: 1.2 % (ref 0–5)
COLOR UR: YELLOW
CORTIS SERPL-MCNC: 53.2 MCG/DL
CORTIS SERPL-MCNC: 54.7 MCG/DL
CREAT SERPL-MCNC: 4.02 MG/DL (ref 0.57–1)
CREAT SERPL-MCNC: 4.39 MG/DL (ref 0.57–1)
CREAT SERPL-MCNC: 4.5 MG/DL (ref 0.57–1)
CREAT UR-MCNC: 19.2 MG/DL
D-LACTATE SERPL-SCNC: 0.7 MMOL/L (ref 0.5–2)
DEPRECATED RDW RBC AUTO: 44.6 FL (ref 37–54)
DEPRECATED RDW RBC AUTO: 45.7 FL (ref 37–54)
DEPRECATED RDW RBC AUTO: 46.2 FL (ref 37–54)
EGFRCR SERPLBLD CKD-EPI 2021: 10.7 ML/MIN/1.73
EGFRCR SERPLBLD CKD-EPI 2021: 9.4 ML/MIN/1.73
EGFRCR SERPLBLD CKD-EPI 2021: 9.7 ML/MIN/1.73
EOSINOPHIL # BLD AUTO: 0.02 10*3/MM3 (ref 0–0.4)
EOSINOPHIL # BLD AUTO: 0.04 10*3/MM3 (ref 0–0.4)
EOSINOPHIL NFR BLD AUTO: 0.2 % (ref 0.3–6.2)
EOSINOPHIL NFR BLD AUTO: 0.4 % (ref 0.3–6.2)
ERYTHROCYTE [DISTWIDTH] IN BLOOD BY AUTOMATED COUNT: 14 % (ref 12.3–15.4)
ERYTHROCYTE [DISTWIDTH] IN BLOOD BY AUTOMATED COUNT: 14 % (ref 12.3–15.4)
ERYTHROCYTE [DISTWIDTH] IN BLOOD BY AUTOMATED COUNT: 14.1 % (ref 12.3–15.4)
FLUAV SUBTYP SPEC NAA+PROBE: NOT DETECTED
FLUBV RNA ISLT QL NAA+PROBE: NOT DETECTED
GAS FLOW AIRWAY: 3 LPM
GEN 5 2HR TROPONIN T REFLEX: 66 NG/L
GLOBULIN UR ELPH-MCNC: 2.1 GM/DL
GLOBULIN UR ELPH-MCNC: 2.8 GM/DL
GLUCOSE BLDC GLUCOMTR-MCNC: 260 MG/DL (ref 70–130)
GLUCOSE BLDC GLUCOMTR-MCNC: 273 MG/DL (ref 70–130)
GLUCOSE SERPL-MCNC: 145 MG/DL (ref 65–99)
GLUCOSE SERPL-MCNC: 157 MG/DL (ref 65–99)
GLUCOSE SERPL-MCNC: 242 MG/DL (ref 65–99)
GLUCOSE UR STRIP-MCNC: NEGATIVE MG/DL
HADV DNA SPEC NAA+PROBE: NOT DETECTED
HCO3 BLDA-SCNC: 10.1 MMOL/L (ref 20–26)
HCO3 BLDA-SCNC: 11 MMOL/L (ref 20–26)
HCO3 BLDA-SCNC: 13.8 MMOL/L (ref 20–26)
HCOV 229E RNA SPEC QL NAA+PROBE: NOT DETECTED
HCOV HKU1 RNA SPEC QL NAA+PROBE: NOT DETECTED
HCOV NL63 RNA SPEC QL NAA+PROBE: NOT DETECTED
HCOV OC43 RNA SPEC QL NAA+PROBE: NOT DETECTED
HCT VFR BLD AUTO: 21.8 % (ref 34–46.6)
HCT VFR BLD AUTO: 22.9 % (ref 34–46.6)
HCT VFR BLD AUTO: 28.2 % (ref 34–46.6)
HCT VFR BLD CALC: 24 % (ref 38–51)
HCT VFR BLD CALC: 26.8 % (ref 38–51)
HGB BLD-MCNC: 7.3 G/DL (ref 12–15.9)
HGB BLD-MCNC: 7.3 G/DL (ref 12–15.9)
HGB BLD-MCNC: 8.9 G/DL (ref 12–15.9)
HGB BLDA-MCNC: 7.8 G/DL (ref 12–16)
HGB BLDA-MCNC: 8.7 G/DL (ref 12–16)
HGB UR QL STRIP.AUTO: ABNORMAL
HMPV RNA NPH QL NAA+NON-PROBE: NOT DETECTED
HPIV1 RNA ISLT QL NAA+PROBE: NOT DETECTED
HPIV2 RNA SPEC QL NAA+PROBE: NOT DETECTED
HPIV3 RNA NPH QL NAA+PROBE: NOT DETECTED
HPIV4 P GENE NPH QL NAA+PROBE: NOT DETECTED
IMM GRANULOCYTES # BLD AUTO: 0.07 10*3/MM3 (ref 0–0.05)
IMM GRANULOCYTES # BLD AUTO: 0.08 10*3/MM3 (ref 0–0.05)
IMM GRANULOCYTES NFR BLD AUTO: 0.8 % (ref 0–0.5)
IMM GRANULOCYTES NFR BLD AUTO: 0.8 % (ref 0–0.5)
INR PPP: 1.23 (ref 0.91–1.09)
IRON 24H UR-MRATE: 105 MCG/DL (ref 37–145)
IRON SATN MFR SERPL: 38 % (ref 20–50)
KETONES UR QL STRIP: NEGATIVE
LEUKOCYTE ESTERASE UR QL STRIP.AUTO: ABNORMAL
LYMPHOCYTES # BLD AUTO: 0.44 10*3/MM3 (ref 0.7–3.1)
LYMPHOCYTES # BLD AUTO: 0.77 10*3/MM3 (ref 0.7–3.1)
LYMPHOCYTES # BLD MANUAL: 0.8 10*3/MM3 (ref 0.7–3.1)
LYMPHOCYTES NFR BLD AUTO: 4.8 % (ref 19.6–45.3)
LYMPHOCYTES NFR BLD AUTO: 7.7 % (ref 19.6–45.3)
LYMPHOCYTES NFR BLD MANUAL: 8 % (ref 5–12)
Lab: ABNORMAL
M PNEUMO IGG SER IA-ACNC: NOT DETECTED
MAGNESIUM SERPL-MCNC: 2.2 MG/DL (ref 1.6–2.4)
MCH RBC QN AUTO: 28.6 PG (ref 26.6–33)
MCH RBC QN AUTO: 29 PG (ref 26.6–33)
MCH RBC QN AUTO: 29.4 PG (ref 26.6–33)
MCHC RBC AUTO-ENTMCNC: 31.6 G/DL (ref 31.5–35.7)
MCHC RBC AUTO-ENTMCNC: 31.9 G/DL (ref 31.5–35.7)
MCHC RBC AUTO-ENTMCNC: 33.5 G/DL (ref 31.5–35.7)
MCV RBC AUTO: 87.9 FL (ref 79–97)
MCV RBC AUTO: 90.7 FL (ref 79–97)
MCV RBC AUTO: 90.9 FL (ref 79–97)
METAMYELOCYTES NFR BLD MANUAL: 3 % (ref 0–0)
METHGB BLD QL: 0.8 % (ref 0–3)
METHGB BLD QL: 1.2 % (ref 0–3)
MODALITY: ABNORMAL
MONOCYTES # BLD AUTO: 0.83 10*3/MM3 (ref 0.1–0.9)
MONOCYTES # BLD AUTO: 0.93 10*3/MM3 (ref 0.1–0.9)
MONOCYTES # BLD: 1.27 10*3/MM3 (ref 0.1–0.9)
MONOCYTES NFR BLD AUTO: 9.1 % (ref 5–12)
MONOCYTES NFR BLD AUTO: 9.3 % (ref 5–12)
MYELOCYTES NFR BLD MANUAL: 1 % (ref 0–0)
NEUTROPHILS # BLD AUTO: 13.22 10*3/MM3 (ref 1.7–7)
NEUTROPHILS NFR BLD AUTO: 7.74 10*3/MM3 (ref 1.7–7)
NEUTROPHILS NFR BLD AUTO: 8.19 10*3/MM3 (ref 1.7–7)
NEUTROPHILS NFR BLD AUTO: 81.6 % (ref 42.7–76)
NEUTROPHILS NFR BLD AUTO: 85.1 % (ref 42.7–76)
NEUTROPHILS NFR BLD MANUAL: 59 % (ref 42.7–76)
NEUTS BAND NFR BLD MANUAL: 24 % (ref 0–5)
NITRITE UR QL STRIP: NEGATIVE
NOTIFIED BY: ABNORMAL
NOTIFIED BY: ABNORMAL
NOTIFIED WHO: ABNORMAL
NOTIFIED WHO: ABNORMAL
NRBC BLD AUTO-RTO: 0.7 /100 WBC (ref 0–0.2)
NRBC BLD AUTO-RTO: 1.1 /100 WBC (ref 0–0.2)
NRBC SPEC MANUAL: 1 /100 WBC (ref 0–0.2)
NT-PROBNP SERPL-MCNC: 4024 PG/ML (ref 0–1800)
OXYHGB MFR BLDV: 93.9 % (ref 94–99)
OXYHGB MFR BLDV: 94.4 % (ref 94–99)
PCO2 BLDA: 27.6 MM HG (ref 35–45)
PCO2 BLDA: 32 MM HG (ref 35–45)
PCO2 BLDA: 34.9 MM HG (ref 35–45)
PCO2 TEMP ADJ BLD: 27.6 MM HG (ref 35–45)
PCO2 TEMP ADJ BLD: 32 MM HG (ref 35–45)
PCO2 TEMP ADJ BLD: 34.9 MM HG (ref 35–45)
PH BLDA: 7.07 PH UNITS (ref 7.35–7.45)
PH BLDA: 7.14 PH UNITS (ref 7.35–7.45)
PH BLDA: 7.31 PH UNITS (ref 7.35–7.45)
PH UR STRIP.AUTO: 7.5 [PH] (ref 5–8)
PH, TEMP CORRECTED: 7.07 PH UNITS (ref 7.35–7.45)
PH, TEMP CORRECTED: 7.14 PH UNITS (ref 7.35–7.45)
PH, TEMP CORRECTED: 7.31 PH UNITS (ref 7.35–7.45)
PLATELET # BLD AUTO: 130 10*3/MM3 (ref 140–450)
PLATELET # BLD AUTO: 136 10*3/MM3 (ref 140–450)
PLATELET # BLD AUTO: 137 10*3/MM3 (ref 140–450)
PMV BLD AUTO: 10.3 FL (ref 6–12)
PMV BLD AUTO: 10.4 FL (ref 6–12)
PMV BLD AUTO: 10.9 FL (ref 6–12)
PO2 BLDA: 83.9 MM HG (ref 83–108)
PO2 BLDA: 90.6 MM HG (ref 83–108)
PO2 BLDA: 98.1 MM HG (ref 83–108)
PO2 TEMP ADJ BLD: 83.9 MM HG (ref 83–108)
PO2 TEMP ADJ BLD: 90.6 MM HG (ref 83–108)
PO2 TEMP ADJ BLD: 98.1 MM HG (ref 83–108)
POIKILOCYTOSIS BLD QL SMEAR: ABNORMAL
POLYCHROMASIA BLD QL SMEAR: ABNORMAL
POTASSIUM BLDA-SCNC: 4.3 MMOL/L (ref 3.5–5.2)
POTASSIUM BLDA-SCNC: 4.7 MMOL/L (ref 3.5–5.2)
POTASSIUM SERPL-SCNC: 3.5 MMOL/L (ref 3.5–5.2)
POTASSIUM SERPL-SCNC: 4.6 MMOL/L (ref 3.5–5.2)
POTASSIUM SERPL-SCNC: 5.1 MMOL/L (ref 3.5–5.2)
PROT SERPL-MCNC: 5.1 G/DL (ref 6–8.5)
PROT SERPL-MCNC: 6.6 G/DL (ref 6–8.5)
PROT UR QL STRIP: ABNORMAL
PROTHROMBIN TIME: 15.7 SECONDS (ref 11.8–14.8)
QT INTERVAL: 548 MS
QTC INTERVAL: 468 MS
RBC # BLD AUTO: 2.48 10*6/MM3 (ref 3.77–5.28)
RBC # BLD AUTO: 2.52 10*6/MM3 (ref 3.77–5.28)
RBC # BLD AUTO: 3.11 10*6/MM3 (ref 3.77–5.28)
RBC # UR STRIP: ABNORMAL /HPF
REF LAB TEST METHOD: ABNORMAL
RHINOVIRUS RNA SPEC NAA+PROBE: NOT DETECTED
RSV RNA NPH QL NAA+NON-PROBE: NOT DETECTED
SAO2 % BLDCOA: 96.2 % (ref 94–99)
SAO2 % BLDCOA: 96.3 % (ref 94–99)
SAO2 % BLDCOA: 98.4 % (ref 94–99)
SARS-COV-2 RNA NPH QL NAA+NON-PROBE: NOT DETECTED
SMALL PLATELETS BLD QL SMEAR: ABNORMAL
SODIUM BLDA-SCNC: 141 MMOL/L (ref 136–145)
SODIUM BLDA-SCNC: 143 MMOL/L (ref 136–145)
SODIUM SERPL-SCNC: 138 MMOL/L (ref 136–145)
SODIUM SERPL-SCNC: 142 MMOL/L (ref 136–145)
SODIUM SERPL-SCNC: 147 MMOL/L (ref 136–145)
SODIUM UR-SCNC: 41 MMOL/L
SP GR UR STRIP: 1.02 (ref 1–1.03)
SQUAMOUS #/AREA URNS HPF: ABNORMAL /HPF
TIBC SERPL-MCNC: 274 MCG/DL (ref 298–536)
TRANSFERRIN SERPL-MCNC: 184 MG/DL (ref 200–360)
TROPONIN T DELTA: -2 NG/L
TROPONIN T SERPL HS-MCNC: 68 NG/L
TSH SERPL DL<=0.05 MIU/L-ACNC: 3.68 UIU/ML (ref 0.27–4.2)
UROBILINOGEN UR QL STRIP: ABNORMAL
VARIANT LYMPHS NFR BLD MANUAL: 5 % (ref 19.6–45.3)
VENTILATOR MODE: ABNORMAL
VIT B12 BLD-MCNC: 1155 PG/ML (ref 211–946)
WBC # UR STRIP: ABNORMAL /HPF
WBC MORPH BLD: NORMAL
WBC NRBC COR # BLD AUTO: 10.03 10*3/MM3 (ref 3.4–10.8)
WBC NRBC COR # BLD AUTO: 15.93 10*3/MM3 (ref 3.4–10.8)
WBC NRBC COR # BLD AUTO: 9.1 10*3/MM3 (ref 3.4–10.8)

## 2024-01-01 PROCEDURE — 85025 COMPLETE CBC W/AUTO DIFF WBC: CPT | Performed by: HOSPITALIST

## 2024-01-01 PROCEDURE — 25010000002 CEFEPIME PER 500 MG: Performed by: HOSPITALIST

## 2024-01-01 PROCEDURE — 80053 COMPREHEN METABOLIC PANEL: CPT | Performed by: FAMILY MEDICINE

## 2024-01-01 PROCEDURE — 87088 URINE BACTERIA CULTURE: CPT | Performed by: FAMILY MEDICINE

## 2024-01-01 PROCEDURE — 25010000002 HALOPERIDOL LACTATE PER 5 MG: Performed by: CLINICAL NURSE SPECIALIST

## 2024-01-01 PROCEDURE — 0 DEXTROSE 5 % SOLUTION 1,000 ML FLEX CONT: Performed by: INTERNAL MEDICINE

## 2024-01-01 PROCEDURE — 25810000003 SODIUM CHLORIDE 0.9 % SOLUTION: Performed by: FAMILY MEDICINE

## 2024-01-01 PROCEDURE — 82009 KETONE BODYS QUAL: CPT | Performed by: FAMILY MEDICINE

## 2024-01-01 PROCEDURE — 85007 BL SMEAR W/DIFF WBC COUNT: CPT | Performed by: HOSPITALIST

## 2024-01-01 PROCEDURE — 71045 X-RAY EXAM CHEST 1 VIEW: CPT

## 2024-01-01 PROCEDURE — 81001 URINALYSIS AUTO W/SCOPE: CPT | Performed by: FAMILY MEDICINE

## 2024-01-01 PROCEDURE — 82375 ASSAY CARBOXYHB QUANT: CPT

## 2024-01-01 PROCEDURE — 99223 1ST HOSP IP/OBS HIGH 75: CPT | Performed by: CLINICAL NURSE SPECIALIST

## 2024-01-01 PROCEDURE — 99285 EMERGENCY DEPT VISIT HI MDM: CPT

## 2024-01-01 PROCEDURE — 83540 ASSAY OF IRON: CPT | Performed by: INTERNAL MEDICINE

## 2024-01-01 PROCEDURE — 85730 THROMBOPLASTIN TIME PARTIAL: CPT | Performed by: FAMILY MEDICINE

## 2024-01-01 PROCEDURE — 82805 BLOOD GASES W/O2 SATURATION: CPT

## 2024-01-01 PROCEDURE — 82607 VITAMIN B-12: CPT | Performed by: HOSPITALIST

## 2024-01-01 PROCEDURE — 83880 ASSAY OF NATRIURETIC PEPTIDE: CPT | Performed by: FAMILY MEDICINE

## 2024-01-01 PROCEDURE — 83735 ASSAY OF MAGNESIUM: CPT | Performed by: FAMILY MEDICINE

## 2024-01-01 PROCEDURE — 25010000002 MORPHINE PER 10 MG: Performed by: CLINICAL NURSE SPECIALIST

## 2024-01-01 PROCEDURE — 82140 ASSAY OF AMMONIA: CPT | Performed by: HOSPITALIST

## 2024-01-01 PROCEDURE — 94799 UNLISTED PULMONARY SVC/PX: CPT

## 2024-01-01 PROCEDURE — 36415 COLL VENOUS BLD VENIPUNCTURE: CPT

## 2024-01-01 PROCEDURE — 36600 WITHDRAWAL OF ARTERIAL BLOOD: CPT

## 2024-01-01 PROCEDURE — 99232 SBSQ HOSP IP/OBS MODERATE 35: CPT | Performed by: CLINICAL NURSE SPECIALIST

## 2024-01-01 PROCEDURE — 85025 COMPLETE CBC W/AUTO DIFF WBC: CPT | Performed by: FAMILY MEDICINE

## 2024-01-01 PROCEDURE — 99232 SBSQ HOSP IP/OBS MODERATE 35: CPT | Performed by: INTERNAL MEDICINE

## 2024-01-01 PROCEDURE — 87040 BLOOD CULTURE FOR BACTERIA: CPT | Performed by: FAMILY MEDICINE

## 2024-01-01 PROCEDURE — 25810000003 SODIUM CHLORIDE 0.9 % SOLUTION: Performed by: INTERNAL MEDICINE

## 2024-01-01 PROCEDURE — 94761 N-INVAS EAR/PLS OXIMETRY MLT: CPT

## 2024-01-01 PROCEDURE — 0 DEXTROSE 5 % SOLUTION 1,000 ML FLEX CONT: Performed by: HOSPITALIST

## 2024-01-01 PROCEDURE — 82948 REAGENT STRIP/BLOOD GLUCOSE: CPT

## 2024-01-01 PROCEDURE — 99222 1ST HOSP IP/OBS MODERATE 55: CPT | Performed by: INTERNAL MEDICINE

## 2024-01-01 PROCEDURE — 83050 HGB METHEMOGLOBIN QUAN: CPT

## 2024-01-01 PROCEDURE — 93010 ELECTROCARDIOGRAM REPORT: CPT | Performed by: INTERNAL MEDICINE

## 2024-01-01 PROCEDURE — 84443 ASSAY THYROID STIM HORMONE: CPT | Performed by: HOSPITALIST

## 2024-01-01 PROCEDURE — 83605 ASSAY OF LACTIC ACID: CPT | Performed by: FAMILY MEDICINE

## 2024-01-01 PROCEDURE — 25010000002 HYDROMORPHONE PER 4 MG: Performed by: CLINICAL NURSE SPECIALIST

## 2024-01-01 PROCEDURE — 25010000002 COSYNTROPIN PER 0.25 MG: Performed by: INTERNAL MEDICINE

## 2024-01-01 PROCEDURE — 87186 SC STD MICRODIL/AGAR DIL: CPT | Performed by: FAMILY MEDICINE

## 2024-01-01 PROCEDURE — 70450 CT HEAD/BRAIN W/O DYE: CPT

## 2024-01-01 PROCEDURE — 82533 TOTAL CORTISOL: CPT | Performed by: INTERNAL MEDICINE

## 2024-01-01 PROCEDURE — 84466 ASSAY OF TRANSFERRIN: CPT | Performed by: INTERNAL MEDICINE

## 2024-01-01 PROCEDURE — 76775 US EXAM ABDO BACK WALL LIM: CPT

## 2024-01-01 PROCEDURE — 51702 INSERT TEMP BLADDER CATH: CPT

## 2024-01-01 PROCEDURE — 25010000002 FUROSEMIDE PER 20 MG: Performed by: CLINICAL NURSE SPECIALIST

## 2024-01-01 PROCEDURE — 84484 ASSAY OF TROPONIN QUANT: CPT | Performed by: FAMILY MEDICINE

## 2024-01-01 PROCEDURE — 82803 BLOOD GASES ANY COMBINATION: CPT

## 2024-01-01 PROCEDURE — 82570 ASSAY OF URINE CREATININE: CPT | Performed by: INTERNAL MEDICINE

## 2024-01-01 PROCEDURE — 94640 AIRWAY INHALATION TREATMENT: CPT

## 2024-01-01 PROCEDURE — 87086 URINE CULTURE/COLONY COUNT: CPT | Performed by: FAMILY MEDICINE

## 2024-01-01 PROCEDURE — 84300 ASSAY OF URINE SODIUM: CPT | Performed by: INTERNAL MEDICINE

## 2024-01-01 PROCEDURE — 0202U NFCT DS 22 TRGT SARS-COV-2: CPT | Performed by: FAMILY MEDICINE

## 2024-01-01 PROCEDURE — 93005 ELECTROCARDIOGRAM TRACING: CPT

## 2024-01-01 PROCEDURE — 85610 PROTHROMBIN TIME: CPT | Performed by: FAMILY MEDICINE

## 2024-01-01 PROCEDURE — 80053 COMPREHEN METABOLIC PANEL: CPT | Performed by: INTERNAL MEDICINE

## 2024-01-01 PROCEDURE — 93005 ELECTROCARDIOGRAM TRACING: CPT | Performed by: FAMILY MEDICINE

## 2024-01-01 PROCEDURE — 63710000001 INSULIN REGULAR HUMAN PER 5 UNITS: Performed by: FAMILY MEDICINE

## 2024-01-01 RX ORDER — MORPHINE SULFATE 20 MG/ML
20 SOLUTION ORAL
Status: DISCONTINUED | OUTPATIENT
Start: 2024-01-01 | End: 2024-01-01 | Stop reason: HOSPADM

## 2024-01-01 RX ORDER — BISACODYL 10 MG
10 SUPPOSITORY, RECTAL RECTAL DAILY PRN
Status: DISCONTINUED | OUTPATIENT
Start: 2024-01-01 | End: 2024-01-01 | Stop reason: HOSPADM

## 2024-01-01 RX ORDER — NOREPINEPHRINE BITARTRATE 0.03 MG/ML
.02-.3 INJECTION, SOLUTION INTRAVENOUS
Status: DISCONTINUED | OUTPATIENT
Start: 2024-01-01 | End: 2024-01-01

## 2024-01-01 RX ORDER — PROCHLORPERAZINE MALEATE 10 MG
5 TABLET ORAL EVERY 6 HOURS PRN
Status: DISCONTINUED | OUTPATIENT
Start: 2024-01-01 | End: 2024-01-01 | Stop reason: HOSPADM

## 2024-01-01 RX ORDER — LACTULOSE 10 G/15ML
20 SOLUTION ORAL DAILY PRN
COMMUNITY

## 2024-01-01 RX ORDER — SODIUM CHLORIDE 9 MG/ML
125 INJECTION, SOLUTION INTRAVENOUS CONTINUOUS
Status: DISCONTINUED | OUTPATIENT
Start: 2024-01-01 | End: 2024-01-01

## 2024-01-01 RX ORDER — BISACODYL 5 MG/1
5 TABLET, DELAYED RELEASE ORAL DAILY PRN
Status: DISCONTINUED | OUTPATIENT
Start: 2024-01-01 | End: 2024-01-01

## 2024-01-01 RX ORDER — DIPHENHYDRAMINE HYDROCHLORIDE 50 MG/ML
25 INJECTION INTRAMUSCULAR; INTRAVENOUS EVERY 6 HOURS PRN
Status: DISCONTINUED | OUTPATIENT
Start: 2024-01-01 | End: 2024-01-01 | Stop reason: HOSPADM

## 2024-01-01 RX ORDER — SODIUM CHLORIDE 0.9 % (FLUSH) 0.9 %
10 SYRINGE (ML) INJECTION EVERY 12 HOURS SCHEDULED
Status: DISCONTINUED | OUTPATIENT
Start: 2024-01-01 | End: 2024-01-01 | Stop reason: HOSPADM

## 2024-01-01 RX ORDER — ACETAMINOPHEN 650 MG/1
650 SUPPOSITORY RECTAL EVERY 4 HOURS PRN
Status: DISCONTINUED | OUTPATIENT
Start: 2024-01-01 | End: 2024-01-01 | Stop reason: HOSPADM

## 2024-01-01 RX ORDER — NICOTINE POLACRILEX 4 MG
15 LOZENGE BUCCAL
Status: DISCONTINUED | OUTPATIENT
Start: 2024-01-01 | End: 2024-01-01

## 2024-01-01 RX ORDER — HALOPERIDOL 5 MG/ML
2 INJECTION INTRAMUSCULAR EVERY 4 HOURS PRN
Status: DISCONTINUED | OUTPATIENT
Start: 2024-01-01 | End: 2024-01-01 | Stop reason: HOSPADM

## 2024-01-01 RX ORDER — DULOXETIN HYDROCHLORIDE 60 MG/1
60 CAPSULE, DELAYED RELEASE ORAL DAILY
COMMUNITY

## 2024-01-01 RX ORDER — IPRATROPIUM BROMIDE AND ALBUTEROL SULFATE 2.5; .5 MG/3ML; MG/3ML
3 SOLUTION RESPIRATORY (INHALATION) EVERY 4 HOURS PRN
Status: DISCONTINUED | OUTPATIENT
Start: 2024-01-01 | End: 2024-01-01 | Stop reason: HOSPADM

## 2024-01-01 RX ORDER — LORAZEPAM 2 MG/ML
1 INJECTION INTRAMUSCULAR
Status: DISCONTINUED | OUTPATIENT
Start: 2024-01-01 | End: 2024-01-01 | Stop reason: HOSPADM

## 2024-01-01 RX ORDER — COSYNTROPIN 0.25 MG/ML
0.25 INJECTION, POWDER, FOR SOLUTION INTRAMUSCULAR; INTRAVENOUS ONCE
Status: COMPLETED | OUTPATIENT
Start: 2024-01-01 | End: 2024-01-01

## 2024-01-01 RX ORDER — SUCRALFATE 1 G/1
1 TABLET ORAL 4 TIMES DAILY
COMMUNITY

## 2024-01-01 RX ORDER — MELATONIN
1000 2 TIMES DAILY
COMMUNITY

## 2024-01-01 RX ORDER — LORAZEPAM 2 MG/ML
0.5 CONCENTRATE ORAL
Status: DISCONTINUED | OUTPATIENT
Start: 2024-01-01 | End: 2024-01-01 | Stop reason: HOSPADM

## 2024-01-01 RX ORDER — MORPHINE SULFATE 20 MG/ML
5 SOLUTION ORAL
Status: DISCONTINUED | OUTPATIENT
Start: 2024-01-01 | End: 2024-01-01 | Stop reason: HOSPADM

## 2024-01-01 RX ORDER — LORAZEPAM 2 MG/ML
2 CONCENTRATE ORAL
Status: DISCONTINUED | OUTPATIENT
Start: 2024-01-01 | End: 2024-01-01 | Stop reason: HOSPADM

## 2024-01-01 RX ORDER — MORPHINE SULFATE 20 MG/ML
10 SOLUTION ORAL
Status: DISCONTINUED | OUTPATIENT
Start: 2024-01-01 | End: 2024-01-01 | Stop reason: HOSPADM

## 2024-01-01 RX ORDER — FUROSEMIDE 10 MG/ML
20 INJECTION INTRAMUSCULAR; INTRAVENOUS EVERY 6 HOURS PRN
Status: DISCONTINUED | OUTPATIENT
Start: 2024-01-01 | End: 2024-01-01 | Stop reason: HOSPADM

## 2024-01-01 RX ORDER — SCOLOPAMINE TRANSDERMAL SYSTEM 1 MG/1
1 PATCH, EXTENDED RELEASE TRANSDERMAL
Status: DISCONTINUED | OUTPATIENT
Start: 2024-01-01 | End: 2024-01-01 | Stop reason: HOSPADM

## 2024-01-01 RX ORDER — DEXTROSE MONOHYDRATE 25 G/50ML
25 INJECTION, SOLUTION INTRAVENOUS
Status: DISCONTINUED | OUTPATIENT
Start: 2024-01-01 | End: 2024-01-01

## 2024-01-01 RX ORDER — ASCORBIC ACID 500 MG
500 TABLET ORAL 2 TIMES DAILY
COMMUNITY

## 2024-01-01 RX ORDER — SODIUM CHLORIDE 0.9 % (FLUSH) 0.9 %
10 SYRINGE (ML) INJECTION AS NEEDED
Status: DISCONTINUED | OUTPATIENT
Start: 2024-01-01 | End: 2024-01-01 | Stop reason: HOSPADM

## 2024-01-01 RX ORDER — INSULIN ASPART 100 [IU]/ML
10 INJECTION, SOLUTION INTRAVENOUS; SUBCUTANEOUS
COMMUNITY

## 2024-01-01 RX ORDER — DIPHENOXYLATE HYDROCHLORIDE AND ATROPINE SULFATE 2.5; .025 MG/1; MG/1
1 TABLET ORAL
Status: DISCONTINUED | OUTPATIENT
Start: 2024-01-01 | End: 2024-01-01 | Stop reason: HOSPADM

## 2024-01-01 RX ORDER — PRIMIDONE 50 MG/1
25 TABLET ORAL NIGHTLY
COMMUNITY

## 2024-01-01 RX ORDER — SODIUM CHLORIDE 9 MG/ML
40 INJECTION, SOLUTION INTRAVENOUS AS NEEDED
Status: DISCONTINUED | OUTPATIENT
Start: 2024-01-01 | End: 2024-01-01 | Stop reason: HOSPADM

## 2024-01-01 RX ORDER — IBUPROFEN 600 MG/1
1 TABLET ORAL
Status: DISCONTINUED | OUTPATIENT
Start: 2024-01-01 | End: 2024-01-01

## 2024-01-01 RX ORDER — MORPHINE SULFATE 20 MG/ML
5 SOLUTION ORAL EVERY 6 HOURS
Qty: 40 ML | Refills: 0 | Status: DISCONTINUED | OUTPATIENT
Start: 2024-01-01 | End: 2024-01-01 | Stop reason: HOSPADM

## 2024-01-01 RX ORDER — PANTOPRAZOLE SODIUM 20 MG/1
20 TABLET, DELAYED RELEASE ORAL DAILY
COMMUNITY

## 2024-01-01 RX ORDER — AMOXICILLIN 250 MG
2 CAPSULE ORAL 2 TIMES DAILY
Status: DISCONTINUED | OUTPATIENT
Start: 2024-01-01 | End: 2024-01-01

## 2024-01-01 RX ORDER — LORAZEPAM 2 MG/ML
2 INJECTION INTRAMUSCULAR
Status: DISCONTINUED | OUTPATIENT
Start: 2024-01-01 | End: 2024-01-01 | Stop reason: HOSPADM

## 2024-01-01 RX ORDER — DIPHENHYDRAMINE HCL 25 MG
25 CAPSULE ORAL EVERY 6 HOURS PRN
Status: DISCONTINUED | OUTPATIENT
Start: 2024-01-01 | End: 2024-01-01 | Stop reason: HOSPADM

## 2024-01-01 RX ORDER — HALOPERIDOL 2 MG/ML
1 SOLUTION ORAL EVERY 4 HOURS PRN
Status: DISCONTINUED | OUTPATIENT
Start: 2024-01-01 | End: 2024-01-01 | Stop reason: HOSPADM

## 2024-01-01 RX ORDER — LORAZEPAM 2 MG/ML
0.5 INJECTION INTRAMUSCULAR
Status: DISCONTINUED | OUTPATIENT
Start: 2024-01-01 | End: 2024-01-01 | Stop reason: HOSPADM

## 2024-01-01 RX ORDER — HALOPERIDOL 2 MG/ML
2 SOLUTION ORAL EVERY 4 HOURS PRN
Status: DISCONTINUED | OUTPATIENT
Start: 2024-01-01 | End: 2024-01-01 | Stop reason: HOSPADM

## 2024-01-01 RX ORDER — IPRATROPIUM BROMIDE AND ALBUTEROL SULFATE 2.5; .5 MG/3ML; MG/3ML
3 SOLUTION RESPIRATORY (INHALATION) ONCE
Status: COMPLETED | OUTPATIENT
Start: 2024-01-01 | End: 2024-01-01

## 2024-01-01 RX ORDER — HALOPERIDOL 5 MG/ML
1 INJECTION INTRAMUSCULAR EVERY 4 HOURS PRN
Status: DISCONTINUED | OUTPATIENT
Start: 2024-01-01 | End: 2024-01-01 | Stop reason: HOSPADM

## 2024-01-01 RX ORDER — PROCHLORPERAZINE 25 MG
25 SUPPOSITORY, RECTAL RECTAL EVERY 12 HOURS PRN
Status: DISCONTINUED | OUTPATIENT
Start: 2024-01-01 | End: 2024-01-01 | Stop reason: HOSPADM

## 2024-01-01 RX ORDER — PROCHLORPERAZINE EDISYLATE 5 MG/ML
5 INJECTION INTRAMUSCULAR; INTRAVENOUS EVERY 6 HOURS PRN
Status: DISCONTINUED | OUTPATIENT
Start: 2024-01-01 | End: 2024-01-01 | Stop reason: HOSPADM

## 2024-01-01 RX ORDER — GUAIFENESIN 600 MG/1
600 TABLET, EXTENDED RELEASE ORAL 2 TIMES DAILY PRN
COMMUNITY

## 2024-01-01 RX ORDER — HYDROMORPHONE HYDROCHLORIDE 1 MG/ML
0.25 INJECTION, SOLUTION INTRAMUSCULAR; INTRAVENOUS; SUBCUTANEOUS ONCE
Status: COMPLETED | OUTPATIENT
Start: 2024-01-01 | End: 2024-01-01

## 2024-01-01 RX ORDER — AMINO ACIDS/PROTEIN HYDROLYS 15G-100/30
30 LIQUID (ML) ORAL EVERY MORNING
COMMUNITY

## 2024-01-01 RX ORDER — POLYETHYLENE GLYCOL 3350 17 G/17G
17 POWDER, FOR SOLUTION ORAL DAILY PRN
Status: DISCONTINUED | OUTPATIENT
Start: 2024-01-01 | End: 2024-01-01

## 2024-01-01 RX ORDER — ATROPINE SULFATE 10 MG/ML
2 SOLUTION/ DROPS OPHTHALMIC 2 TIMES DAILY PRN
Status: DISCONTINUED | OUTPATIENT
Start: 2024-01-01 | End: 2024-01-01 | Stop reason: HOSPADM

## 2024-01-01 RX ORDER — LORAZEPAM 2 MG/ML
1 CONCENTRATE ORAL
Status: DISCONTINUED | OUTPATIENT
Start: 2024-01-01 | End: 2024-01-01 | Stop reason: HOSPADM

## 2024-01-01 RX ORDER — ONDANSETRON 4 MG/1
4 TABLET, FILM COATED ORAL 3 TIMES DAILY PRN
COMMUNITY

## 2024-01-01 RX ORDER — ACETAMINOPHEN 650 MG/1
650 SUPPOSITORY RECTAL EVERY 4 HOURS PRN
Status: DISCONTINUED | OUTPATIENT
Start: 2024-01-01 | End: 2024-01-01

## 2024-01-01 RX ORDER — IPRATROPIUM BROMIDE AND ALBUTEROL SULFATE 2.5; .5 MG/3ML; MG/3ML
3 SOLUTION RESPIRATORY (INHALATION)
Status: DISCONTINUED | OUTPATIENT
Start: 2024-01-01 | End: 2024-01-01

## 2024-01-01 RX ORDER — ICOSAPENT ETHYL 1000 MG/1
2 CAPSULE ORAL 2 TIMES DAILY WITH MEALS
COMMUNITY

## 2024-01-01 RX ADMIN — HYDROMORPHONE HYDROCHLORIDE 0.25 MG: 1 INJECTION, SOLUTION INTRAMUSCULAR; INTRAVENOUS; SUBCUTANEOUS at 10:43

## 2024-01-01 RX ADMIN — MORPHINE SULFATE 5 MG: 20 SOLUTION ORAL at 13:17

## 2024-01-01 RX ADMIN — COSYNTROPIN 0.25 MG: 0.25 INJECTION, POWDER, LYOPHILIZED, FOR SOLUTION INTRAMUSCULAR; INTRAVENOUS at 22:01

## 2024-01-01 RX ADMIN — IPRATROPIUM BROMIDE AND ALBUTEROL SULFATE 3 ML: .5; 3 SOLUTION RESPIRATORY (INHALATION) at 20:58

## 2024-01-01 RX ADMIN — IPRATROPIUM BROMIDE AND ALBUTEROL SULFATE 3 ML: .5; 3 SOLUTION RESPIRATORY (INHALATION) at 13:59

## 2024-01-01 RX ADMIN — SODIUM BICARBONATE 125 MEQ: 84 INJECTION, SOLUTION INTRAVENOUS at 13:02

## 2024-01-01 RX ADMIN — MORPHINE SULFATE 5 MG: 20 SOLUTION ORAL at 15:04

## 2024-01-01 RX ADMIN — SODIUM CHLORIDE 125 ML/HR: 9 INJECTION, SOLUTION INTRAVENOUS at 19:27

## 2024-01-01 RX ADMIN — Medication 10 ML: at 20:53

## 2024-01-01 RX ADMIN — ATROPINE SULFATE 2 DROP: 10 SOLUTION/ DROPS OPHTHALMIC at 23:35

## 2024-01-01 RX ADMIN — Medication 10 ML: at 21:44

## 2024-01-01 RX ADMIN — SODIUM BICARBONATE: 84 INJECTION, SOLUTION INTRAVENOUS at 16:33

## 2024-01-01 RX ADMIN — MORPHINE SULFATE 10 MG: 20 SOLUTION ORAL at 23:35

## 2024-01-01 RX ADMIN — MORPHINE SULFATE 6 MG: 4 INJECTION, SOLUTION INTRAMUSCULAR; INTRAVENOUS at 22:18

## 2024-01-01 RX ADMIN — SODIUM CHLORIDE 1000 ML: 9 INJECTION, SOLUTION INTRAVENOUS at 15:55

## 2024-01-01 RX ADMIN — SODIUM BICARBONATE: 84 INJECTION, SOLUTION INTRAVENOUS at 23:58

## 2024-01-01 RX ADMIN — ATROPINE SULFATE 2 DROP: 10 SOLUTION/ DROPS OPHTHALMIC at 12:40

## 2024-01-01 RX ADMIN — CEFEPIME 2000 MG: 2 INJECTION, POWDER, FOR SOLUTION INTRAVENOUS at 13:10

## 2024-01-01 RX ADMIN — MORPHINE SULFATE 5 MG: 20 SOLUTION ORAL at 09:12

## 2024-01-01 RX ADMIN — INSULIN HUMAN 6 UNITS: 100 INJECTION, SOLUTION PARENTERAL at 05:57

## 2024-01-01 RX ADMIN — ACETAMINOPHEN 650 MG: 650 SUPPOSITORY RECTAL at 03:23

## 2024-01-01 RX ADMIN — MORPHINE SULFATE 5 MG: 20 SOLUTION ORAL at 01:18

## 2024-01-01 RX ADMIN — IPRATROPIUM BROMIDE AND ALBUTEROL SULFATE 3 ML: .5; 3 SOLUTION RESPIRATORY (INHALATION) at 10:02

## 2024-01-01 RX ADMIN — IPRATROPIUM BROMIDE AND ALBUTEROL SULFATE 3 ML: .5; 3 SOLUTION RESPIRATORY (INHALATION) at 10:27

## 2024-01-01 RX ADMIN — NOREPINEPHRINE BITARTRATE 0.12 MCG/KG/MIN: 0.03 INJECTION, SOLUTION INTRAVENOUS at 05:17

## 2024-01-01 RX ADMIN — Medication 10 ML: at 12:47

## 2024-01-01 RX ADMIN — NOREPINEPHRINE BITARTRATE 0.02 MCG/KG/MIN: 0.03 INJECTION, SOLUTION INTRAVENOUS at 14:03

## 2024-01-01 RX ADMIN — HALOPERIDOL LACTATE 1 MG: 5 INJECTION, SOLUTION INTRAMUSCULAR at 22:31

## 2024-01-01 RX ADMIN — LORAZEPAM 2 MG: 2 LIQUID ORAL at 21:44

## 2024-01-01 RX ADMIN — IPRATROPIUM BROMIDE AND ALBUTEROL SULFATE 3 ML: .5; 3 SOLUTION RESPIRATORY (INHALATION) at 06:05

## 2024-01-01 RX ADMIN — SCOPALAMINE 1 PATCH: 1 PATCH, EXTENDED RELEASE TRANSDERMAL at 22:31

## 2024-01-01 RX ADMIN — MORPHINE SULFATE 5 MG: 20 SOLUTION ORAL at 21:37

## 2024-01-01 RX ADMIN — FUROSEMIDE 20 MG: 10 INJECTION, SOLUTION INTRAMUSCULAR; INTRAVENOUS at 21:44

## 2024-01-01 RX ADMIN — SODIUM BICARBONATE 50 MEQ: 84 INJECTION INTRAVENOUS at 10:46

## 2024-01-01 RX ADMIN — Medication 10 ML: at 08:03

## 2024-01-01 RX ADMIN — SODIUM CHLORIDE 125 ML/HR: 9 INJECTION, SOLUTION INTRAVENOUS at 11:27

## 2024-01-01 RX ADMIN — SODIUM CHLORIDE 500 ML: 9 INJECTION, SOLUTION INTRAVENOUS at 10:44

## 2024-01-01 RX ADMIN — SODIUM CHLORIDE 125 ML/HR: 9 INJECTION, SOLUTION INTRAVENOUS at 03:37

## 2024-01-01 RX ADMIN — Medication 10 ML: at 09:12

## 2024-01-01 RX ADMIN — SODIUM BICARBONATE: 84 INJECTION, SOLUTION INTRAVENOUS at 08:03

## 2024-01-23 PROBLEM — T68.XXXA HYPOTHERMIA: Status: ACTIVE | Noted: 2024-01-01

## 2024-01-23 NOTE — CONSULTS
[facility] [place] PULMONARY & CRITICAL CARE CONSULT - [facility]    24, 13:25 CST  Patient Care Team:  Chu Isaac MD as PCP - General  Chu Isaac MD as PCP - Family Medicine  Name: Gregoria Bennett  : 1945  MRN: [id number]  Contact Serial Number [id number]    Chief complaint: Metabolic acidosis, acute on chronic renal failure, hypothermia  HPI:  We have been consulted by Erinn Gutierrez MD to see this 78 y.o. female.  Patient was admitted from a local nursing home today hypothermic with a core temp of 91, metabolic acidosis and acute on chronic renal failure.  Initial pH was 7.07 with a normal pO2.  Follow-up ABG done at 1517 shows a pH of 7.14, pCO2 of 32 and a normal pO2 of 84.  She is seen in the emergency room on 3 L nasal cannula with a sat of 96%.  Her core temperature is up to 94.8.  She is hypotensive on Levophed drip.  She also has bicarb infusing.  Chest x-ray and CT of the head are unremarkable.  A renal ultrasound shows bilateral renal atrophy.  A renal consult is pending.  Creatinine was 4.50 initially and is now 4.39 after some hydration with IV fluids.  She is not known to our practice and is not on any inhalers per the medication list from the nursing home.  She is a DNR and a lau of the [facility].  She is unable to provide any of her own history.    Past Medical History:   has a past medical history of Chronic kidney disease, stage 4 (severe), Contracture of muscle ankle and foot, left, Contracture of muscle, right ankle and foot, COPD (chronic obstructive pulmonary disease), Coronary artery disease, Dementia, Diabetes mellitus, Dysphagia, History of dermatomyositis, Hypertension, Peptic ulcer disease, Pulmonary disease, Renal insufficiency, Urinary tract infection, Venous insufficiency, and Vitamin D deficiency.   has a past surgical history that includes Cholecystectomy and Colon surgery.  Allergies   Allergen Reactions    Aspirin Rash    Tetracyclines & Related  Rash     Medications:  cefepime, 2,000 mg, Intravenous, Once  [START ON 1/24/2024] cefepime, 2,000 mg, Intravenous, Q24H  ipratropium-albuterol, 3 mL, Nebulization, 4x Daily - RT  senna-docusate sodium, 2 tablet, Oral, BID  sodium chloride, 10 mL, Intravenous, Q12H      sodium bicarbonate 8.4 % 125 mEq in dextrose (D5W) 5 % 1,000 mL infusion (greater than 75 mEq), 125 mEq, Last Rate: 125 mEq (01/23/24 1302)  sodium chloride, 125 mL/hr, Last Rate: 125 mL/hr (01/23/24 1127)      Family History:  Family History   Problem Relation Age of Onset    Breast cancer Neg Hx      Social History:   reports that she has quit smoking. She does not have any smokeless tobacco history on file. She reports that she does not drink alcohol and does not use drugs.  Review of Systems:  Review of Systems   Unable to obtain due to altered mental status  Physical Exam:  Temp:  [90.7 °F (32.6 °C)-93.6 °F (34.2 °C)] 93.6 °F (34.2 °C)  Heart Rate:  [43-64] 55  Resp:  [13-19] 19  BP: ()/(28-79) 86/28  Intake/Output Summary (Last 24 hours) at 1/23/2024 1325  Last data filed at 1/23/2024 1127  Gross per 24 hour   Intake 500 ml   Output --   Net 500 ml         01/23/24  0924   Weight: 68.9 kg (152 lb)     SpO2 Percentage    01/23/24 1213 01/23/24 1231 01/23/24 1318   SpO2: 98% 93% 100%     Body mass index is 29.69 kg/m².   Physical Exam  Vitals and nursing note reviewed.   Constitutional:       Appearance: She is well-developed. She is ill-appearing.      Interventions: Nasal cannula in place.   HENT:      Head: Normocephalic and atraumatic.   Cardiovascular:      Rate and Rhythm: Normal rate and regular rhythm.   Pulmonary:      Effort: No respiratory distress.   Abdominal:      General: There is no distension.      Palpations: Abdomen is soft.   Genitourinary:     Comments: Meza catheter  Musculoskeletal:      Cervical back: Neck supple.   Skin:     General: Skin is warm and dry.      Comments: Bear hugger in place   Neurological:       Mental Status: She is lethargic.       Result Review  Results from last 7 days   Lab Units 01/23/24  1309 01/23/24  0943   WBC 10*3/mm3 9.10 10.03   HEMOGLOBIN g/dL 7.3* 8.9*   PLATELETS 10*3/mm3 130* 137*     Results from last 7 days   Lab Units 01/23/24  1005 01/23/24  0943   SODIUM mmol/L  --  138   SODIUM, ARTERIAL mmol/L 141  --    POTASSIUM mmol/L  --  5.1   CO2 mmol/L  --  11.0*   BUN mg/dL  --  170*   CREATININE mg/dL  --  4.50*   MAGNESIUM mg/dL  --  2.2   GLUCOSE mg/dL  --  157*     Results from last 7 days   Lab Units 01/23/24  1005   PH, ARTERIAL pH units 7.070*   PCO2, ARTERIAL mm Hg 34.9*   PO2 ART mm Hg 90.6     Microbiology Results (last 10 days)       Procedure Component Value - Date/Time    Respiratory Panel PCR w/COVID-19(SARS-CoV-2) ARIANA/GREG/CAMRON/PAD/COR/ANTONELLA In-House, NP Swab in UTM/VTM, 2 HR TAT - Swab, Nasopharynx [[id number]]  (Normal) Collected: 01/23/24 1006    Lab Status: Final result Specimen: Swab from Nasopharynx Updated: 01/23/24 1103     ADENOVIRUS, PCR Not Detected     Coronavirus 229E Not Detected     Coronavirus HKU1 Not Detected     Coronavirus NL63 Not Detected     Coronavirus OC43 Not Detected     COVID19 Not Detected     Human Metapneumovirus Not Detected     Human Rhinovirus/Enterovirus Not Detected     Influenza A PCR Not Detected     Influenza B PCR Not Detected     Parainfluenza Virus 1 Not Detected     Parainfluenza Virus 2 Not Detected     Parainfluenza Virus 3 Not Detected     Parainfluenza Virus 4 Not Detected     RSV, PCR Not Detected     Bordetella pertussis pcr Not Detected     Bordetella parapertussis PCR Not Detected     Chlamydophila pneumoniae PCR Not Detected     Mycoplasma pneumo by PCR Not Detected    Narrative:      In the setting of a positive respiratory panel with a viral infection PLUS a negative procalcitonin without other underlying concern for bacterial infection, consider observing off antibiotics or discontinuation of antibiotics and continue supportive  care. If the respiratory panel is positive for atypical bacterial infection (Bordetella pertussis, Chlamydophila pneumoniae, or Mycoplasma pneumoniae), consider antibiotic de-escalation to target atypical bacterial infection.          Recent radiology:   Imaging Results (Last 72 Hours)       Procedure Component Value Units Date/Time    US Renal Bilateral [[id number]] Resulted: 01/23/24 1217     Updated: 01/23/24 1311    CT Head Without Contrast [[id number]] Collected: 01/23/24 1116     Updated: 01/23/24 1122    Narrative:      Exam: CT HEAD WO CONTRAST- 1/23/2024 10:11 AM     HISTORY: altered mental state; T68.XXXA-Hypothermia, initial encounter;  E87.20-Acidosis, unspecified; N17.0-Acute kidney failure with tubular  necrosis; N18.4-Chronic kidney disease, stage 4 (severe); N18.9-Chronic  kidney disease, unspecified; Z86.2-Personal history of diseases of the  blood and blood-forming organs and certain disorders involving the  immune mechanism       DOSE LENGTH PRODUCT: 838.32 mGy.cm mGy cm. Automated exposure control  was also utilized to decrease patient radiation dose.     Technique:  Helically acquired CT of the brain without IV contrast was performed.  Sagittal and coronal reformations are also provided for review. Soft  tissue and bone kernels are available for interpretation.     Comparison: 12/16/2019.     Findings:     Ventricles and extra-axial CSF spaces are normal in size.     No intraparenchymal or extra-axial hemorrhage.     Gray-white matter differentiation is preserved.     Orbits are grossly unremarkable. Paranasal sinuses are grossly clear.  Mastoid air cells are grossly clear.     No suspicious calvarial or extracranial soft tissue abnormality.     Other: Vascular calcifications.       Impression:      Impression:       No acute intracranial hemorrhage, mass effect, or large vascular  territorial infarct.     This report was signed and finalized on 1/23/2024 11:18 AM by Fletcher Ramirez.       XR  Chest 1 View [[id number]] Collected: 01/23/24 0958     Updated: 01/23/24 1002    Narrative:      EXAM: XR CHEST 1 VW- 1/23/2024 8:45 AM     HISTORY: Positive for cough, shortness of breath and low oxygen       COMPARISON: 12/18/2019.     TECHNIQUE: Single frontal radiograph of the chest was obtained.     FINDINGS:     Limited exam due to patient rotation.     Support Devices: None.     Cardiac and Mediastinal Silhouettes: Normal.     Lungs/Pleura: No focal consolidation. No sizable pleural effusion. No  visible pneumothorax.     Osseous structures: No acute osseous finding.     Other: None.       Impression:         No acute cardiopulmonary abnormality.           This report was signed and finalized on 1/23/2024 9:59 AM by Fletcher Ramirez.               Other test results (not lab or imaging): Results for orders placed during the hospital encounter of 03/04/18    Adult Transthoracic Echo Complete W/ Cont if Necessary Per Protocol    Interpretation Summary  · Left ventricular systolic function is normal. Estimated EF = 55%.  · Left ventricular diastolic dysfunction (grade II) consistent with pseudonormalization.  · Normal right ventricular cavity size and systolic function noted.  · There is aortic valve sclerosis without significant stenosis.    Problem List as identified by Epic (may contain historical, inactive problems)    Hypothermia    Pulmonary Assessment:    Metabolic acidosis  Hypothermia  Acute on chronic renal failure  Elevated BNP  Anemia, thrombocytopenia  DNR  [place] of state, a social determinant of health  Hx copd per history but not treated for such.  Deep search of available resources including chart review tab, care everywhere, and media yields no PFT results, so this dx cannot be confirmed    Recommend/plan:   Follow-up ABG showing some improvement in pH though still showing metabolic acidosis  Continue bicarb replacement, and defer additional adjustments to nephrology  DuoNebs ok given reported hx  copd  Cosyntropin stimulation test  Supplemental oxygen to keep O2 sat greater than 92%  Currently on cefepime; ok to continue considering possible infectious cause of presentation.  Notably chest xray is clear with no infiltrates.    Await unit bed    Thank you for this consult.  We will follow along.    I personally spent 10 minutes in accordance with split shared billing, this excludes any time spent jointly with the MD and/or other billable services (if applicable).    Electronically signed by SHERRY Cuevas, 01/23/24, 1:25 PM CST.    Personal review of imaging : cxr neg no infitlrates  Pertinent physical exam finding: ill appearing, upper airway noise with breathing, pale.  I personally spent 22 minutes in accordance with split shared billing. Time spent includes time reviewing chart, face-to-face time,  counseling patient/family/caregiver, ordering medications/tests/procedures, communicating with other health care professionals, documenting clinical information in the electronic health record, and coordination of care.  Electronically signed by Akil Camargo MD, 1/23/2024, 18:37 CST      This documentation indicates a split shared visit as defined in CMS Final rule ([place]) 2024 Physician Fee Schedule dated 11/2/2023: for Medicare billing purposes, the “substantive portion” means more than half of the total time spent by the physician or nonphysician practitioner performing the split (or shared) visit, or a substantive part of the medical decision making.  This visit involved face to face encounters with the patient by both providers on the date of the visit.  Time spent is indicated to identify the substantive portion of the visit, whereas the level of service may be determined by complexity of medical decision making.

## 2024-01-23 NOTE — ED PROVIDER NOTES
HPI:    Patient is an 78-year-old white female who is a DNR resident of [facility] who was found poorly responsive this morning by nursing staff at the nursing home with low oxygen levels.  Oxygen levels was as low as in the low 80s.  EMS was called and upon arrival EMS did notice that the patient was poorly responsive and with oxygenation in the low 80s.  Patient was placed on a nonrebreather by EMS and transported here to the emergency room.  There is no report of a fever.  But patient has had a wet cough also according to the nursing staff.  EMS stated that when they asked her if she is hurting she pointed towards her chest.  However here in the emergency room tried to speak with her she is arousable but unable to share any information vocally.      REVIEW OF SYSTEMS  CONSTITUTIONAL:  No complaints of fever, chills,or weakness  EYES:  No complaints of discharge   ENT: No complaints of sore throat or ear pain  CARDIOVASCULAR: Positive for questionable chest discomfort  RESPIRATORY: Positive for shortness of breath, cough and low oxygen   GI:  No complaints of abdominal pain, nausea, vomiting, or diarrhea  MUSCULOSKELETAL:  No complaints of back pain  SKIN:  No complaints of rash  NEUROLOGIC:  No complaints of headache, focal weakness, or sensory changes  ENDOCRINE:  No complaints of polyuria or polydipsia  LYMPHATIC:  No complaints of swollen glands  GENITOURINARY: No complaints of urinary frequency or hematuria        PAST MEDICAL HISTORY  Past Medical History:   Diagnosis Date    Chronic kidney disease, stage 4 (severe)     COPD (chronic obstructive pulmonary disease)     Coronary artery disease     Dementia     Diabetes mellitus     History of dermatomyositis     Hypertension     Peptic ulcer disease     Pulmonary disease     Renal insufficiency     Urinary tract infection     Venous insufficiency        FAMILY HISTORY  Family History   Problem Relation Age of Onset    Breast cancer Neg Hx   "      SOCIAL HISTORY  Social History     Socioeconomic History    Marital status:    Tobacco Use    Smoking status: Former   Substance and Sexual Activity    Alcohol use: No    Drug use: No       IMMUNIZATION HISTORY  Deferred to primary care physician.    SURGICAL HISTORY  Past Surgical History:   Procedure Laterality Date    CHOLECYSTECTOMY      COLON SURGERY         CURRENT MEDICATIONS    Current Facility-Administered Medications:     sodium chloride 0.9 % bolus 500 mL, 500 mL, Intravenous, Once, Carmine Manning Jr., MD, Last Rate: 1,000 mL/hr at 01/23/24 1044, 500 mL at 01/23/24 1044    sodium chloride 0.9 % infusion, 125 mL/hr, Intravenous, Continuous, Carmine Manning Jr., MD    Current Outpatient Medications:     acetaminophen (TYLENOL) 325 MG tablet, Take 650 mg by mouth Every 6 (Six) Hours As Needed for Mild Pain  or Fever., Disp: , Rfl:     amLODIPine (NORVASC) 2.5 MG tablet, Take 1 tablet by mouth Daily., Disp: , Rfl:     atenolol (TENORMIN) 25 MG tablet, Take 1 tablet by mouth Daily., Disp: , Rfl:     atorvastatin (LIPITOR) 40 MG tablet, Take 1 tablet by mouth Every Night., Disp: , Rfl:     CALCIUM CARBONATE-VITAMIN D PO, Take 1 tablet by mouth 2 (Two) Times a Day., Disp: , Rfl:     clopidogrel (PLAVIX) 75 MG tablet, Take 75 mg by mouth daily., Disp: , Rfl:     cycloSPORINE (RESTASIS) 0.05 % ophthalmic emulsion, Administer 1 drop to both eyes 2 (Two) Times a Day. \"Once between 8567-7662 and again between 5361-6228\", Disp: , Rfl:     dextrose (GLUTOSE) 40 % gel, Take 15 g by mouth Every 1 (One) Hour As Needed for Low Blood Sugar., Disp: , Rfl:     Docusate Sodium (COLACE) 100 MG/10ML, Take 10 mL by mouth Every Morning., Disp: , Rfl:     Emollient (CeraVe AM SPF 30) lotion, Apply 1 application topically Daily., Disp: , Rfl:     ferrous sulfate 325 (65 FE) MG tablet, Take 325 mg by mouth Daily With Breakfast., Disp: , Rfl:     Glucagon (Gvoke HypoPen 1-Pack) 1 MG/0.2ML solution " "auto-injector, Inject 1 mg under the skin into the appropriate area as directed 3 (Three) Times a Day As Needed., Disp: , Rfl:     levETIRAcetam (KEPPRA) 500 MG tablet, Take 1 tablet by mouth 2 (Two) Times a Day., Disp: , Rfl:     Multiple Vitamin (TAB-A-JACKY) tablet, Take 1 tablet by mouth Every Morning., Disp: , Rfl:     nitroglycerin (NITROSTAT) 0.4 MG SL tablet, Place 0.4 mg under the tongue Every 5 (Five) Minutes As Needed for Chest Pain. Take no more than 3 doses in 15 minutes., Disp: , Rfl:     polyethylene glycol (MIRALAX) packet, Take 17 g by mouth Daily As Needed (constipation)., Disp: , Rfl:     sertraline (ZOLOFT) 50 MG tablet, Take 1 tablet by mouth Every Morning., Disp: , Rfl:     ALLERGIES  Allergies   Allergen Reactions    Aspirin Rash    Tetracyclines & Related Rash         Respiratory Exam    VITAL SIGNS:   /67   Pulse (!) 45   Temp (!) 90.7 °F (32.6 °C) (Rectal)   Resp 13   Ht 152.4 cm (60\")   Wt 68.9 kg (152 lb)   SpO2 100%   BMI 29.69 kg/m²     Constitutional: Patient is alert and in no mild respiratory distress.  Patient with questionable chest discomfort.    ENT: There is a normal pharynx with no acute erythema or exudate and oral mucosa is moist.  Nose is clear with no drainage.  Tympanic membranes intact and non-erythemic    Cardiovascular: S1-S2 regular rate and rhythm no murmur rubs or gallops    Respiratory: Coarse rhonchi is noted bilaterally with decreased breath sounds in both mid to lower lung fields.    Abdomen: Soft nontender bowel sounds are normal in all 4 quadrants there is no rebound or guarding noted.  There is no abdominal distention or hepatosplenomegaly.    Genitourinary: Patient is voiding appropriately.    Integument: Thin frail dry appearing skin    Neurological: Patient is arousable but very somnolent.  Unable to facilitate the patient to follow instructions to assess for 4 neurological function of strength and sensation.  Patient does look at me upon " stimulation and speaking      Psychiatric: Normal affect and mood      RADIOLOGY/PROCEDURES    XR Chest 1 View   Final Result       No acute cardiopulmonary abnormality.               This report was signed and finalized on 1/23/2024 9:59 AM by Fletcher Ramirez.          CT Head Without Contrast    (Results Pending)         FUTURE APPOINTMENTS     Future Appointments   Date Time Provider Department Center   1/23/2024 11:05 AM PAD CT 3 (ER)  PAD CAT PAD                COURSE & MEDICAL DECISION MAKING     Patient's partial differential diagnosis can include: Pneumonia, pneumonitis, aspiration pneumonia, pneumothorax, COPD, empyema, CHF, unstable angina, non-STEMI, pulmonary embolism, arrhythmia, acute on chronic renal failure and others      Patient is known to have a low temperature at 90.7 rectally.  Bear hugger will be applied.  Patient is in acute on chronic renal failure.  BUN is 170 and creatinine is 4.50.  Sodium potassium is normal.  Troponin is elevated at 68 but I do believe this is likely related to the patient's renal failure.  Patient is acidotic at 7.070 we will give the patient some bicarb.  Chest x-ray does not show fluid overload.  Patient will be given a bolus of 500 mL of normal saline and then 125 mL of normal saline continuously.  Due to patient's severity of situation patient will need to be admitted to the hospital.  Despite the fact that she is DNR we still need to treat the elements and the symptoms that the patient has which includes the hypothermia, acidosis, and acute on chronic renal failure.    Call for hospitalist has been made to Dr. Gutierrez.    Discussed case with hospitalist Dr. Gutierrez accepted patient to his medical service in the ICU.      Patient's level of risk: Moderate        CRITICAL CARE    CRITICAL CARE: No    CRITICAL CARE TIME: None      Patient was hemodynamically and neurologically stable in the ED.   Pertinent studies were reviewed as above.     Recent Results (from the  past 24 hour(s))   ECG 12 Lead Chest Pain    Collection Time: 01/23/24  9:12 AM   Result Value Ref Range    QT Interval 548 ms    QTC Interval 468 ms   Comprehensive Metabolic Panel    Collection Time: 01/23/24  9:43 AM    Specimen: Blood   Result Value Ref Range    Glucose 157 (H) 65 - 99 mg/dL     (H) 8 - 23 mg/dL    Creatinine 4.50 (H) 0.57 - 1.00 mg/dL    Sodium 138 136 - 145 mmol/L    Potassium 5.1 3.5 - 5.2 mmol/L    Chloride 110 (H) 98 - 107 mmol/L    CO2 11.0 (L) 22.0 - 29.0 mmol/L    Calcium 9.2 8.6 - 10.5 mg/dL    Total Protein 6.6 6.0 - 8.5 g/dL    Albumin 3.8 3.5 - 5.2 g/dL    ALT (SGPT) 68 (H) 1 - 33 U/L    AST (SGOT) 36 (H) 1 - 32 U/L    Alkaline Phosphatase 101 39 - 117 U/L    Total Bilirubin <0.2 0.0 - 1.2 mg/dL    Globulin 2.8 gm/dL    A/G Ratio 1.4 g/dL    BUN/Creatinine Ratio 37.8 (H) 7.0 - 25.0    Anion Gap 17.0 (H) 5.0 - 15.0 mmol/L    eGFR 9.4 (L) >60.0 mL/min/1.73   Protime-INR    Collection Time: 01/23/24  9:43 AM    Specimen: Blood   Result Value Ref Range    Protime 15.7 (H) 11.8 - 14.8 Seconds    INR 1.23 (H) 0.91 - 1.09   aPTT    Collection Time: 01/23/24  9:43 AM    Specimen: Blood   Result Value Ref Range    PTT 67.4 (H) 24.5 - 36.0 seconds   High Sensitivity Troponin T    Collection Time: 01/23/24  9:43 AM    Specimen: Blood   Result Value Ref Range    HS Troponin T 68 (C) <14 ng/L   BNP    Collection Time: 01/23/24  9:43 AM    Specimen: Blood   Result Value Ref Range    proBNP 4,024.0 (H) 0.0 - 1,800.0 pg/mL   Lactic Acid, Plasma    Collection Time: 01/23/24  9:43 AM    Specimen: Blood   Result Value Ref Range    Lactate 0.7 0.5 - 2.0 mmol/L   Magnesium    Collection Time: 01/23/24  9:43 AM    Specimen: Blood   Result Value Ref Range    Magnesium 2.2 1.6 - 2.4 mg/dL   CBC Auto Differential    Collection Time: 01/23/24  9:43 AM    Specimen: Blood   Result Value Ref Range    WBC 10.03 3.40 - 10.80 10*3/mm3    RBC 3.11 (L) 3.77 - 5.28 10*6/mm3    Hemoglobin 8.9 (L) 12.0 - 15.9  g/dL    Hematocrit 28.2 (L) 34.0 - 46.6 %    MCV 90.7 79.0 - 97.0 fL    MCH 28.6 26.6 - 33.0 pg    MCHC 31.6 31.5 - 35.7 g/dL    RDW 14.0 12.3 - 15.4 %    RDW-SD 46.2 37.0 - 54.0 fl    MPV 10.4 6.0 - 12.0 fL    Platelets 137 (L) 140 - 450 10*3/mm3    Neutrophil % 81.6 (H) 42.7 - 76.0 %    Lymphocyte % 7.7 (L) 19.6 - 45.3 %    Monocyte % 9.3 5.0 - 12.0 %    Eosinophil % 0.4 0.3 - 6.2 %    Basophil % 0.2 0.0 - 1.5 %    Immature Grans % 0.8 (H) 0.0 - 0.5 %    Neutrophils, Absolute 8.19 (H) 1.70 - 7.00 10*3/mm3    Lymphocytes, Absolute 0.77 0.70 - 3.10 10*3/mm3    Monocytes, Absolute 0.93 (H) 0.10 - 0.90 10*3/mm3    Eosinophils, Absolute 0.04 0.00 - 0.40 10*3/mm3    Basophils, Absolute 0.02 0.00 - 0.20 10*3/mm3    Immature Grans, Absolute 0.08 (H) 0.00 - 0.05 10*3/mm3    nRBC 0.7 (H) 0.0 - 0.2 /100 WBC   Acetone    Collection Time: 01/23/24  9:43 AM    Specimen: Blood   Result Value Ref Range    Acetone Negative Negative   Blood Gas, Arterial With Co-Ox    Collection Time: 01/23/24 10:05 AM    Specimen: Arterial Blood   Result Value Ref Range    Site Left Radial     Andrea's Test Positive     pH, Arterial 7.070 (C) 7.350 - 7.450 pH units    pCO2, Arterial 34.9 (L) 35.0 - 45.0 mm Hg    pO2, Arterial 90.6 83.0 - 108.0 mm Hg    HCO3, Arterial 10.1 (L) 20.0 - 26.0 mmol/L    Base Excess, Arterial -18.8 (L) 0.0 - 2.0 mmol/L    O2 Saturation, Arterial 96.2 94.0 - 99.0 %    Hemoglobin, Blood Gas 8.7 (L) 12 - 16 g/dL    Hematocrit, Blood Gas 26.8 (L) 38.0 - 51.0 %    Oxyhemoglobin 93.9 (L) 94 - 99 %    Methemoglobin 1.20 0.00 - 3.00 %    Carboxyhemoglobin 1.2 0 - 5 %    A-a DO2 18.9 mmHg    Temperature 37.0     Sodium, Arterial 141 136 - 145 mmol/L    Potassium, Arterial 4.7 3.5 - 5.2 mmol/L    Barometric Pressure for Blood Gas 756 mmHg    Modality Nasal Cannula     Flow Rate 3.0 lpm    Ventilator Mode NA     Notified Who DR. TURPIN     Notified By [id number]     Notified Time 01/23/2024 10:06     Collected by [id number]     pH, Temp  Corrected 7.070 (C) 7.350 - 7.450 pH Units    pCO2, Temperature Corrected 34.9 (L) 35 - 45 mm Hg    pO2, Temperature Corrected 90.6 83 - 108 mm Hg   Urinalysis With Culture If Indicated - Urine, Catheter    Collection Time: 01/23/24 10:06 AM    Specimen: Urine, Catheter   Result Value Ref Range    Color, UA Yellow Yellow, Straw    Appearance, UA Turbid (A) Clear    pH, UA 7.5 5.0 - 8.0    Specific Gravity, UA 1.019 1.005 - 1.030    Glucose, UA Negative Negative    Ketones, UA Negative Negative    Bilirubin, UA Negative Negative    Blood, UA Small (1+) (A) Negative    Protein,  mg/dL (2+) (A) Negative    Leuk Esterase, UA Large (3+) (A) Negative    Nitrite, UA Negative Negative    Urobilinogen, UA 0.2 E.U./dL 0.2 - 1.0 E.U./dL   Urinalysis, Microscopic Only - Urine, Catheter    Collection Time: 01/23/24 10:06 AM    Specimen: Urine, Catheter   Result Value Ref Range    RBC, UA 6-10 (A) None Seen, 0-2 /HPF    WBC, UA Too Numerous to Count (A) None Seen, 0-2 /HPF    Bacteria, UA 4+ (A) None Seen /HPF    Squamous Epithelial Cells, UA 13-20 (A) None Seen, 0-2 /HPF    Amorphous Crystals, UA Moderate/2+ None Seen /HPF    Methodology Manual Light Microscopy        The patient received:  Medications   sodium chloride 0.9 % infusion (has no administration in time range)   sodium chloride 0.9 % bolus 500 mL (500 mL Intravenous New Bag 1/23/24 1044)   ipratropium-albuterol (DUO-NEB) nebulizer solution 3 mL (3 mL Nebulization Given 1/23/24 1027)   sodium bicarbonate injection 8.4% 50 mEq (50 mEq Intravenous Given 1/23/24 1046)            ED Disposition       ED Disposition   Decision to Admit    Condition   --    Comment   Level of Care: Critical Care [6]   Diagnosis: Hypothermia [858444]   Admitting Physician: BRIE ATKINSON [[id number]]   Attending Physician: BRIE ATKINSON [[id number]]   Certification: I Certify That Inpatient Hospital Services Are Medically Necessary For Greater Than 2 Midnights                     Dragon  disclaimer:  Part of this note may be an electronic transcription/translation of spoken language to printed text using the Dragon Dictation System.     I have reviewed the patient’s prescription history via a prescription monitoring program.  This information is consistent with my knowledge of the patient’s controlled substance use history.    Patient evaluate during Coronavirus Pandemic. Isolation practices followed according to [facility] policy.    FINAL IMPRESSION   Diagnosis Plan   1. Hypothermia, initial encounter        2. Acidosis        3. Acute renal failure with acute tubular necrosis superimposed on stage 4 chronic kidney disease        4. History of anemia due to chronic kidney disease              MD Nigel Cassidy Jr, Thomas Mark Jr., MD  01/23/24 [id number]

## 2024-01-23 NOTE — CONSULTS
Nephrology ([facility]) Consult Note      Patient:  Gregoria Bennett  YOB: 1945  Date of Service: 1/23/2024  MRN: [id number]   Acct: [id number]   Primary Care Physician: Chu Isaac MD  Advance Directive:   Code Status and Medical Interventions:   Ordered at: 01/23/24 1207     Code Status (Patient has no pulse and is not breathing):    No CPR (Do Not Attempt to Resuscitate)     Medical Interventions (Patient has pulse or is breathing):    Full Support     Admit Date: 1/23/2024       Hospital Day: 0  Referring Provider: No ref. provider found      Patient Seen, Chart, Consults, Notes, Labs, Radiology studies reviewed.    Chief complaint: Abnormal labs.    Subjective:  Gregoria Bennett is a 78 y.o. female  whom we were consulted for evaluation and treatment of acute kidney injury.  She has history of stage IV chronic kidney disease baseline, dementia, seizure disorder, hypertension, type 2 diabetes and chronic obstructive pulm disease.  She is a resident of [facility].  She was noticed to have significant drop in O2 saturation in mid 80s.  She was brought in by EMS to ER.  In emergency room she was unresponsive, she was placed on nonrebreather.  She denies any fever or cough.  Her chest x-ray looks normal with no infiltrate or pulmonary edema.  However she has high troponin level.  Routine ABG consistent with pH of 7.1, pCO2 was 35 and pO2 was 80 on nonrebreather.  She was hypothermic as well.  Routine lab data indicated serum creatinine is 4.5, blood urea nitrogen 170 mg with bicarb of 11.  She is waiting to go to ICU and was currently seen in the emergency room.    Patient was seen in the ER, fairly confused disoriented and her blood pressure is borderline low.  Patient is currently DNR.  I was unable to communicate with her.  Her history was obtained from the chart.  Patient is currently covered with bear hugger    Allergies:  Aspirin and Tetracyclines &  related    Home Meds:  (Not in a hospital admission)      Medicines:  Current Facility-Administered Medications   Medication Dose Route Frequency Provider Last Rate Last Admin    sennosides-docusate (PERICOLACE) 8.6-50 MG per tablet 2 tablet  2 tablet Oral BID Erinn Gutierrez MD        And    polyethylene glycol (MIRALAX) packet 17 g  17 g Oral Daily PRN Erinn Gutierrez MD        And    bisacodyl (DULCOLAX) EC tablet 5 mg  5 mg Oral Daily PRN Erinn Gutierrez MD        And    bisacodyl (DULCOLAX) suppository 10 mg  10 mg Rectal Daily PRN Erinn Gutierrez MD        [START ON 1/24/2024] cefepime 2 gm IVPB in 100 ml NS (MBP)  2,000 mg Intravenous Q24H Erinn Gutierrez MD        ipratropium-albuterol (DUO-NEB) nebulizer solution 3 mL  3 mL Nebulization 4x Daily - RT Erinn Gutierrez MD   3 mL at 01/23/24 1359    norepinephrine (LEVOPHED) 8 mg in 250 mL NS infusion (premix)  0.02-0.3 mcg/kg/min Intravenous Titrated Erinn Gutierrez MD 12.92 mL/hr at 01/23/24 1515 0.1 mcg/kg/min at 01/23/24 1515    sodium bicarbonate 8.4 % 125 mEq in dextrose (D5W) 5 % 1,000 mL infusion (greater than 75 mEq)  125 mEq Intravenous Continuous Erinn Gutierrez  mL/hr at 01/23/24 1302 125 mEq at 01/23/24 1302    sodium chloride 0.9 % flush 10 mL  10 mL Intravenous Q12H Erinn Gutierrez MD   10 mL at 01/23/24 1247    sodium chloride 0.9 % flush 10 mL  10 mL Intravenous PRN Erinn Gutierrez MD        sodium chloride 0.9 % infusion 40 mL  40 mL Intravenous PRN Erinn Gutierrez MD        sodium chloride 0.9 % infusion  125 mL/hr Intravenous Continuous Carmine Manning Jr.,  mL/hr at 01/23/24 1127 125 mL/hr at 01/23/24 1127     Current Outpatient Medications   Medication Sig Dispense Refill    acetaminophen (TYLENOL) 325 MG tablet Take 2 tablets by mouth Every 6 (Six) Hours As Needed for Mild Pain.      Amino Acids-Protein Hydrolys (Pro-Stat 101) liquid Take 30 mL by mouth Every Morning.      amLODIPine (NORVASC) 2.5 MG tablet Take 1 tablet  by mouth Daily.      ascorbic acid (VITAMIN C) 500 MG tablet Take 1 tablet by mouth 2 (Two) Times a Day.      atenolol (TENORMIN) 25 MG tablet Take 1 tablet by mouth Daily.      Cholecalciferol 25 MCG (1000 UT) tablet Take 1 tablet by mouth 2 (Two) Times a Day.      clopidogrel (PLAVIX) 75 MG tablet Take 1 tablet by mouth Daily.      cycloSPORINE (RESTASIS) 0.05 % ophthalmic emulsion Administer 1 drop to both eyes 2 (Two) Times a Day.      docusate sodium (COLACE) 100 MG capsule Take 1 capsule by mouth Every Morning.      DULoxetine (CYMBALTA) 60 MG capsule Take 1 capsule by mouth Daily.      Emollient (CeraVe AM SPF 30) lotion Apply 1 application topically Daily.      ferrous sulfate 325 (65 FE) MG tablet Take 1 tablet by mouth Daily With Breakfast.      Glucagon (Gvoke HypoPen 1-Pack) 1 MG/0.2ML solution auto-injector Inject 1 mg under the skin into the appropriate area as directed 3 (Three) Times a Day As Needed.      icosapent ethyl (Vascepa) 1 g capsule capsule Take 2 g by mouth 2 (Two) Times a Day With Meals.      Insulin Aspart (novoLOG) 100 UNIT/ML injection Inject 10 Units under the skin into the appropriate area as directed 3 (Three) Times a Day Before Meals. Hold if BS is below 150      insulin NPH-insulin regular (humuLIN 70/30,novoLIN 70/30) (70-30) 100 UNIT/ML injection Inject 15 Units under the skin into the appropriate area as directed 2 (Two) Times a Day With Meals.      levETIRAcetam (KEPPRA) 500 MG tablet Take 1 tablet by mouth 2 (Two) Times a Day.      Multiple Vitamin (TAB-A-JACKY) tablet Take 1 tablet by mouth Every Morning.      nitroglycerin (NITROSTAT) 0.4 MG SL tablet Place 1 tablet under the tongue Every 5 (Five) Minutes As Needed for Chest Pain. Take no more than 3 doses in 15 minutes.      pantoprazole (PROTONIX) 20 MG EC tablet Take 1 tablet by mouth Daily.      polyethylene glycol (MIRALAX) packet Take 17 g by mouth Daily As Needed (constipation).      primidone (MYSOLINE) 50 MG tablet  "Take 0.5 tablets by mouth Every Night.      sucralfate (CARAFATE) 1 g tablet Take 1 tablet by mouth 4 (Four) Times a Day.      guaiFENesin (MUCINEX) 600 MG 12 hr tablet Take 1 tablet by mouth 2 (Two) Times a Day As Needed for Cough.      lactulose (CHRONULAC) 10 GM/15ML solution Take 30 mL by mouth Daily As Needed (constipation).      ondansetron (ZOFRAN) 4 MG tablet Take 1 tablet by mouth 3 (Three) Times a Day As Needed for Nausea.         Past Medical History:  Past Medical History:   Diagnosis Date    Chronic kidney disease, stage 4 (severe)     Contracture of muscle ankle and foot, left     Contracture of muscle, right ankle and foot     COPD (chronic obstructive pulmonary disease)     Coronary artery disease     Dementia     Diabetes mellitus     Dysphagia     History of dermatomyositis     Hypertension     Peptic ulcer disease     Pulmonary disease     Renal insufficiency     Urinary tract infection     Venous insufficiency     Vitamin D deficiency        Past Surgical History:  Past Surgical History:   Procedure Laterality Date    CHOLECYSTECTOMY      COLON SURGERY         Family History  Family History   Problem Relation Age of Onset    Breast cancer Neg Hx        Social History  Social History     Socioeconomic History    Marital status:    Tobacco Use    Smoking status: Former   Vaping Use    Vaping Use: Never used   Substance and Sexual Activity    Alcohol use: No    Drug use: No    Sexual activity: Defer         Review of Systems:  Unable to obtain review of system as she is encephalopathic    Objective:  BP (!) 87/53   Pulse 72   Temp 94.8 °F (34.9 °C) (Rectal)   Resp 14   Ht 152.4 cm (60\")   Wt 68.9 kg (152 lb)   SpO2 96%   BMI 29.69 kg/m²     Intake/Output Summary (Last 24 hours) at 1/23/2024 1527  Last data filed at 1/23/2024 1348  Gross per 24 hour   Intake 600 ml   Output --   Net 600 ml     General:  Sleepy/confused  HEENT: Normocephalic atraumatic head  Neck: Supple with no JVD or " carotid bruits.  Chest:  clear to auscultation bilaterally without respiratory distress  CVS: regular rate and rhythm  Abdominal: soft, nontender, normal bowel sounds  Extremities: no cyanosis or edema  Skin: warm and dry without rash      Labs:    Results from last 7 days   Lab Units 01/23/24  1309 01/23/24  0943   WBC 10*3/mm3 9.10 10.03   HEMOGLOBIN g/dL 7.3* 8.9*   HEMATOCRIT % 22.9* 28.2*   PLATELETS 10*3/mm3 130* 137*       Results from last 7 days   Lab Units 01/23/24  1517 01/23/24  1309 01/23/24  1005 01/23/24  0943   SODIUM mmol/L  --  142  --  138   SODIUM, ARTERIAL mmol/L 143  --  141  --    POTASSIUM mmol/L  --  4.6  --  5.1   CHLORIDE mmol/L  --  113*  --  110*   CO2 mmol/L  --  12.0*  --  11.0*   BUN mg/dL  --  165*  --  170*   CREATININE mg/dL  --  4.39*  --  4.50*   CALCIUM mg/dL  --  8.2*  --  9.2   BILIRUBIN mg/dL  --   --   --  <0.2   ALK PHOS U/L  --   --   --  101   ALT (SGPT) U/L  --   --   --  68*   AST (SGOT) U/L  --   --   --  36*   GLUCOSE mg/dL  --  145*  --  157*   EGFR mL/min/1.73  --  9.7*  --  9.4*         Radiology:   Imaging Results (Last 24 Hours)       Procedure Component Value Units Date/Time    US Renal Bilateral [[id number]] Collected: 01/23/24 1448     Updated: 01/23/24 1456    Narrative:      EXAMINATION: US RENAL BILATERAL- 1/23/2024 2:48 PM     HISTORY: acute on CKD; T68.XXXA-Hypothermia, initial encounter;  E87.20-Acidosis, unspecified; N17.0-Acute kidney failure with tubular  necrosis; N18.4-Chronic kidney disease, stage 4 (severe); N18.9-Chronic  kidney disease, unspecified; Z86.2-Personal history of diseases of the  blood and blood-forming organs and certain disorders involving the  immune mechanism.     REPORT: Sonographic images of the kidneys were obtained in the  transverse and longitudinal dimensions.     COMPARISON: CT abdomen and pelvis 9/16/2023.     The technologist reports that the examination is very limited due to the  patient's body habitus and overlying  bowel gas, the patient's inability  to cooperate with positioning and breath hold.     The right kidney measures approximately 7.9 x 4.2 x 3.5 cm and has a  grossly normal morphology and cortical echogenicity, there is partial  obscuration of the inferior pole. No mass or hydronephrosis is  identified.     The left kidney measures 7.2 x 3.3 x 4.1 cm and is unremarkable. Color  Doppler images demonstrate vascular flow within both kidneys.       Impression:      Limited exam as detailed above with bilateral renal atrophy,  no evidence of hydronephrosis. Both kidneys demonstrate relatively  normal cortical echogenicity.     This report was signed and finalized on 1/23/2024 2:53 PM by Dr. Emir Guardado MD.       CT Head Without Contrast [[id number]] Collected: 01/23/24 1116     Updated: 01/23/24 1122    Narrative:      Exam: CT HEAD WO CONTRAST- 1/23/2024 10:11 AM     HISTORY: altered mental state; T68.XXXA-Hypothermia, initial encounter;  E87.20-Acidosis, unspecified; N17.0-Acute kidney failure with tubular  necrosis; N18.4-Chronic kidney disease, stage 4 (severe); N18.9-Chronic  kidney disease, unspecified; Z86.2-Personal history of diseases of the  blood and blood-forming organs and certain disorders involving the  immune mechanism       DOSE LENGTH PRODUCT: 838.32 mGy.cm mGy cm. Automated exposure control  was also utilized to decrease patient radiation dose.     Technique:  Helically acquired CT of the brain without IV contrast was performed.  Sagittal and coronal reformations are also provided for review. Soft  tissue and bone kernels are available for interpretation.     Comparison: 12/16/2019.     Findings:     Ventricles and extra-axial CSF spaces are normal in size.     No intraparenchymal or extra-axial hemorrhage.     Gray-white matter differentiation is preserved.     Orbits are grossly unremarkable. Paranasal sinuses are grossly clear.  Mastoid air cells are grossly clear.     No suspicious calvarial or  "extracranial soft tissue abnormality.     Other: Vascular calcifications.       Impression:      Impression:       No acute intracranial hemorrhage, mass effect, or large vascular  territorial infarct.     This report was signed and finalized on 1/23/2024 11:18 AM by Fletcher Ramirez.       XR Chest 1 View [[id number]] Collected: 01/23/24 0958     Updated: 01/23/24 1002    Narrative:      EXAM: XR CHEST 1 VW- 1/23/2024 8:45 AM     HISTORY: Positive for cough, shortness of breath and low oxygen       COMPARISON: 12/18/2019.     TECHNIQUE: Single frontal radiograph of the chest was obtained.     FINDINGS:     Limited exam due to patient rotation.     Support Devices: None.     Cardiac and Mediastinal Silhouettes: Normal.     Lungs/Pleura: No focal consolidation. No sizable pleural effusion. No  visible pneumothorax.     Osseous structures: No acute osseous finding.     Other: None.       Impression:         No acute cardiopulmonary abnormality.           This report was signed and finalized on 1/23/2024 9:59 AM by Fletcher Ramirez.               Culture:  No components found for: \"WOUNDCUL\", \"3\"  No components found for: \"CSFCUL\", \"3\"  No components found for: \"BC\", \"3\"  No components found for: \"URINECUL\", \"3\"      Assessment   1.  Acute kidney injury stage III.  2.  Acute tubular necrosis  3.  Stage IV chronic kidney disease baseline.  4.  Type 2 diabetes with nephropathy.  5.  Chronic obstructive pulm disease.  6.  Metabolic acidemia.  7.  Uremic manifestation.  8.  Sepsis?.  9.  Anemia of chronic kidney disease.    Plan:  1.  IV fluid with sodium bicarbonate.  2.  Bolus of normal saline.  3.  Urinary electrolytes.  4.  Renal ultrasound was not helpful  5.  Patient is currently DNR, no family at the bedside.  However I do not recommend dialysis because of advanced dementia, multiple comorbid conditions and poor hemodynamics.      Thank you for the consult, we appreciate the opportunity to provide care to your " patients.  Feel free to contact me if I can be of any further assistance.      Sergey Knight MD  1/23/2024  15:27 CST

## 2024-01-23 NOTE — CASE MANAGEMENT/SOCIAL WORK
Discharge Planning Assessment  [facility]     Patient Name: Gregoria Bennett  MRN: [id number]  Today's Date: 1/23/2024    Admit Date: 1/23/2024        Discharge Needs Assessment       Row Name 01/23/24 1444       Living Environment    People in Home facility resident    Current Living Arrangements home    Primary Care Provided by other (see comments)    Provides Primary Care For no one, unable/limited ability to care for self    Family Caregiver if Needed other (see comments)  Guardian    Quality of Family Relationships involved    Able to Return to Prior Arrangements yes       Resource/Environmental Concerns    Resource/Environmental Concerns none    Transportation Concerns none       Transition Planning    Patient/Family Anticipates Transition to long-term care facility    Patient/Family Anticipated Services at Transition none    Transportation Anticipated health plan transportation       Discharge Needs Assessment    Readmission Within the Last 30 Days no previous admission in last 30 days    Current Outpatient/Agency/Support Group skilled nursing facility    Equipment Currently Used at Home none    Concerns to be Addressed no discharge needs identified    Equipment Needed After Discharge none    Outpatient/Agency/Support Group Needs skilled nursing facility    Discharge Facility/Level of Care Needs nursing facility, skilled    Discharge Coordination/Progress PT is a long term resident of [facility], and has a legal guardian (Hilario Armstrong). PT has a bed hold and may return to SNF when medically ready. SW will follow and assist as needed.                   Discharge Plan    No documentation.                 Continued Care and Services - Admitted Since 1/23/2024    Coordination has not been started for this encounter.          Demographic Summary    No documentation.                  Functional Status    No documentation.                  Psychosocial    No documentation.                  Abuse/Neglect    No  documentation.                  Legal    No documentation.                  Substance Abuse    No documentation.                  Patient Forms    No documentation.                     STANISLAV Oakes

## 2024-01-23 NOTE — H&P
[facility] Medicine Services  HISTORY AND PHYSICAL    Date of Admission: 1/23/2024  Primary Care Physician: Chu Isaac MD    Subjective   Primary Historian: ER    Chief Complaint: Mental status change hypoxemia hypothermia confusion metabolic encephalopathy metabolic acidosis acute on chronic renal failure    Shortness of Breath      Patient is an 78-year-old white female who is a DNR guardian of state resident of [facility] past medical history of chronic kidney disease stage IV creatinine around 2.3 history of dementia history of seizure on Keppra history of hypertension diabetes who was found poorly responsive this morning by nursing staff at the nursing home with low oxygen levels.  Oxygen levels was as low as in the low 80s.  EMS was called and upon arrival EMS did notice that the patient was poorly responsive and with oxygenation in the low 80s.  Patient was placed on a nonrebreather by EMS and transported here to the emergency room.  There is no report of a fever.  But patient has had a wet cough also according to the nursing staff.  EMS stated that when they asked her if she is hurting she pointed towards her chest.  However here in the emergency room tried to speak with her she is arousable but unable to share any information vocally.   In the ER the patient pH was showing 7.0 patient pCO2 was 35 creatinine was up to 4.5 from average of 2.3 chest x-ray was unremarkable patient was started on BiPAP UA was grossly positive CT of the head was unremarkable for any acute hemorrhage the patient had hypothermia so was started on Gildardo hugger the patient will be admitted to the ICU for acute on chronic renal failure with metabolic acidosis and metabolic encephalopathy I already spoke to nephrology Dr. Garcia and he will be seeing the patient will order ultrasound of the kidney started on bicarb drip the patient is to have blood culture will start her on  cefepime for now patient is to be placed on breathing treatment and will consult with pulmonary patient remains DNR from nursing home will be placed on SCDs for DVT prophylaxis prognosis is guarded I will hold off all her medications including blood pressure medication since her blood pressure is marginal and I will hold off her seizure medication will identify and reconcile home medications      Review of Systems   Respiratory:  Positive for shortness of breath.       Otherwise complete ROS reviewed and negative except as mentioned in the HPI.    Past Medical History:   Past Medical History:   Diagnosis Date    Chronic kidney disease, stage 4 (severe)     Contracture of muscle ankle and foot, left     Contracture of muscle, right ankle and foot     COPD (chronic obstructive pulmonary disease)     Coronary artery disease     Dementia     Diabetes mellitus     Dysphagia     History of dermatomyositis     Hypertension     Peptic ulcer disease     Pulmonary disease     Renal insufficiency     Urinary tract infection     Venous insufficiency     Vitamin D deficiency      Past Surgical History:  Past Surgical History:   Procedure Laterality Date    CHOLECYSTECTOMY      COLON SURGERY       Social History:  reports that she has quit smoking. She does not have any smokeless tobacco history on file. She reports that she does not drink alcohol and does not use drugs.    Family History: family history is not on file.       Allergies:  Allergies   Allergen Reactions    Aspirin Rash    Tetracyclines & Related Rash       Medications:  Prior to Admission medications    Medication Sig Start Date End Date Taking? Authorizing Provider   acetaminophen (TYLENOL) 325 MG tablet Take 650 mg by mouth Every 6 (Six) Hours As Needed for Mild Pain  or Fever.    Provider, MD Tammi   amLODIPine (NORVASC) 2.5 MG tablet Take 1 tablet by mouth Daily. 11/12/22   Kevin Tsai MD   atenolol (TENORMIN) 25 MG tablet Take 1 tablet by  "mouth Daily. 11/12/22   Kevin Tsai MD   atorvastatin (LIPITOR) 40 MG tablet Take 1 tablet by mouth Every Night.    Tammi Mart MD   CALCIUM CARBONATE-VITAMIN D PO Take 1 tablet by mouth 2 (Two) Times a Day.    Tammi Mart MD   clopidogrel (PLAVIX) 75 MG tablet Take 75 mg by mouth daily.    Tammi Mart MD   cycloSPORINE (RESTASIS) 0.05 % ophthalmic emulsion Administer 1 drop to both eyes 2 (Two) Times a Day. \"Once between 2086-5811 and again between 0022-3768\"    Tammi Mart MD   dextrose (GLUTOSE) 40 % gel Take 15 g by mouth Every 1 (One) Hour As Needed for Low Blood Sugar.    Tammi Mart MD   Docusate Sodium (COLACE) 100 MG/10ML Take 10 mL by mouth Every Morning.    Tammi Mart MD   Emollient (CeraVe AM SPF 30) lotion Apply 1 application topically Daily.    Tammi Mart MD   ferrous sulfate 325 (65 FE) MG tablet Take 325 mg by mouth Daily With Breakfast.    Tammi Mart MD   Glucagon (Gvoke HypoPen 1-Pack) 1 MG/0.2ML solution auto-injector Inject 1 mg under the skin into the appropriate area as directed 3 (Three) Times a Day As Needed.    Tammi Mart MD   levETIRAcetam (KEPPRA) 500 MG tablet Take 1 tablet by mouth 2 (Two) Times a Day.    Tammi Mart MD   Multiple Vitamin (TAB-A-JACKY) tablet Take 1 tablet by mouth Every Morning.    Tammi Mart MD   nitroglycerin (NITROSTAT) 0.4 MG SL tablet Place 0.4 mg under the tongue Every 5 (Five) Minutes As Needed for Chest Pain. Take no more than 3 doses in 15 minutes.    Tammi Mart MD   polyethylene glycol (MIRALAX) packet Take 17 g by mouth Daily As Needed (constipation).    Tammi Mart MD   sertraline (ZOLOFT) 50 MG tablet Take 1 tablet by mouth Every Morning.    Tammi Mart MD     I have utilized all available immediate resources to obtain, update, or review the patient's current medications (including all prescriptions, " "over-the-counter products, herbals, cannabis/cannabidiol products, and vitamin/mineral/dietary (nutritional) supplements).    Objective     Vital Signs: BP 96/79   Pulse 59   Temp (!) 91.2 °F (32.9 °C) (Rectal)   Resp 19   Ht 152.4 cm (60\")   Wt 68.9 kg (152 lb)   SpO2 98%   BMI 29.69 kg/m²   Physical Exam  Constitutional:       Appearance: She is ill-appearing.   HENT:      Head: Normocephalic.      Nose: Nose normal.   Eyes:      Pupils: Pupils are equal, round, and reactive to light.   Cardiovascular:      Rate and Rhythm: Normal rate and regular rhythm.   Pulmonary:      Breath sounds: Stridor present. Wheezing present.   Abdominal:      General: Abdomen is flat.      Palpations: Abdomen is soft.   Neurological:      Mental Status: She is oriented to person, place, and time.              Results Reviewed:  Lab Results (last 24 hours)       Procedure Component Value Units Date/Time    TSH [[id number]] Collected: 01/23/24 0943    Specimen: Blood Updated: 01/23/24 1214    Respiratory Panel PCR w/COVID-19(SARS-CoV-2) [facility]ANA/GREG/CAMRON/PAD/COR/ANTONELLA In-House, NP Swab in UTM/VTM, 2 HR TAT - Swab, Nasopharynx [[id number]]  (Normal) Collected: 01/23/24 1006    Specimen: Swab from Nasopharynx Updated: 01/23/24 1103     ADENOVIRUS, PCR Not Detected     Coronavirus 229E Not Detected     Coronavirus HKU1 Not Detected     Coronavirus NL63 Not Detected     Coronavirus OC43 Not Detected     COVID19 Not Detected     Human Metapneumovirus Not Detected     Human Rhinovirus/Enterovirus Not Detected     Influenza A PCR Not Detected     Influenza B PCR Not Detected     Parainfluenza Virus 1 Not Detected     Parainfluenza Virus 2 Not Detected     Parainfluenza Virus 3 Not Detected     Parainfluenza Virus 4 Not Detected     RSV, PCR Not Detected     Bordetella pertussis pcr Not Detected     Bordetella parapertussis PCR Not Detected     Chlamydophila pneumoniae PCR Not Detected     Mycoplasma pneumo by PCR Not Detected    Narrative:      " In the setting of a positive respiratory panel with a viral infection PLUS a negative procalcitonin without other underlying concern for bacterial infection, consider observing off antibiotics or discontinuation of antibiotics and continue supportive care. If the respiratory panel is positive for atypical bacterial infection (Bordetella pertussis, Chlamydophila pneumoniae, or Mycoplasma pneumoniae), consider antibiotic de-escalation to target atypical bacterial infection.    Urinalysis, Microscopic Only - Urine, Catheter [[id number]]  (Abnormal) Collected: 01/23/24 1006    Specimen: Urine, Catheter Updated: 01/23/24 1102     RBC, UA 6-10 /HPF      WBC, UA Too Numerous to Count /HPF      Bacteria, UA 4+ /HPF      Squamous Epithelial Cells, UA 13-20 /HPF      Amorphous Crystals, UA Moderate/2+ /HPF      Methodology Manual Light Microscopy    Urine Culture - Urine, Urine, Catheter [[id number]] Collected: 01/23/24 1006    Specimen: Urine, Catheter Updated: 01/23/24 1102    Blood Culture - Blood, Arm, Right [[id number]] Collected: 01/23/24 1047    Specimen: Blood from Arm, Right Updated: 01/23/24 1056    Ketone Bodies, Serum (Not performed at [place]) [[id number]]  (Normal) Collected: 01/23/24 0943    Specimen: Blood Updated: 01/23/24 1056    Narrative:      The following orders were created for panel order Ketone Bodies, Serum (Not performed at [place]).  Procedure                               Abnormality         Status                     ---------                               -----------         ------                     Acetone[[id number]]                      Normal              Final result                 Please view results for these tests on the individual orders.    Acetone [[id number]]  (Normal) Collected: 01/23/24 0943    Specimen: Blood Updated: 01/23/24 1056     Acetone Negative    Urinalysis With Culture If Indicated - Urine, Catheter [[id number]]  (Abnormal) Collected: 01/23/24 1006    Specimen: Urine,  Catheter Updated: 01/23/24 1051     Color, UA Yellow     Appearance, UA Turbid     pH, UA 7.5     Specific Gravity, UA 1.019     Glucose, UA Negative     Ketones, UA Negative     Bilirubin, UA Negative     Blood, UA Small (1+)     Protein,  mg/dL (2+)     Leuk Esterase, UA Large (3+)     Nitrite, UA Negative     Urobilinogen, UA 0.2 E.U./dL    Narrative:      In absence of clinical symptoms, the presence of pyuria, bacteria, and/or nitrites on the urinalysis result does not correlate with infection.    Comprehensive Metabolic Panel [[id number]]  (Abnormal) Collected: 01/23/24 0943    Specimen: Blood Updated: 01/23/24 1033     Glucose 157 mg/dL       mg/dL      Creatinine 4.50 mg/dL      Sodium 138 mmol/L      Potassium 5.1 mmol/L      Chloride 110 mmol/L      CO2 11.0 mmol/L      Calcium 9.2 mg/dL      Total Protein 6.6 g/dL      Albumin 3.8 g/dL      ALT (SGPT) 68 U/L      AST (SGOT) 36 U/L      Alkaline Phosphatase 101 U/L      Total Bilirubin <0.2 mg/dL      Globulin 2.8 gm/dL      A/G Ratio 1.4 g/dL      BUN/Creatinine Ratio 37.8     Anion Gap 17.0 mmol/L      eGFR 9.4 mL/min/1.73      Comment: <15 Indicative of kidney failure       Narrative:      GFR Normal >60  Chronic Kidney Disease <60  Kidney Failure <15    The GFR formula is only valid for adults with stable renal function between ages 18 and 70.    High Sensitivity Troponin T [[id number]]  (Abnormal) Collected: 01/23/24 0943    Specimen: Blood Updated: 01/23/24 1019     HS Troponin T 68 ng/L     Narrative:      High Sensitive Troponin T Reference Range:  <14.0 ng/L- Negative Female for AMI  <22.0 ng/L- Negative Male for AMI  >=14 - Abnormal Female indicating possible myocardial injury.  >=22 - Abnormal Male indicating possible myocardial injury.   Clinicians would have to utilize clinical acumen, EKG, Troponin, and serial changes to determine if it is an Acute Myocardial Infarction or myocardial injury due to an underlying chronic  condition.         Lactic Acid, Plasma [[id number]]  (Normal) Collected: 01/23/24 0943    Specimen: Blood Updated: 01/23/24 1016     Lactate 0.7 mmol/L     BNP [[id number]]  (Abnormal) Collected: 01/23/24 0943    Specimen: Blood Updated: 01/23/24 1016     proBNP 4,024.0 pg/mL     Narrative:      This assay is used as an aid in the diagnosis of individuals suspected of having heart failure. It can be used as an aid in the diagnosis of acute decompensated heart failure (ADHF) in patients presenting with signs and symptoms of ADHF to the emergency department (ED). In addition, NT-proBNP of <300 pg/mL indicates ADHF is not likely.    Age Range Result Interpretation  NT-proBNP Concentration (pg/mL:      <50             Positive            >450                   Gray                 300-450                    Negative             <300    50-75           Positive            >900                  Gray                300-900                  Negative            <300      >75             Positive            >1800                  Gray                300-1800                  Negative            <300    Magnesium [[id number]]  (Normal) Collected: 01/23/24 0943    Specimen: Blood Updated: 01/23/24 1012     Magnesium 2.2 mg/dL     Blood Gas, Arterial With Co-Ox [[id number]]  (Abnormal) Collected: 01/23/24 1005    Specimen: Arterial Blood Updated: 01/23/24 1003     Site Left Radial     Andrea's Test Positive     pH, Arterial 7.070 pH units      Comment: 85 Value below critical limit        pCO2, Arterial 34.9 mm Hg      Comment: 84 Value below reference range        pO2, Arterial 90.6 mm Hg      HCO3, Arterial 10.1 mmol/L      Comment: 84 Value below reference range        Base Excess, Arterial -18.8 mmol/L      Comment: 84 Value below reference range        O2 Saturation, Arterial 96.2 %      Hemoglobin, Blood Gas 8.7 g/dL      Comment: 84 Value below reference range        Hematocrit, Blood Gas 26.8 %      Comment: 84 Value below  reference range        Oxyhemoglobin 93.9 %      Comment: 84 Value below reference range        Methemoglobin 1.20 %      Carboxyhemoglobin 1.2 %      A-a DO2 18.9 mmHg      Temperature 37.0     Sodium, Arterial 141 mmol/L      Potassium, Arterial 4.7 mmol/L      Barometric Pressure for Blood Gas 756 mmHg      Modality Nasal Cannula     Flow Rate 3.0 lpm      Ventilator Mode NA     Notified Who DR. TURPIN     Notified By [id number]     Notified Time 01/23/2024 10:06     Collected by [id number]     Comment: Meter: [id number]     :  [id number]        pH, Temp Corrected 7.070 pH Units      pCO2, Temperature Corrected 34.9 mm Hg      pO2, Temperature Corrected 90.6 mm Hg     Protime-INR [[id number]]  (Abnormal) Collected: 01/23/24 0943    Specimen: Blood Updated: 01/23/24 1002     Protime 15.7 Seconds      INR 1.23    aPTT [[id number]]  (Abnormal) Collected: 01/23/24 0943    Specimen: Blood Updated: 01/23/24 1002     PTT 67.4 seconds     CBC & Differential [[id number]]  (Abnormal) Collected: 01/23/24 0943    Specimen: Blood Updated: 01/23/24 0954    Narrative:      The following orders were created for panel order CBC & Differential.  Procedure                               Abnormality         Status                     ---------                               -----------         ------                     CBC Auto Differential[[id number]]        Abnormal            Final result                 Please view results for these tests on the individual orders.    CBC Auto Differential [[id number]]  (Abnormal) Collected: 01/23/24 0943    Specimen: Blood Updated: 01/23/24 0954     WBC 10.03 10*3/mm3      RBC 3.11 10*6/mm3      Hemoglobin 8.9 g/dL      Hematocrit 28.2 %      MCV 90.7 fL      MCH 28.6 pg      MCHC 31.6 g/dL      RDW 14.0 %      RDW-SD 46.2 fl      MPV 10.4 fL      Platelets 137 10*3/mm3      Neutrophil % 81.6 %      Lymphocyte % 7.7 %      Monocyte % 9.3 %      Eosinophil % 0.4 %      Basophil % 0.2 %       Immature Grans % 0.8 %      Neutrophils, Absolute 8.19 10*3/mm3      Lymphocytes, Absolute 0.77 10*3/mm3      Monocytes, Absolute 0.93 10*3/mm3      Eosinophils, Absolute 0.04 10*3/mm3      Basophils, Absolute 0.02 10*3/mm3      Immature Grans, Absolute 0.08 10*3/mm3      nRBC 0.7 /100 WBC     Blood Culture - Blood, Arm, Right [[id number]] Collected: 01/23/24 0943    Specimen: Blood from Arm, Right Updated: 01/23/24 0949          Imaging Results (Last 24 Hours)       Procedure Component Value Units Date/Time    CT Head Without Contrast [[id number]] Collected: 01/23/24 1116     Updated: 01/23/24 1122    Narrative:      Exam: CT HEAD WO CONTRAST- 1/23/2024 10:11 AM     HISTORY: altered mental state; T68.XXXA-Hypothermia, initial encounter;  E87.20-Acidosis, unspecified; N17.0-Acute kidney failure with tubular  necrosis; N18.4-Chronic kidney disease, stage 4 (severe); N18.9-Chronic  kidney disease, unspecified; Z86.2-Personal history of diseases of the  blood and blood-forming organs and certain disorders involving the  immune mechanism       DOSE LENGTH PRODUCT: 838.32 mGy.cm mGy cm. Automated exposure control  was also utilized to decrease patient radiation dose.     Technique:  Helically acquired CT of the brain without IV contrast was performed.  Sagittal and coronal reformations are also provided for review. Soft  tissue and bone kernels are available for interpretation.     Comparison: 12/16/2019.     Findings:     Ventricles and extra-axial CSF spaces are normal in size.     No intraparenchymal or extra-axial hemorrhage.     Gray-white matter differentiation is preserved.     Orbits are grossly unremarkable. Paranasal sinuses are grossly clear.  Mastoid air cells are grossly clear.     No suspicious calvarial or extracranial soft tissue abnormality.     Other: Vascular calcifications.       Impression:      Impression:       No acute intracranial hemorrhage, mass effect, or large vascular  territorial infarct.      This report was signed and finalized on 1/23/2024 11:18 AM by Fletcher Ramirez.       XR Chest 1 View [[id number]] Collected: 01/23/24 0958     Updated: 01/23/24 1002    Narrative:      EXAM: XR CHEST 1 VW- 1/23/2024 8:45 AM     HISTORY: Positive for cough, shortness of breath and low oxygen       COMPARISON: 12/18/2019.     TECHNIQUE: Single frontal radiograph of the chest was obtained.     FINDINGS:     Limited exam due to patient rotation.     Support Devices: None.     Cardiac and Mediastinal Silhouettes: Normal.     Lungs/Pleura: No focal consolidation. No sizable pleural effusion. No  visible pneumothorax.     Osseous structures: No acute osseous finding.     Other: None.       Impression:         No acute cardiopulmonary abnormality.           This report was signed and finalized on 1/23/2024 9:59 AM by Fletcher Ramirez.             I have personally reviewed and interpreted the radiology studies and ECG obtained at time of admission.     Assessment / Plan   Assessment:   Active Hospital Problems    Diagnosis     **Hypothermia        Treatment Plan  The patient will be admitted to my service here at [facility].  In the ER the patient pH was showing 7.0 patient pCO2 was 35 creatinine was up to 4.5 from average of 2.3 chest x-ray was unremarkable patient was started on BiPAP UA was grossly positive CT of the head was unremarkable for any acute hemorrhage the patient had hypothermia so was started on Gildardo hugger the patient will be admitted to the ICU for acute on chronic renal failure with metabolic acidosis and metabolic encephalopathy I already spoke to nephrology Dr. Garcia and he will be seeing the patient will order ultrasound of the kidney started on bicarb drip the patient is to have blood culture will start her on cefepime for now patient is to be placed on breathing treatment and will consult with pulmonary patient remains DNR from nursing home will be placed on SCDs for DVT prophylaxis  prognosis is guarded I will hold off all her medications including blood pressure medication since her blood pressure is marginal and I will hold off her seizure medication will identify and reconcile home medications    Medical Decision Making  Number and Complexity of problems: 4 acute  Differential Diagnosis: As above    Conditions and Status        Condition is unchanged.     [facility] Data  External documents reviewed: No  Cardiac tracing (EKG, telemetry) interpretation: Yes  Radiology interpretation: Yes  Labs reviewed: Yes  Any tests that were considered but not ordered: No     Decision rules/scores evaluated (example [id number], Wells, etc): No     Discussed with: ER     Care Planning  Shared decision making: Patient apprised of current labs, vitals, imaging and treatment plan.  They are agreeable with proceeding with plans as discussed.    Code status and discussions: DNR DNI    Disposition  Social Determinants of Health that impact treatment or disposition: No  Estimated length of stay is TBD.     I confirmed that the patient's advanced care plan is present, code status is documented, and a surrogate decision maker is listed in the patient's medical record.         The patient was seen and examined by me on 1223 at [facility].    Electronically signed by Erinn Gutierrez MD, 01/23/24, 12:15 CST.

## 2024-01-23 NOTE — PROGRESS NOTES
"Pharmacy Dosing Service  Antimicrobial Dosing  Cefepime    Assessment/Action/Plan:  Based on indication and renal function, Cefepime dose adjusted to  2 gm IV every 24 hours via extended infusion. Pharmacy will continue to monitor daily and make further adjustment(s) accordingly.     Subjective:  Gregoria Bennett is a 78 y.o. female initiated on Cefepime 1 gm IV every 12 hours, for the treatment of UTI by prescriber, day 1 of 5 of treatment.    Objective:  Ht: 152.4 cm (60\"); Wt: 68.9 kg (152 lb)  Estimated Creatinine Clearance: 8.6 mL/min (A) (by C-G formula based on SCr of 4.5 mg/dL (H)).   Creatinine   Date Value Ref Range Status   01/23/2024 4.50 (H) 0.57 - 1.00 mg/dL Final   Final   Final   Final      Lab Results   Component Value Date    WBC 10.03 01/23/2024      Baseline culture results:  Microbiology Results (last 10 days)       Procedure Component Value - Date/Time    Respiratory Panel PCR w/COVID-19(SARS-CoV-2) [facility]/GREG/CAMRON/PAD/COR/ANTONELLA In-House, NP Swab in UTM/VTM, 2 HR TAT - Swab, Nasopharynx [[id number]]  (Normal) Collected: 01/23/24 1006    Lab Status: Final result Specimen: Swab from Nasopharynx Updated: 01/23/24 1103     ADENOVIRUS, PCR Not Detected     Coronavirus 229E Not Detected     Coronavirus HKU1 Not Detected     Coronavirus NL63 Not Detected     Coronavirus OC43 Not Detected     COVID19 Not Detected     Human Metapneumovirus Not Detected     Human Rhinovirus/Enterovirus Not Detected     Influenza A PCR Not Detected     Influenza B PCR Not Detected     Parainfluenza Virus 1 Not Detected     Parainfluenza Virus 2 Not Detected     Parainfluenza Virus 3 Not Detected     Parainfluenza Virus 4 Not Detected     RSV, PCR Not Detected     Bordetella pertussis pcr Not Detected     Bordetella parapertussis PCR Not Detected     Chlamydophila pneumoniae PCR Not Detected     Mycoplasma pneumo by PCR Not Detected    Narrative:      In the setting of a positive respiratory panel with a viral infection PLUS a " negative procalcitonin without other underlying concern for bacterial infection, consider observing off antibiotics or discontinuation of antibiotics and continue supportive care. If the respiratory panel is positive for atypical bacterial infection (Bordetella pertussis, Chlamydophila pneumoniae, or Mycoplasma pneumoniae), consider antibiotic de-escalation to target atypical bacterial infection.            KG Arroyo PharmD  01/23/24 12:10 CST

## 2024-01-24 NOTE — PROGRESS NOTES
Nephrology ([facility]) Progress Note      Patient:  Gregoria Bennett  YOB: 1945  Date of Service: 1/24/2024  MRN: [id number]   Acct: [id number]   Primary Care Physician: Chu Isaac MD  Advance Directive:   Code Status and Medical Interventions:   Ordered at: 01/24/24 1144     Level Of Support Discussed With:    Health Care Surrogate     Code Status (Patient has no pulse and is not breathing):    No CPR (Do Not Attempt to Resuscitate)     Medical Interventions (Patient has pulse or is breathing):    Comfort Measures     Comments:    GuardianHilario     Admit Date: 1/23/2024       Hospital Day: 1  Referring Provider: No ref. provider found      Patient personally seen and examined.  Complete chart including Consults, Notes, Operative Reports, Labs, Cardiology, and Radiology studies reviewed as able.    Chief complaint: Abnormal labs.    Subjective:  Gregoria Bennett is a 78 y.o. female  whom we were consulted for evaluation and treatment of acute kidney injury.  She has history of stage IV chronic kidney disease baseline, dementia, seizure disorder, hypertension, type 2 diabetes and chronic obstructive pulm disease.  She is a resident of [facility], [place].  She was noticed to have significant drop in O2 saturation in mid 80s.  She was brought in by EMS to ER.  In emergency room she was unresponsive, she was placed on nonrebreather.  She denies any fever or cough.  Her chest x-ray looks normal with no infiltrate or pulmonary edema.  However she has high troponin level.  Routine ABG consistent with pH of 7.1, pCO2 was 35 and pO2 was 80 on nonrebreather.  She was hypothermic as well.  Routine lab data indicated serum creatinine is 4.5, blood urea nitrogen 170 mg with bicarb of 11.  She is waiting to go to ICU and was currently seen in the emergency room.    This morning patient remained in the emergency room holding area.  There were no critical bed  available.  She is currently DNR.  However next of kin is trying to get her to comfort care/hospice.  If that happen, patient will be transferred back to nursing home.  Meanwhile she has responded to IV fluid and has excellent urine output.  Her hemodynamics are stabilized as she has been off vasopressors.        Allergies:  Aspirin and Tetracyclines & related    Home Meds:  (Not in a hospital admission)      Medicines:  Current Facility-Administered Medications   Medication Dose Route Frequency Provider Last Rate Last Admin    acetaminophen (TYLENOL) suppository 650 mg  650 mg Rectal Q4H PRN Rubia Bains APRN        atropine 1 % ophthalmic solution 2 drop  2 drop Sublingual BID PRN Rubia Bains APRJEWELS        bisacodyl (DULCOLAX) suppository 10 mg  10 mg Rectal Daily PRN Erinn Gutierrez MD        bisacodyl (DULCOLAX) suppository 10 mg  10 mg Rectal Daily PRN Rubia Bains APRN        diphenhydrAMINE (BENADRYL) capsule 25 mg  25 mg Oral Q6H PRN Rubia Bains APRN        Or    diphenhydrAMINE (BENADRYL) injection 25 mg  25 mg Intravenous Q6H PRN Rubia Bains APRJEWELS        diphenoxylate-atropine (LOMOTIL) 2.5-0.025 MG per tablet 1 tablet  1 tablet Oral Q2H PRN Rubia Bains APRN        furosemide (LASIX) injection 20 mg  20 mg Intravenous Q6H PRN Rubia Bains APRN        Or    furosemide (LASIX) injection 20 mg  20 mg Subcutaneous Q6H PRN Rubia Bains APRN        Glycerin-Hypromellose- (ARTIFICIAL TEARS) 0.2-0.2-1 % ophthalmic solution solution 1 drop  1 drop Both Eyes Q30 Min PRN Rubia Bains APRN        haloperidol (HALDOL) 2 MG/ML solution 1 mg  1 mg Sublingual Q4H PRN Rubia Bains APRJEWELS        Or    haloperidol lactate (HALDOL) injection 1 mg  1 mg Intravenous Q4H PRN Rubia Bains APRN        haloperidol (HALDOL) 2 MG/ML solution 2 mg  2 mg Sublingual Q4H PRN Bains, Rubia L, APRN        Or    haloperidol lactate (HALDOL)  injection 2 mg  2 mg Intravenous Q4H PRN Bains, Rubia L, APRN        ipratropium-albuterol (DUO-NEB) nebulizer solution 3 mL  3 mL Nebulization Q4H PRN Bains, Rubia L, APRN        LORazepam (ATIVAN) injection 0.5 mg  0.5 mg Intravenous Q1H PRN Bains, Rubia L, APRN        Or    LORazepam (ATIVAN) 2 MG/ML concentrated solution 0.5 mg  0.5 mg Sublingual Q1H PRN Bains, Rubia L, APRN        LORazepam (ATIVAN) injection 1 mg  1 mg Intravenous Q1H PRN Bains, Rubia L, APRN        Or    LORazepam (ATIVAN) 2 MG/ML concentrated solution 1 mg  1 mg Sublingual Q1H PRN Bains, Rubia L, APRN        LORazepam (ATIVAN) injection 2 mg  2 mg Intravenous Q1H PRN Bains, Rubia L, APRN        Or    LORazepam (ATIVAN) 2 MG/ML concentrated solution 2 mg  2 mg Sublingual Q1H PRN Bains, Rubia L, APRN        morphine injection 4 mg  4 mg Intravenous Q1H PRN Bains, Rubia L, APRN        Or    morphine concentrated solution 10 mg  10 mg Sublingual Q1H PRN Bains, Rubia L, APRN        morphine injection 6 mg  6 mg Intravenous Q1H PRN Bains, Rubia L, APRN        Or    morphine concentrated solution 20 mg  20 mg Sublingual Q1H PRN Bains, Rubia L, APRN        morphine concentrated solution 5 mg  5 mg Sublingual Q1H PRN Bains Rubia L, APRN        morphine concentrated solution 5 mg  5 mg Sublingual Q6H Bains, Rubia L, APRN        prochlorperazine (COMPAZINE) injection 5 mg  5 mg Intravenous Q6H PRN Bains, Rubia L, APRN        Or    prochlorperazine (COMPAZINE) tablet 5 mg  5 mg Oral Q6H PRN Bains, Rubia L, APRN        Or    prochlorperazine (COMPAZINE) suppository 25 mg  25 mg Rectal Q12H PRN Bains, Rubia L, APRN        scopolamine patch 1 mg/72 hr  1 patch Transdermal Q72H PRN Rubia Bains, SHERRY        sodium chloride 0.9 % flush 10 mL  10 mL Intravenous Q12H Erinn Gutierrez MD   10 mL at 01/24/24 0803    sodium chloride 0.9 % flush 10 mL  10 mL Intravenous PRN  Erinn Gutierrez MD        sodium chloride 0.9 % infusion 40 mL  40 mL Intravenous PRN Erinn Gutierrez MD         Current Outpatient Medications   Medication Sig Dispense Refill    acetaminophen (TYLENOL) 325 MG tablet Take 2 tablets by mouth Every 6 (Six) Hours As Needed for Mild Pain.      Amino Acids-Protein Hydrolys (Pro-Stat 101) liquid Take 30 mL by mouth Every Morning.      amLODIPine (NORVASC) 2.5 MG tablet Take 1 tablet by mouth Daily.      ascorbic acid (VITAMIN C) 500 MG tablet Take 1 tablet by mouth 2 (Two) Times a Day.      atenolol (TENORMIN) 25 MG tablet Take 1 tablet by mouth Daily.      Cholecalciferol 25 MCG (1000 UT) tablet Take 1 tablet by mouth 2 (Two) Times a Day.      clopidogrel (PLAVIX) 75 MG tablet Take 1 tablet by mouth Daily.      cycloSPORINE (RESTASIS) 0.05 % ophthalmic emulsion Administer 1 drop to both eyes 2 (Two) Times a Day.      docusate sodium (COLACE) 100 MG capsule Take 1 capsule by mouth Every Morning.      DULoxetine (CYMBALTA) 60 MG capsule Take 1 capsule by mouth Daily.      Emollient (CeraVe AM SPF 30) lotion Apply 1 application topically Daily.      ferrous sulfate 325 (65 FE) MG tablet Take 1 tablet by mouth Daily With Breakfast.      Glucagon (Gvoke HypoPen 1-Pack) 1 MG/0.2ML solution auto-injector Inject 1 mg under the skin into the appropriate area as directed 3 (Three) Times a Day As Needed.      icosapent ethyl (Vascepa) 1 g capsule capsule Take 2 g by mouth 2 (Two) Times a Day With Meals.      Insulin Aspart (novoLOG) 100 UNIT/ML injection Inject 10 Units under the skin into the appropriate area as directed 3 (Three) Times a Day Before Meals. Hold if BS is below 150      insulin NPH-insulin regular (humuLIN 70/30,novoLIN 70/30) (70-30) 100 UNIT/ML injection Inject 15 Units under the skin into the appropriate area as directed 2 (Two) Times a Day With Meals.      levETIRAcetam (KEPPRA) 500 MG tablet Take 1 tablet by mouth 2 (Two) Times a Day.      Multiple Vitamin  (TAB-A-JACKY) tablet Take 1 tablet by mouth Every Morning.      nitroglycerin (NITROSTAT) 0.4 MG SL tablet Place 1 tablet under the tongue Every 5 (Five) Minutes As Needed for Chest Pain. Take no more than 3 doses in 15 minutes.      pantoprazole (PROTONIX) 20 MG EC tablet Take 1 tablet by mouth Daily.      polyethylene glycol (MIRALAX) packet Take 17 g by mouth Daily As Needed (constipation).      primidone (MYSOLINE) 50 MG tablet Take 0.5 tablets by mouth Every Night.      sucralfate (CARAFATE) 1 g tablet Take 1 tablet by mouth 4 (Four) Times a Day.      guaiFENesin (MUCINEX) 600 MG 12 hr tablet Take 1 tablet by mouth 2 (Two) Times a Day As Needed for Cough.      lactulose (CHRONULAC) 10 GM/15ML solution Take 30 mL by mouth Daily As Needed (constipation).      ondansetron (ZOFRAN) 4 MG tablet Take 1 tablet by mouth 3 (Three) Times a Day As Needed for Nausea.         Past Medical History:  Past Medical History:   Diagnosis Date    Chronic kidney disease, stage 4 (severe)     Contracture of muscle ankle and foot, left     Contracture of muscle, right ankle and foot     COPD (chronic obstructive pulmonary disease)     Coronary artery disease     Dementia     Diabetes mellitus     Dysphagia     History of dermatomyositis     Hypertension     Peptic ulcer disease     Pulmonary disease     Renal insufficiency     Urinary tract infection     Venous insufficiency     Vitamin D deficiency        Past Surgical History:  Past Surgical History:   Procedure Laterality Date    CHOLECYSTECTOMY      COLON SURGERY         Family History  Family History   Problem Relation Age of Onset    Breast cancer Neg Hx        Social History  Social History     Socioeconomic History    Marital status:    Tobacco Use    Smoking status: Former   Vaping Use    Vaping Use: Never used   Substance and Sexual Activity    Alcohol use: No    Drug use: No    Sexual activity: Defer       Review of Systems:  Unable to obtain review of system as she  is currently sleepy/encephalopathy    Objective:  Patient Vitals for the past 24 hrs:   BP Temp Temp src Pulse Resp SpO2   01/24/24 1100 140/56 -- -- 85 19 95 %   01/24/24 1008 -- -- -- 85 18 97 %   01/24/24 1002 133/57 -- -- 86 18 97 %   01/24/24 1000 -- -- -- 85 -- 94 %   01/24/24 0945 166/55 -- -- 85 -- 95 %   01/24/24 0930 147/62 -- -- 86 -- 96 %   01/24/24 0915 160/69 -- -- 87 -- 95 %   01/24/24 0900 138/62 -- -- 86 -- 94 %   01/24/24 0847 151/71 -- -- -- -- --   01/24/24 0845 (!) 151/125 -- -- 87 -- 95 %   01/24/24 0830 (!) 134/123 -- -- 85 -- 95 %   01/24/24 0815 172/87 -- -- 85 -- 91 %   01/24/24 0800 172/87 97.7 °F (36.5 °C) Axillary 83 18 96 %   01/24/24 0615 -- -- -- 84 18 99 %   01/24/24 0605 -- -- -- -- 18 --   01/24/24 0530 123/62 -- -- 70 -- 94 %   01/24/24 0515 122/48 -- -- 71 -- 98 %   01/24/24 0445 101/59 -- -- 67 -- (!) 84 %   01/24/24 0430 (!) 104/39 97.2 °F (36.2 °C) Axillary 67 21 96 %   01/24/24 0414 98/64 -- -- -- -- --   01/24/24 0332 122/70 -- -- -- -- --   01/24/24 0206 105/57 -- -- 82 -- 98 %   01/24/24 0030 141/97 -- -- 79 -- 97 %   01/24/24 0000 -- 97.3 °F (36.3 °C) Axillary -- -- --   01/23/24 2330 (!) 135/104 -- -- 80 -- 99 %   01/23/24 2315 139/78 -- -- 81 -- 93 %   01/23/24 2200 131/90 -- -- 89 -- 97 %   01/23/24 2145 121/49 -- -- 89 -- 95 %   01/23/24 2130 92/66 -- -- 90 -- 96 %   01/23/24 2115 (!) 122/104 -- -- 88 -- 95 %   01/23/24 2105 -- -- -- 84 20 96 %   01/23/24 2100 -- -- -- 92 -- 98 %   01/23/24 2058 -- -- -- 84 13 95 %   01/23/24 2047 -- 98.2 °F (36.8 °C) Axillary -- -- --   01/23/24 2045 129/69 -- -- 85 -- 96 %   01/23/24 2030 -- -- -- 82 -- 92 %   01/23/24 2015 -- -- -- 82 -- 92 %   01/23/24 2001 139/95 -- -- 82 18 94 %   01/23/24 2000 -- -- -- 84 -- 92 %   01/23/24 1945 137/62 -- -- 85 -- 95 %   01/23/24 1930 114/44 -- -- 83 20 95 %   01/23/24 1922 -- -- -- 80 16 94 %   01/23/24 1830 -- 97 °F (36.1 °C) Rectal 76 -- 91 %   01/23/24 1659 111/89 -- -- 81 18 93 %    01/23/24 1601 (!) 84/69 96.6 °F (35.9 °C) Rectal 76 20 96 %   01/23/24 1548 (!) 73/41 -- -- 71 -- 97 %   01/23/24 1513 (!) 87/53 -- -- 72 14 96 %   01/23/24 1436 -- -- -- 78 -- 96 %   01/23/24 1435 -- 94.8 °F (34.9 °C) Rectal -- -- --   01/23/24 1432 (!) 70/55 -- -- 74 -- 90 %   01/23/24 1415 92/76 -- -- 74 -- 90 %   01/23/24 1410 -- -- -- 73 20 90 %   01/23/24 1359 (!) 62/47 -- -- 69 22 (!) 88 %   01/23/24 1354 (!) 83/63 -- -- 72 -- 96 %   01/23/24 1318 (!) 86/28 -- -- 55 -- 100 %   01/23/24 1313 -- 93.6 °F (34.2 °C) Rectal -- -- --   01/23/24 1231 (!) 101/31 -- -- 64 -- 93 %   01/23/24 1213 -- -- -- 59 -- 98 %       Intake/Output Summary (Last 24 hours) at 1/24/2024 1206  Last data filed at 1/24/2024 1128  Gross per 24 hour   Intake 6434.05 ml   Output 900 ml   Net 5534.05 ml     General:  Weak/encephalopathic  HEENT: Normocephalic atraumatic head  Neck: Supple with mild JVD.  Chest:  Decreased breath sound on both lung field.  CVS: regular rate and rhythm  Abdominal: soft, nontender, positive bowel sounds  Extremities: no cyanosis or edema  Skin: warm and dry without rash      Labs:  Results from last 7 days   Lab Units 01/24/24  0549 01/23/24  1309 01/23/24  0943   WBC 10*3/mm3 15.93* 9.10 10.03   HEMOGLOBIN g/dL 7.3* 7.3* 8.9*   HEMATOCRIT % 21.8* 22.9* 28.2*   PLATELETS 10*3/mm3 136* 130* 137*         Results from last 7 days   Lab Units 01/24/24  0549 01/23/24  1517 01/23/24  1309 01/23/24  1005 01/23/24  0943   SODIUM mmol/L 147*  --  142  --  138   SODIUM, ARTERIAL mmol/L  --  143  --    < >  --    POTASSIUM mmol/L 3.5  --  4.6  --  5.1   CHLORIDE mmol/L 111*  --  113*  --  110*   CO2 mmol/L 18.0*  --  12.0*  --  11.0*   BUN mg/dL 130*  --  165*  --  170*   CREATININE mg/dL 4.02*  --  4.39*  --  4.50*   CALCIUM mg/dL 7.5*  --  8.2*  --  9.2   EGFR mL/min/1.73 10.7*  --  9.7*  --  9.4*   BILIRUBIN mg/dL <0.2  --   --   --  <0.2   ALK PHOS U/L 72  --   --   --  101   ALT (SGPT) U/L 47*  --   --   --  68*    AST (SGOT) U/L 30  --   --   --  36*   GLUCOSE mg/dL 242*  --  145*  --  157*    < > = values in this interval not displayed.       Radiology:   Imaging Results (Last 72 Hours)       Procedure Component Value Units Date/Time    US Renal Bilateral [[id number]] Collected: 01/23/24 1448     Updated: 01/23/24 1456    Narrative:      EXAMINATION: US RENAL BILATERAL- 1/23/2024 2:48 PM     HISTORY: acute on CKD; T68.XXXA-Hypothermia, initial encounter;  E87.20-Acidosis, unspecified; N17.0-Acute kidney failure with tubular  necrosis; N18.4-Chronic kidney disease, stage 4 (severe); N18.9-Chronic  kidney disease, unspecified; Z86.2-Personal history of diseases of the  blood and blood-forming organs and certain disorders involving the  immune mechanism.     REPORT: Sonographic images of the kidneys were obtained in the  transverse and longitudinal dimensions.     COMPARISON: CT abdomen and pelvis 9/16/2023.     The technologist reports that the examination is very limited due to the  patient's body habitus and overlying bowel gas, the patient's inability  to cooperate with positioning and breath hold.     The right kidney measures approximately 7.9 x 4.2 x 3.5 cm and has a  grossly normal morphology and cortical echogenicity, there is partial  obscuration of the inferior pole. No mass or hydronephrosis is  identified.     The left kidney measures 7.2 x 3.3 x 4.1 cm and is unremarkable. Color  Doppler images demonstrate vascular flow within both kidneys.       Impression:      Limited exam as detailed above with bilateral renal atrophy,  no evidence of hydronephrosis. Both kidneys demonstrate relatively  normal cortical echogenicity.     This report was signed and finalized on 1/23/2024 2:53 PM by Dr. Emir Guardado MD.       CT Head Without Contrast [[id number]] Collected: 01/23/24 1116     Updated: 01/23/24 1122    Narrative:      Exam: CT HEAD WO CONTRAST- 1/23/2024 10:11 AM     HISTORY: altered mental state;  T68.XXXA-Hypothermia, initial encounter;  E87.20-Acidosis, unspecified; N17.0-Acute kidney failure with tubular  necrosis; N18.4-Chronic kidney disease, stage 4 (severe); N18.9-Chronic  kidney disease, unspecified; Z86.2-Personal history of diseases of the  blood and blood-forming organs and certain disorders involving the  immune mechanism       DOSE LENGTH PRODUCT: 838.32 mGy.cm mGy cm. Automated exposure control  was also utilized to decrease patient radiation dose.     Technique:  Helically acquired CT of the brain without IV contrast was performed.  Sagittal and coronal reformations are also provided for review. Soft  tissue and bone kernels are available for interpretation.     Comparison: 12/16/2019.     Findings:     Ventricles and extra-axial CSF spaces are normal in size.     No intraparenchymal or extra-axial hemorrhage.     Gray-white matter differentiation is preserved.     Orbits are grossly unremarkable. Paranasal sinuses are grossly clear.  Mastoid air cells are grossly clear.     No suspicious calvarial or extracranial soft tissue abnormality.     Other: Vascular calcifications.       Impression:      Impression:       No acute intracranial hemorrhage, mass effect, or large vascular  territorial infarct.     This report was signed and finalized on 1/23/2024 11:18 AM by Fletcher Ramirez.       XR Chest 1 View [[id number]] Collected: 01/23/24 0958     Updated: 01/23/24 1002    Narrative:      EXAM: XR CHEST 1 VW- 1/23/2024 8:45 AM     HISTORY: Positive for cough, shortness of breath and low oxygen       COMPARISON: 12/18/2019.     TECHNIQUE: Single frontal radiograph of the chest was obtained.     FINDINGS:     Limited exam due to patient rotation.     Support Devices: None.     Cardiac and Mediastinal Silhouettes: Normal.     Lungs/Pleura: No focal consolidation. No sizable pleural effusion. No  visible pneumothorax.     Osseous structures: No acute osseous finding.     Other: None.       Impression:          No acute cardiopulmonary abnormality.           This report was signed and finalized on 1/23/2024 9:59 AM by Fletcher Ramirez.               Culture:  Blood Culture   Date Value Ref Range Status   01/23/2024 No growth at 24 hours  Preliminary   01/23/2024 No growth at 24 hours  Preliminary         Assessment   1.  Acute kidney injury stage III/stable.  2.  Acute tubular necrosis.  3.  Stage IV chronic kidney disease baseline.  4.  Type 2 diabetes with nephropathy.  5.  Chronic obstructive pulm disease.  6.  Metabolic encephalopathy/uremic  7.  Metabolic acidosis.  8.  Anemia of chronic kidney disease.  9.  Advanced dementia.    Plan:  1.  Continue IV fluid with sodium bicarbonate.  2.  May need blood transfusion.  3.  Baseline iron studies.  4.  Hemodialysis treatment not recommended due to advanced dementia/multiple comorbid conditions.  However next of kin is working to make her comfort care as she is already DNR.      Sergey Knight MD  1/24/2024  12:06 CST

## 2024-01-24 NOTE — PLAN OF CARE
Goal Outcome Evaluation:    Patient admitted from ER. Patient was found non-responsive this morning at the nursing home and sent to the ER. Patient has been admitted with hypothermia. Patient was seen by palliative care while in the ER. Patient is non-verbal at this time. Will continue to monitor and report abnormal to provider.Patient is comfort measures at this time

## 2024-01-24 NOTE — ED NOTES
Several attempts to collect b/p to LUE/RLE/and LLE unsuccessful.  Gamaliel LOCKHART to obtain manual b/p for accurate reading.

## 2024-01-24 NOTE — CASE MANAGEMENT/SOCIAL WORK
Continued Stay Note  [facility]     Patient Name: Gregoria Bennett  MRN: [id number]  Today's Date: 1/24/2024    Admit Date: 1/23/2024    Plan: Hospice referral given to [facility]   Discharge Plan       Row Name 01/24/24 4154       Plan    Plan Hospice referral given to [facility]    Patient/Family in Agreement with Plan yes    Plan Comments Pt has a hospice consult, referral given to Lucita Eason from [facility]. Note pt being admitted upstairs and bed ready for transfer.                   Discharge Codes    No documentation.                       CARO Mendiola

## 2024-01-24 NOTE — PROGRESS NOTES
[facility] Medicine Services  INPATIENT PROGRESS NOTE    Patient Name: Gregoria Bennett  Date of Admission: 1/23/2024  Today's Date: 01/24/24  Length of Stay: 1  Primary Care Physician: Chu Isaac MD    Subjective   Chief Complaint: Hypothermia hypotension acute on chronic renal failure  HPI   Patient is an 78-year-old white female who is a DNR guardian of state resident of [facility] past medical history of chronic kidney disease stage IV creatinine around 2.3 history of dementia history of seizure on Keppra history of hypertension diabetes who was found poorly responsive this morning by nursing staff at the nursing home with low oxygen levels.  Oxygen levels was as low as in the low 80s.  EMS was called and upon arrival EMS did notice that the patient was poorly responsive and with oxygenation in the low 80s.  Patient was placed on a nonrebreather by EMS and transported here to the emergency room.  There is no report of a fever.  But patient has had a wet cough also according to the nursing staff.  EMS stated that when they asked her if she is hurting she pointed towards her chest.  However here in the emergency room tried to speak with her she is arousable but unable to share any information vocally.   In the ER the patient pH was showing 7.0 patient pCO2 was 35 creatinine was up to 4.5 from average of 2.3 chest x-ray was unremarkable patient was started on BiPAP UA was grossly positive CT of the head was unremarkable for any acute hemorrhage the patient had hypothermia so was started on Gildardo hugger the patient will be admitted to the ICU for acute on chronic renal failure with metabolic acidosis and metabolic encephalopathy I already spoke to nephrology Dr. Garcia and he will be seeing the patient will order ultrasound of the kidney started on bicarb drip the patient is to have blood culture will start her on cefepime for now patient is to be placed on  breathing treatment and will consult with pulmonary patient remains DNR from nursing home will be placed on SCDs for DVT prophylaxis prognosis is guarded I will hold off all her medications including blood pressure medication since her blood pressure is marginal and I will hold off her seizure medication will identify and reconcile home medications  1/24  History of advanced dementia patient is nonverbal DNR DNI state guardian, resident of [facility] past medical history of chronic kidney disease stage IV creatinine around 2.3 history of dementia history of seizure on Keppra history of hypertension diabetes admitted for hypotension hypothermia, acute pyelo, acute on CKD, US renal no hydro, BP better, metabolic acidosis, CT head -ve acute nephrology appreciated no indication of HD , PT in pain recommend palliative care consult , ? Hospice comfort care , B12 ammonia TSH -ve       Review of Systems   All pertinent negatives and positives are as above. All other systems have been reviewed and are negative unless otherwise stated.     Objective    Temp:  [90.7 °F (32.6 °C)-98.2 °F (36.8 °C)] 97.7 °F (36.5 °C)  Heart Rate:  [43-92] 83  Resp:  [13-22] 18  BP: ()/() 172/87  Physical Exam  Constitutional:       Appearance: She is ill-appearing.   HENT:      Head: Normocephalic.   Eyes:      Pupils: Pupils are equal, round, and reactive to light.   Cardiovascular:      Rate and Rhythm: Normal rate and regular rhythm.   Pulmonary:      Breath sounds: Stridor present. Rhonchi present.   Abdominal:      General: Abdomen is flat.   Neurological:      Comments: Remains confused           Results Review:  I have reviewed the labs, radiology results, and diagnostic studies.    Laboratory Data:   Results from last 7 days   Lab Units 01/24/24  0549 01/23/24  1309 01/23/24  0943   WBC 10*3/mm3 15.93* 9.10 10.03   HEMOGLOBIN g/dL 7.3* 7.3* 8.9*   HEMATOCRIT % 21.8* 22.9* 28.2*   PLATELETS 10*3/mm3 136* 130* 137*  "       Results from last 7 days   Lab Units 01/24/24  0549 01/23/24  1517 01/23/24  1309 01/23/24  1005 01/23/24  0943   SODIUM mmol/L 147*  --  142  --  138   SODIUM, ARTERIAL mmol/L  --  143  --    < >  --    POTASSIUM mmol/L 3.5  --  4.6  --  5.1   CHLORIDE mmol/L 111*  --  113*  --  110*   CO2 mmol/L 18.0*  --  12.0*  --  11.0*   BUN mg/dL 130*  --  165*  --  170*   CREATININE mg/dL 4.02*  --  4.39*  --  4.50*   CALCIUM mg/dL 7.5*  --  8.2*  --  9.2   BILIRUBIN mg/dL <0.2  --   --   --  <0.2   ALK PHOS U/L 72  --   --   --  101   ALT (SGPT) U/L 47*  --   --   --  68*   AST (SGOT) U/L 30  --   --   --  36*   GLUCOSE mg/dL 242*  --  145*  --  157*    < > = values in this interval not displayed.       Culture Data:   No results found for: \"BLOODCX\", \"URINECX\", \"WOUNDCX\", \"MRSACX\", \"RESPCX\", \"STOOLCX\"    Radiology Data:   Imaging Results (Last 24 Hours)       Procedure Component Value Units Date/Time    US Renal Bilateral [[id number]] Collected: 01/23/24 1448     Updated: 01/23/24 1456    Narrative:      EXAMINATION: US RENAL BILATERAL- 1/23/2024 2:48 PM     HISTORY: acute on CKD; T68.XXXA-Hypothermia, initial encounter;  E87.20-Acidosis, unspecified; N17.0-Acute kidney failure with tubular  necrosis; N18.4-Chronic kidney disease, stage 4 (severe); N18.9-Chronic  kidney disease, unspecified; Z86.2-Personal history of diseases of the  blood and blood-forming organs and certain disorders involving the  immune mechanism.     REPORT: Sonographic images of the kidneys were obtained in the  transverse and longitudinal dimensions.     COMPARISON: CT abdomen and pelvis 9/16/2023.     The technologist reports that the examination is very limited due to the  patient's body habitus and overlying bowel gas, the patient's inability  to cooperate with positioning and breath hold.     The right kidney measures approximately 7.9 x 4.2 x 3.5 cm and has a  grossly normal morphology and cortical echogenicity, there is " partial  obscuration of the inferior pole. No mass or hydronephrosis is  identified.     The left kidney measures 7.2 x 3.3 x 4.1 cm and is unremarkable. Color  Doppler images demonstrate vascular flow within both kidneys.       Impression:      Limited exam as detailed above with bilateral renal atrophy,  no evidence of hydronephrosis. Both kidneys demonstrate relatively  normal cortical echogenicity.     This report was signed and finalized on 1/23/2024 2:53 PM by Dr. Emir Guardado MD.       CT Head Without Contrast [[id number]] Collected: 01/23/24 1116     Updated: 01/23/24 1122    Narrative:      Exam: CT HEAD WO CONTRAST- 1/23/2024 10:11 AM     HISTORY: altered mental state; T68.XXXA-Hypothermia, initial encounter;  E87.20-Acidosis, unspecified; N17.0-Acute kidney failure with tubular  necrosis; N18.4-Chronic kidney disease, stage 4 (severe); N18.9-Chronic  kidney disease, unspecified; Z86.2-Personal history of diseases of the  blood and blood-forming organs and certain disorders involving the  immune mechanism       DOSE LENGTH PRODUCT: 838.32 mGy.cm mGy cm. Automated exposure control  was also utilized to decrease patient radiation dose.     Technique:  Helically acquired CT of the brain without IV contrast was performed.  Sagittal and coronal reformations are also provided for review. Soft  tissue and bone kernels are available for interpretation.     Comparison: 12/16/2019.     Findings:     Ventricles and extra-axial CSF spaces are normal in size.     No intraparenchymal or extra-axial hemorrhage.     Gray-white matter differentiation is preserved.     Orbits are grossly unremarkable. Paranasal sinuses are grossly clear.  Mastoid air cells are grossly clear.     No suspicious calvarial or extracranial soft tissue abnormality.     Other: Vascular calcifications.       Impression:      Impression:       No acute intracranial hemorrhage, mass effect, or large vascular  territorial infarct.     This report  was signed and finalized on 1/23/2024 11:18 AM by Fletcher Ramirez.       XR Chest 1 View [[id number]] Collected: 01/23/24 0958     Updated: 01/23/24 1002    Narrative:      EXAM: XR CHEST 1 VW- 1/23/2024 8:45 AM     HISTORY: Positive for cough, shortness of breath and low oxygen       COMPARISON: 12/18/2019.     TECHNIQUE: Single frontal radiograph of the chest was obtained.     FINDINGS:     Limited exam due to patient rotation.     Support Devices: None.     Cardiac and Mediastinal Silhouettes: Normal.     Lungs/Pleura: No focal consolidation. No sizable pleural effusion. No  visible pneumothorax.     Osseous structures: No acute osseous finding.     Other: None.       Impression:         No acute cardiopulmonary abnormality.           This report was signed and finalized on 1/23/2024 9:59 AM by Fletcher Ramirez.               I have reviewed the patient's current medications.     Assessment/Plan   Assessment  Active Hospital Problems    Diagnosis     **Hypothermia        Treatment Plan  In the ER the patient pH was showing 7.0 patient pCO2 was 35 creatinine was up to 4.5 from average of 2.3 chest x-ray was unremarkable patient was started on BiPAP UA was grossly positive CT of the head was unremarkable for any acute hemorrhage the patient had hypothermia so was started on Gildardo hugger the patient will be admitted to the ICU for acute on chronic renal failure with metabolic acidosis and metabolic encephalopathy I already spoke to nephrology Dr. Garcia and he will be seeing the patient will order ultrasound of the kidney started on bicarb drip the patient is to have blood culture will start her on cefepime for now patient is to be placed on breathing treatment and will consult with pulmonary patient remains DNR from nursing home will be placed on SCDs for DVT prophylaxis prognosis is guarded I will hold off all her medications including blood pressure medication since her blood pressure is marginal and I will hold off  her seizure medication will identify and reconcile home medications     Medical Decision Making  Number and Complexity of problems: 4 Acute  Differential Diagnosis: As above    Conditions and Status        Condition is worsening.     [facility] Data  External documents reviewed:   Cardiac tracing (EKG, telemetry) interpretation: Yes  Radiology interpretation: Yes  Labs reviewed: Yes  Any tests that were considered but not ordered: No     Decision rules/scores evaluated (example [id number], Wells, etc): No     Discussed with: Nursing staff     Care Planning  Shared decision making: Patient apprised of current labs, vitals, imaging and treatment plan.  They are agreeable with proceeding with plans as discussed.   Code status and discussions: DNR    Disposition  Social Determinants of Health that impact treatment or disposition: Patient is still guardian  I expect the patient to be discharged to TBD in TBD days.     Electronically signed by Erinn Gutierrez MD, 01/24/24, 08:38 CST.

## 2024-01-24 NOTE — PROGRESS NOTES
[facility] [place] [facility] [place] - [facility]    Patient: Gregoria Bennett  1945   MR# [id number]   Acct# [id number]  01/24/24   07:22 CST  Referring Provider: Erinn Gutierrez MD    Chief Complaint: Metabolic acidosis, acute on chronic renal failure  Interval history: Temperature has normalized.  Creatinine 4.02.  ABGs are slowly improving; pH 7.30.  She is very disoriented this morning.  Dialysis is not recommended per nephrology due to multiple comorbidities and dementia.  PaO2 98 on ABG this morning current O2 sat is 96% on 3 L.      Meds:  cefepime, 2,000 mg, Intravenous, Q24H  insulin regular, 2-9 Units, Subcutaneous, Q6H  ipratropium-albuterol, 3 mL, Nebulization, 4x Daily - RT  senna-docusate sodium, 2 tablet, Oral, BID  sodium chloride, 10 mL, Intravenous, Q12H      dextrose 5 % 850 mL with sodium bicarbonate 8.4 % 150 mEq infusion, , Last Rate: 150 mL/hr at 01/23/24 2358  norepinephrine, 0.02-0.3 mcg/kg/min, Last Rate: 0.12 mcg/kg/min (01/24/24 0517)  sodium chloride, 125 mL/hr, Last Rate: 125 mL/hr (01/24/24 0337)      Physical Exam:  SpO2 Percentage    01/24/24 0515 01/24/24 0530 01/24/24 0615   SpO2: 98% 94% 99%     Body mass index is 29.69 kg/m².   Temp:  [90.7 °F (32.6 °C)-98.2 °F (36.8 °C)] 97.2 °F (36.2 °C)  Heart Rate:  [43-92] 84  Resp:  [13-22] 18  BP: ()/() 123/62  Intake/Output Summary (Last 24 hours) at 1/24/2024 0722  Last data filed at 1/24/2024 0517  Gross per 24 hour   Intake 6934.05 ml   Output 150 ml   Net 6784.05 ml     Physical Exam  Vitals and nursing note reviewed.   Constitutional:       Appearance: She is well-developed. She is ill-appearing.      Interventions: Nasal cannula in place.   HENT:      Head: Normocephalic and atraumatic.   Eyes:      General: No scleral icterus.  Cardiovascular:      Rate and Rhythm: Normal rate and regular rhythm.   Abdominal:      General: There is no distension.   Musculoskeletal:      Cervical back:  Neck supple.   Skin:     General: Skin is warm and dry.   Neurological:      Mental Status: She is disoriented.       Laboratory Data:  Results from last 7 days   Lab Units 01/24/24  0549 01/23/24  1309 01/23/24  0943   WBC 10*3/mm3 15.93* 9.10 10.03   HEMOGLOBIN g/dL 7.3* 7.3* 8.9*   PLATELETS 10*3/mm3 136* 130* 137*     Results from last 7 days   Lab Units 01/24/24  0549 01/23/24  1517 01/23/24  1309 01/23/24  1005 01/23/24  0943   SODIUM mmol/L 147*  --  142  --  138   SODIUM, ARTERIAL mmol/L  --  143  --    < >  --    POTASSIUM mmol/L 3.5  --  4.6  --  5.1   BUN mg/dL 130*  --  165*  --  170*   CREATININE mg/dL 4.02*  --  4.39*  --  4.50*   INR   --   --   --   --  1.23*    < > = values in this interval not displayed.     Results from last 7 days   Lab Units 01/24/24  0121 01/23/24  1517 01/23/24  1005   PH, ARTERIAL pH units 7.308* 7.143* 7.070*   PCO2, ARTERIAL mm Hg 27.6* 32.0* 34.9*   PO2 ART mm Hg 98.1 83.9 90.6     Microbiology Results (last 10 days)       Procedure Component Value - Date/Time    Respiratory Panel PCR w/COVID-19(SARS-CoV-2) ARIANA/GREG/CAMRON/PAD/COR/ANTONELLA In-House, NP Swab in UTM/VTM, 2 HR TAT - Swab, Nasopharynx [[id number]]  (Normal) Collected: 01/23/24 1006    Lab Status: Final result Specimen: Swab from Nasopharynx Updated: 01/23/24 1103     ADENOVIRUS, PCR Not Detected     Coronavirus 229E Not Detected     Coronavirus HKU1 Not Detected     Coronavirus NL63 Not Detected     Coronavirus OC43 Not Detected     COVID19 Not Detected     Human Metapneumovirus Not Detected     Human Rhinovirus/Enterovirus Not Detected     Influenza A PCR Not Detected     Influenza B PCR Not Detected     Parainfluenza Virus 1 Not Detected     Parainfluenza Virus 2 Not Detected     Parainfluenza Virus 3 Not Detected     Parainfluenza Virus 4 Not Detected     RSV, PCR Not Detected     Bordetella pertussis pcr Not Detected     Bordetella parapertussis PCR Not Detected     Chlamydophila pneumoniae PCR Not Detected      Mycoplasma pneumo by PCR Not Detected    Narrative:      In the setting of a positive respiratory panel with a viral infection PLUS a negative procalcitonin without other underlying concern for bacterial infection, consider observing off antibiotics or discontinuation of antibiotics and continue supportive care. If the respiratory panel is positive for atypical bacterial infection (Bordetella pertussis, Chlamydophila pneumoniae, or Mycoplasma pneumoniae), consider antibiotic de-escalation to target atypical bacterial infection.          Recent films:  US Renal Bilateral    Result Date: 1/23/2024  Limited exam as detailed above with bilateral renal atrophy, no evidence of hydronephrosis. Both kidneys demonstrate relatively normal cortical echogenicity.  This report was signed and finalized on 1/23/2024 2:53 PM by Dr. Emir Guardado MD.      CT Head Without Contrast    Result Date: 1/23/2024  Impression:   No acute intracranial hemorrhage, mass effect, or large vascular territorial infarct.  This report was signed and finalized on 1/23/2024 11:18 AM by Fletcher Ramirez.      XR Chest 1 View    Result Date: 1/23/2024   No acute cardiopulmonary abnormality.    This report was signed and finalized on 1/23/2024 9:59 AM by Fletcher Ramirez.       Pulmonary Assessment:  Metabolic acidosis better  Hypothermia, improved  Acute on chronic renal failure  Elevated BNP  Anemia/thrombocytopenia  DNR status    Recommend:   Continue to decrease oxygen as tolerated  Will cancel cosyntropin test as it is now a send out lab and we will not get timely results and the patient has improved, suggesting absence of adrenal crisis  Pulmonology has no further plan.  We will sign off.  Call if we are needed again.    I personally spent 5 minutes in accordance with split shared billing, this excludes any time spent jointly with the MD and/or other billable services (if applicable).    Electronically signed by SHERRY Cuevas,  1/24/2024, 07:22 CST     Personal review of imaging : no new films to review  Pertinent physical exam finding: moaning, no resp distress. No wheezing  I personally spent 9 minutes in accordance with split shared billing. Time spent includes time reviewing chart, face-to-face time,  counseling patient/family/caregiver, ordering medications/tests/procedures, communicating with other health care professionals, documenting clinical information in the electronic health record, and coordination of care.  Electronically signed by Akil Camargo MD, 1/24/2024, 08:23 CST      This documentation indicates a split shared visit as defined in CMS Final rule ([place]) 2024 Physician Fee Schedule dated 11/2/2023: for Medicare billing purposes, the “substantive portion” means more than half of the total time spent by the physician or nonphysician practitioner performing the split (or shared) visit, or a substantive part of the medical decision making.  This visit involved face to face encounters with the patient by both providers on the date of the visit.  Time spent is indicated to identify the substantive portion of the visit, whereas the level of service may be determined by complexity of medical decision making.

## 2024-01-24 NOTE — CONSULTS
[facility]    Palliative Care Initial Consult   Attending Physician: Erinn Gutierrez MD  Referring Provider: Erinn Gutierrez MD    Reason for Referral: assistance with clarification of goals of care and hospice referral or discussion  Family/Support: guardian    Code Status and Medical Interventions:   Ordered at: 01/23/24 1207     Code Status (Patient has no pulse and is not breathing):    No CPR (Do Not Attempt to Resuscitate)     Medical Interventions (Patient has pulse or is breathing):    Full Support     Goals of Care: TBD.    HPI:   78 y.o. female has a past medical history of dementia, chronic kidney disease, stage 4 baseline creatinine 2.3, Contracture of muscle, right ankle and foot, COPD, Coronary artery disease, Diabetes mellitus, diastolic dysfunction, dysphagia, Hypertension, Peptic ulcer disease, seizure disorder-on Keppra, venous insufficiency, and Vitamin D deficiency.  Guardian-Hilario Armstrong and resident of [facility]. EMS/DNR order dated 12/13/2016.  Patient does have a living will directive dated May 14, 2003 stating wishes regarding life-prolonging treatment and artificial provided nutrition and hydration if patient no longer has decisional capacity, have a terminal condition, or become permanently unconscious to include direct that treatment be withheld or withdrawn, and that I be permitted to die naturally with only the administration of medicine or the performance of any medical treatment deemed necessary to alleviate pain.  Authorized withholding and withdrawal of artificially provided food, water, or other artificially provided nourishment of foods.  Do not authorize a giving of all or any part of my body upon death.  ED visit 9/16/2023 due to fecal impaction, constipation, and vomiting. Patient presented to [facility] on 1/23/2024 related to mental status change with hypoxemia, hypothermia, confusion.  ED  workup shows urinalysis with 4+ bacteria, 3+ leuk esterase, nitrite negative, creatinine 4.50, transaminitis, elevated troponin/BNP, and anemia.  ABGs pH 7.070, pCO2 34.9.  CT head shows no acute intercranial abnormalities.  Chest imaging shows no acute cardiopulmonary abnormality.  Placed on BiPAP, luna hugger, IV antibiotics, Levophed drip, and IVFs.  Pulmonology and nephrology consulted.  Renal ultrasound limited with bilateral renal atrophy.  Nephrology does not recommend dialysis because of advanced dementia, multiple comorbidities conditions, and poor hemodynamics.  Mild improvement in renal function.  Poor urine output 150 mL over the last 24 hours.  Pulmonology has signed off. Laying in bed yelling out frequently, increased with any stimulus. According to nursing facility, total care requires a lift for transfers, confused at baseline, yells out when in pain. Palliative Care Spoke With: other Guardian  Due to the Palliative Care Topics Discussed: goals of care, [facility], and discharge options we will establish an advance care plan.   Advance Care Planning   Advance Care Planning Discussion: spoke with guardian, Hilario Armstrong via telephone who reports patient has both a DNR and comfort care court appointed verdict from . Awaiting documentation to verify this and he is aware plan to pursue hospice given advanced dementia, renal failure, comorbidites and advanced age.     Review of Systems   Unable to perform ROS: Dementia     1- Pain Assessment  CPOT Facial Expression: 1-->tense  CPOT Body Movements: 2-->restlessness  CPOT Muscle Tension: 1-->tense, rigid  Ventilator Compliance/Vocalization: 2-->crying out, sobbing  CPOT Score: 6  PAINAD Breathin-->normal  PAINAD Negative Vocalization: 1-->occasional moan/groan, low speech, negative/disapproving quality  PAINAD Facial Expression: 2-->facial grimacing  PAINAD Body Language: 0-->relaxed  PAINAD Consolability: 1-->distracted or reassured by  voice/touch  PAINAD Score: 4    Past Medical History:   Diagnosis Date    Chronic kidney disease, stage 4 (severe)     Contracture of muscle ankle and foot, left     Contracture of muscle, right ankle and foot     COPD (chronic obstructive pulmonary disease)     Coronary artery disease     Dementia     Diabetes mellitus     Dysphagia     History of dermatomyositis     Hypertension     Peptic ulcer disease     Pulmonary disease     Renal insufficiency     Urinary tract infection     Venous insufficiency     Vitamin D deficiency      Past Surgical History:   Procedure Laterality Date    CHOLECYSTECTOMY      COLON SURGERY       Social History     Socioeconomic History    Marital status:    Tobacco Use    Smoking status: Former   Vaping Use    Vaping Use: Never used   Substance and Sexual Activity    Alcohol use: No    Drug use: No    Sexual activity: Defer         Current Facility-Administered Medications:     acetaminophen (TYLENOL) suppository 650 mg, 650 mg, Rectal, Q4H PRN, Isaias Alexandra MD, 650 mg at 01/24/24 0323    sennosides-docusate (PERICOLACE) 8.6-50 MG per tablet 2 tablet, 2 tablet, Oral, BID **AND** polyethylene glycol (MIRALAX) packet 17 g, 17 g, Oral, Daily PRN **AND** bisacodyl (DULCOLAX) EC tablet 5 mg, 5 mg, Oral, Daily PRN **AND** bisacodyl (DULCOLAX) suppository 10 mg, 10 mg, Rectal, Daily PRN, Erinn Gutierrez MD    cefepime 2 gm IVPB in 100 ml NS (MBP), 2,000 mg, Intravenous, Q24H, Erinn Gutierrez MD    dextrose (D50W) (25 g/50 mL) IV injection 25 g, 25 g, Intravenous, Q15 Min PRN, Isaias Alexandra MD    dextrose (GLUTOSE) oral gel 15 g, 15 g, Oral, Q15 Min PRN, Isaias Alexandra MD    dextrose 5 % 850 mL with sodium bicarbonate 8.4 % 150 mEq infusion, , Intravenous, Continuous, Sergey Knight MD, Last Rate: 150 mL/hr at 01/24/24 0803, New Bag at 01/24/24 0803    glucagon (GLUCAGEN) injection 1 mg, 1 mg, Intramuscular, Q15 Min PRN, Isaias Alexandra MD     insulin regular (humuLIN R,novoLIN R) injection 2-9 Units, 2-9 Units, Subcutaneous, Q6H, Isaias Alexandra MD, 6 Units at 01/24/24 0557    ipratropium-albuterol (DUO-NEB) nebulizer solution 3 mL, 3 mL, Nebulization, 4x Daily - RT, Erinn Gutierrez MD, 3 mL at 01/24/24 0605    norepinephrine (LEVOPHED) 8 mg in 250 mL NS infusion (premix), 0.02-0.3 mcg/kg/min, Intravenous, Titrated, Erinn Gutierrez MD, Last Rate: 12.92 mL/hr at 01/24/24 0818, 0.1 mcg/kg/min at 01/24/24 0818    sodium chloride 0.9 % flush 10 mL, 10 mL, Intravenous, Q12H, Erinn Gutierrez MD, 10 mL at 01/24/24 0803    sodium chloride 0.9 % flush 10 mL, 10 mL, Intravenous, PRN, Erinn Gutierrez MD    sodium chloride 0.9 % infusion 40 mL, 40 mL, Intravenous, PRN, Erinn Gutierrez MD    sodium chloride 0.9 % infusion, 125 mL/hr, Intravenous, Continuous, Carmine Manning Jr., MD, Last Rate: 125 mL/hr at 01/24/24 0337, 125 mL/hr at 01/24/24 0337    Current Outpatient Medications:     acetaminophen (TYLENOL) 325 MG tablet, Take 2 tablets by mouth Every 6 (Six) Hours As Needed for Mild Pain., Disp: , Rfl:     Amino Acids-Protein Hydrolys (Pro-Stat 101) liquid, Take 30 mL by mouth Every Morning., Disp: , Rfl:     amLODIPine (NORVASC) 2.5 MG tablet, Take 1 tablet by mouth Daily., Disp: , Rfl:     ascorbic acid (VITAMIN C) 500 MG tablet, Take 1 tablet by mouth 2 (Two) Times a Day., Disp: , Rfl:     atenolol (TENORMIN) 25 MG tablet, Take 1 tablet by mouth Daily., Disp: , Rfl:     Cholecalciferol 25 MCG (1000 UT) tablet, Take 1 tablet by mouth 2 (Two) Times a Day., Disp: , Rfl:     clopidogrel (PLAVIX) 75 MG tablet, Take 1 tablet by mouth Daily., Disp: , Rfl:     cycloSPORINE (RESTASIS) 0.05 % ophthalmic emulsion, Administer 1 drop to both eyes 2 (Two) Times a Day., Disp: , Rfl:     docusate sodium (COLACE) 100 MG capsule, Take 1 capsule by mouth Every Morning., Disp: , Rfl:     DULoxetine (CYMBALTA) 60 MG capsule, Take 1 capsule by mouth Daily., Disp: ,  Rfl:     Emollient (CeraVe AM SPF 30) lotion, Apply 1 application topically Daily., Disp: , Rfl:     ferrous sulfate 325 (65 FE) MG tablet, Take 1 tablet by mouth Daily With Breakfast., Disp: , Rfl:     Glucagon (Gvoke HypoPen 1-Pack) 1 MG/0.2ML solution auto-injector, Inject 1 mg under the skin into the appropriate area as directed 3 (Three) Times a Day As Needed., Disp: , Rfl:     icosapent ethyl (Vascepa) 1 g capsule capsule, Take 2 g by mouth 2 (Two) Times a Day With Meals., Disp: , Rfl:     Insulin Aspart (novoLOG) 100 UNIT/ML injection, Inject 10 Units under the skin into the appropriate area as directed 3 (Three) Times a Day Before Meals. Hold if BS is below 150, Disp: , Rfl:     insulin NPH-insulin regular (humuLIN 70/30,novoLIN 70/30) (70-30) 100 UNIT/ML injection, Inject 15 Units under the skin into the appropriate area as directed 2 (Two) Times a Day With Meals., Disp: , Rfl:     levETIRAcetam (KEPPRA) 500 MG tablet, Take 1 tablet by mouth 2 (Two) Times a Day., Disp: , Rfl:     Multiple Vitamin (TAB-A-JACKY) tablet, Take 1 tablet by mouth Every Morning., Disp: , Rfl:     nitroglycerin (NITROSTAT) 0.4 MG SL tablet, Place 1 tablet under the tongue Every 5 (Five) Minutes As Needed for Chest Pain. Take no more than 3 doses in 15 minutes., Disp: , Rfl:     pantoprazole (PROTONIX) 20 MG EC tablet, Take 1 tablet by mouth Daily., Disp: , Rfl:     polyethylene glycol (MIRALAX) packet, Take 17 g by mouth Daily As Needed (constipation)., Disp: , Rfl:     primidone (MYSOLINE) 50 MG tablet, Take 0.5 tablets by mouth Every Night., Disp: , Rfl:     sucralfate (CARAFATE) 1 g tablet, Take 1 tablet by mouth 4 (Four) Times a Day., Disp: , Rfl:     guaiFENesin (MUCINEX) 600 MG 12 hr tablet, Take 1 tablet by mouth 2 (Two) Times a Day As Needed for Cough., Disp: , Rfl:     lactulose (CHRONULAC) 10 GM/15ML solution, Take 30 mL by mouth Daily As Needed (constipation)., Disp: , Rfl:     ondansetron (ZOFRAN) 4 MG tablet, Take 1  "tablet by mouth 3 (Three) Times a Day As Needed for Nausea., Disp: , Rfl:     Allergies   Allergen Reactions    Aspirin Rash    Tetracyclines & Related Rash     I have utilized all available immediate resources to obtain, update, or review the patient's current medications (including all prescriptions, over-the-counter products, herbals, cannabis/cannabidiol products, and vitamin/mineral/dietary (nutritional) supplements) for name, route of administration, type, dose and frequency.    Intake/Output Summary (Last 24 hours) at 1/24/2024 0840  Last data filed at 1/24/2024 0517  Gross per 24 hour   Intake 6934.05 ml   Output 150 ml   Net 6784.05 ml       Physical Exam:    Diagnostics: Reviewed  /87 (BP Location: Left leg)   Pulse 83   Temp 97.7 °F (36.5 °C) (Axillary)   Resp 18   Ht 152.4 cm (60\")   Wt 68.9 kg (152 lb)   SpO2 99%   BMI 29.69 kg/m²     Vitals and nursing note reviewed.   Constitutional:       Appearance: Frail. Ill-appearing and acutely ill-appearing.      Interventions: Nasal cannula in place.      Comments: somnolent   Eyes:      Comments: Exudate bilateral lids   HENT:      Head: Normocephalic.   Pulmonary:      Effort: Pulmonary effort is normal.      Breath sounds: Decreased breath sounds present. Rales present.   Cardiovascular:      Normal rate.   Edema:     Peripheral edema absent.   Abdominal:      Palpations: Abdomen is soft.   Musculoskeletal:      Comments: Foot drop, right  Extremities straight and stiff Skin:     General: Skin is warm.      Coloration: Skin is pale.   Genitourinary:     Comments: Meza catheter in place  Neurological:      Comments: Yells out frequently and with any stimulus   Psychiatric:         Cognition and Memory: Cognition is impaired.     Patient status: Disease state: Deteriorating despite treatments.  Current Functional status: Palliative Performance Scale Score: Performance 10% based on the following measures: Ambulation: Totally bed bound, Activity " and Evidence of Disease: Unable to do any work, extensive evidence of disease, Self-Care: Total care required,  Intake: Mouth care only, LOC: Drowsy or comatose   Baseline Functional status: Palliative Performance Scale Score: Performance 30% based on the following measures: Ambulation: Totally bed bound, Activity and Evidence of Disease: Unable to do any work, extensive evidence of disease, Self-Care: Total care required,  Intake: Reduced, LOC: Full, drowsy or confusion   Nutritional status: Albumin 3.0 Body mass index is 29.69 kg/m².         Hospital Problem List      Hypothermia    Impression/Problem List:    Dementia, atrophy and cerebral microvascular disease per MRI brain 2019  Acute kidney injury on chronic kidney disease, stage 4 baseline creatinine 2.3  Metabolic acidosis  Elevated BNP  Anemia of chronic disease  Hypothermia on baseline hypertension  Thrombocytopenia  Transaminitis  Type 2 diabetes mellitus  Contracture of muscle, right ankle and foot  COPD  Coronary artery disease  Diastolic dysfunction  Dysphagia  Peptic ulcer disease  Seizure disorder-on Keppra  Venous insufficiency  Advanced age     Recommendations/Plan:  1. plan: Goals of care include No CPR/Full support.    Family support: The patient lacks significant family support..  Advance Directives: Advance directive on file dated 5/14/2003     POA/Healthcare surrogate-Guardian, Hilario Armstrong.    2.  Palliative care encounter  - Prognosis is poor long-term secondary to advanced dementia, acute kidney injury on chronic kidney disease stage IV, multiple comorbidities, and advanced age.  -Guardian appears to have good prognostic awareness.     -[facility] of the [facility], guardian-Hilario Armstrong and resident of [facility].    -EMS/DNR order dated 12/13/2016.  Patient does have a living will directive dated May 14, 2003 stating wishes regarding life-prolonging treatment and artificial provided nutrition and hydration if patient no  longer has decisional capacity, have a terminal condition, or become permanently unconscious to include direct that treatment be withheld or withdrawn, and that I be permitted to die naturally with only the administration of medicine or the performance of any medical treatment deemed necessary to alleviate pain.  Authorized withholding and withdrawal of artificially provided food, water, or other artificially provided nourishment of foods.  Do not authorize a giving of all or any part of my body upon death.     -Placed on BiPAP, luna hugger, IV antibiotics, Levophed drip, and IVFs.  -Pulmonology and nephrology consulted.    -Nephrology does not recommend dialysis because of advanced dementia, multiple comorbidities conditions, and poor hemodynamics.    -Pulmonology has signed off.       1/24-recommending comfort care/hospice. Guardian aware of recommendation. Documentation reviewed and verified with legal. CODE STATUS changes to No CPR/Comfort care.  -Will plan to complete a MOST document with guardian.  -A hospice consult placed.     3. Pain  -hydromorphone 0.25 mg x 1 now  -Scheduled morphine concentrate every 6 hours and as needed.  -Additional palliative adjuvants as needed.  -Meza catheter for comfort.      Thank you for this consult and allowing us to participate in patient's plan of care. Palliative Care Team will continue to follow patient.     Rubia Bains, APRN  1/24/2024  08:40 CST

## 2024-01-25 NOTE — PLAN OF CARE
Goal Outcome Evaluation:  Plan of Care Reviewed With: patient        Progress: no change  Outcome Evaluation: Scheduled MSIR given as ordered. PRN morphine, ativan, haldol, atropine sl, lasix iv, scop patch given this shift. Complete bed bath and linen change provided, patient did moan and scream during this, medicated as needed (see MAR). Lung congestion noted this shift, PRN lasix given as ordered. Patient has been turned q2hr. Scopolamine patch behind left ear. Skin care provided, mcknight cath care, oral care provided. Patient is currently resting without s/s of pain or air hunger. DNR bracelet in place. Oral suction at bedside if needed. Patient NPO. Safety maintained.

## 2024-01-25 NOTE — PLAN OF CARE
Goal Outcome Evaluation:Patient medicated 2 times this shift with scheduled morphine and once with atropine drops.  Patient is comfort measures. Resting comfortably at this time. Patient safety to be maintained this shift. Continue to monitor and report abnormal to provider.

## 2024-01-25 NOTE — CASE MANAGEMENT/SOCIAL WORK
Continued Stay Note  [facility]     Patient Name: Gregoria Bennett  MRN: [id number]  Today's Date: 1/25/2024    Admit Date: 1/23/2024    Plan: Comfort Care   Discharge Plan       Row Name 01/25/24 1458       Plan    Plan Comfort Care    Plan Comments Lucita with [facility] was working on hospice consult but have asked her to put it on hold as pt may pass while at [facility].                   Discharge Codes    No documentation.                       KALPESH Sarabia

## 2024-01-25 NOTE — PLAN OF CARE
Goal Outcome Evaluation:  Plan of Care Reviewed With: other (see comments)        Progress: no change  Outcome Evaluation: Pt identified at nutrition risk r/t having a PI to coccyx.   NTN has reviewed POC. Interventions align with the goal(s) to maintain comfort at this time. Did change diet to NPO to coincide with plan of care per RN. NTN/RD available upon request.

## 2024-01-25 NOTE — PROGRESS NOTES
[facility] Medicine Services  INPATIENT PROGRESS NOTE    Patient Name: Gregoria Bennett  Date of Admission: 1/23/2024  Today's Date: 01/25/24  Length of Stay: 2  Primary Care Physician: Chu Isaac MD    Subjective   Chief Complaint: Hypothermia hypotension acute on chronic renal failure  Shortness of Breath       Patient is an 78-year-old white female who is a DNR guardian of state resident of [facility] past medical history of chronic kidney disease stage IV creatinine around 2.3 history of dementia history of seizure on Keppra history of hypertension diabetes who was found poorly responsive this morning by nursing staff at the nursing home with low oxygen levels.  Oxygen levels was as low as in the low 80s.  EMS was called and upon arrival EMS did notice that the patient was poorly responsive and with oxygenation in the low 80s.  Patient was placed on a nonrebreather by EMS and transported here to the emergency room.  There is no report of a fever.  But patient has had a wet cough also according to the nursing staff.  EMS stated that when they asked her if she is hurting she pointed towards her chest.  However here in the emergency room tried to speak with her she is arousable but unable to share any information vocally.   In the ER the patient pH was showing 7.0 patient pCO2 was 35 creatinine was up to 4.5 from average of 2.3 chest x-ray was unremarkable patient was started on BiPAP UA was grossly positive CT of the head was unremarkable for any acute hemorrhage the patient had hypothermia so was started on Gildardo hugger the patient will be admitted to the ICU for acute on chronic renal failure with metabolic acidosis and metabolic encephalopathy I already spoke to nephrology Dr. Garcia and he will be seeing the patient will order ultrasound of the kidney started on bicarb drip the patient is to have blood culture will start her on cefepime for now patient is  to be placed on breathing treatment and will consult with pulmonary patient remains DNR from nursing home will be placed on SCDs for DVT prophylaxis prognosis is guarded I will hold off all her medications including blood pressure medication since her blood pressure is marginal and I will hold off her seizure medication will identify and reconcile home medications  1/24  History of advanced dementia patient is nonverbal DNR DNI state guardian, resident of [facility] past medical history of chronic kidney disease stage IV creatinine around 2.3 history of dementia history of seizure on Keppra history of hypertension diabetes admitted for hypotension hypothermia, acute pyelo, acute on CKD, US renal no hydro, BP better, metabolic acidosis, CT head -ve acute nephrology appreciated no indication of HD , PT in pain recommend palliative care consult , ? Hospice comfort care , B12 ammonia TSH -ve   1/25  The patient is hospice care only seems comfortable    Review of Systems   Respiratory:  Positive for shortness of breath.       All pertinent negatives and positives are as above. All other systems have been reviewed and are negative unless otherwise stated.     Objective    Temp:  [97.3 °F (36.3 °C)-97.8 °F (36.6 °C)] 97.6 °F (36.4 °C)  Heart Rate:  [78-87] 78  Resp:  [16-18] 16  BP: ()/(36-77) 94/36  Physical Exam  Constitutional:       Appearance: She is ill-appearing.   HENT:      Head: Normocephalic.   Eyes:      Comments: Eyes closed     Cardiovascular:      Rate and Rhythm: Normal rate and regular rhythm.   Pulmonary:      Breath sounds: Stridor present. Rhonchi present.   Abdominal:      General: Abdomen is flat.   Neurological:      Comments: Remains confused           Results Review:  I have reviewed the labs, radiology results, and diagnostic studies.    Laboratory Data:   Results from last 7 days   Lab Units 01/24/24  0549 01/23/24  1309 01/23/24  0943   WBC 10*3/mm3 15.93* 9.10 10.03  "  HEMOGLOBIN g/dL 7.3* 7.3* 8.9*   HEMATOCRIT % 21.8* 22.9* 28.2*   PLATELETS 10*3/mm3 136* 130* 137*        Results from last 7 days   Lab Units 01/24/24  0549 01/23/24  1517 01/23/24  1309 01/23/24  1005 01/23/24  0943   SODIUM mmol/L 147*  --  142  --  138   SODIUM, ARTERIAL mmol/L  --  143  --    < >  --    POTASSIUM mmol/L 3.5  --  4.6  --  5.1   CHLORIDE mmol/L 111*  --  113*  --  110*   CO2 mmol/L 18.0*  --  12.0*  --  11.0*   BUN mg/dL 130*  --  165*  --  170*   CREATININE mg/dL 4.02*  --  4.39*  --  4.50*   CALCIUM mg/dL 7.5*  --  8.2*  --  9.2   BILIRUBIN mg/dL <0.2  --   --   --  <0.2   ALK PHOS U/L 72  --   --   --  101   ALT (SGPT) U/L 47*  --   --   --  68*   AST (SGOT) U/L 30  --   --   --  36*   GLUCOSE mg/dL 242*  --  145*  --  157*    < > = values in this interval not displayed.       Culture Data:   No results found for: \"BLOODCX\", \"URINECX\", \"WOUNDCX\", \"MRSACX\", \"RESPCX\", \"STOOLCX\"    Radiology Data:   Imaging Results (Last 24 Hours)       ** No results found for the last 24 hours. **            I have reviewed the patient's current medications.     Assessment/Plan   Assessment  Active Hospital Problems    Diagnosis     **Hypothermia        Treatment Plan  In the ER the patient pH was showing 7.0 patient pCO2 was 35 creatinine was up to 4.5 from average of 2.3 chest x-ray was unremarkable patient was started on BiPAP UA was grossly positive CT of the head was unremarkable for any acute hemorrhage the patient had hypothermia so was started on Gildardo hugger the patient will be admitted to the ICU for acute on chronic renal failure with metabolic acidosis and metabolic encephalopathy I already spoke to nephrology Dr. Garcia and he will be seeing the patient will order ultrasound of the kidney started on bicarb drip the patient is to have blood culture will start her on cefepime for now patient is to be placed on breathing treatment and will consult with pulmonary patient remains DNR from nursing home " will be placed on SCDs for DVT prophylaxis prognosis is guarded I will hold off all her medications including blood pressure medication since her blood pressure is marginal and I will hold off her seizure medication will identify and reconcile home medications     Medical Decision Making  Number and Complexity of problems: 4 Acute  Differential Diagnosis: As above    Conditions and Status        Condition is worsening.     [facility] Data  External documents reviewed:   Cardiac tracing (EKG, telemetry) interpretation: Yes  Radiology interpretation: Yes  Labs reviewed: Yes  Any tests that were considered but not ordered: No     Decision rules/scores evaluated (example [id number], Wells, etc): No     Discussed with: Nursing staff     Care Planning  Shared decision making: Patient apprised of current labs, vitals, imaging and treatment plan.  They are agreeable with proceeding with plans as discussed.   Code status and discussions: DNR    Disposition  Social Determinants of Health that impact treatment or disposition: Patient is still guardian  I expect the patient to be discharged to TBD in TBD days.     Electronically signed by Erinn Gutierrez MD, 01/25/24, 11:12 CST.

## 2024-01-25 NOTE — PROGRESS NOTES
[facility] Palliative Care Services    Palliative Care Daily Progress Note   Chief complaint-follow up comfort care    Code Status:   Code Status and Medical Interventions:   Ordered at: 24 1144     Level Of Support Discussed With:    Health Care Surrogate     Code Status (Patient has no pulse and is not breathing):    No CPR (Do Not Attempt to Resuscitate)     Medical Interventions (Patient has pulse or is breathing):    Comfort Measures     Comments:    GuardianHilario      Advanced Directives: Advance Directive Status: Patient has advance directive, copy in chart   Goals of Care: Ongoing.     S: Medical record reviewed. Events noted.  Received 43 mg oral morphine equivalent in the form of scheduled morphine concentrate, as needed dosing, and morphine IV.  In addition received 1 dose of Ativan and Haldol.  Lasix x 1 provided.  A scopolamine patch is in place. Laying in bed open mouthed breathing pattern, appears comfortable. No family at bedside. Anticipating back to SNF with hospice. Attending to sign MOST.    Review of Systems   Unable to perform ROS: Dementia     Pain Assessment  CPOT Facial Expression: 1-->tense  CPOT Body Movements: 2-->restlessness  CPOT Muscle Tension: 1-->tense, rigid  Ventilator Compliance/Vocalization: 2-->crying out, sobbing  CPOT Score: 6  PAINAD Breathin-->occasional labored breathing, short period of hyperventilation  PAINAD Negative Vocalization: 0-->none  PAINAD Facial Expression: 0-->smiling or inexpressive  PAINAD Body Language: 0-->relaxed  PAINAD Consolability: 0-->no need to console  PAINAD Score: 1    O:     Intake/Output Summary (Last 24 hours) at 2024 1994  Last data filed at 2024 0425  Gross per 24 hour   Intake --   Output 1200 ml   Net -1200 ml       Diagnostics and current medications: Reviewed.      Current Facility-Administered Medications:     acetaminophen (TYLENOL) suppository 650 mg, 650 mg, Rectal, Q4H PRN,  Rubia Bains, APRN    atropine 1 % ophthalmic solution 2 drop, 2 drop, Sublingual, BID PRN, Rubia Bains APRN, 2 drop at 01/24/24 2335    [DISCONTINUED] sennosides-docusate (PERICOLACE) 8.6-50 MG per tablet 2 tablet, 2 tablet, Oral, BID **AND** [DISCONTINUED] polyethylene glycol (MIRALAX) packet 17 g, 17 g, Oral, Daily PRN **AND** [DISCONTINUED] bisacodyl (DULCOLAX) EC tablet 5 mg, 5 mg, Oral, Daily PRN **AND** bisacodyl (DULCOLAX) suppository 10 mg, 10 mg, Rectal, Daily PRN, Erinn Gutierrez MD    bisacodyl (DULCOLAX) suppository 10 mg, 10 mg, Rectal, Daily PRN, Rubia Bains, APRN    diphenhydrAMINE (BENADRYL) capsule 25 mg, 25 mg, Oral, Q6H PRN **OR** diphenhydrAMINE (BENADRYL) injection 25 mg, 25 mg, Intravenous, Q6H PRN, Rubia Bains, APRN    diphenoxylate-atropine (LOMOTIL) 2.5-0.025 MG per tablet 1 tablet, 1 tablet, Oral, Q2H PRN, Rubia Bains, APRN    furosemide (LASIX) injection 20 mg, 20 mg, Intravenous, Q6H PRN, 20 mg at 01/24/24 2144 **OR** furosemide (LASIX) injection 20 mg, 20 mg, Subcutaneous, Q6H PRN, Rubia Bains, APRN    Glycerin-Hypromellose- (ARTIFICIAL TEARS) 0.2-0.2-1 % ophthalmic solution solution 1 drop, 1 drop, Both Eyes, Q30 Min PRN, Rubia Bains, APRN    haloperidol (HALDOL) 2 MG/ML solution 1 mg, 1 mg, Sublingual, Q4H PRN **OR** haloperidol lactate (HALDOL) injection 1 mg, 1 mg, Intravenous, Q4H PRN, Rubia Bains APRN, 1 mg at 01/24/24 2231    haloperidol (HALDOL) 2 MG/ML solution 2 mg, 2 mg, Sublingual, Q4H PRN **OR** haloperidol lactate (HALDOL) injection 2 mg, 2 mg, Intravenous, Q4H PRN, Rubia Bains, APRN    ipratropium-albuterol (DUO-NEB) nebulizer solution 3 mL, 3 mL, Nebulization, Q4H PRN, Rubia Bains, APRN    LORazepam (ATIVAN) injection 0.5 mg, 0.5 mg, Intravenous, Q1H PRN **OR** LORazepam (ATIVAN) 2 MG/ML concentrated solution 0.5 mg, 0.5 mg, Sublingual, Q1H PRN, Rubia Bains, APRN    LORazepam  (ATIVAN) injection 1 mg, 1 mg, Intravenous, Q1H PRN **OR** LORazepam (ATIVAN) 2 MG/ML concentrated solution 1 mg, 1 mg, Sublingual, Q1H PRN, Bains, Rubia L, APRN    LORazepam (ATIVAN) injection 2 mg, 2 mg, Intravenous, Q1H PRN **OR** LORazepam (ATIVAN) 2 MG/ML concentrated solution 2 mg, 2 mg, Sublingual, Q1H PRN, Bains, Rubia L, APRN, 2 mg at 01/24/24 2144    morphine injection 4 mg, 4 mg, Intravenous, Q1H PRN **OR** morphine concentrated solution 10 mg, 10 mg, Sublingual, Q1H PRN, Bains, Rubia L, APRN, 10 mg at 01/24/24 2335    morphine injection 6 mg, 6 mg, Intravenous, Q1H PRN, 6 mg at 01/24/24 2218 **OR** morphine concentrated solution 20 mg, 20 mg, Sublingual, Q1H PRN, Bains, Rubia L, APRN    morphine concentrated solution 5 mg, 5 mg, Sublingual, Q1H PRN, Bains, Rubia L, APRN    morphine concentrated solution 5 mg, 5 mg, Sublingual, Q6H, Bains, Rubia L, APRN, 5 mg at 01/25/24 0118    prochlorperazine (COMPAZINE) injection 5 mg, 5 mg, Intravenous, Q6H PRN **OR** prochlorperazine (COMPAZINE) tablet 5 mg, 5 mg, Oral, Q6H PRN **OR** prochlorperazine (COMPAZINE) suppository 25 mg, 25 mg, Rectal, Q12H PRN, Bains, Rubia L, APRN    scopolamine patch 1 mg/72 hr, 1 patch, Transdermal, Q72H PRN, Bains, Rubia L, APRN, 1 patch at 01/24/24 2231    sodium chloride 0.9 % flush 10 mL, 10 mL, Intravenous, Q12H, Erinn Gutierrez MD, 10 mL at 01/24/24 2144    sodium chloride 0.9 % flush 10 mL, 10 mL, Intravenous, PRN, Erinn Gutierrez MD    sodium chloride 0.9 % infusion 40 mL, 40 mL, Intravenous, PRNMatt Fakhri, MD    Allergies   Allergen Reactions    Aspirin Rash    Tetracyclines & Related Rash     I have utilized all available immediate resources to obtain, update, or review the patient's current medications (including all prescriptions, over-the-counter products, herbals, cannabis/cannabidiol products, and vitamin/mineral/dietary (nutritional) supplements) for name, route of  "administration, type, dose and frequency.    A:    BP 99/77 (BP Location: Left arm, Patient Position: Lying)   Pulse 80   Temp 97.3 °F (36.3 °C) (Oral)   Resp 16   Ht 152.4 cm (60\")   Wt 68.9 kg (152 lb)   SpO2 94%   BMI 29.69 kg/m²     Vitals and nursing note reviewed.   Constitutional:       Appearance: Frail. Ill-appearing and acutely ill-appearing.      Interventions: Room air  HENT:      Head: Normocephalic.   Pulmonary:      Effort: Pulmonary effort is normal.   Cardiovascular:      Normal rate.   Edema:     Peripheral edema absent.   Abdominal:      Palpations: Abdomen is soft.   Musculoskeletal:      Comments: Foot drop, right  Skin:     General: Skin is warm.      Coloration: Skin is pale.   Genitourinary:     Comments: Meza catheter in place  Neurological:      Comments: lethargic     Patient status: Disease state: Deteriorating despite treatments.  Current Functional status: Palliative Performance Scale Score: Performance 10% based on the following measures: Ambulation: Totally bed bound, Activity and Evidence of Disease: Unable to do any work, extensive evidence of disease, Self-Care: Total care required,  Intake: Mouth care only, LOC: Drowsy or comatose   Baseline Functional status: Palliative Performance Scale Score: Performance 30% based on the following measures: Ambulation: Totally bed bound, Activity and Evidence of Disease: Unable to do any work, extensive evidence of disease, Self-Care: Total care required,  Intake: Reduced, LOC: Full, drowsy or confusion   Nutritional status: Albumin 3.0 Body mass index is 29.69 kg/m².      Hospital Problem List      Hypothermia     Impression/Problem List:     Dementia, atrophy and cerebral microvascular disease per MRI brain 2019  Acute kidney injury on chronic kidney disease, stage 4 baseline creatinine 2.3  Metabolic acidosis  Elevated BNP  Anemia of chronic disease  Hypothermia on baseline hypertension  Thrombocytopenia  Transaminitis  Type 2 " diabetes mellitus  Contracture of muscle, right ankle and foot  COPD  Coronary artery disease  Diastolic dysfunction  Dysphagia  Peptic ulcer disease  Seizure disorder-on Keppra  Venous insufficiency  Advanced age      Recommendations/Plan:  1. plan: Goals of care include No CPR/Full support.     Family support: The patient lacks significant family support..  Advance Directives: Advance directive on file dated 5/14/2003     POA/Healthcare surrogate-Guardian, Hilario Armstrong.     2.  Palliative care encounter  - Prognosis is poor long-term secondary to advanced dementia, acute kidney injury on chronic kidney disease stage IV, multiple comorbidities, and advanced age.  -Guardian appears to have good prognostic awareness.      -[facility]unningham of [facility], guardian-Hilario Armstrong and resident of [facility].     -EMS/DNR order dated 12/13/2016.  Patient does have a living will directive dated May 14, 2003 stating wishes regarding life-prolonging treatment and artificial provided nutrition and hydration if patient no longer has decisional capacity, have a terminal condition, or become permanently unconscious to include direct that treatment be withheld or withdrawn, and that I be permitted to die naturally with only the administration of medicine or the performance of any medical treatment deemed necessary to alleviate pain.  Authorized withholding and withdrawal of artificially provided food, water, or other artificially provided nourishment of foods.  Do not authorize a giving of all or any part of my body upon death.      -Placed on BiPAP, luna hugger, IV antibiotics, Levophed drip, and IVFs.  -Pulmonology and nephrology consulted.    -Nephrology does not recommend dialysis because of advanced dementia, multiple comorbidities conditions, and poor hemodynamics.    -Pulmonology has signed off.        1/24-recommending comfort care/hospice. Guardian aware of recommendation. Documentation reviewed and verified  with legal. CODE STATUS changes to No CPR/Comfort care.  -Will plan to complete a MOST document with guardian.  -A hospice consult placed.     1/25-CODE STATUS changes to No CPR/Comfort once court-appointed documentation provided by guardian.  -plan back to SNF with hospice   -A MOST document reviewed and initiated.  Attending to complete.     3. Pain  -Scheduled morphine concentrate every 6 hours and as needed.  -Additional palliative adjuvants as needed.  -Meza catheter for comfort.        Thank you for allowing us to participate in patient's plan of care. Palliative Care Team will continue to follow patient.     Rubia Bains, APRN  1/25/2024  07:53 CST

## 2024-01-27 NOTE — DISCHARGE SUMMARY
[facility] Medicine Services  DISCHARGE SUMMARY       Date of Admission: 1/23/2024  Date of Discharge:  1/27/2024  Primary Care Physician: Chu Isaac MD    Presenting Problem/History of Present Illness:  Patient is an 78-year-old white female who is a DNR guardian of state resident of [facility] past medical history of chronic kidney disease stage IV creatinine around 2.3 history of dementia history of seizure on Keppra history of hypertension diabetes who was found poorly responsive this morning by nursing staff at the nursing home with low oxygen levels.  Oxygen levels was as low as in the low 80s.  EMS was called and upon arrival EMS did notice that the patient was poorly responsive and with oxygenation in the low 80s.  Patient was placed on a nonrebreather by EMS and transported here to the emergency room.  There is no report of a fever.  But patient has had a wet cough also according to the nursing staff.  EMS stated that when they asked her if she is hurting she pointed towards her chest.  However here in the emergency room tried to speak with her she is arousable but unable to share any information vocally.   In the ER the patient pH was showing 7.0 patient pCO2 was 35 creatinine was up to 4.5 from average of 2.3 chest x-ray was unremarkable patient was started on BiPAP UA was grossly positive CT of the head was unremarkable for any acute hemorrhage the patient had hypothermia so was started on Gildardo hugger the patient will be admitted to the ICU for acute on chronic renal failure with metabolic acidosis and metabolic encephalopathy I already spoke to nephrology Dr. Garcia and he will be seeing the patient will order ultrasound of the kidney started on bicarb drip the patient is to have blood culture will start her on cefepime for now patient is to be placed on breathing treatment and will consult with pulmonary patient remains DNR from [facility] home  will be placed on SCDs for DVT prophylaxis prognosis is guarded I will hold off all her medications including blood pressure medication since her blood pressure is marginal and I will hold off her seizure medication will identify and reconcile home medications    History of advanced dementia patient is nonverbal DNR DNI state guardian, resident of [facility] past medical history of chronic kidney disease stage IV creatinine around 2.3 history of dementia history of seizure on Keppra history of hypertension diabetes admitted for hypotension hypothermia, acute pyelo, acute on CKD, US renal no hydro, BP better, metabolic acidosis, CT head -ve acute nephrology appreciated no indication of HD , PT in pain recommend palliative care consult , ? Hospice comfort care , B12 ammonia TSH -ve     The patient is hospice care only seems comfortable, patient then  and was pronounced dead     Final Discharge Diagnoses:  Active Hospital Problems    Diagnosis     **Hypothermia        Consults: nephrology , pulmonary     Procedures Performed: no     Pertinent Test Results:   Results for orders placed during the hospital encounter of 18    Adult Transthoracic Echo Complete W/ Cont if Necessary Per Protocol    Interpretation Summary  · Left ventricular systolic function is normal. Estimated EF = 55%.  · Left ventricular diastolic dysfunction (grade II) consistent with pseudonormalization.  · Normal right ventricular cavity size and systolic function noted.  · There is aortic valve sclerosis without significant stenosis.      Imaging Results (All)       Procedure Component Value Units Date/Time    US Renal Bilateral [[id number]] Collected: 24 1448     Updated: 24 1456    Narrative:      EXAMINATION: US RENAL BILATERAL- 2024 2:48 PM     HISTORY: acute on CKD; T68.XXXA-Hypothermia, initial encounter;  E87.20-Acidosis, unspecified; N17.0-Acute kidney failure with tubular  necrosis;  N18.4-Chronic kidney disease, stage 4 (severe); N18.9-Chronic  kidney disease, unspecified; Z86.2-Personal history of diseases of the  blood and blood-forming organs and certain disorders involving the  immune mechanism.     REPORT: Sonographic images of the kidneys were obtained in the  transverse and longitudinal dimensions.     COMPARISON: CT abdomen and pelvis 9/16/2023.     The technologist reports that the examination is very limited due to the  patient's body habitus and overlying bowel gas, the patient's inability  to cooperate with positioning and breath hold.     The right kidney measures approximately 7.9 x 4.2 x 3.5 cm and has a  grossly normal morphology and cortical echogenicity, there is partial  obscuration of the inferior pole. No mass or hydronephrosis is  identified.     The left kidney measures 7.2 x 3.3 x 4.1 cm and is unremarkable. Color  Doppler images demonstrate vascular flow within both kidneys.       Impression:      Limited exam as detailed above with bilateral renal atrophy,  no evidence of hydronephrosis. Both kidneys demonstrate relatively  normal cortical echogenicity.     This report was signed and finalized on 1/23/2024 2:53 PM by Dr. Emir Guardado MD.       CT Head Without Contrast [[id number]] Collected: 01/23/24 1116     Updated: 01/23/24 1122    Narrative:      Exam: CT HEAD WO CONTRAST- 1/23/2024 10:11 AM     HISTORY: altered mental state; T68.XXXA-Hypothermia, initial encounter;  E87.20-Acidosis, unspecified; N17.0-Acute kidney failure with tubular  necrosis; N18.4-Chronic kidney disease, stage 4 (severe); N18.9-Chronic  kidney disease, unspecified; Z86.2-Personal history of diseases of the  blood and blood-forming organs and certain disorders involving the  immune mechanism       DOSE LENGTH PRODUCT: 838.32 mGy.cm mGy cm. Automated exposure control  was also utilized to decrease patient radiation dose.     Technique:  Helically acquired CT of the brain without IV contrast  was performed.  Sagittal and coronal reformations are also provided for review. Soft  tissue and bone kernels are available for interpretation.     Comparison: 12/16/2019.     Findings:     Ventricles and extra-axial CSF spaces are normal in size.     No intraparenchymal or extra-axial hemorrhage.     Gray-white matter differentiation is preserved.     Orbits are grossly unremarkable. Paranasal sinuses are grossly clear.  Mastoid air cells are grossly clear.     No suspicious calvarial or extracranial soft tissue abnormality.     Other: Vascular calcifications.       Impression:      Impression:       No acute intracranial hemorrhage, mass effect, or large vascular  territorial infarct.     This report was signed and finalized on 1/23/2024 11:18 AM by Fletcher Ramirez.       XR Chest 1 View [[id number]] Collected: 01/23/24 0958     Updated: 01/23/24 1002    Narrative:      EXAM: XR CHEST 1 VW- 1/23/2024 8:45 AM     HISTORY: Positive for cough, shortness of breath and low oxygen       COMPARISON: 12/18/2019.     TECHNIQUE: Single frontal radiograph of the chest was obtained.     FINDINGS:     Limited exam due to patient rotation.     Support Devices: None.     Cardiac and Mediastinal Silhouettes: Normal.     Lungs/Pleura: No focal consolidation. No sizable pleural effusion. No  visible pneumothorax.     Osseous structures: No acute osseous finding.     Other: None.       Impression:         No acute cardiopulmonary abnormality.           This report was signed and finalized on 1/23/2024 9:59 AM by Fletcher Ramirez.             LAB RESULTS:      Lab 01/24/24  0549 01/23/24  1309 01/23/24  0943   WBC 15.93* 9.10 10.03   HEMOGLOBIN 7.3* 7.3* 8.9*   HEMATOCRIT 21.8* 22.9* 28.2*   PLATELETS 136* 130* 137*   NEUTROS ABS 13.22* 7.74* 8.19*   IMMATURE GRANS (ABS)  --  0.07* 0.08*   LYMPHS ABS  --  0.44* 0.77   MONOS ABS  --  0.83 0.93*   EOS ABS  --  0.02 0.04   MCV 87.9 90.9 90.7   LACTATE  --   --  0.7   PROTIME  --   --   15.7*   APTT  --   --  67.4*         Lab 01/24/24  0549 01/23/24  1517 01/23/24  1309 01/23/24  1005 01/23/24  0943   SODIUM 147*  --  142  --  138   SODIUM, ARTERIAL  --  143  --  141  --    POTASSIUM 3.5  --  4.6  --  5.1   CHLORIDE 111*  --  113*  --  110*   CO2 18.0*  --  12.0*  --  11.0*   ANION GAP 18.0*  --  17.0*  --  17.0*   *  --  165*  --  170*   CREATININE 4.02*  --  4.39*  --  4.50*   EGFR 10.7*  --  9.7*  --  9.4*   GLUCOSE 242*  --  145*  --  157*   CALCIUM 7.5*  --  8.2*  --  9.2   MAGNESIUM  --   --   --   --  2.2   TSH  --   --   --   --  3.680         Lab 01/24/24  0549 01/23/24  0943   TOTAL PROTEIN 5.1* 6.6   ALBUMIN 3.0* 3.8   GLOBULIN 2.1 2.8   ALT (SGPT) 47* 68*   AST (SGOT) 30 36*   BILIRUBIN <0.2 <0.2   ALK PHOS 72 101         Lab 01/23/24  1309 01/23/24  0943   PROBNP  --  4,024.0*   HSTROP T 66* 68*   PROTIME  --  15.7*   INR  --  1.23*             Lab 01/23/24  1309   IRON 105   IRON SATURATION (TSAT) 38   TIBC 274*   TRANSFERRIN 184*   VITAMIN B 12 1,155*         Lab 01/24/24  0121 01/23/24  1517 01/23/24  1005   PH, ARTERIAL 7.308* 7.143* 7.070*   PCO2, ARTERIAL 27.6* 32.0* 34.9*   PO2 ART 98.1 83.9 90.6   O2 SATURATION ART 98.4 96.3 96.2   HCO3 ART 13.8* 11.0* 10.1*   BASE EXCESS ART -11.4* -16.7* -18.8*   CARBOXYHEMOGLOBIN  --  1.2 1.2     Brief Urine Lab Results  (Last result in the past 365 days)        Color   Clarity   Blood   Leuk Est   Nitrite   Protein   CREAT   Urine HCG        01/24/24 0957             19.2               Microbiology Results (last 10 days)       Procedure Component Value - Date/Time    Blood Culture - Blood, Arm, Right [[id number]]  (Normal) Collected: 01/23/24 1047    Lab Status: Preliminary result Specimen: Blood from Arm, Right Updated: 01/26/24 1101     Blood Culture No growth at 3 days    Respiratory Panel PCR w/COVID-19(SARS-CoV-2) ARIANA/GREG/CAMRON/PAD/COR/ANTONELLA In-House, NP Swab in UTM/VTM, 2 HR TAT - Swab, Nasopharynx [[id number]]  (Normal) Collected:  01/23/24 1006    Lab Status: Final result Specimen: Swab from Nasopharynx Updated: 01/23/24 1103     ADENOVIRUS, PCR Not Detected     Coronavirus 229E Not Detected     Coronavirus HKU1 Not Detected     Coronavirus NL63 Not Detected     Coronavirus OC43 Not Detected     COVID19 Not Detected     Human Metapneumovirus Not Detected     Human Rhinovirus/Enterovirus Not Detected     Influenza A PCR Not Detected     Influenza B PCR Not Detected     Parainfluenza Virus 1 Not Detected     Parainfluenza Virus 2 Not Detected     Parainfluenza Virus 3 Not Detected     Parainfluenza Virus 4 Not Detected     RSV, PCR Not Detected     Bordetella pertussis pcr Not Detected     Bordetella parapertussis PCR Not Detected     Chlamydophila pneumoniae PCR Not Detected     Mycoplasma pneumo by PCR Not Detected    Narrative:      In the setting of a positive respiratory panel with a viral infection PLUS a negative procalcitonin without other underlying concern for bacterial infection, consider observing off antibiotics or discontinuation of antibiotics and continue supportive care. If the respiratory panel is positive for atypical bacterial infection (Bordetella pertussis, Chlamydophila pneumoniae, or Mycoplasma pneumoniae), consider antibiotic de-escalation to target atypical bacterial infection.    Urine Culture - Urine, Urine, Catheter [[id number]]  (Abnormal)  (Susceptibility) Collected: 01/23/24 1006    Lab Status: Final result Specimen: Urine, Catheter Updated: 01/25/24 0226     Urine Culture >100,000 CFU/mL Proteus mirabilis    Narrative:      Colonization of the urinary tract without infection is common. Treatment is discouraged unless the patient is symptomatic, pregnant, or undergoing an invasive urologic procedure.    Susceptibility        Proteus mirabilis      JOHNNY      Amikacin Susceptible      Amoxicillin + Clavulanate Susceptible      Ampicillin Susceptible      Ampicillin + Sulbactam Susceptible      Cefazolin Susceptible       Cefepime Susceptible      Ceftazidime Susceptible      Ceftriaxone Susceptible      Gentamicin Resistant      Levofloxacin Resistant      Nitrofurantoin Resistant      Piperacillin + Tazobactam Susceptible      Tobramycin Intermediate      Trimethoprim + Sulfamethoxazole Resistant                           Blood Culture - Blood, Arm, Right [[id number]]  (Normal) Collected: 01/23/24 0943    Lab Status: Preliminary result Specimen: Blood from Arm, Right Updated: 01/26/24 1001     Blood Culture No growth at 3 days            Hospital Course:   Patient is an 78-year-old white female who is a DNR guardian of state resident of [facility] past medical history of chronic kidney disease stage IV creatinine around 2.3 history of dementia history of seizure on Keppra history of hypertension diabetes who was found poorly responsive this morning by nursing staff at the nursing home with low oxygen levels.  Oxygen levels was as low as in the low 80s.  EMS was called and upon arrival EMS did notice that the patient was poorly responsive and with oxygenation in the low 80s.  Patient was placed on a nonrebreather by EMS and transported here to the emergency room.  There is no report of a fever.  But patient has had a wet cough also according to the nursing staff.  EMS stated that when they asked her if she is hurting she pointed towards her chest.  However here in the emergency room tried to speak with her she is arousable but unable to share any information vocally.   In the ER the patient pH was showing 7.0 patient pCO2 was 35 creatinine was up to 4.5 from average of 2.3 chest x-ray was unremarkable patient was started on BiPAP UA was grossly positive CT of the head was unremarkable for any acute hemorrhage the patient had hypothermia so was started on Gildardo hugger the patient will be admitted to the ICU for acute on chronic renal failure with metabolic acidosis and metabolic encephalopathy I already spoke to  "nephrology Dr. Garcia and he will be seeing the patient will order ultrasound of the kidney started on bicarb drip the patient is to have blood culture will start her on cefepime for now patient is to be placed on breathing treatment and will consult with pulmonary patient remains DNR from nursing home will be placed on SCDs for DVT prophylaxis prognosis is guarded I will hold off all her medications including blood pressure medication since her blood pressure is marginal and I will hold off her seizure medication will identify and reconcile home medications    History of advanced dementia patient is nonverbal DNR DNI state guardian, resident of [facility] past medical history of chronic kidney disease stage IV creatinine around 2.3 history of dementia history of seizure on Keppra history of hypertension diabetes admitted for hypotension hypothermia, acute pyelo, acute on CKD, US renal no hydro, BP better, metabolic acidosis, CT head -ve acute nephrology appreciated no indication of HD , PT in pain recommend palliative care consult , ? Hospice comfort care , B12 ammonia TSH -ve     The patient is hospice care only seems comfortable    Physical Exam on Discharge:  BP (!) 87/35 (BP Location: Left arm, Patient Position: Lying)   Pulse 79   Temp 98.1 °F (36.7 °C) (Axillary)   Resp (!) 7   Ht 152.4 cm (60\")   Wt 68.9 kg (152 lb)   SpO2 93%   BMI 29.69 kg/m²   Physical Exam      Discharge Disposition:      Discharge Medications:     Discharge Medications        ASK your doctor about these medications        Instructions Start Date   acetaminophen 325 MG tablet  Commonly known as: TYLENOL   650 mg, Oral, Every 6 Hours PRN      amLODIPine 2.5 MG tablet  Commonly known as: NORVASC   2.5 mg, Oral, Every 24 Hours Scheduled      ascorbic acid 500 MG tablet  Commonly known as: VITAMIN C   500 mg, Oral, 2 Times Daily      atenolol 25 MG tablet  Commonly known as: TENORMIN   25 mg, Oral, Daily    "   CeraVe AM SPF 30 lotion   1 application , Apply externally, Daily      cholecalciferol 25 MCG (1000 UT) tablet  Commonly known as: VITAMIN D3   1,000 Units, Oral, 2 Times Daily      clopidogrel 75 MG tablet  Commonly known as: PLAVIX   75 mg, Oral, Daily      cycloSPORINE 0.05 % ophthalmic emulsion  Commonly known as: RESTASIS   1 drop, Both Eyes, 2 Times Daily      docusate sodium 100 MG capsule  Commonly known as: COLACE   100 mg, Oral, Every Morning      DULoxetine 60 MG capsule  Commonly known as: CYMBALTA   60 mg, Oral, Daily      ferrous sulfate 325 (65 FE) MG tablet   325 mg, Oral, Daily With Breakfast      guaiFENesin 600 MG 12 hr tablet  Commonly known as: MUCINEX   600 mg, Oral, 2 Times Daily PRN      Gvoke HypoPen 1-Pack 1 MG/0.2ML solution auto-injector  Generic drug: Glucagon   1 mg, Subcutaneous, 3 Times Daily PRN      Insulin Aspart 100 UNIT/ML injection  Commonly known as: novoLOG   10 Units, Subcutaneous, 3 Times Daily Before Meals, Hold if BS is below 150      insulin NPH-insulin regular (70-30) 100 UNIT/ML injection  Commonly known as: humuLIN 70/30,novoLIN 70/30   15 Units, Subcutaneous, 2 Times Daily With Meals      lactulose 10 GM/15ML solution  Commonly known as: CHRONULAC   20 g, Oral, Daily PRN      levETIRAcetam 500 MG tablet  Commonly known as: KEPPRA   500 mg, Oral, 2 Times Daily      multivitamin with minerals tablet tablet  Generic drug: multivitamin with minerals   1 tablet, Oral, Every Morning      nitroglycerin 0.4 MG SL tablet  Commonly known as: NITROSTAT   0.4 mg, Sublingual, Every 5 Minutes PRN, Take no more than 3 doses in 15 minutes.       ondansetron 4 MG tablet  Commonly known as: ZOFRAN   4 mg, Oral, 3 Times Daily PRN      pantoprazole 20 MG EC tablet  Commonly known as: PROTONIX   20 mg, Oral, Daily      polyethylene glycol packet  Commonly known as: MIRALAX   17 g, Oral, Daily PRN      primidone 50 MG tablet  Commonly known as: MYSOLINE   25 mg, Oral, Nightly       Pro-Stat 101 liquid   30 mL, Oral, Every Morning      sucralfate 1 g tablet  Commonly known as: CARAFATE   1 g, Oral, 4 Times Daily      Vascepa 1 g capsule capsule  Generic drug: icosapent ethyl   2 g, Oral, 2 Times Daily With Meals                   Electronically signed by Erinn Gutierrez MD, 01/27/24, 07:37 CST.    Time: 45 minutes.

## 2024-01-28 LAB
BACTERIA SPEC AEROBE CULT: NORMAL
BACTERIA SPEC AEROBE CULT: NORMAL

## 2025-04-04 NOTE — PLAN OF CARE
Problem: Falls - Risk of:  Goal: Will remain free from falls  Will remain free from falls   Outcome: Ongoing    Goal: Absence of physical injury  Absence of physical injury   Outcome: Ongoing no

## (undated) DEVICE — ENDO KIT,LOURDES HOSPITAL: Brand: MEDLINE INDUSTRIES, INC.

## (undated) DEVICE — FORCEPS BX L240CM JAW DIA2.4MM ORNG L CAP W/ NDL DISP RAD